# Patient Record
Sex: MALE | Race: WHITE | NOT HISPANIC OR LATINO | Employment: OTHER | URBAN - METROPOLITAN AREA
[De-identification: names, ages, dates, MRNs, and addresses within clinical notes are randomized per-mention and may not be internally consistent; named-entity substitution may affect disease eponyms.]

---

## 2018-06-01 ENCOUNTER — TRANSCRIBE ORDERS (OUTPATIENT)
Dept: LAB | Facility: HOSPITAL | Age: 73
End: 2018-06-01

## 2018-06-01 ENCOUNTER — APPOINTMENT (OUTPATIENT)
Dept: LAB | Facility: HOSPITAL | Age: 73
End: 2018-06-01
Attending: INTERNAL MEDICINE
Payer: MEDICARE

## 2018-06-01 DIAGNOSIS — E78.5 HYPERLIPOPROTEINEMIA: ICD-10-CM

## 2018-06-01 DIAGNOSIS — J45.909 ALLERGIC ASTHMA WITH STATED CAUSE: ICD-10-CM

## 2018-06-01 DIAGNOSIS — E78.5 HYPERLIPOPROTEINEMIA: Primary | ICD-10-CM

## 2018-06-01 DIAGNOSIS — D53.9 SIMPLE CHRONIC ANEMIA: ICD-10-CM

## 2018-06-01 LAB
ALBUMIN SERPL-MCNC: 3.6 G/DL (ref 3.4–5)
ALP SERPL-CCNC: 45 IU/L (ref 35–126)
ALT SERPL-CCNC: 28 IU/L (ref 16–63)
ANION GAP SERPL CALC-SCNC: 6 MEQ/L (ref 3–15)
AST SERPL-CCNC: 41 IU/L (ref 15–41)
BASOPHILS # BLD: 0.01 K/UL (ref 0.01–0.1)
BASOPHILS NFR BLD: 0.2 %
BILIRUB SERPL-MCNC: 0.9 MG/DL (ref 0.3–1.2)
BUN SERPL-MCNC: 20 MG/DL (ref 8–20)
CALCIUM SERPL-MCNC: 8.8 MG/DL (ref 8.9–10.3)
CHLORIDE SERPL-SCNC: 104 MMOL/L (ref 98–109)
CHOLEST SERPL-MCNC: 148 MG/DL
CO2 SERPL-SCNC: 26 MMOL/L (ref 22–32)
CREAT SERPL-MCNC: 1 MG/DL (ref 0.8–1.3)
DIFFERENTIAL METHOD BLD: ABNORMAL
EOSINOPHIL # BLD: 0.02 K/UL (ref 0.04–0.54)
EOSINOPHIL NFR BLD: 0.3 %
ERYTHROCYTE [DISTWIDTH] IN BLOOD BY AUTOMATED COUNT: 13.7 % (ref 11.6–14.4)
GFR SERPL CREATININE-BSD FRML MDRD: >60 ML/MIN/1.73M*2
GLUCOSE SERPL-MCNC: 88 MG/DL (ref 70–99)
HCT VFR BLDCO AUTO: 35.7 % (ref 40–51)
HDLC SERPL-MCNC: 55 MG/DL
HDLC SERPL: 2.7 {RATIO}
HGB BLD-MCNC: 12.3 G/DL (ref 13.7–17.5)
IMM GRANULOCYTES # BLD AUTO: 0.02 K/UL (ref 0–0.08)
IMM GRANULOCYTES NFR BLD AUTO: 0.3 %
LDLC SERPL CALC-MCNC: 85 MG/DL
LYMPHOCYTES # BLD: 1.32 K/UL (ref 1.2–3.5)
LYMPHOCYTES NFR BLD: 20.3 %
MCH RBC QN AUTO: 32.3 PG (ref 28–33.2)
MCHC RBC AUTO-ENTMCNC: 34.5 G/DL (ref 32.2–36.5)
MCV RBC AUTO: 93.7 FL (ref 83–98)
MONOCYTES # BLD: 0.42 K/UL (ref 0.3–1)
MONOCYTES NFR BLD: 6.5 %
NEUTROPHILS # BLD: 4.72 K/UL (ref 1.7–7)
NEUTS SEG NFR BLD: 72.4 %
NONHDLC SERPL-MCNC: 93 MG/DL
NRBC BLD-RTO: 0 %
PDW BLD AUTO: 10.4 FL (ref 9.4–12.4)
PLATELET # BLD AUTO: 159 K/UL (ref 150–350)
POTASSIUM SERPL-SCNC: 3.8 MMOL/L (ref 3.6–5.1)
PROT SERPL-MCNC: 6.6 G/DL (ref 6–8.2)
PSA SERPL-MCNC: 2.26 NG/ML
RBC # BLD AUTO: 3.81 M/UL (ref 4.5–5.8)
SODIUM SERPL-SCNC: 136 MMOL/L (ref 136–144)
TRIGL SERPL-MCNC: 41 MG/DL (ref 30–149)
WBC # BLD AUTO: 6.51 K/UL (ref 3.8–10.5)

## 2018-06-01 PROCEDURE — 85025 COMPLETE CBC W/AUTO DIFF WBC: CPT

## 2018-06-01 PROCEDURE — 80061 LIPID PANEL: CPT

## 2018-06-01 PROCEDURE — 36415 COLL VENOUS BLD VENIPUNCTURE: CPT

## 2018-06-01 PROCEDURE — 84132 ASSAY OF SERUM POTASSIUM: CPT

## 2018-06-01 PROCEDURE — G0103 PSA SCREENING: HCPCS

## 2018-06-05 ENCOUNTER — LAB REQUISITION (OUTPATIENT)
Dept: LAB | Facility: HOSPITAL | Age: 73
End: 2018-06-05
Attending: INTERNAL MEDICINE
Payer: MEDICARE

## 2018-06-05 DIAGNOSIS — D64.9 ANEMIA: ICD-10-CM

## 2018-06-05 DIAGNOSIS — E07.9 DISORDER OF THYROID: ICD-10-CM

## 2018-06-05 DIAGNOSIS — D47.2 MONOCLONAL GAMMOPATHY: ICD-10-CM

## 2018-06-05 PROCEDURE — 36415 COLL VENOUS BLD VENIPUNCTURE: CPT | Performed by: INTERNAL MEDICINE

## 2018-06-05 PROCEDURE — 84165 PROTEIN E-PHORESIS SERUM: CPT | Performed by: INTERNAL MEDICINE

## 2018-06-06 LAB — EXTRA TUBES HOLD SPECIMEN: NORMAL

## 2018-06-07 LAB
ALBUMIN MFR UR ELPH: 46.3 % (ref 48–62)
ALBUMIN SERPL ELPH-MCNC: 3.33 G/DL (ref 3.2–4.5)
ALBUMIN/GLOB SERPL: 0.9 {RATIO} (ref 1.1–2.1)
ALPHA1 GLOB MFR SERPL ELPH: 2.9 % (ref 2.1–4.8)
ALPHA1 GLOB SERPL ELPH-MCNC: 0.21 G/DL (ref 0.15–0.32)
ALPHA2 GLOB MFR UR ELPH: 13.8 % (ref 9.7–16.2)
ALPHA2 GLOB SERPL ELPH-MCNC: 0.99 G/DL (ref 0.7–1.1)
B-GLOBULIN SERPL ELPH-MCNC: 0.7 G/DL (ref 0.73–1.3)
BETA1 GLOB MFR UR ELPH: 9.8 % (ref 10.8–17.9)
GAMMA GLOB MFR UR ELPH: 27.3 % (ref 9–23)
GAMMA GLOB SERPL ELPH-MCNC: 1.97 G/DL (ref 0.6–1.6)
M PROTEIN MFR SERPL ELPH: 24.5 %
M PROTEIN SERPL ELPH-MCNC: 1.76 G/DL
PROT PATTERN SERPL ELPH-IMP: NORMAL
PROT SERPL-MCNC: 7.2 G/DL (ref 6–8.2)

## 2018-07-17 ENCOUNTER — TRANSCRIBE ORDERS (OUTPATIENT)
Dept: SCHEDULING | Age: 73
End: 2018-07-17

## 2018-07-17 DIAGNOSIS — R31.0 GROSS HEMATURIA: Primary | ICD-10-CM

## 2018-10-30 ENCOUNTER — LAB REQUISITION (OUTPATIENT)
Dept: LAB | Facility: HOSPITAL | Age: 73
End: 2018-10-30
Attending: INTERNAL MEDICINE
Payer: MEDICARE

## 2018-10-30 DIAGNOSIS — E07.9 DISORDER OF THYROID: ICD-10-CM

## 2018-10-30 DIAGNOSIS — D47.2 MONOCLONAL GAMMOPATHY: ICD-10-CM

## 2018-10-30 DIAGNOSIS — D64.9 ANEMIA: ICD-10-CM

## 2018-10-30 LAB
ALBUMIN SERPL-MCNC: 3.6 G/DL (ref 3.4–5)
ALP SERPL-CCNC: 54 IU/L (ref 35–126)
ALT SERPL-CCNC: 26 IU/L (ref 16–63)
ANION GAP SERPL CALC-SCNC: 6 MEQ/L (ref 3–15)
AST SERPL-CCNC: 41 IU/L (ref 15–41)
BASOPHILS # BLD: 0.01 K/UL (ref 0.01–0.1)
BASOPHILS NFR BLD: 0.2 %
BILIRUB SERPL-MCNC: 0.7 MG/DL (ref 0.3–1.2)
BUN SERPL-MCNC: 15 MG/DL (ref 8–20)
CALCIUM SERPL-MCNC: 9.2 MG/DL (ref 8.9–10.3)
CHLORIDE SERPL-SCNC: 104 MEQ/L (ref 98–109)
CO2 SERPL-SCNC: 27 MEQ/L (ref 22–32)
CREAT SERPL-MCNC: 1 MG/DL (ref 0.8–1.3)
DIFFERENTIAL METHOD BLD: NORMAL
EOSINOPHIL # BLD: 0.06 K/UL (ref 0.04–0.54)
EOSINOPHIL NFR BLD: 1.1 %
ERYTHROCYTE [DISTWIDTH] IN BLOOD BY AUTOMATED COUNT: 13.8 % (ref 11.6–14.4)
GFR SERPL CREATININE-BSD FRML MDRD: >60 ML/MIN/1.73M*2
GLUCOSE SERPL-MCNC: 103 MG/DL (ref 70–99)
HCT VFR BLDCO AUTO: 34.9 % (ref 40.1–51)
HGB BLD-MCNC: 12 G/DL (ref 13.7–17.5)
IMM GRANULOCYTES # BLD AUTO: 0.01 K/UL (ref 0–0.08)
IMM GRANULOCYTES NFR BLD AUTO: 0.2 %
LYMPHOCYTES # BLD: 1.42 K/UL (ref 1.2–3.5)
LYMPHOCYTES NFR BLD: 26 %
MCH RBC QN AUTO: 32.9 PG (ref 28–33.2)
MCHC RBC AUTO-ENTMCNC: 34.4 G/DL (ref 32.2–36.5)
MCV RBC AUTO: 95.6 FL (ref 83–98)
MONOCYTES # BLD: 0.32 K/UL (ref 0.3–1)
MONOCYTES NFR BLD: 5.9 %
NEUTROPHILS # BLD: 3.65 K/UL (ref 1.7–7)
NEUTS SEG NFR BLD: 66.6 %
NRBC BLD-RTO: 0 %
PDW BLD AUTO: 9.4 FL (ref 9.4–12.4)
PLATELET # BLD AUTO: 166 K/UL (ref 150–350)
POTASSIUM SERPL-SCNC: 4.3 MEQ/L (ref 3.6–5.1)
PROT SERPL-MCNC: 8 G/DL (ref 6–8.2)
RBC # BLD AUTO: 3.65 M/UL (ref 4.5–5.8)
SODIUM SERPL-SCNC: 137 MEQ/L (ref 136–144)
WBC # BLD AUTO: 5.47 K/UL (ref 3.8–10.5)

## 2018-10-30 PROCEDURE — 84165 PROTEIN E-PHORESIS SERUM: CPT | Performed by: INTERNAL MEDICINE

## 2018-10-30 PROCEDURE — 85025 COMPLETE CBC W/AUTO DIFF WBC: CPT | Performed by: INTERNAL MEDICINE

## 2018-10-30 PROCEDURE — 80053 COMPREHEN METABOLIC PANEL: CPT | Performed by: INTERNAL MEDICINE

## 2018-10-30 PROCEDURE — 36415 COLL VENOUS BLD VENIPUNCTURE: CPT | Performed by: INTERNAL MEDICINE

## 2018-10-31 LAB
ALBUMIN MFR UR ELPH: 43.2 % (ref 48–62)
ALBUMIN SERPL ELPH-MCNC: 3.28 G/DL (ref 3.2–4.5)
ALBUMIN/GLOB SERPL: 0.8 {RATIO} (ref 1.1–2.1)
ALPHA1 GLOB MFR SERPL ELPH: 3 % (ref 2.1–4.8)
ALPHA1 GLOB SERPL ELPH-MCNC: 0.23 G/DL (ref 0.15–0.32)
ALPHA2 GLOB MFR UR ELPH: 12.9 % (ref 9.7–16.2)
ALPHA2 GLOB SERPL ELPH-MCNC: 0.98 G/DL (ref 0.7–1.1)
B-GLOBULIN SERPL ELPH-MCNC: 0.8 G/DL (ref 0.73–1.3)
BETA1 GLOB MFR UR ELPH: 10.5 % (ref 10.8–17.9)
GAMMA GLOB MFR UR ELPH: 30.5 % (ref 9–23)
GAMMA GLOB SERPL ELPH-MCNC: 2.31 G/DL (ref 0.6–1.6)
M PROTEIN MFR SERPL ELPH: 27.5 %
M PROTEIN SERPL ELPH-MCNC: 2.09 G/DL
PROT PATTERN SERPL ELPH-IMP: NORMAL
PROT SERPL-MCNC: 7.6 G/DL (ref 6–8.2)

## 2019-03-19 ENCOUNTER — APPOINTMENT (OUTPATIENT)
Dept: PREADMISSION TESTING | Facility: HOSPITAL | Age: 74
End: 2019-03-19
Attending: UROLOGY
Payer: MEDICARE

## 2019-03-19 ENCOUNTER — APPOINTMENT (OUTPATIENT)
Dept: LAB | Facility: HOSPITAL | Age: 74
End: 2019-03-19
Attending: UROLOGY
Payer: MEDICARE

## 2019-03-19 ENCOUNTER — TRANSCRIBE ORDERS (OUTPATIENT)
Dept: LAB | Facility: HOSPITAL | Age: 74
End: 2019-03-19

## 2019-03-19 VITALS
WEIGHT: 158.6 LBS | SYSTOLIC BLOOD PRESSURE: 133 MMHG | DIASTOLIC BLOOD PRESSURE: 60 MMHG | OXYGEN SATURATION: 97 % | RESPIRATION RATE: 16 BRPM | HEART RATE: 65 BPM | TEMPERATURE: 96.9 F | HEIGHT: 63 IN | BODY MASS INDEX: 28.1 KG/M2

## 2019-03-19 DIAGNOSIS — R33.9 RETENTION OF URINE: ICD-10-CM

## 2019-03-19 DIAGNOSIS — R33.9 RETENTION OF URINE: Primary | ICD-10-CM

## 2019-03-19 DIAGNOSIS — Z01.818 ENCOUNTER FOR PREADMISSION TESTING: Primary | ICD-10-CM

## 2019-03-19 LAB
ANION GAP SERPL CALC-SCNC: 8 MEQ/L (ref 3–15)
APTT PPP: 28 SEC (ref 23–35)
ATRIAL RATE: 61
BUN SERPL-MCNC: 21 MG/DL (ref 8–20)
CALCIUM SERPL-MCNC: 9.1 MG/DL (ref 8.9–10.3)
CHLORIDE SERPL-SCNC: 105 MEQ/L (ref 98–109)
CO2 SERPL-SCNC: 25 MEQ/L (ref 22–32)
CREAT SERPL-MCNC: 0.9 MG/DL
ERYTHROCYTE [DISTWIDTH] IN BLOOD BY AUTOMATED COUNT: 13.8 % (ref 11.6–14.4)
GFR SERPL CREATININE-BSD FRML MDRD: >60 ML/MIN/1.73M*2
GLUCOSE SERPL-MCNC: 93 MG/DL (ref 70–99)
HCT VFR BLDCO AUTO: 33.8 %
HGB BLD-MCNC: 11.4 G/DL
INR PPP: 1 INR
MCH RBC QN AUTO: 32.9 PG (ref 28–33.2)
MCHC RBC AUTO-ENTMCNC: 33.7 G/DL (ref 32.2–36.5)
MCV RBC AUTO: 97.4 FL (ref 83–98)
P AXIS: 0
PDW BLD AUTO: 10.3 FL (ref 9.4–12.4)
PLATELET # BLD AUTO: 212 K/UL
POTASSIUM SERPL-SCNC: 4.2 MEQ/L (ref 3.6–5.1)
PR INTERVAL: 214
PROTHROMBIN TIME: 12.3 SEC (ref 12.2–14.5)
QRS DURATION: 140
QT INTERVAL: 458
QTC CALCULATION(BAZETT): 461
R AXIS: -31
RBC # BLD AUTO: 3.47 M/UL (ref 4.5–5.8)
SODIUM SERPL-SCNC: 138 MEQ/L (ref 136–144)
T WAVE AXIS: 21
VENTRICULAR RATE: 61
WBC # BLD AUTO: 5.47 K/UL

## 2019-03-19 PROCEDURE — 36415 COLL VENOUS BLD VENIPUNCTURE: CPT

## 2019-03-19 PROCEDURE — 93005 ELECTROCARDIOGRAM TRACING: CPT

## 2019-03-19 PROCEDURE — 93010 ELECTROCARDIOGRAM REPORT: CPT | Performed by: INTERNAL MEDICINE

## 2019-03-19 PROCEDURE — 87086 URINE CULTURE/COLONY COUNT: CPT

## 2019-03-19 PROCEDURE — 80048 BASIC METABOLIC PNL TOTAL CA: CPT | Mod: 59

## 2019-03-19 PROCEDURE — 85610 PROTHROMBIN TIME: CPT | Mod: GZ

## 2019-03-19 PROCEDURE — 85730 THROMBOPLASTIN TIME PARTIAL: CPT | Mod: DBM

## 2019-03-19 PROCEDURE — 85027 COMPLETE CBC AUTOMATED: CPT

## 2019-03-19 RX ORDER — TAMSULOSIN HYDROCHLORIDE 0.4 MG/1
0.4 CAPSULE ORAL 2 TIMES DAILY
Refills: 3 | Status: ON HOLD | COMMUNITY
Start: 2019-01-28 | End: 2021-11-19 | Stop reason: SDUPTHER

## 2019-03-19 RX ORDER — SODIUM FLUORIDE 1.1 G/100G
CREAM ORAL
Refills: 3 | COMMUNITY
Start: 2019-02-28 | End: 2021-10-31

## 2019-03-19 RX ORDER — SIMVASTATIN 20 MG/1
20 TABLET, FILM COATED ORAL NIGHTLY
COMMUNITY
Start: 2019-02-13 | End: 2019-10-08 | Stop reason: ALTCHOICE

## 2019-03-19 RX ORDER — CYCLOSPORINE 0.5 MG/ML
1 EMULSION OPHTHALMIC 2 TIMES DAILY
COMMUNITY
End: 2021-10-31

## 2019-03-19 ASSESSMENT — ENCOUNTER SYMPTOMS
DIFFICULTY URINATING: 1
RESPIRATORY NEGATIVE: 1
DYSURIA: 1
CARDIOVASCULAR NEGATIVE: 1

## 2019-03-19 NOTE — H&P
History and Physical  Pre-admission testing         HISTORY OF PRESENT ILLNESS      Eulalio Gregg is an 73 y.o. male with a past medical history of BPH who presents with urinary obstruction. He is scheduled for Cystoscopy Urolift [76868 (CPT®)]    PAST MEDICAL AND SURGICAL HISTORY      Past Medical History:   Diagnosis Date   • Anemia    • Blepharospasm    • BPH (benign prostatic hyperplasia)    • History of vertigo 2016   • Lipid disorder    • Tendency toward bleeding easily (CMS/HCC) (HCC)        Past Surgical History:   Procedure Laterality Date   • CATARACT EXTRACTION W/  INTRAOCULAR LENS IMPLANT     • CHOLECYSTECTOMY  1992   • HERNIA REPAIR         PROBLEM LIST     Patient Active Problem List   Diagnosis   • BPH (benign prostatic hyperplasia)       MEDICATIONS        Current Outpatient Prescriptions:   •  cycloSPORINE (RESTASIS) 0.05 % ophthalmic emulsion, Administer 1 drop into both eyes 2 (two) times a day., Disp: , Rfl:   •  multivit-min/FA/lycopen/lutein (CENTRUM SILVER MEN ORAL), Take by mouth daily., Disp: , Rfl:   •  TURMERIC ORAL, Take by mouth daily., Disp: , Rfl:   •  DENTA 5000 PLUS 1.1 % cream, 2 (two) times a day. as directed, Disp: , Rfl: 3  •  simvastatin (ZOCOR) 20 mg tablet, Take 20 mg by mouth nightly.  , Disp: , Rfl:   •  tamsulosin (FLOMAX) 0.4 mg capsule, 1 tablet 2 times daily, Disp: , Rfl: 3    ALLERGIES      Allergies   Allergen Reactions   • Penicillins Rash     amoxicllin only -rash . Patient can tolerate PCN       FAMILY HISTORY      No family history on file.    SOCIAL HISTORY      Social History     Social History   • Marital status: Single     Spouse name: N/A   • Number of children: N/A   • Years of education: N/A     Occupational History   • Not on file.     Social History Main Topics   • Smoking status: Former Smoker     Packs/day: 1.00     Years: 10.00     Types: Cigarettes     Quit date: 1972   • Smokeless tobacco: Never Used   • Alcohol use Yes      Comment: 3-4  "drinks/week   • Drug use: No   • Sexual activity: Not on file     Other Topics Concern   • Not on file     Social History Narrative   • No narrative on file       REVIEW OF SYSTEMS      Review of Systems   Respiratory: Negative.    Cardiovascular: Negative.    Genitourinary: Positive for difficulty urinating, dysuria and urgency.       PHYSICAL EXAMINATION      /60 (BP Location: Right upper arm, Patient Position: Sitting)   Pulse 65   Temp (!) 36.1 °C (96.9 °F)   Resp 16   Ht 1.6 m (5' 3\")   Wt 71.9 kg (158 lb 9.6 oz)   SpO2 97%   BMI 28.09 kg/m²   Body mass index is 28.09 kg/m².    Physical Exam   Constitutional: He is oriented to person, place, and time. He appears well-developed and well-nourished.   HENT:   Head: Normocephalic and atraumatic.   Right Ear: External ear normal.   Left Ear: External ear normal.   Mouth/Throat: Oropharynx is clear and moist.   Eyes: Conjunctivae and EOM are normal. Pupils are equal, round, and reactive to light.   Neck: Normal range of motion. Neck supple.   Cardiovascular: Normal rate, regular rhythm, normal heart sounds and intact distal pulses.    Pulses:       Dorsalis pedis pulses are 1+ on the right side, and 1+ on the left side.        Posterior tibial pulses are 1+ on the right side, and 1+ on the left side.   Pulmonary/Chest: Effort normal and breath sounds normal.   Abdominal: Soft. Bowel sounds are normal.   Genitourinary:   Genitourinary Comments: deferred   Musculoskeletal: Normal range of motion.   Neurological: He is alert and oriented to person, place, and time.   Skin: Skin is warm and dry.   Psychiatric: He has a normal mood and affect. His behavior is normal. Judgment and thought content normal.   Nursing note and vitals reviewed.         CARSON Orozco  3/19/2019     "

## 2019-03-19 NOTE — PRE-PROCEDURE INSTRUCTIONS
1. We will call you between 3 pm and 7 pm on March 27, 2019 to determine that arrival time for your procedure. If you do not hear by 4:30 PM. Please call [phone] for arrival time.    2. Please report to [address] vel, walk into [place] and report to the admission desk on first floor on the day of your procedure.   3. Please follow the following fasting guidelines:   Nothing to eat or drink after midnight unless otherwise instructed by  your physician. No gum mints candy. Brush teeth/Rinse Mouth   4.    5. Other Instructions: Stop supplements today-   6. If you develop a cold, cough, fever, rash, or other symptom prior to the data of the procedure, please report it to your physician immediately.   7. If you need to cancel the procedure for any reason, please contact your physician or call the unit listed above.   8. Make arrangements to have someone drive you home from the procedure. If you have not arranged for transportation home, your surgery may be cancelled.    9. You may not take public transportation unless accompanied by a responsible person.   10. You may not drive a car or operate complex or potentially dangerous machinery for 24 hours following anesthesia and/or sedation.   11. If it is medically necessary for you to have a longer stay, you will be informed as soon as the decision is made.   12. Do not wear or bring anything of value to the hospital including jewelry of any kind. Do not wear make-up or contact lenses. DO bring your glasses and hearing aid.   13. No lotion, creams, powders, or oils on skin the morning of procedure    14. Dress in comfortable clothes.   15.  If instructed, please bring a copy of your Advanced Directive (Living Will/Durable Power of ) on the day of your procedure.      Pre operative instructions given as per protocol.  Form explained by: CARSON Orozco     I have read and understand the above information. I have had sufficient opportunity to  ask questions I might have and they have been answered to my satisfaction. I agree to comply with the Patient Responsibilities listed above and have received a copy of this form.

## 2019-03-20 LAB — BACTERIA UR CULT: NORMAL

## 2019-03-28 ENCOUNTER — ANESTHESIA EVENT (OUTPATIENT)
Dept: OPERATING ROOM | Facility: HOSPITAL | Age: 74
Setting detail: HOSPITAL OUTPATIENT SURGERY
End: 2019-03-28
Payer: MEDICARE

## 2019-03-28 ENCOUNTER — HOSPITAL ENCOUNTER (OUTPATIENT)
Facility: HOSPITAL | Age: 74
Setting detail: HOSPITAL OUTPATIENT SURGERY
Discharge: HOME | End: 2019-03-28
Attending: UROLOGY | Admitting: UROLOGY
Payer: MEDICARE

## 2019-03-28 VITALS
HEIGHT: 63 IN | SYSTOLIC BLOOD PRESSURE: 144 MMHG | OXYGEN SATURATION: 100 % | TEMPERATURE: 97.8 F | WEIGHT: 156 LBS | HEART RATE: 57 BPM | DIASTOLIC BLOOD PRESSURE: 58 MMHG | RESPIRATION RATE: 20 BRPM | BODY MASS INDEX: 27.64 KG/M2

## 2019-03-28 PROCEDURE — C1889 IMPLANT/INSERT DEVICE, NOC: HCPCS | Performed by: UROLOGY

## 2019-03-28 PROCEDURE — 36000012 HC OR LEVEL 2 EA ADDL MIN: Performed by: UROLOGY

## 2019-03-28 PROCEDURE — 25800000 HC PHARMACY IV SOLUTIONS: Performed by: NURSE ANESTHETIST, CERTIFIED REGISTERED

## 2019-03-28 PROCEDURE — 71000012 HC PACU PHASE 2 EA ADDL MIN: Performed by: UROLOGY

## 2019-03-28 PROCEDURE — 25000000 HC PHARMACY GENERAL: Performed by: NURSE ANESTHETIST, CERTIFIED REGISTERED

## 2019-03-28 PROCEDURE — 37000002 HC ANESTHESIA MAC: Performed by: UROLOGY

## 2019-03-28 PROCEDURE — 36000002 HC OR LEVEL 2 INITIAL 30MIN: Performed by: UROLOGY

## 2019-03-28 PROCEDURE — 71000001 HC PACU PHASE 1 INITIAL 30MIN: Performed by: UROLOGY

## 2019-03-28 PROCEDURE — 63600000 HC DRUGS/DETAIL CODE: Performed by: NURSE ANESTHETIST, CERTIFIED REGISTERED

## 2019-03-28 PROCEDURE — 71000011 HC PACU PHASE 1 EA ADDL MIN: Performed by: UROLOGY

## 2019-03-28 PROCEDURE — 71000002 HC PACU PHASE 2 INITIAL 30MIN: Performed by: UROLOGY

## 2019-03-28 PROCEDURE — 0T7D8DZ DILATION OF URETHRA WITH INTRALUMINAL DEVICE, VIA NATURAL OR ARTIFICIAL OPENING ENDOSCOPIC: ICD-10-PCS | Performed by: UROLOGY

## 2019-03-28 DEVICE — UROLIFT SYSTEM IMPLANTS: Type: IMPLANTABLE DEVICE | Status: FUNCTIONAL

## 2019-03-28 RX ORDER — IBUPROFEN 200 MG
16-32 TABLET ORAL AS NEEDED
Status: CANCELLED | OUTPATIENT
Start: 2019-03-28 | End: 2019-06-26

## 2019-03-28 RX ORDER — PROPOFOL 200MG/20ML
SYRINGE (ML) INTRAVENOUS AS NEEDED
Status: DISCONTINUED | OUTPATIENT
Start: 2019-03-28 | End: 2019-03-28 | Stop reason: SURG

## 2019-03-28 RX ORDER — ONDANSETRON HYDROCHLORIDE 2 MG/ML
INJECTION, SOLUTION INTRAVENOUS AS NEEDED
Status: DISCONTINUED | OUTPATIENT
Start: 2019-03-28 | End: 2019-03-28 | Stop reason: SURG

## 2019-03-28 RX ORDER — SODIUM CHLORIDE 9 MG/ML
INJECTION, SOLUTION INTRAVENOUS CONTINUOUS
Status: CANCELLED | OUTPATIENT
Start: 2019-03-28 | End: 2019-03-29

## 2019-03-28 RX ORDER — FENTANYL CITRATE 50 UG/ML
50 INJECTION, SOLUTION INTRAMUSCULAR; INTRAVENOUS
Status: DISCONTINUED | OUTPATIENT
Start: 2019-03-28 | End: 2019-03-28 | Stop reason: HOSPADM

## 2019-03-28 RX ORDER — MIDAZOLAM HYDROCHLORIDE 2 MG/2ML
INJECTION, SOLUTION INTRAMUSCULAR; INTRAVENOUS AS NEEDED
Status: DISCONTINUED | OUTPATIENT
Start: 2019-03-28 | End: 2019-03-28 | Stop reason: SURG

## 2019-03-28 RX ORDER — FLUMAZENIL 0.1 MG/ML
INJECTION INTRAVENOUS AS NEEDED
Status: DISCONTINUED | OUTPATIENT
Start: 2019-03-28 | End: 2019-03-28 | Stop reason: SURG

## 2019-03-28 RX ORDER — HYDROMORPHONE HYDROCHLORIDE 2 MG/ML
0.5 INJECTION, SOLUTION INTRAMUSCULAR; INTRAVENOUS; SUBCUTANEOUS
Status: DISCONTINUED | OUTPATIENT
Start: 2019-03-28 | End: 2019-03-28 | Stop reason: HOSPADM

## 2019-03-28 RX ORDER — DEXTROSE 50 % IN WATER (D50W) INTRAVENOUS SYRINGE
25 AS NEEDED
Status: CANCELLED | OUTPATIENT
Start: 2019-03-28 | End: 2019-06-26

## 2019-03-28 RX ORDER — LIDOCAINE HCL/PF 100 MG/5ML
SYRINGE (ML) INTRAVENOUS AS NEEDED
Status: DISCONTINUED | OUTPATIENT
Start: 2019-03-28 | End: 2019-03-28 | Stop reason: SURG

## 2019-03-28 RX ORDER — ACETAMINOPHEN 325 MG/1
650 TABLET ORAL EVERY 4 HOURS PRN
Status: CANCELLED | OUTPATIENT
Start: 2019-03-28

## 2019-03-28 RX ORDER — NALOXONE HYDROCHLORIDE 0.4 MG/ML
INJECTION, SOLUTION INTRAMUSCULAR; INTRAVENOUS; SUBCUTANEOUS AS NEEDED
Status: DISCONTINUED | OUTPATIENT
Start: 2019-03-28 | End: 2019-03-28 | Stop reason: SURG

## 2019-03-28 RX ORDER — SODIUM CHLORIDE 9 MG/ML
INJECTION, SOLUTION INTRAVENOUS CONTINUOUS PRN
Status: DISCONTINUED | OUTPATIENT
Start: 2019-03-28 | End: 2019-03-28 | Stop reason: SURG

## 2019-03-28 RX ORDER — ONDANSETRON HYDROCHLORIDE 2 MG/ML
4 INJECTION, SOLUTION INTRAVENOUS
Status: DISCONTINUED | OUTPATIENT
Start: 2019-03-28 | End: 2019-03-28 | Stop reason: HOSPADM

## 2019-03-28 RX ORDER — DEXTROSE 40 %
15-30 GEL (GRAM) ORAL AS NEEDED
Status: CANCELLED | OUTPATIENT
Start: 2019-03-28 | End: 2019-06-26

## 2019-03-28 RX ORDER — FENTANYL CITRATE 50 UG/ML
INJECTION, SOLUTION INTRAMUSCULAR; INTRAVENOUS AS NEEDED
Status: DISCONTINUED | OUTPATIENT
Start: 2019-03-28 | End: 2019-03-28 | Stop reason: SURG

## 2019-03-28 RX ADMIN — FLUMAZENIL 0.2 MG: 0.1 INJECTION, SOLUTION INTRAVENOUS at 11:33

## 2019-03-28 RX ADMIN — LIDOCAINE HYDROCHLORIDE 100 MG: 20 INJECTION, SOLUTION INTRAVENOUS at 10:55

## 2019-03-28 RX ADMIN — ONDANSETRON 4 MG: 2 INJECTION INTRAMUSCULAR; INTRAVENOUS at 10:55

## 2019-03-28 RX ADMIN — NALOXONE HYDROCHLORIDE 40 MCG: 0.4 INJECTION, SOLUTION INTRAMUSCULAR; INTRAVENOUS; SUBCUTANEOUS at 11:35

## 2019-03-28 RX ADMIN — MIDAZOLAM HYDROCHLORIDE 2 MG: 1 INJECTION, SOLUTION INTRAMUSCULAR; INTRAVENOUS at 10:55

## 2019-03-28 RX ADMIN — PROPOFOL 20 MG: 10 INJECTION, EMULSION INTRAVENOUS at 10:55

## 2019-03-28 RX ADMIN — SODIUM CHLORIDE: 9 INJECTION, SOLUTION INTRAVENOUS at 11:02

## 2019-03-28 RX ADMIN — PROPOFOL 50 MG: 10 INJECTION, EMULSION INTRAVENOUS at 11:02

## 2019-03-28 RX ADMIN — FENTANYL CITRATE 50 MCG: 50 INJECTION, SOLUTION INTRAMUSCULAR; INTRAVENOUS at 10:55

## 2019-03-28 RX ADMIN — NALOXONE HYDROCHLORIDE 40 MCG: 0.4 INJECTION, SOLUTION INTRAMUSCULAR; INTRAVENOUS; SUBCUTANEOUS at 11:33

## 2019-03-28 ASSESSMENT — PAIN - FUNCTIONAL ASSESSMENT: PAIN_FUNCTIONAL_ASSESSMENT: NO/DENIES PAIN

## 2019-03-28 NOTE — ANESTHESIA PROCEDURE NOTES
Airway  Urgency: elective    Start Time: 3/28/2019 11:03 AM    General Information and Staff    Patient location during procedure: OR  Anesthesiologist: RHONA HAMM  Resident/CRNA: JUSTYNA AVILA  Performed: resident/CRNA     Indications and Patient Condition  Indications for airway management: anesthesia  Sedation level: deep  Preoxygenated: yes  Mask difficulty assessment: 2 - vent by mask + OA or adjuvant +/- NMBA    Final Airway Details  Final airway type: supraglottic airway (LMA)      Successful airway: iGel  Size 5    Number of attempts at approach: 1  Number of other approaches attempted: 1  Atraumatic airway insertion

## 2019-03-28 NOTE — OR SURGEON
Pre-Procedure patient identification:  I am the primary operating surgeon/proceduralist and I have identified the patient on 03/28/19 at 8:29 AM Villa Mathew MD  Phone Number: [phone]

## 2019-03-28 NOTE — PERIOPERATIVE NURSING NOTE
RN spoke with Dr Mathew by phone, pt unable to void, pt has 400ml urine in bladder as per bladder scanner. Dr Mathew verbal ordered that pt go home with bladder catheter to leg bag. 16 fr catheter inserted by rn under sterile technique, pt had 400 ml bloody urine out. Leg bag connected to catheter, teaching done with pt and spouse as to how to care for the catheter and leg bag. Return demonstration completed. Pt discharged to home. Pt will call dr mathew office tomorrow 3/29 to have catheter removed.

## 2019-03-28 NOTE — DISCHARGE INSTRUCTIONS
Resume all medications  cipro sent to pharmacy  Call if temp>100.8; bleeding; pain or retention  Ambulate daily

## 2019-03-28 NOTE — ANESTHESIA PREPROCEDURE EVALUATION
Anesthesia ROS/MED HX      Cardiovascular   dyslipidemia  Comments: BEARD  Renal Disease   BPH      Past Surgical History:   Procedure Laterality Date   • CATARACT EXTRACTION W/  INTRAOCULAR LENS IMPLANT     • CHOLECYSTECTOMY  1992   • HERNIA REPAIR       Patient Active Problem List   Diagnosis   • BPH (benign prostatic hyperplasia)       No current facility-administered medications for this encounter.        Prior to Admission medications    Medication Sig Start Date End Date Taking? Authorizing Provider   cycloSPORINE (RESTASIS) 0.05 % ophthalmic emulsion Administer 1 drop into both eyes 2 (two) times a day.    Jessi Smith MD   DENTA 5000 PLUS 1.1 % cream 2 (two) times a day. as directed 2/28/19   Jessi Smith MD   multivit-min/FA/lycopen/lutein (CENTRUM SILVER MEN ORAL) Take by mouth daily.    Jessi Smith MD   simvastatin (ZOCOR) 20 mg tablet Take 20 mg by mouth nightly.   2/13/19   Jessi Smith MD   tamsulosin (FLOMAX) 0.4 mg capsule 1 tablet 2 times daily 1/28/19   Jessi Smith MD   TURMERIC ORAL Take by mouth daily.    Jessi Smith MD       CBC Results       03/19/19 10/30/18 06/01/18                    1409 1309 0820         WBC 5.47 5.47 6.51         RBC 3.47 (L) 3.65 (L) 3.81 (L)         HGB 11.4 (L) 12.0 (L) 12.3 (L)         HCT 33.8 (L) 34.9 (L) 35.7 (L)         MCV 97.4 95.6 93.7         MCH 32.9 32.9 32.3         MCHC 33.7 34.4 34.5          166 159                       BMP Results       03/19/19 10/30/18 06/01/18                    1409 1309 0820          137 136         K 4.2 4.3 3.8         Cl 105 104 104         CO2 25 27 26         Glucose 93 103 (H) 88         BUN 21 (H) 15 20         Creatinine 0.9 1.0 1.0         Calcium 9.1 9.2 8.8 (L)         Anion Gap 8 6 6         EGFR &gt;60.0 &gt;60.0 &gt;60.0                       Physical Exam    Airway   Mallampati: II   TM distance: >3 FB   Neck ROM: full  Cardiovascular - normal    Rhythm: regular   Rate: normal  Pulmonary - normal   clear to auscultation  Other Findings   BEARD  Dental    Teeth Problems: chipped and missing          Anesthesia Plan    Plan: MAC    Technique: MAC     Lines and Monitors: PIV     Airway: natural airway / supplemental oxygen   ASA 2  Anesthetic plan and risks discussed with: patient  Induction:    intravenous   Postop Plan:   Pain Management: IV analgesics

## 2019-03-28 NOTE — ANESTHESIA POSTPROCEDURE EVALUATION
Patient: Euallio Gregg    Procedure Summary     Date:  03/28/19 Room / Location:  [facility] OR 9 / [facility] OR    Anesthesia Start:  1050 Anesthesia Stop:  1143    Procedure:  Cystoscopy Urolift (N/A ) Diagnosis:       Retention of urine      (R33.9)    Surgeon:  Villa Mathew MD Responsible Provider:  Gabriel Tyson MD    Anesthesia Type:  MAC ASA Status:  2          Anesthesia Type: MAC  PACU Vitals  3/28/2019 1137 - 3/28/2019 1237      3/28/2019 1141 3/28/2019 1145 3/28/2019 1200 3/28/2019 1215    BP: 137/64 137/64 134/60 137/60    Temp: 36.6 °C (97.8 °F) - - -    Pulse: (!)  51 (!)  54 (!)  44 (!)  46    Resp: 18 16 (!)  11 14    SpO2: - 100 % 100 % 100 %              3/28/2019 1230             BP: (!)  149/61       Temp: -       Pulse: (!)  46       Resp: 12       SpO2: 100 %               Anesthesia Post Evaluation    Pain management: satisfactory to patient  Mode of pain management: IV medication  Patient location during evaluation: PACU  Patient participation: complete - patient participated  Level of consciousness: awake and alert  Cardiovascular status: acceptable and hemodynamically stable  Airway Patency: adequate  Respiratory status: acceptable  Hydration status: stable  Anesthetic complications: no

## 2019-04-02 ENCOUNTER — LAB REQUISITION (OUTPATIENT)
Dept: LAB | Facility: HOSPITAL | Age: 74
End: 2019-04-02
Attending: UROLOGY
Payer: MEDICARE

## 2019-04-02 DIAGNOSIS — R39.12 POOR URINARY STREAM: ICD-10-CM

## 2019-04-02 DIAGNOSIS — R33.9 RETENTION OF URINE: ICD-10-CM

## 2019-04-02 DIAGNOSIS — R30.0 DYSURIA: ICD-10-CM

## 2019-04-02 PROCEDURE — 87086 URINE CULTURE/COLONY COUNT: CPT | Performed by: UROLOGY

## 2019-04-04 LAB — BACTERIA UR CULT: NORMAL

## 2019-04-04 NOTE — OP NOTE
REPORT TYPE:  Operative Note    DATE OF OPERATION:  03/28/2019      PREOPERATIVE DIAGNOSIS:  Bladder outlet obstruction.    POSTOPERATIVE DIAGNOSIS:  Bladder outlet obstruction.    PROCEDURE PERFORMED:  Cystoscopy, UroLift, 6 implants.    SURGEON:  Villa Mathew MD.    ANESTHESIA:  General.    ESTIMATED BLOOD LOSS:  None.    FLUIDS:  Approximately 500 mL of crystalloids.    SPECIMENS:  None.    DRAINS:  None.    COMPLICATIONS:  None.    INDICATIONS:  This is a 73-year-old gentleman with history of bladder outlet obstruction refractory to full maximal medical therapy.    The patient requested and was consented for the above procedure.    DESCRIPTION OF PROCEDURE:  He was administered IV antibiotics, brought to the operating room, identified, administered anesthesia, and was placed in relaxed dorsal lithotomy position, was prepped and draped in normal sterile fashion.  Timeout was called   and he was identified once again.    Cystourethroscopy with the camera was performed with the Storz cystoscope.  The urethral mucosa was normal to inspection.  The bladder outlet was occluded with lateral lobe hypertrophy.  The bladder reveals no evidence of stone, tumor, or foreign body   and each orifice was identified, effluxing clear urine bilaterally.    The cystoscope was then brought out through the bladder neck and 1 cm distal to this.  The first 2 UroLift implants were placed at the 11 o'clock and 1 o'clock positions and with the patient in exaggerated lithotomy which elevated the bladder neck.    The cystoscope was then brought out to the apex and 1 cm proximal to this.  The fourth and fifth implants were placed at the 10 o'clock and 2 o'clock positions.  Lateral lobes were then seen bulging into the middle of the prostate and then the fifth and   sixth implants were placed at the 9 o'clock and 3 o'clock positions.  There was a good anterior channel with a slight bulging from the left lobe and, therefore, a seventh  implant was placed between the mid and apical implant on the left side at the 10   o'clock position and, at this point, there was a wide anterior channel.    There was no evidence of bleeding and the bladder outlet was open with a good anterior channel.  The bladder was entered and, once again, there was clear efflux of urine from each orifice and there was no evidence of implant or cab within the bladder   neck or bladder.  Therefore, the bladder was drained.  The cystoscope was removed and the patient was awakened and transferred to recovery in good stable condition.      BRADY REYNOLDS MD        CC:     DD: 04/04/2019 10:08  DT: 04/04/2019 11:01  Voice ID: [id number]/Report ID: [id number]  [email or web address]

## 2019-04-09 NOTE — H&P (VIEW-ONLY)
History and Physical  Pre-admission testing         HISTORY OF PRESENT ILLNESS      Eulalio Gregg is an 73 y.o. male with a past medical history of BPH who presents with urinary obstruction. He is scheduled for Cystoscopy Urolift [82898 (CPT®)]    PAST MEDICAL AND SURGICAL HISTORY      Past Medical History:   Diagnosis Date   • Anemia    • Blepharospasm    • BPH (benign prostatic hyperplasia)    • History of vertigo 2016   • Lipid disorder    • Tendency toward bleeding easily (CMS/HCC) (HCC)        Past Surgical History:   Procedure Laterality Date   • CATARACT EXTRACTION W/  INTRAOCULAR LENS IMPLANT     • CHOLECYSTECTOMY  1992   • HERNIA REPAIR         PROBLEM LIST     Patient Active Problem List   Diagnosis   • BPH (benign prostatic hyperplasia)       MEDICATIONS        Current Outpatient Prescriptions:   •  cycloSPORINE (RESTASIS) 0.05 % ophthalmic emulsion, Administer 1 drop into both eyes 2 (two) times a day., Disp: , Rfl:   •  multivit-min/FA/lycopen/lutein (CENTRUM SILVER MEN ORAL), Take by mouth daily., Disp: , Rfl:   •  TURMERIC ORAL, Take by mouth daily., Disp: , Rfl:   •  DENTA 5000 PLUS 1.1 % cream, 2 (two) times a day. as directed, Disp: , Rfl: 3  •  simvastatin (ZOCOR) 20 mg tablet, Take 20 mg by mouth nightly.  , Disp: , Rfl:   •  tamsulosin (FLOMAX) 0.4 mg capsule, 1 tablet 2 times daily, Disp: , Rfl: 3    ALLERGIES      Allergies   Allergen Reactions   • Penicillins Rash     amoxicllin only -rash . Patient can tolerate PCN       FAMILY HISTORY      No family history on file.    SOCIAL HISTORY      Social History     Social History   • Marital status: Single     Spouse name: N/A   • Number of children: N/A   • Years of education: N/A     Occupational History   • Not on file.     Social History Main Topics   • Smoking status: Former Smoker     Packs/day: 1.00     Years: 10.00     Types: Cigarettes     Quit date: 1972   • Smokeless tobacco: Never Used   • Alcohol use Yes      Comment: 3-4  "drinks/week   • Drug use: No   • Sexual activity: Not on file     Other Topics Concern   • Not on file     Social History Narrative   • No narrative on file       REVIEW OF SYSTEMS      Review of Systems   Respiratory: Negative.    Cardiovascular: Negative.    Genitourinary: Positive for difficulty urinating, dysuria and urgency.       PHYSICAL EXAMINATION      /60 (BP Location: Right upper arm, Patient Position: Sitting)   Pulse 65   Temp (!) 36.1 °C (96.9 °F)   Resp 16   Ht 1.6 m (5' 3\")   Wt 71.9 kg (158 lb 9.6 oz)   SpO2 97%   BMI 28.09 kg/m²   Body mass index is 28.09 kg/m².    Physical Exam   Constitutional: He is oriented to person, place, and time. He appears well-developed and well-nourished.   HENT:   Head: Normocephalic and atraumatic.   Right Ear: External ear normal.   Left Ear: External ear normal.   Mouth/Throat: Oropharynx is clear and moist.   Eyes: Conjunctivae and EOM are normal. Pupils are equal, round, and reactive to light.   Neck: Normal range of motion. Neck supple.   Cardiovascular: Normal rate, regular rhythm, normal heart sounds and intact distal pulses.    Pulses:       Dorsalis pedis pulses are 1+ on the right side, and 1+ on the left side.        Posterior tibial pulses are 1+ on the right side, and 1+ on the left side.   Pulmonary/Chest: Effort normal and breath sounds normal.   Abdominal: Soft. Bowel sounds are normal.   Genitourinary:   Genitourinary Comments: deferred   Musculoskeletal: Normal range of motion.   Neurological: He is alert and oriented to person, place, and time.   Skin: Skin is warm and dry.   Psychiatric: He has a normal mood and affect. His behavior is normal. Judgment and thought content normal.   Nursing note and vitals reviewed.         CARSON Orozco  3/19/2019     "

## 2019-06-03 ENCOUNTER — TRANSCRIBE ORDERS (OUTPATIENT)
Dept: LAB | Facility: HOSPITAL | Age: 74
End: 2019-06-03

## 2019-06-03 ENCOUNTER — APPOINTMENT (OUTPATIENT)
Dept: LAB | Facility: HOSPITAL | Age: 74
End: 2019-06-03
Attending: INTERNAL MEDICINE
Payer: MEDICARE

## 2019-06-03 DIAGNOSIS — E78.5 HYPERLIPIDEMIA: Primary | ICD-10-CM

## 2019-06-03 DIAGNOSIS — E78.5 HYPERLIPIDEMIA: ICD-10-CM

## 2019-06-03 LAB
ALBUMIN SERPL-MCNC: 3.6 G/DL (ref 3.4–5)
ALP SERPL-CCNC: 57 IU/L (ref 35–126)
ALT SERPL-CCNC: 25 IU/L (ref 16–63)
ANION GAP SERPL CALC-SCNC: 7 MEQ/L (ref 3–15)
AST SERPL-CCNC: 37 IU/L (ref 15–41)
BILIRUB SERPL-MCNC: 0.6 MG/DL (ref 0.3–1.2)
BUN SERPL-MCNC: 19 MG/DL (ref 8–20)
CALCIUM SERPL-MCNC: 8.7 MG/DL (ref 8.9–10.3)
CHLORIDE SERPL-SCNC: 102 MEQ/L (ref 98–109)
CHOLEST SERPL-MCNC: 142 MG/DL
CO2 SERPL-SCNC: 27 MEQ/L (ref 22–32)
CREAT SERPL-MCNC: 1.1 MG/DL
ERYTHROCYTE [DISTWIDTH] IN BLOOD BY AUTOMATED COUNT: 13.3 % (ref 11.6–14.4)
GFR SERPL CREATININE-BSD FRML MDRD: >60 ML/MIN/1.73M*2
GLUCOSE SERPL-MCNC: 83 MG/DL (ref 70–99)
HCT VFR BLDCO AUTO: 36.1 %
HDLC SERPL-MCNC: 48 MG/DL
HDLC SERPL: 3 {RATIO}
HGB BLD-MCNC: 12.4 G/DL
LDLC SERPL CALC-MCNC: 88 MG/DL
MCH RBC QN AUTO: 33.3 PG (ref 28–33.2)
MCHC RBC AUTO-ENTMCNC: 34.3 G/DL (ref 32.2–36.5)
MCV RBC AUTO: 97 FL (ref 83–98)
NONHDLC SERPL-MCNC: 94 MG/DL
PDW BLD AUTO: 10.2 FL (ref 9.4–12.4)
PLATELET # BLD AUTO: 174 K/UL
POTASSIUM SERPL-SCNC: 3.9 MEQ/L (ref 3.6–5.1)
PROT SERPL-MCNC: 7 G/DL (ref 6–8.2)
RBC # BLD AUTO: 3.72 M/UL (ref 4.5–5.8)
SODIUM SERPL-SCNC: 136 MEQ/L (ref 136–144)
TRIGL SERPL-MCNC: 28 MG/DL (ref 30–149)
WBC # BLD AUTO: 3.82 K/UL

## 2019-06-03 PROCEDURE — 80061 LIPID PANEL: CPT

## 2019-06-03 PROCEDURE — 85027 COMPLETE CBC AUTOMATED: CPT

## 2019-06-03 PROCEDURE — 84520 ASSAY OF UREA NITROGEN: CPT

## 2019-06-03 PROCEDURE — 36415 COLL VENOUS BLD VENIPUNCTURE: CPT

## 2019-06-05 ENCOUNTER — LAB REQUISITION (OUTPATIENT)
Dept: LAB | Facility: HOSPITAL | Age: 74
End: 2019-06-05
Attending: INTERNAL MEDICINE
Payer: MEDICARE

## 2019-06-05 DIAGNOSIS — D47.2 MONOCLONAL GAMMOPATHY: ICD-10-CM

## 2019-06-05 DIAGNOSIS — D64.9 ANEMIA: ICD-10-CM

## 2019-06-05 DIAGNOSIS — E07.9 DISORDER OF THYROID: ICD-10-CM

## 2019-06-05 PROCEDURE — 84165 PROTEIN E-PHORESIS SERUM: CPT | Performed by: INTERNAL MEDICINE

## 2019-06-05 PROCEDURE — 83883 ASSAY NEPHELOMETRY NOT SPEC: CPT | Performed by: INTERNAL MEDICINE

## 2019-06-06 LAB
ALBUMIN MFR UR ELPH: 47.1 % (ref 48–62)
ALBUMIN SERPL ELPH-MCNC: 3.44 G/DL (ref 3.2–4.5)
ALBUMIN/GLOB SERPL: 0.9 {RATIO} (ref 1.1–2.1)
ALPHA1 GLOB MFR SERPL ELPH: 2.7 % (ref 2.1–4.8)
ALPHA1 GLOB SERPL ELPH-MCNC: 0.19 G/DL (ref 0.15–0.32)
ALPHA2 GLOB MFR UR ELPH: 12 % (ref 9.7–16.2)
ALPHA2 GLOB SERPL ELPH-MCNC: 0.88 G/DL (ref 0.7–1.1)
B-GLOBULIN SERPL ELPH-MCNC: 0.71 G/DL (ref 0.73–1.3)
BETA1 GLOB MFR UR ELPH: 9.8 % (ref 10.8–17.9)
GAMMA GLOB MFR UR ELPH: 28.4 % (ref 9–23)
GAMMA GLOB SERPL ELPH-MCNC: 2.08 G/DL (ref 0.6–1.6)
M PROTEIN MFR SERPL ELPH: 23.8 %
M PROTEIN SERPL ELPH-MCNC: 1.73 G/DL
PROT PATTERN SERPL ELPH-IMP: NORMAL
PROT SERPL-MCNC: 7.3 G/DL (ref 6–8.2)

## 2019-06-07 LAB
KAPPA LC SERPL-MCNC: 8.7 MG/L (ref 3.3–19.4)
KAPPA LC/LAMBDA SER: 0.05 {RATIO} (ref 0.26–1.65)
LAMBDA LC SERPL-MCNC: 190 MG/L (ref 5.7–26.3)

## 2019-06-21 ENCOUNTER — TELEPHONE (OUTPATIENT)
Dept: CARDIOLOGY | Facility: HOSPITAL | Age: 74
End: 2019-06-21

## 2019-06-25 ENCOUNTER — HOSPITAL ENCOUNTER (OUTPATIENT)
Dept: CARDIOLOGY | Facility: HOSPITAL | Age: 74
Discharge: HOME | End: 2019-06-25
Attending: INTERNAL MEDICINE
Payer: MEDICARE

## 2019-06-25 VITALS
HEART RATE: 133 BPM | WEIGHT: 156 LBS | BODY MASS INDEX: 27.64 KG/M2 | DIASTOLIC BLOOD PRESSURE: 62 MMHG | SYSTOLIC BLOOD PRESSURE: 136 MMHG | OXYGEN SATURATION: 97 % | HEIGHT: 63 IN

## 2019-06-25 DIAGNOSIS — R07.89 OTHER CHEST PAIN: ICD-10-CM

## 2019-06-25 LAB
BSA FOR ECHO PROCEDURE: 1.77 M2
EDV (BP): 96 CM3
EF (A4C): 57 %
EF A2C: 61 %
EJECTION FRACTION: 60 %
ESV (BP): 38 CM3
LA ESV (BP): 73 CM3
LA ESV INDEX (A2C): 31.07 CM3/M2
LA ESV INDEX (BP): 41.24 CM3/M2
LAAS-AP2: 20.6 CM2
LAAS-AP4: 27.2 CM2
LALD A4C: 6.24 CM
LALD A4C: 6.95 CM
LAV-S: 55 CM3
LEFT VENTRICLE DIASTOLIC VOLUME INDEX: 53.11 CM3/M2
LEFT VENTRICLE DIASTOLIC VOLUME: 94 CM3
LEFT VENTRICLE SYSTOLIC VOLUME INDEX: 22.6 CM3/M2
LEFT VENTRICLE SYSTOLIC VOLUME: 40 CM3
LV DIASTOLIC VOLUME: 94 CM3
LV ESV (APICAL 2 CHAMBER): 37 CM3
LVAD-AP2: 29.5 CM2
LVAD-AP4: 29.9 CM2
LVAS-AP2: 16.9 CM2
LVAS-AP4: 18 CM2
LVEDVI(A2C): 53.11 CM3/M2
LVEDVI(BP): 54.24 CM3/M2
LVESVI(A2C): 20.9 CM3/M2
LVESVI(BP): 21.47 CM3/M2
LVLD-AP2: 7.57 CM
LVLD-AP4: 7.97 CM
LVLS-AP2: 6.74 CM
LVLS-AP4: 6.8 CM
STRESS ANGINA INDEX: 0
STRESS BASELINE BP: NORMAL MMHG
STRESS BASELINE HR: 71 BPM
STRESS ECHO POST RECOVERY HR: 78 BPM
STRESS PERCENT HR: 92 %
STRESS POST ESTIMATED WORKLOAD: 10.1 METS
STRESS POST EXERCISE DUR MIN: 9 MIN
STRESS POST PEAK BP: NORMAL MMHG
STRESS POST PEAK HR: 133 BPM
STRESS TARGET HR: 123 BPM

## 2019-06-25 PROCEDURE — 25500000 HC DRUGS/INCIDENT RAD: Mod: GZ | Performed by: INTERNAL MEDICINE

## 2019-06-25 PROCEDURE — 25000000 HC PHARMACY GENERAL: Performed by: INTERNAL MEDICINE

## 2019-06-25 PROCEDURE — C8928 TTE W OR W/O FOL W/CON,STRES: HCPCS

## 2019-06-25 PROCEDURE — 93351 STRESS TTE COMPLETE: CPT | Mod: 26 | Performed by: INTERNAL MEDICINE

## 2019-06-25 RX ADMIN — PERFLUTREN 5 ML: 6.52 INJECTION, SUSPENSION INTRAVENOUS at 11:47

## 2019-07-31 ENCOUNTER — OFFICE VISIT (OUTPATIENT)
Dept: CARDIOLOGY | Facility: CLINIC | Age: 74
End: 2019-07-31
Payer: MEDICARE

## 2019-07-31 VITALS
HEART RATE: 66 BPM | SYSTOLIC BLOOD PRESSURE: 130 MMHG | HEIGHT: 63 IN | BODY MASS INDEX: 27.82 KG/M2 | DIASTOLIC BLOOD PRESSURE: 70 MMHG | WEIGHT: 157 LBS | RESPIRATION RATE: 14 BRPM

## 2019-07-31 DIAGNOSIS — E78.49 OTHER HYPERLIPIDEMIA: ICD-10-CM

## 2019-07-31 DIAGNOSIS — R07.89 OTHER CHEST PAIN: ICD-10-CM

## 2019-07-31 DIAGNOSIS — I45.10 RBBB: ICD-10-CM

## 2019-07-31 DIAGNOSIS — R07.9 CHEST PAIN, UNSPECIFIED TYPE: Primary | ICD-10-CM

## 2019-07-31 PROCEDURE — 99205 OFFICE O/P NEW HI 60 MIN: CPT | Performed by: INTERNAL MEDICINE

## 2019-07-31 RX ORDER — ACETAMINOPHEN 500 MG
2000 TABLET ORAL DAILY
COMMUNITY

## 2019-07-31 RX ORDER — ACETAMINOPHEN 160 MG/5ML
SUSPENSION, ORAL (FINAL DOSE FORM) ORAL DAILY
COMMUNITY

## 2019-07-31 RX ORDER — SPIRONOLACTONE 25 MG
20 TABLET ORAL
COMMUNITY
End: 2019-10-08 | Stop reason: ALTCHOICE

## 2019-07-31 RX ORDER — DILTIAZEM HYDROCHLORIDE 60 MG/1
TABLET, FILM COATED ORAL
Refills: 3 | COMMUNITY
Start: 2019-06-19 | End: 2020-10-07

## 2019-07-31 RX ORDER — ACETAMINOPHEN 500 MG
5 TABLET ORAL NIGHTLY
COMMUNITY
End: 2021-10-28

## 2019-07-31 NOTE — LETTER
2019     Otto Dennison MD  [facility] [address]  MOBS, [facility] [address]  [facility] [address]    Patient: Eulalio Gregg  YOB: 1945  Date of Visit: 2019      Dear Dr. Dennison:    Thank you for referring Eulalio Gregg to me for evaluation. Below are my notes for this consultation.    If you have questions, please do not hesitate to call me. I look forward to following your patient along with you.         Sincerely,        Donal Ellis,         CC: MD Rhonda Teran Christopher J, DO  2019 11:53 PM  Sign at close encounter       Donal Ellis D.O., [facility]    Clinical Cardiology and Heart Failure     [facility] HEART [facility]  [facility]  [facility] Level  [address] [place] [address]     TEL  [phone]  [facility]           Re:  Eulalio Gregg  : 1945    Dear Otto,    Thank you kindly for asking me to see Eulalio for a cardiology consultation regarding his exertional chest pain.    Eulalio is a pleasant 73-year-old white male with a history of hyperlipidemia and asthma along with chronic hypotension.  He used to run marathons.  In the last year to when he would run he would get discomfort in the left side of his chest such that he has stopped running.  It was nonradiating, graded 3-4/10 and he would noted even when he ran on a treadmill within the past few months.  You send him for a stress echocardiogram in late  which was negative for ischemia.  He saw Dr. Cardoza he says his lungs are fine and to look elsewhere.  Interestingly, however, he does a spinning class for 1 hour 4-5 days a week and has absolutely no symptoms of chest discomfort.  There is no associated dyspnea or diaphoresis.  He denies orthopnea, paroxysmal nocturnal dyspnea, abdominal bloating, peripheral edema, palpitations, presyncope, syncope, stroke, TIA or claudication.    PAST MEDICAL  "HISTORY:  All aspects of this documentation have been updated and/or entered into our EHR.  Chest pain, hyperlipidemia, asthma, hernia repair, cholecystectomy, Uro-Lift    MEDICATIONS:   · Cholecalciferol  · Coenzyme Q 10  · Cyclosporine eyedrops  · Denta cream  · Lutein  · Melatonin  · Multivitamin  · Simvastatin 20 mg nightly  · Symbicort  · Tamsulosin  · Turmeric    ALLERGIES: Amoxicillin caused skin rash.  Sensitive to anesthesia.    SOCIAL HISTORY: He is .  He has a partner, Emma Powers.  He has a grown son and daughter.  He retired in .  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    FAMILY HISTORY: Mother  of gastric cancer.  Dad  of colon cancer.  One brother  after being hit by a truck.  One sister is alive but she has congestive heart failure.    REVIEW OF SYSTEMS: Aside from what is mentioned above, a 14 point review of systems was performed and was negative.    PHYSICAL EXAMINATION:  Vital Signs: /70   Pulse 66   Resp 14   Ht 1.6 m (5' 3\")   Wt 71.2 kg (157 lb)   BMI 27.81 kg/m²  .   General: Pleasant and in no acute distress.  HEENT: No corneal arcus or xanthelasmas.  Sclerae are anicteric.  Nares patent.  Mucous membranes moist.  Neck: Supple.  JVP is 4 cm/H2O.  Carotids are equal with no audible bruits.  No lymphadenopathy or thyromegaly.  Heart: Regular.  Normal S1 and S2.  No S4. No S3. No murmur.  Chest: Symmetrical.  Lungs: Clear bilaterally without rales, wheezes nor rhonchi.  Abdomen: Soft, nontender.  No masses or bruits.  No organomegaly.  Normal bowel sounds.  Extremities: No cyanosis, clubbing or edema.  Distal pulses are easily palpable.  Skin: Deeply tanned.  Warm and dry and well perfused.  Neurologic: Alert and oriented ×3.  Cranial nerves II through XII are intact.  Psychiatric: normal mood, affect & judgment.    DIAGNOSTIC DATA:    EKG: I reviewed the tracing from 3/19/2019 which shows normal sinus rhythm, left " axis deviation.  Incomplete right bundle branch block.    Labs:   Lab Results   Component Value Date     06/03/2019    K 3.9 06/03/2019    BUN 19 06/03/2019    CREATININE 1.1 06/03/2019    EGFR >60.0 06/03/2019    WBC 3.82 06/03/2019    HGB 12.4 (L) 06/03/2019     06/03/2019    ALT 25 06/03/2019    AST 37 06/03/2019    GLUCOSE 83 06/03/2019    TSH 2.23 12/12/2017    INR 1.0 03/19/2019       Lipid Profile:   Lab Results   Component Value Date    CHOL 142 06/03/2019    CHOL 148 06/01/2018    CHOL 148 06/02/2017     Lab Results   Component Value Date    TRIG 28 (L) 06/03/2019    TRIG 41 06/01/2018    TRIG 46 06/02/2017     Lab Results   Component Value Date    HDL 48 06/03/2019    HDL 55 06/01/2018    HDL 52 06/02/2017     Lab Results   Component Value Date    LDLCALC 88 06/03/2019    LDLCALC 85 06/01/2018    LDLCALC 87 06/02/2017     Stress echocardiogram 6/25/2019:  1. Conclusion: Stress echo does not meet criteria for ischemia.  2. Baseline Echo: Normal left ventricular size and function. No wall motion abnormalities. Ejection fraction 65%. Normal left atrial size.  Mild mitral annular calcification. Trace mitral regurgitation. Tricuspid aortic valve. Mild aortic regurgitation. Normal right atrium. Normal  right ventricular size and function. Trace tricuspid regurgitation. The jet is insufficient to estimate right ventricular systolic pressure.  3. Post-Stress Echo: All walls become hyperdynamic. Ejection fraction improves.  4. Stress ECG does not meet criteria for ischemia.  5. Good exercise tolerance. Holman treadmill score is 8, associated with low risk for near-future cardiac events.    IMPRESSION/RECOMMENDATIONS:  1. Exertional chest pain -certainly Eulalio has a history consistent with exertional angina pectoris.  His left-sided, graded 3-4/10 and resolves with rest.  Interestingly, however, he does a spinning class for 1 hour breaking a solid sweat and has absolutely no symptoms.  I presented 5  options including ignoring his symptoms and looking elsewhere, empiric antianginal therapy, alternate stress testing with nuclear perfusion imaging, coronary CT angiogram, or diagnostic cardiac catheterization.  He wishes to proceed with an invasive evaluation to define his coronary anatomy to the best of our ability.  Options, risks & benefits were explained to the patient and all questions answered.  He will take an aspirin the day of the procedure, otherwise he is unrestricted.  2. Hyperlipidemia -on simvastatin therapy.  I reviewed his lipid profile from June 2019.  3. Asthma -on Symbicort.  He follows with Suresh Cardzoa.  4. MAC -noted on his echo.  5. Aortic insufficiency -graded mild on his stress echocardiogram.  Probably age-related changes to the aortic valve.  6. Incomplete right bundle branch block (I RBBB) -I reviewed this issue with him.  It is probably of no clinical consequence.    Counseling and coordination of care exceeded >50% of this 60-minute patient encounter.    Menjivar, thank you for allowing me to share in the care of your patient.  I asked Eulalio to return 1 to 2 weeks post catheterization.  If you have any further questions, please do not hesitate to contact me.    Sincerely,    Donal Ellis D.O., [facility]

## 2019-07-31 NOTE — LETTER
2019     Otto Dennison MD  [facility] [address]  [address], [facility] [address]  [facility] [address]    Patient: Eulalio Gregg  YOB: 1945  Date of Visit: 2019      Dear Dr. Dennison:    Thank you for referring Eulalio Gregg to me for evaluation. Below are my notes for this consultation.    If you have questions, please do not hesitate to call me. I look forward to following your patient along with you.         Sincerely,        Donal Ellis,         CC: MD Rhonda Teran Christopher J, DO  2019 11:52 PM  Sign at close encounter       Donal Ellis D.O., [facility]    Clinical Cardiology and Heart Failure     [facility] HEART [facility]  [facility]  [facility] [address]  [address] [place] [address]     TEL  [phone]  [facility]           Re:  Eulalio Gregg  : 1945    Dear Otto,    Thank you kindly for asking me to see Eulalio for a cardiology consultation regarding his exertional chest pain.    Eulalio is a pleasant 73-year-old white male with a history of hyperlipidemia and asthma along with chronic hypotension.  He used to run marathons.  In the last year to when he would run he would get discomfort in the left side of his chest such that he has stopped running.  It was nonradiating, graded 3-4/10 and he would noted even when he ran on a treadmill within the past few months.  You send him for a stress echocardiogram in late  which was negative for ischemia.  He saw Dr. Cardoza he says his lungs are fine and to look elsewhere.  Interestingly, however, he does a spinning class for 1 hour 4-5 days a week and has absolutely no symptoms of chest discomfort.  There is no associated dyspnea or diaphoresis.  He denies orthopnea, paroxysmal nocturnal dyspnea, abdominal bloating, peripheral edema, palpitations, presyncope, syncope, stroke, TIA or claudication.    PAST MEDICAL  Left message for Abimael to return my phone call.   "HISTORY:  All aspects of this documentation have been updated and/or entered into our EHR.  Chest pain, hyperlipidemia, asthma, hernia repair, cholecystectomy, Uro-Lift    MEDICATIONS:   · Cholecalciferol  · Coenzyme Q 10  · Cyclosporine eyedrops  · Denta cream  · Lutein  · Melatonin  · Multivitamin  · Simvastatin 20 mg nightly  · Symbicort  · Tamsulosin  · Turmeric    ALLERGIES: Amoxicillin caused skin rash.  Sensitive to anesthesia.    SOCIAL HISTORY: He is .  He has a partner, Emma Powers.  He has a grown son and daughter.  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    FAMILY HISTORY: Mother  of gastric cancer.  Dad  of colon cancer.  One brother  after being hit by a truck.  One sister is alive but she has congestive heart failure.    REVIEW OF SYSTEMS: Aside from what is mentioned above, a 14 point review of systems was performed and was negative.    PHYSICAL EXAMINATION:  Vital Signs: /70   Pulse 66   Resp 14   Ht 1.6 m (5' 3\")   Wt 71.2 kg (157 lb)   BMI 27.81 kg/m²  .   General: Pleasant and in no acute distress.  HEENT: No corneal arcus or xanthelasmas.  Sclerae are anicteric.  Nares patent.  Mucous membranes moist.  Neck: Supple.  JVP is *** cm/H2O.  Carotids are equal with no audible bruits.  No lymphadenopathy or thyromegaly.  Heart: Regular.  Normal S1 and S2.  No S4. No S3. No murmur.  Chest: Symmetrical.  Lungs: Clear bilaterally without rales, wheezes nor rhonchi.  Abdomen: Soft, nontender.  No masses or bruits.  No organomegaly.  Normal bowel sounds.  Extremities: No cyanosis, clubbing or edema.  Distal pulses are easily palpable.  Skin: Deeply tanned.  Warm and dry and well perfused.  Neurologic: Alert and oriented ×3.  Cranial nerves II through XII are intact.  Psychiatric: normal mood, affect & judgment.    DIAGNOSTIC DATA:    EKG: I reviewed the tracing from 3/19/2019 which shows normal sinus rhythm, " left axis deviation.  Incomplete right bundle branch block.    Labs:   Lab Results   Component Value Date     06/03/2019    K 3.9 06/03/2019    BUN 19 06/03/2019    CREATININE 1.1 06/03/2019    EGFR >60.0 06/03/2019    WBC 3.82 06/03/2019    HGB 12.4 (L) 06/03/2019     06/03/2019    ALT 25 06/03/2019    AST 37 06/03/2019    GLUCOSE 83 06/03/2019    TSH 2.23 12/12/2017    INR 1.0 03/19/2019       Lipid Profile:   Lab Results   Component Value Date    CHOL 142 06/03/2019    CHOL 148 06/01/2018    CHOL 148 06/02/2017     Lab Results   Component Value Date    TRIG 28 (L) 06/03/2019    TRIG 41 06/01/2018    TRIG 46 06/02/2017     Lab Results   Component Value Date    HDL 48 06/03/2019    HDL 55 06/01/2018    HDL 52 06/02/2017     Lab Results   Component Value Date    LDLCALC 88 06/03/2019    LDLCALC 85 06/01/2018    LDLCALC 87 06/02/2017     Stress echocardiogram 6/25/2019:  1. Conclusion: Stress echo does not meet criteria for ischemia.  2. Baseline Echo: Normal left ventricular size and function. No wall motion abnormalities. Ejection fraction 65%. Normal left atrial size.  Mild mitral annular calcification. Trace mitral regurgitation. Tricuspid aortic valve. Mild aortic regurgitation. Normal right atrium. Normal  right ventricular size and function. Trace tricuspid regurgitation. The jet is insufficient to estimate right ventricular systolic pressure.  3. Post-Stress Echo: All walls become hyperdynamic. Ejection fraction improves.  4. Stress ECG does not meet criteria for ischemia.  5. Good exercise tolerance. Holman treadmill score is 8, associated with low risk for near-future cardiac events.    IMPRESSION/RECOMMENDATIONS:  1. Exertional chest pain -certainly Eulalio has a history consistent with exertional angina pectoris.  His left-sided, graded 3-4/10 and resolves with rest.  Interestingly, however, he does a spinning class for 1 hour breaking a solid sweat and has absolutely no symptoms.  I presented 5  options including ignoring his symptoms and looking elsewhere, empiric antianginal therapy, alternate stress testing with nuclear perfusion imaging, coronary CT angiogram, or diagnostic cardiac catheterization.  He wishes to proceed with an invasive evaluation to define his coronary anatomy to the best of our ability.  Options, risks & benefits were explained to the patient and all questions answered.  He will take an aspirin the day of the procedure, otherwise he is unrestricted.  2. Hyperlipidemia -on simvastatin therapy.  I reviewed his lipid profile from June 2019.  3. Asthma -on Symbicort.  He follows with Suresh Cardoza.  4. MAC -noted on his echo.  5. Aortic insufficiency -graded mild on his stress echocardiogram.  Probably age-related changes to the aortic valve.  6. Incomplete right bundle branch block (I RBBB) -I reviewed this issue with him.  It is probably of no clinical consequence.    Counseling and coordination of care exceeded >50% of this 60-minute patient encounter.    Menjivar, thank you for allowing me to share in the care of your patient.  I asked Elualio to return 1 to 2 weeks post catheterization.  If you have any further questions, please do not hesitate to contact me.    Sincerely,    Donal Ellis D.O., [facility]

## 2019-07-31 NOTE — PATIENT INSTRUCTIONS
1. We reviewed the issue about the discomfort in the chest you have when you run; we also reviewed the issue with the right bundle branch block which is of no clinical consequence right now.  2. I have recommended a cardiac catheterization as the gold standard to detect a blockage in 1 of the coronary arteries that was not detected by the stress echo test you had  3. Please schedule left heart catheterization with Akin Bates  4. Nonfasting blood work is needed within 30 days of the procedure date unless blood work done 2 weeks ago for Dr. Mathew suffices  5. please take an aspirin 81 mg the day you come in for the cardiac catheterization  6. Return 1 to 2 weeks post catheterization O'Damien/Rhonda

## 2019-07-31 NOTE — PROGRESS NOTES
Donal Ellis D.O., [facility]    Clinical Cardiology and Heart Failure     [facility]     [facility]  The Heart [address]  [address] [place] [address]     TEL  [phone]  [facility]           Re:  Eulalio Gregg  : 1945    Dear Otto,    Thank you kindly for asking me to see Eulalio for a cardiology consultation regarding his exertional chest pain.    Eulalio is a pleasant 73-year-old white male with a history of hyperlipidemia and asthma along with chronic hypotension.  He used to run marathons.  In the last year to when he would run he would get discomfort in the left side of his chest such that he has stopped running.  It was nonradiating, graded 3-4/10 and he would noted even when he ran on a treadmill within the past few months.  You send him for a stress echocardiogram in late  which was negative for ischemia.  He saw Dr. Cardoza he says his lungs are fine and to look elsewhere.  Interestingly, however, he does a spinning class for 1 hour 4-5 days a week and has absolutely no symptoms of chest discomfort.  There is no associated dyspnea or diaphoresis.  He denies orthopnea, paroxysmal nocturnal dyspnea, abdominal bloating, peripheral edema, palpitations, presyncope, syncope, stroke, TIA or claudication.    PAST MEDICAL HISTORY:  All aspects of this documentation have been updated and/or entered into our EHR.  Chest pain, hyperlipidemia, asthma, hernia repair, cholecystectomy, Uro-Lift    MEDICATIONS:   · Cholecalciferol  · Coenzyme Q 10  · Cyclosporine eyedrops  · Denta cream  · Lutein  · Melatonin  · Multivitamin  · Simvastatin 20 mg nightly  · Symbicort  · Tamsulosin  · Turmeric    ALLERGIES: Amoxicillin caused skin rash.  Sensitive to anesthesia.    SOCIAL HISTORY: He is .  He has a partner, Emma Powers.  He has a grown son and daughter.  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    FAMILY HISTORY: Mother  of gastric cancer.  Dad  of colon cancer.  One brother  after being hit by a truck.  One sister is alive but she has congestive heart failure.    REVIEW OF SYSTEMS: Aside from what is mentioned above, a 14 point review of systems was performed and was negative.    PHYSICAL EXAMINATION:  Vital Signs: /70   Pulse 66   Resp 14   Ht 1.6 m (5' 3\")   Wt 71.2 kg (157 lb)   BMI 27.81 kg/m² .   General: Pleasant and in no acute distress.  HEENT: No corneal arcus or xanthelasmas.  Sclerae are anicteric.  Nares patent.  Mucous membranes moist.  Neck: Supple.  JVP is 4 cm/H2O.  Carotids are equal with no audible bruits.  No lymphadenopathy or thyromegaly.  Heart: Regular.  Normal S1 and S2.  No S4. No S3. No murmur.  Chest: Symmetrical.  Lungs: Clear bilaterally without rales, wheezes nor rhonchi.  Abdomen: Soft, nontender.  No masses or bruits.  No organomegaly.  Normal bowel sounds.  Extremities: No cyanosis, clubbing or edema.  Distal pulses are easily palpable.  Skin: Deeply tanned.  Warm and dry and well perfused.  Neurologic: Alert and oriented ×3.  Cranial nerves II through XII are intact.  Psychiatric: normal mood, affect & judgment.    DIAGNOSTIC DATA:    EKG: I reviewed the tracing from 3/19/2019 which shows normal sinus rhythm, left axis deviation.  Incomplete right bundle branch block.    Labs:   Lab Results   Component Value Date     2019    K 3.9 2019    BUN 19 2019    CREATININE 1.1 2019    EGFR >60.0 2019    WBC 3.82 2019    HGB 12.4 (L) 2019     2019    ALT 25 2019    AST 37 2019    GLUCOSE 83 2019    TSH 2.23 2017    INR 1.0 2019       Lipid Profile:   Lab Results   Component Value Date    CHOL 142 2019    CHOL 148 2018    CHOL 148 2017     Lab Results   Component Value Date    TRIG 28 (L) 2019    " TRIG 41 06/01/2018    TRIG 46 06/02/2017     Lab Results   Component Value Date    HDL 48 06/03/2019    HDL 55 06/01/2018    HDL 52 06/02/2017     Lab Results   Component Value Date    LDLCALC 88 06/03/2019    LDLCALC 85 06/01/2018    LDLCALC 87 06/02/2017     Stress echocardiogram 6/25/2019:  1. Conclusion: Stress echo does not meet criteria for ischemia.  2. Baseline Echo: Normal left ventricular size and function. No wall motion abnormalities. Ejection fraction 65%. Normal left atrial size.  Mild mitral annular calcification. Trace mitral regurgitation. Tricuspid aortic valve. Mild aortic regurgitation. Normal right atrium. Normal  right ventricular size and function. Trace tricuspid regurgitation. The jet is insufficient to estimate right ventricular systolic pressure.  3. Post-Stress Echo: All walls become hyperdynamic. Ejection fraction improves.  4. Stress ECG does not meet criteria for ischemia.  5. Good exercise tolerance. Holman treadmill score is 8, associated with low risk for near-future cardiac events.    IMPRESSION/RECOMMENDATIONS:  1. Exertional chest pain -certainly Eulalio has a history consistent with exertional angina pectoris.  His left-sided, graded 3-4/10 and resolves with rest.  Interestingly, however, he does a spinning class for 1 hour breaking a solid sweat and has absolutely no symptoms.  I presented 5 options including ignoring his symptoms and looking elsewhere, empiric antianginal therapy, alternate stress testing with nuclear perfusion imaging, coronary CT angiogram, or diagnostic cardiac catheterization.  He wishes to proceed with an invasive evaluation to define his coronary anatomy to the best of our ability.  Options, risks & benefits were explained to the patient and all questions answered.  He will take an aspirin the day of the procedure, otherwise he is unrestricted.  2. Hyperlipidemia -on simvastatin therapy.  I reviewed his lipid profile from June 2019.  3. Asthma -on  Symbicort.  He follows with Suresh Cardoza.  4. MAC -noted on his echo.  5. Aortic insufficiency -graded mild on his stress echocardiogram.  Probably age-related changes to the aortic valve.  6. Incomplete right bundle branch block (I RBBB) -I reviewed this issue with him.  It is probably of no clinical consequence.    Counseling and coordination of care exceeded >50% of this 60-minute patient encounter.    Menjivar, thank you for allowing me to share in the care of your patient.  I asked Eulalio to return 1 to 2 weeks post catheterization.  If you have any further questions, please do not hesitate to contact me.    Sincerely,    Donal Ellis D.O., [facility]

## 2019-08-01 PROBLEM — R07.9 CHEST PAIN: Status: ACTIVE | Noted: 2019-08-01

## 2019-08-23 LAB
BASOPHILS # BLD AUTO: 0 X10E3/UL (ref 0–0.2)
BASOPHILS NFR BLD AUTO: 0 %
BUN SERPL-MCNC: 26 MG/DL (ref 8–27)
BUN/CREAT SERPL: 27 (ref 10–24)
CALCIUM SERPL-MCNC: 9 MG/DL (ref 8.6–10.2)
CHLORIDE SERPL-SCNC: 102 MMOL/L (ref 96–106)
CO2 SERPL-SCNC: 24 MMOL/L (ref 20–29)
CREAT SERPL-MCNC: 0.96 MG/DL (ref 0.76–1.27)
EOSINOPHIL # BLD AUTO: 0 X10E3/UL (ref 0–0.4)
EOSINOPHIL NFR BLD AUTO: 1 %
ERYTHROCYTE [DISTWIDTH] IN BLOOD BY AUTOMATED COUNT: 14.5 % (ref 12.3–15.4)
GLUCOSE SERPL-MCNC: 84 MG/DL (ref 65–99)
HCT VFR BLD AUTO: 36.5 % (ref 37.5–51)
HGB BLD-MCNC: 11.8 G/DL (ref 13–17.7)
IMM GRANULOCYTES # BLD AUTO: 0 X10E3/UL (ref 0–0.1)
IMM GRANULOCYTES NFR BLD AUTO: 0 %
LAB CORP EGFR IF AFRICN AM: 88 ML/MIN/1.73
LAB CORP EGFR IF NONAFRICN AM: 76 ML/MIN/1.73
LYMPHOCYTES # BLD AUTO: 1.1 X10E3/UL (ref 0.7–3.1)
LYMPHOCYTES NFR BLD AUTO: 23 %
MCH RBC QN AUTO: 32.6 PG (ref 26.6–33)
MCHC RBC AUTO-ENTMCNC: 32.3 G/DL (ref 31.5–35.7)
MCV RBC AUTO: 101 FL (ref 79–97)
MONOCYTES # BLD AUTO: 0.4 X10E3/UL (ref 0.1–0.9)
MONOCYTES NFR BLD AUTO: 9 %
NEUTROPHILS # BLD AUTO: 3.3 X10E3/UL (ref 1.4–7)
NEUTROPHILS NFR BLD AUTO: 67 %
PLATELET # BLD AUTO: 208 X10E3/UL (ref 150–450)
POTASSIUM SERPL-SCNC: 4.8 MMOL/L (ref 3.5–5.2)
RBC # BLD AUTO: 3.62 X10E6/UL (ref 4.14–5.8)
SODIUM SERPL-SCNC: 139 MMOL/L (ref 134–144)
WBC # BLD AUTO: 4.9 X10E3/UL (ref 3.4–10.8)

## 2019-09-04 ENCOUNTER — LAB REQUISITION (OUTPATIENT)
Dept: LAB | Facility: HOSPITAL | Age: 74
End: 2019-09-04
Attending: INTERNAL MEDICINE
Payer: MEDICARE

## 2019-09-04 DIAGNOSIS — E07.9 DISORDER OF THYROID: ICD-10-CM

## 2019-09-04 DIAGNOSIS — D47.2 MONOCLONAL GAMMOPATHY: ICD-10-CM

## 2019-09-04 DIAGNOSIS — D64.9 ANEMIA: ICD-10-CM

## 2019-09-04 LAB
ALBUMIN SERPL-MCNC: 3.5 G/DL (ref 3.4–5)
ALP SERPL-CCNC: 51 IU/L (ref 35–126)
ALT SERPL-CCNC: 30 IU/L (ref 16–63)
ANION GAP SERPL CALC-SCNC: 5 MEQ/L (ref 3–15)
AST SERPL-CCNC: 39 IU/L (ref 15–41)
BASOPHILS # BLD: 0.01 K/UL (ref 0.01–0.1)
BASOPHILS NFR BLD: 0.2 %
BILIRUB SERPL-MCNC: 0.8 MG/DL (ref 0.3–1.2)
BUN SERPL-MCNC: 17 MG/DL (ref 8–20)
CALCIUM SERPL-MCNC: 8.8 MG/DL (ref 8.9–10.3)
CHLORIDE SERPL-SCNC: 103 MEQ/L (ref 98–109)
CO2 SERPL-SCNC: 27 MEQ/L (ref 22–32)
CREAT SERPL-MCNC: 1 MG/DL
DIFFERENTIAL METHOD BLD: ABNORMAL
EOSINOPHIL # BLD: 0.02 K/UL (ref 0.04–0.54)
EOSINOPHIL NFR BLD: 0.4 %
ERYTHROCYTE [DISTWIDTH] IN BLOOD BY AUTOMATED COUNT: 13.3 % (ref 11.6–14.4)
GFR SERPL CREATININE-BSD FRML MDRD: >60 ML/MIN/1.73M*2
GLUCOSE SERPL-MCNC: 93 MG/DL (ref 70–99)
HCT VFR BLDCO AUTO: 34.7 %
HGB BLD-MCNC: 12.1 G/DL
IMM GRANULOCYTES # BLD AUTO: 0.02 K/UL (ref 0–0.08)
IMM GRANULOCYTES NFR BLD AUTO: 0.4 %
LYMPHOCYTES # BLD: 0.98 K/UL (ref 1.2–3.5)
LYMPHOCYTES NFR BLD: 21.7 %
MCH RBC QN AUTO: 33.4 PG (ref 28–33.2)
MCHC RBC AUTO-ENTMCNC: 34.9 G/DL (ref 32.2–36.5)
MCV RBC AUTO: 95.9 FL (ref 83–98)
MONOCYTES # BLD: 0.37 K/UL (ref 0.3–1)
MONOCYTES NFR BLD: 8.2 %
NEUTROPHILS # BLD: 3.11 K/UL (ref 1.7–7)
NEUTS SEG NFR BLD: 69.1 %
NRBC BLD-RTO: 0 %
PDW BLD AUTO: 9.3 FL (ref 9.4–12.4)
PLATELET # BLD AUTO: 176 K/UL
POTASSIUM SERPL-SCNC: 4 MEQ/L (ref 3.6–5.1)
PROT SERPL-MCNC: 7.2 G/DL (ref 6–8.2)
RBC # BLD AUTO: 3.62 M/UL (ref 4.5–5.8)
SODIUM SERPL-SCNC: 135 MEQ/L (ref 136–144)
WBC # BLD AUTO: 4.51 K/UL

## 2019-09-04 PROCEDURE — 80053 COMPREHEN METABOLIC PANEL: CPT | Performed by: INTERNAL MEDICINE

## 2019-09-04 PROCEDURE — 83883 ASSAY NEPHELOMETRY NOT SPEC: CPT | Performed by: INTERNAL MEDICINE

## 2019-09-04 PROCEDURE — 36415 COLL VENOUS BLD VENIPUNCTURE: CPT | Performed by: INTERNAL MEDICINE

## 2019-09-04 PROCEDURE — 85025 COMPLETE CBC W/AUTO DIFF WBC: CPT | Performed by: INTERNAL MEDICINE

## 2019-09-04 PROCEDURE — 84165 PROTEIN E-PHORESIS SERUM: CPT | Performed by: INTERNAL MEDICINE

## 2019-09-05 LAB
ALBUMIN MFR UR ELPH: 47.4 % (ref 48–62)
ALBUMIN SERPL ELPH-MCNC: 3.46 G/DL (ref 3.2–4.5)
ALBUMIN/GLOB SERPL: 0.9 {RATIO} (ref 1.1–2.1)
ALPHA1 GLOB MFR SERPL ELPH: 2.9 % (ref 2.1–4.8)
ALPHA1 GLOB SERPL ELPH-MCNC: 0.21 G/DL (ref 0.15–0.32)
ALPHA2 GLOB MFR UR ELPH: 11.2 % (ref 9.7–16.2)
ALPHA2 GLOB SERPL ELPH-MCNC: 0.82 G/DL (ref 0.7–1.1)
B-GLOBULIN SERPL ELPH-MCNC: 0.73 G/DL (ref 0.73–1.3)
BETA1 GLOB MFR UR ELPH: 10 % (ref 10.8–17.9)
GAMMA GLOB MFR UR ELPH: 28.5 % (ref 9–23)
GAMMA GLOB SERPL ELPH-MCNC: 2.08 G/DL (ref 0.6–1.6)
M PROTEIN MFR SERPL ELPH: 22 %
M PROTEIN SERPL ELPH-MCNC: 1.6 G/DL
PROT PATTERN SERPL ELPH-IMP: NORMAL
PROT SERPL-MCNC: 7.3 G/DL (ref 6–8.2)

## 2019-09-06 LAB
KAPPA LC SERPL-MCNC: 4 MG/L (ref 3.3–19.4)
KAPPA LC/LAMBDA SER: 0.03 {RATIO} (ref 0.26–1.65)
LAMBDA LC SERPL-MCNC: 119.8 MG/L (ref 5.7–26.3)

## 2019-09-18 ENCOUNTER — HOSPITAL ENCOUNTER (OUTPATIENT)
Facility: HOSPITAL | Age: 74
Setting detail: HOSPITAL OUTPATIENT SURGERY
Discharge: HOME | End: 2019-09-18
Attending: INTERNAL MEDICINE | Admitting: INTERNAL MEDICINE
Payer: MEDICARE

## 2019-09-18 VITALS
BODY MASS INDEX: 28 KG/M2 | HEART RATE: 54 BPM | DIASTOLIC BLOOD PRESSURE: 56 MMHG | TEMPERATURE: 96.8 F | WEIGHT: 158 LBS | HEIGHT: 63 IN | SYSTOLIC BLOOD PRESSURE: 134 MMHG | OXYGEN SATURATION: 100 % | RESPIRATION RATE: 14 BRPM

## 2019-09-18 DIAGNOSIS — R07.9 CHEST PAIN, UNSPECIFIED TYPE: ICD-10-CM

## 2019-09-18 PROBLEM — E78.5 HYPERLIPIDEMIA: Status: ACTIVE | Noted: 2019-09-18

## 2019-09-18 PROCEDURE — 63700000 HC SELF-ADMINISTRABLE DRUG: Performed by: INTERNAL MEDICINE

## 2019-09-18 PROCEDURE — 200200 CARDIAC CATHETERIZATION: Performed by: INTERNAL MEDICINE

## 2019-09-18 PROCEDURE — 63600105 HC IODINE BASED CONTRAST: Performed by: INTERNAL MEDICINE

## 2019-09-18 PROCEDURE — 4A023N7 MEASUREMENT OF CARDIAC SAMPLING AND PRESSURE, LEFT HEART, PERCUTANEOUS APPROACH: ICD-10-PCS | Performed by: INTERNAL MEDICINE

## 2019-09-18 PROCEDURE — C1887 CATHETER, GUIDING: HCPCS | Performed by: INTERNAL MEDICINE

## 2019-09-18 PROCEDURE — 4A0335C MEASUREMENT OF ARTERIAL FLOW, CORONARY, PERCUTANEOUS APPROACH: ICD-10-PCS | Performed by: INTERNAL MEDICINE

## 2019-09-18 PROCEDURE — B2111ZZ FLUOROSCOPY OF MULTIPLE CORONARY ARTERIES USING LOW OSMOLAR CONTRAST: ICD-10-PCS | Performed by: INTERNAL MEDICINE

## 2019-09-18 PROCEDURE — 93458 L HRT ARTERY/VENTRICLE ANGIO: CPT | Performed by: INTERNAL MEDICINE

## 2019-09-18 PROCEDURE — C1769 GUIDE WIRE: HCPCS | Performed by: INTERNAL MEDICINE

## 2019-09-18 PROCEDURE — 63600000 HC DRUGS/DETAIL CODE: Performed by: INTERNAL MEDICINE

## 2019-09-18 PROCEDURE — 71000011 HC PACU PHASE 1 EA ADDL MIN: Performed by: INTERNAL MEDICINE

## 2019-09-18 PROCEDURE — 99152 MOD SED SAME PHYS/QHP 5/>YRS: CPT | Performed by: INTERNAL MEDICINE

## 2019-09-18 PROCEDURE — 200200 PR NO CHARGE: Performed by: INTERNAL MEDICINE

## 2019-09-18 PROCEDURE — 27200000 HC STERILE SUPPLY: Performed by: INTERNAL MEDICINE

## 2019-09-18 PROCEDURE — 99153 MOD SED SAME PHYS/QHP EA: CPT | Performed by: INTERNAL MEDICINE

## 2019-09-18 PROCEDURE — C1894 INTRO/SHEATH, NON-LASER: HCPCS | Performed by: INTERNAL MEDICINE

## 2019-09-18 PROCEDURE — 93571 IV DOP VEL&/PRESS C FLO 1ST: CPT | Mod: 52 | Performed by: INTERNAL MEDICINE

## 2019-09-18 PROCEDURE — 4A033BC MEASUREMENT OF ARTERIAL PRESSURE, CORONARY, PERCUTANEOUS APPROACH: ICD-10-PCS | Performed by: INTERNAL MEDICINE

## 2019-09-18 PROCEDURE — 93458 L HRT ARTERY/VENTRICLE ANGIO: CPT | Mod: 26 | Performed by: INTERNAL MEDICINE

## 2019-09-18 PROCEDURE — 71000001 HC PACU PHASE 1 INITIAL 30MIN: Performed by: INTERNAL MEDICINE

## 2019-09-18 PROCEDURE — 25000000 HC PHARMACY GENERAL: Performed by: INTERNAL MEDICINE

## 2019-09-18 RX ORDER — HEPARIN SODIUM 1000 [USP'U]/ML
INJECTION, SOLUTION INTRAVENOUS; SUBCUTANEOUS AS NEEDED
Status: DISCONTINUED | OUTPATIENT
Start: 2019-09-18 | End: 2019-09-18 | Stop reason: HOSPADM

## 2019-09-18 RX ORDER — FENTANYL CITRATE 50 UG/ML
INJECTION, SOLUTION INTRAMUSCULAR; INTRAVENOUS AS NEEDED
Status: DISCONTINUED | OUTPATIENT
Start: 2019-09-18 | End: 2019-09-18 | Stop reason: HOSPADM

## 2019-09-18 RX ORDER — NICARDIPINE HCL-0.9% SOD CHLOR 1 MG/10 ML
SYRINGE (ML) INTRAVENOUS AS NEEDED
Status: DISCONTINUED | OUTPATIENT
Start: 2019-09-18 | End: 2019-09-18 | Stop reason: HOSPADM

## 2019-09-18 RX ORDER — ASPIRIN 325 MG
325 TABLET ORAL ONCE
Status: COMPLETED | OUTPATIENT
Start: 2019-09-18 | End: 2019-09-18

## 2019-09-18 RX ORDER — LIDOCAINE HYDROCHLORIDE 10 MG/ML
INJECTION, SOLUTION INFILTRATION; PERINEURAL AS NEEDED
Status: DISCONTINUED | OUTPATIENT
Start: 2019-09-18 | End: 2019-09-18 | Stop reason: HOSPADM

## 2019-09-18 RX ORDER — IODIXANOL 320 MG/ML
INJECTION, SOLUTION INTRAVASCULAR AS NEEDED
Status: DISCONTINUED | OUTPATIENT
Start: 2019-09-18 | End: 2019-09-18 | Stop reason: HOSPADM

## 2019-09-18 RX ORDER — MIDAZOLAM HYDROCHLORIDE 2 MG/2ML
INJECTION, SOLUTION INTRAMUSCULAR; INTRAVENOUS AS NEEDED
Status: DISCONTINUED | OUTPATIENT
Start: 2019-09-18 | End: 2019-09-18 | Stop reason: HOSPADM

## 2019-09-18 RX ADMIN — ASPIRIN 325 MG: 325 TABLET, FILM COATED ORAL at 07:36

## 2019-09-18 NOTE — POST-PROCEDURE NOTE
Patient ambulated and voided in the bathroom.  Steady gait.   Right radial site looks good.  Med lock removed.  Patient dressed and taken to lobby for discharge.

## 2019-09-18 NOTE — DISCHARGE INSTRUCTIONS
· Post cardiac catheterization instructions:  · NO driving or operating heavy machinery for 24 hours.  This is because of sedation you received for your procedure.  · On day of discharge, limit activities.  · No heavy lifting greater than 10 lbs for the next 2 days after procedure.    · Remove dressing next day. Keep site clean and dry.  · May shower next day of procedure. Do not submerge catherization site in pool or tub baths for 5 days  · Call if drainage, swelling, bleeding, fever or drainage from catheterization site          Medications:  Please take medications as directed. Any questions or concerns, please contact your physician's  office.    Follow up: Dr. Ellis  [phone]  Follow up as scheduled

## 2019-09-18 NOTE — PRE-PROCEDURE NOTE
Cardiac Cath Lab Pre-procedure Note    - Patient was seen and examined at bedside.  - The patient's chart and all data was reviewed.  - The procedure, treatment alternatives, risks and benefits were explained with specific risks discussed.  - Patient was consented for cardiac cath procedure and possible PCI.  - Patient's case was found appropriate for dual antiplatelet therapy.    Patient's clinical presentation to the cardiac cath lab: stable ischemic symptoms.       Patient appears to be managing well.         Total anti-anginal medications: none.         Pre-sedation assessment  ASA 2   Mallampati class: III - soft palate, base of uvula visible.

## 2019-09-18 NOTE — ASSESSMENT & PLAN NOTE
Exertional chest pain, improves with rest  Stress  ECHO 6/25/19  Neg for ischemia  For cath today  Plan pending cath results  Follows with Dr Ellis

## 2019-09-18 NOTE — H&P
Cardiology History and Physical     Admitting Diagnosis   Chest pain, unspecified type [R07.9]   HPI    Eulalio Gregg is a 74 y.o. male with PMH of dyslipidemia, incomplete rbbb who presents to [facility] for cardiac catheterization. Pt notes exertional cp when running. + Relief with rest. He noted it when he was late to see a show and had to run to get there quickly.  He is able to do spin class without pain.  Stress in June 2019 neg for ischemia. However with continued symptoms and discussion with his cardiologist Dr Ellis, for cardiac catheterization today.       Medical History   Medical History:   Past Medical History:   Diagnosis Date   • Anemia    • Blepharospasm     receives botox injections every 2-3 months   • BPH (benign prostatic hyperplasia)    • History of vertigo 2016   • Lipid disorder    • Tendency toward bleeding easily (CMS/HCC) (HCC)        Surgical History:   Past Surgical History:   Procedure Laterality Date   • CATARACT EXTRACTION W/  INTRAOCULAR LENS IMPLANT     • CHOLECYSTECTOMY  1992   • HERNIA REPAIR         Allergies: Penicillins    No current facility-administered medications for this encounter.        Social History:   Social History     Social History   • Marital status: Single     Spouse name: N/A   • Number of children: N/A   • Years of education: N/A     Social History Main Topics   • Smoking status: Former Smoker     Packs/day: 1.00     Years: 10.00     Types: Cigarettes     Quit date: 1972   • Smokeless tobacco: Never Used   • Alcohol use Yes      Comment: 3-4 drinks/week   • Drug use: No   • Sexual activity: Defer     Other Topics Concern   • None     Social History Narrative   • None       Family History:   Family History   Problem Relation Age of Onset   • Cancer Biological Mother    • Colon cancer Biological Father          Review of Systems   Constitutional: negative  Respiratory: negative  Cardiovascular: positive for chest pressure/discomfort  Gastrointestinal:  "negative  Genitourinary:negative  Hematologic/lymphatic: negative  Neurological: negative  Endocrine: negative   Objective    Vital Signs for the last 24 hours   Temp:  [36 °C (96.8 °F)] 36 °C (96.8 °F)  Heart Rate:  [46] 46  Resp:  [15-17] 15  BP: (135-151)/(67-76) 135/67       Physical Exam   /67   Pulse (!) 46   Temp (!) 36 °C (96.8 °F) (Tympanic)   Resp 15   Ht 1.6 m (5' 3\")   Wt 71.7 kg (158 lb)   SpO2 98%   BMI 27.99 kg/m²   General appearance: alert, appears stated age and cooperative  Head: normocephalic, without obvious abnormality, atraumatic  Lungs: clear to auscultation bilaterally  Heart: regular rate and rhythm, S1, S2 normal, no murmur, click, rub or gallop  Extremities: extremities normal, warm and well-perfused; no cyanosis, clubbing, or edema  Pulses: 2+ and symmetric bilateral DP  Neurologic: Grossly normal      Labs   Lab Results   Component Value Date    WBC 4.51 09/04/2019    HGB 12.1 (L) 09/04/2019    HCT 34.7 (L) 09/04/2019     09/04/2019    CHOL 142 06/03/2019    TRIG 28 (L) 06/03/2019    HDL 48 06/03/2019    ALT 30 09/04/2019    AST 39 09/04/2019     (L) 09/04/2019    K 4.0 09/04/2019     09/04/2019    CREATININE 1.0 09/04/2019    BUN 17 09/04/2019    CO2 27 09/04/2019    TSH 2.23 12/12/2017    PSA 2.26 06/01/2018    INR 1.0 03/19/2019     Troponin I Results     No lab values to display.            Imaging        Cardiac Imaging   ECHOCARDIOGRAM STRESS TEST 06/25/2019    Narrative 1. Conclusion: Stress echo does not meet criteria for ischemia.  2. Baseline Echo: Normal left ventricular size and function. No wall   motion abnormalities. Ejection fraction 65%. Normal left atrial size.  Mild mitral annular calcification. Trace mitral regurgitation. Tricuspid   aortic valve. Mild aortic regurgitation. Normal right atrium. Normal  right ventricular size and function. Trace tricuspid regurgitation. The   jet is insufficient to estimate right ventricular systolic " pressure.  3. Post-Stress Echo: All walls become hyperdynamic. Ejection fraction   improves.  4. Stress ECG does not meet criteria for ischemia.  5. Good exercise tolerance. Holman treadmill score is 8, associated with low   risk for near-future cardiac events.            ECG     SR   Telemetry   SR      Assessment/Plan      Chest pain  Exertional chest pain, improves with rest  Stress  ECHO 6/25/19  Neg for ischemia  For cath today  Plan pending cath results  Follows with Dr Ellis       RBBB  Known Incomplete bundle branch block    Hyperlipidemia  Lab Results   Component Value Date    CHOL 142 06/03/2019    CHOL 148 06/01/2018    CHOL 148 06/02/2017     Lab Results   Component Value Date    HDL 48 06/03/2019    HDL 55 06/01/2018    HDL 52 06/02/2017     Lab Results   Component Value Date    LDLCALC 88 06/03/2019    LDLCALC 85 06/01/2018    LDLCALC 87 06/02/2017     Lab Results   Component Value Date    TRIG 28 (L) 06/03/2019    TRIG 41 06/01/2018    TRIG 46 06/02/2017       C.w statin                    CARSON Kaur  9/18/2019  8:10 AM

## 2019-09-18 NOTE — ASSESSMENT & PLAN NOTE
Lab Results   Component Value Date    CHOL 142 06/03/2019    CHOL 148 06/01/2018    CHOL 148 06/02/2017     Lab Results   Component Value Date    HDL 48 06/03/2019    HDL 55 06/01/2018    HDL 52 06/02/2017     Lab Results   Component Value Date    LDLCALC 88 06/03/2019    LDLCALC 85 06/01/2018    LDLCALC 87 06/02/2017     Lab Results   Component Value Date    TRIG 28 (L) 06/03/2019    TRIG 41 06/01/2018    TRIG 46 06/02/2017       C.w statin

## 2019-10-02 ENCOUNTER — TRANSCRIBE ORDERS (OUTPATIENT)
Dept: SCHEDULING | Age: 74
End: 2019-10-02

## 2019-10-02 DIAGNOSIS — S62.022A DISPLACED FRACTURE OF MIDDLE THIRD OF NAVICULAR (SCAPHOID) BONE OF LEFT WRIST, INITIAL ENCOUNTER FOR CLOSED FRACTURE: Primary | ICD-10-CM

## 2019-10-03 ENCOUNTER — HOSPITAL ENCOUNTER (OUTPATIENT)
Dept: RADIOLOGY | Facility: HOSPITAL | Age: 74
Discharge: HOME | End: 2019-10-03
Attending: ORTHOPAEDIC SURGERY
Payer: MEDICARE

## 2019-10-03 DIAGNOSIS — S62.022A DISPLACED FRACTURE OF MIDDLE THIRD OF NAVICULAR (SCAPHOID) BONE OF LEFT WRIST, INITIAL ENCOUNTER FOR CLOSED FRACTURE: ICD-10-CM

## 2019-10-03 PROCEDURE — 73221 MRI JOINT UPR EXTREM W/O DYE: CPT | Mod: LT

## 2019-10-08 ENCOUNTER — OFFICE VISIT (OUTPATIENT)
Dept: CARDIOLOGY | Facility: CLINIC | Age: 74
End: 2019-10-08
Payer: MEDICARE

## 2019-10-08 VITALS
RESPIRATION RATE: 12 BRPM | DIASTOLIC BLOOD PRESSURE: 56 MMHG | WEIGHT: 161 LBS | HEART RATE: 60 BPM | BODY MASS INDEX: 28.53 KG/M2 | SYSTOLIC BLOOD PRESSURE: 112 MMHG | HEIGHT: 63 IN

## 2019-10-08 DIAGNOSIS — I45.10 RBBB: ICD-10-CM

## 2019-10-08 DIAGNOSIS — E78.5 HYPERLIPIDEMIA, UNSPECIFIED HYPERLIPIDEMIA TYPE: ICD-10-CM

## 2019-10-08 DIAGNOSIS — I25.10 CORONARY ARTERY DISEASE INVOLVING NATIVE CORONARY ARTERY OF NATIVE HEART WITHOUT ANGINA PECTORIS: Primary | ICD-10-CM

## 2019-10-08 DIAGNOSIS — R07.9 CHEST PAIN, UNSPECIFIED TYPE: ICD-10-CM

## 2019-10-08 PROCEDURE — 99214 OFFICE O/P EST MOD 30 MIN: CPT | Performed by: INTERNAL MEDICINE

## 2019-10-08 PROCEDURE — 93000 ELECTROCARDIOGRAM COMPLETE: CPT | Performed by: INTERNAL MEDICINE

## 2019-10-08 RX ORDER — ASPIRIN 81 MG/1
81 TABLET ORAL DAILY
Qty: 90 TABLET | Refills: 1
Start: 2019-10-08 | End: 2021-10-28

## 2019-10-08 RX ORDER — ROSUVASTATIN CALCIUM 10 MG/1
10 TABLET, COATED ORAL DAILY
Qty: 90 TABLET | Refills: 3 | Status: SHIPPED | OUTPATIENT
Start: 2019-10-08 | End: 2020-06-08 | Stop reason: SDUPTHER

## 2019-10-08 NOTE — LETTER
2019     Otto Dennison MD  [address] [place] [address] Ave  [place], [address] [address]    Patient: Eulalio Gregg  YOB: 1945  Date of Visit: 10/8/2019      Dear Dr. Dennison:    Thank you for referring Eulalio Gregg to me for evaluation. Below are my notes for this consultation.    If you have questions, please do not hesitate to call me. I look forward to following your patient along with you.         Sincerely,        Donal Ellis, DO        CC: MD Latoya Teran MD Droogan, Christopher J, DO  10/8/2019  3:54 PM  Sign at close encounter       Donal Ellis D.O., [facility]    Clinical Cardiology and Heart Failure     [facility] HEART [facility]  The Heart [facility] Level  [address] [place] [address]     TEL  [phone]  [facility] system       10/8/2019    Re:  Eulalio Gregg  : 1945    Dear Eulalio Menjivar return to the office for follow-up after cardiac catheterization which showed moderate nonobstructive single-vessel CAD with 50% LAD and first diagonal stenosis.  He is a pleasant 73-year-old white male with a history of hyperlipidemia and asthma along with chronic hypotension.      At his last visit 2 months ago, he was seen in cardiology consultation regarding exertional chest discomfort. He used to run marathons. In the last year to when he would run he would get discomfort in the left side of his chest such that he has stopped running.  It was nonradiating, graded 3-4/10 and he would noted even when he ran on a treadmill within the past few months.  You sent him for a stress echocardiogram 2019 which was negative for ischemia.  He saw Dr. Cardoza he says his lungs are fine and to look elsewhere.  Interestingly, however, he does a spinning class for 1 hour 4-5 days a week and has absolutely no symptoms of chest discomfort.  There is no associated dyspnea or  "diaphoresis.  Thankfully left heart catheterization without obstructive CAD.  He denies orthopnea, paroxysmal nocturnal dyspnea, abdominal bloating, peripheral edema, palpitations, presyncope, syncope, stroke, TIA or claudication.    PAST MEDICAL HISTORY:  All aspects of this documentation have been updated and/or entered into our EHR.  Chest pain, hyperlipidemia, asthma, hernia repair, cholecystectomy, Uro-Lift    MEDICATIONS:   · Aspirin 81 mg daily-started for moderate nonobstructive CAD  · Cholecalciferol  · Coenzyme Q 10  · Cyclosporine eyedrops  · Denta cream  · Lutein  · Melatonin  · Multivitamin  · Simvastatin 20 mg nightly-switched to more aggressive rosuvastatin 10 mg daily targeting LDL less than 70  · Symbicort  · Tamsulosin  · Turmeric    ALLERGIES: Amoxicillin caused skin rash.  Sensitive to anesthesia.    SOCIAL HISTORY: He is .  He has a partner, Emma Powers.  He has a grown son and daughter.  He retired in .  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  He smoked 1 pack a day for 10 years and quit in .    FAMILY HISTORY: Mother  of gastric cancer.  Dad  of colon cancer.  One brother  after being hit by a truck.  One sister is alive but she has congestive heart failure.    REVIEW OF SYSTEMS: Aside from what is mentioned above, a 14 point review of systems was performed and was negative.    PHYSICAL EXAMINATION:  Vital Signs: BP (!) 112/56   Pulse 60   Resp 12   Ht 1.6 m (5' 3\")   Wt 73 kg (161 lb)   BMI 28.52 kg/m²  .   General: Pleasant and in no acute distress.  HEENT: No corneal arcus or xanthelasmas.  Sclerae are anicteric.  Nares patent.  Mucous membranes moist.  Neck: Supple.  JVP is 4 cm/H2O.  Carotids are equal with no audible bruits.  No lymphadenopathy or thyromegaly.  Heart: Regular.  Normal S1 and S2.  No S4. No S3. No murmur.  Chest: Symmetrical.  Lungs: Clear bilaterally without rales, wheezes nor rhonchi.  Abdomen: Soft, nontender.  No masses or bruits.  No " organomegaly.  Normal bowel sounds.  Extremities: No cyanosis, clubbing or edema.  Distal pulses are easily palpable.  Skin: Deeply tanned.  Warm and dry and well perfused.  Right radial cath site without ecchymosis or evidence for aneurysm.  Neurologic: Alert and oriented ×3.  Cranial nerves II through XII are intact.  Psychiatric: normal mood, affect & judgment.    DIAGNOSTIC DATA:    EKG: normal sinus rhythm, left axis deviation.  Incomplete right bundle branch block.    Labs:   Lab Results   Component Value Date     (L) 09/04/2019    K 4.0 09/04/2019    BUN 17 09/04/2019    CREATININE 1.0 09/04/2019    EGFR >60.0 09/04/2019    WBC 4.51 09/04/2019    HGB 12.1 (L) 09/04/2019     09/04/2019    ALT 30 09/04/2019    AST 39 09/04/2019    GLUCOSE 93 09/04/2019    TSH 2.23 12/12/2017    INR 1.0 03/19/2019       Lipid Profile:   Lab Results   Component Value Date    CHOL 142 06/03/2019    CHOL 148 06/01/2018    CHOL 148 06/02/2017     Lab Results   Component Value Date    TRIG 28 (L) 06/03/2019    TRIG 41 06/01/2018    TRIG 46 06/02/2017     Lab Results   Component Value Date    HDL 48 06/03/2019    HDL 55 06/01/2018    HDL 52 06/02/2017     Lab Results   Component Value Date    LDLCALC 88 06/03/2019    LDLCALC 85 06/01/2018    LDLCALC 87 06/02/2017     Left heart catheterization: 9/18/2019  Mid LAD 50% stenosis at the level of a diagonal branch which also has 50% ostial stenosis. Both LAD and diagonal branches were iFR negative. Normal LVEDP.    Stress echocardiogram 6/25/2019:  1. Conclusion: Stress echo does not meet criteria for ischemia.  2. Baseline Echo: Normal left ventricular size and function. No wall motion abnormalities. Ejection fraction 65%. Normal left atrial size.  Mild mitral annular calcification. Trace mitral regurgitation. Tricuspid aortic valve. Mild aortic regurgitation. Normal right atrium. Normal  right ventricular size and function. Trace tricuspid regurgitation. The jet is  insufficient to estimate right ventricular systolic pressure.  3. Post-Stress Echo: All walls become hyperdynamic. Ejection fraction improves.  4. Stress ECG does not meet criteria for ischemia.  5. Good exercise tolerance. Holman treadmill score is 8, associated with low risk for near-future cardiac events.    IMPRESSION/RECOMMENDATIONS:  1. Moderate nonobstructive single vessel 50% mid LAD and 50% ostial D1 stenosis by catheterization 9/18/2019- Exertional chest discomfort when running to a concert and previously when running full speed. He spins for 1 hour without complaints. Thankfully only nonobstructive CAD.  I reviewed the potential for microvascular/small vessel disease as a cause of his exertional chest discomfort in the absence of severe epicardial large vessel stenoses.  Again I favor aggressive medical therapy with aspirin and statin. I asked him to start aspirin 81mg daily and switch simvastatin to more aggressive rosuvastatin to drive his LDL well below 70.  2. Exertional chest pain -His left-sided, graded 3-4/10 and resolves with rest.  Interestingly, however, he does a spinning class for 1 hour breaking a solid sweat and has absolutely no symptoms.  As above.  3. Hyperlipidemia -target LDL<70 given CAD. LDL 88 on simvastatin 20mg which I will switch to more aggressive rosuvastatin therapy.  Fasting lipids prior to next annual visit.   4. Asthma -on Symbicort.  He follows with Suresh Cardoza.  5. MAC -noted on his echo.  6. Aortic insufficiency -graded mild on his stress echocardiogram.  Probably age-related changes to the aortic valve.  7. Incomplete right bundle branch block (I RBBB) -I reviewed this issue with him.  It is probably of no clinical consequence.  8. MGUS- follows with Dr. Romero.     Counseling and coordination of care exceeded >50% of this 25-minute patient encounter.    Menjivar, thank you for allowing me to share in the care of your patient.  I asked Eulalio to return 1 year.  If you have  any further questions, please do not hesitate to contact me.    Sincerely,    DUSTY Walker D.O., [facility]    I, Devon Galeana, am scribing for, and in the presence of, Donal Ellis DO.    IDonal D.O., personally performed the services described in this documentation as scribed by Devon Galeana PA-C in my presence, and it is both accurate and complete.

## 2019-10-08 NOTE — PATIENT INSTRUCTIONS
1. Seen today for heart cath follow-up. Medium (moderate) narrowing of 1 your heart (coronary) blood vessels which was 50% narrowed.  2. We will treat with medications.  3. Start aspirin 81 mg daily.  4. Switch simvastatin to rosuvastatin 10 mg daily.  5. Fasting blood work 1 week before your next visit.  CMP and lipids.  6. Office follow-up in 1 year.

## 2019-10-08 NOTE — PROGRESS NOTES
Donal Ellis D.O., [facility]    Clinical Cardiology and Heart Failure     [facility] HEART GROUP     [facility]  The Heart [facility] Level  [address] [id number]     TEL  [phone]  [facility]/ml       10/8/2019    Re:  Eulalio Rosadoblatt  : 1945    Dear Eulalio Menjivar return to the office for follow-up after cardiac catheterization which showed moderate nonobstructive single-vessel CAD with 50% LAD and first diagonal stenosis.  He is a pleasant 73-year-old white male with a history of hyperlipidemia and asthma along with chronic hypotension.      At his last visit 2 months ago, he was seen in cardiology consultation regarding exertional chest discomfort. He used to run marathons. In the last year to when he would run he would get discomfort in the left side of his chest such that he has stopped running.  It was nonradiating, graded 3-4/10 and he would noted even when he ran on a treadmill within the past few months.  You sent him for a stress echocardiogram 2019 which was negative for ischemia.  He saw Dr. Cradoza he says his lungs are fine and to look elsewhere.  Interestingly, however, he does a spinning class for 1 hour 4-5 days a week and has absolutely no symptoms of chest discomfort.  There is no associated dyspnea or diaphoresis.  Thankfully left heart catheterization without obstructive CAD.  He denies orthopnea, paroxysmal nocturnal dyspnea, abdominal bloating, peripheral edema, palpitations, presyncope, syncope, stroke, TIA or claudication.    PAST MEDICAL HISTORY:  All aspects of this documentation have been updated and/or entered into our EHR.  Chest pain, hyperlipidemia, asthma, hernia repair, cholecystectomy, Uro-Lift    MEDICATIONS:   · Aspirin 81 mg daily-started for moderate nonobstructive CAD  · Cholecalciferol  · Coenzyme Q 10  · Cyclosporine eyedrops  · Denta  "cream  · Lutein  · Melatonin  · Multivitamin  · Simvastatin 20 mg nightly-switched to more aggressive rosuvastatin 10 mg daily targeting LDL less than 70  · Symbicort  · Tamsulosin  · Turmeric    ALLERGIES: Amoxicillin caused skin rash.  Sensitive to anesthesia.    SOCIAL HISTORY: He is .  He has a partner, Emma Powers.  He has a grown son and daughter.  He retired in .  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    FAMILY HISTORY: Mother  of gastric cancer.  Dad  of colon cancer.  One brother  after being hit by a truck.  One sister is alive but she has congestive heart failure.    REVIEW OF SYSTEMS: Aside from what is mentioned above, a 14 point review of systems was performed and was negative.    PHYSICAL EXAMINATION:  Vital Signs: BP (!) 112/56   Pulse 60   Resp 12   Ht 1.6 m (5' 3\")   Wt 73 kg (161 lb)   BMI 28.52 kg/m² .   General: Pleasant and in no acute distress.  HEENT: No corneal arcus or xanthelasmas.  Sclerae are anicteric.  Nares patent.  Mucous membranes moist.  Neck: Supple.  JVP is 4 cm/H2O.  Carotids are equal with no audible bruits.  No lymphadenopathy or thyromegaly.  Heart: Regular.  Normal S1 and S2.  No S4. No S3. No murmur.  Chest: Symmetrical.  Lungs: Clear bilaterally without rales, wheezes nor rhonchi.  Abdomen: Soft, nontender.  No masses or bruits.  No organomegaly.  Normal bowel sounds.  Extremities: No cyanosis, clubbing or edema.  Distal pulses are easily palpable.  Skin: Deeply tanned.  Warm and dry and well perfused.  Right radial cath site without ecchymosis or evidence for aneurysm.  Neurologic: Alert and oriented ×3.  Cranial nerves II through XII are intact.  Psychiatric: normal mood, affect & judgment.    DIAGNOSTIC DATA:    EKG: normal sinus rhythm, left axis deviation.  Incomplete right bundle branch block.    Labs:   Lab Results   Component Value Date     (L) 2019    K 4.0 2019    BUN 17 " 09/04/2019    CREATININE 1.0 09/04/2019    EGFR >60.0 09/04/2019    WBC 4.51 09/04/2019    HGB 12.1 (L) 09/04/2019     09/04/2019    ALT 30 09/04/2019    AST 39 09/04/2019    GLUCOSE 93 09/04/2019    TSH 2.23 12/12/2017    INR 1.0 03/19/2019       Lipid Profile:   Lab Results   Component Value Date    CHOL 142 06/03/2019    CHOL 148 06/01/2018    CHOL 148 06/02/2017     Lab Results   Component Value Date    TRIG 28 (L) 06/03/2019    TRIG 41 06/01/2018    TRIG 46 06/02/2017     Lab Results   Component Value Date    HDL 48 06/03/2019    HDL 55 06/01/2018    HDL 52 06/02/2017     Lab Results   Component Value Date    LDLCALC 88 06/03/2019    LDLCALC 85 06/01/2018    LDLCALC 87 06/02/2017     Left heart catheterization: 9/18/2019  Mid LAD 50% stenosis at the level of a diagonal branch which also has 50% ostial stenosis. Both LAD and diagonal branches were iFR negative. Normal LVEDP.    Stress echocardiogram 6/25/2019:  1. Conclusion: Stress echo does not meet criteria for ischemia.  2. Baseline Echo: Normal left ventricular size and function. No wall motion abnormalities. Ejection fraction 65%. Normal left atrial size.  Mild mitral annular calcification. Trace mitral regurgitation. Tricuspid aortic valve. Mild aortic regurgitation. Normal right atrium. Normal  right ventricular size and function. Trace tricuspid regurgitation. The jet is insufficient to estimate right ventricular systolic pressure.  3. Post-Stress Echo: All walls become hyperdynamic. Ejection fraction improves.  4. Stress ECG does not meet criteria for ischemia.  5. Good exercise tolerance. Holman treadmill score is 8, associated with low risk for near-future cardiac events.    IMPRESSION/RECOMMENDATIONS:  1. Moderate nonobstructive single vessel 50% mid LAD and 50% ostial D1 stenosis by catheterization 9/18/2019- Exertional chest discomfort when running to a concert and previously when running full speed. He spins for 1 hour without complaints.  Thankfully only nonobstructive CAD.  I reviewed the potential for microvascular/small vessel disease as a cause of his exertional chest discomfort in the absence of severe epicardial large vessel stenoses.  Again I favor aggressive medical therapy with aspirin and statin. I asked him to start aspirin 81mg daily and switch simvastatin to more aggressive rosuvastatin to drive his LDL well below 70.  2. Exertional chest pain -His left-sided, graded 3-4/10 and resolves with rest.  Interestingly, however, he does a spinning class for 1 hour breaking a solid sweat and has absolutely no symptoms.  As above.  3. Hyperlipidemia -target LDL<70 given CAD. LDL 88 on simvastatin 20mg which I will switch to more aggressive rosuvastatin therapy.  Fasting lipids prior to next annual visit.   4. Asthma -on Symbicort.  He follows with Suresh Cardoza.  5. MAC -noted on his echo.  6. Aortic insufficiency -graded mild on his stress echocardiogram.  Probably age-related changes to the aortic valve.  7. Incomplete right bundle branch block (I RBBB) -I reviewed this issue with him.  It is probably of no clinical consequence.  8. MGUS- follows with Dr. Romero.     Counseling and coordination of care exceeded >50% of this 25-minute patient encounter.    Menjivar, thank you for allowing me to share in the care of your patient.  I asked Eulalio to return 1 year.  If you have any further questions, please do not hesitate to contact me.    Sincerely,    DUSTY Walker D.O., [facility]    Devon SOLITARIO, am scribing for, and in the presence of, Donal Ellis DO.    Donal SOLITARIO D.O., personally performed the services described in this documentation as scribed by Devon Galeana PA-C in my presence, and it is both accurate and complete.

## 2019-10-08 NOTE — LETTER
2019     Donal Ellis, DO  [address] [facility]  [facility]  [place] [address]    Patient: Eulalio Gregg  YOB: 1945  Date of Visit: 10/8/2019      Dear Dr. Ellis:    Thank you for referring Eulalio Gregg to me for evaluation. Below are my notes for this consultation.    If you have questions, please do not hesitate to call me. I look forward to following your patient along with you.         Sincerely,        DUSTY Walker        CC: No Recipients  Devon Beltrán PA C  10/8/2019  3:39 PM  Sign at close encounter       Donal Ellis D.O., [facility]    Clinical Cardiology and Heart Failure     [facility] HEART [facility]  [facility]  [address] [place] [address]     TEL  [phone]  [facility]       10/8/2019    Re:  Eulalio Gregg  : 1945    Dear Eulalio Menjivar return to the office for follow-up after cardiac catheterization which showed moderate nonobstructive single-vessel CAD with 50% LAD and first diagonal stenosis.  He is a pleasant 73-year-old white male with a history of hyperlipidemia and asthma along with chronic hypotension.      At his last visit 2 months ago, he was seen in cardiology consultation regarding exertional chest discomfort. He used to run marathons. In the last year to when he would run he would get discomfort in the left side of his chest such that he has stopped running.  It was nonradiating, graded 3-4/10 and he would noted even when he ran on a treadmill within the past few months.  You sent him for a stress echocardiogram 2019 which was negative for ischemia.  He saw Dr. Cardoza he says his lungs are fine and to look elsewhere.  Interestingly, however, he does a spinning class for 1 hour 4-5 days a week and has absolutely no symptoms of chest discomfort.  There is no associated dyspnea or diaphoresis.   "Thankfully left heart catheterization without obstructive CAD.  He denies orthopnea, paroxysmal nocturnal dyspnea, abdominal bloating, peripheral edema, palpitations, presyncope, syncope, stroke, TIA or claudication.    PAST MEDICAL HISTORY:  All aspects of this documentation have been updated and/or entered into our EHR.  Chest pain, hyperlipidemia, asthma, hernia repair, cholecystectomy, Uro-Lift    MEDICATIONS:   · Aspirin 81 mg daily-started for moderate nonobstructive CAD  · Cholecalciferol  · Coenzyme Q 10  · Cyclosporine eyedrops  · Denta cream  · Lutein  · Melatonin  · Multivitamin  · Simvastatin 20 mg nightly-switched to more aggressive rosuvastatin 10 mg daily targeting LDL less than 70  · Symbicort  · Tamsulosin  · Turmeric    ALLERGIES: Amoxicillin caused skin rash.  Sensitive to anesthesia.    SOCIAL HISTORY: He is .  He has a partner, Emma Powers.  He has a grown son and daughter.  He retired in .  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    FAMILY HISTORY: Mother  of gastric cancer.  Dad  of colon cancer.  One brother  after being hit by a truck.  One sister is alive but she has congestive heart failure.    REVIEW OF SYSTEMS: Aside from what is mentioned above, a 14 point review of systems was performed and was negative.    PHYSICAL EXAMINATION:  Vital Signs: BP (!) 112/56   Pulse 60   Resp 12   Ht 1.6 m (5' 3\")   Wt 73 kg (161 lb)   BMI 28.52 kg/m²  .   General: Pleasant and in no acute distress.  HEENT: No corneal arcus or xanthelasmas.  Sclerae are anicteric.  Nares patent.  Mucous membranes moist.  Neck: Supple.  JVP is 4 cm/H2O.  Carotids are equal with no audible bruits.  No lymphadenopathy or thyromegaly.  Heart: Regular.  Normal S1 and S2.  No S4. No S3. No murmur.  Chest: Symmetrical.  Lungs: Clear bilaterally without rales, wheezes nor rhonchi.  Abdomen: Soft, nontender.  No masses or bruits.  No organomegaly.  Normal " bowel sounds.  Extremities: No cyanosis, clubbing or edema.  Distal pulses are easily palpable.  Skin: Deeply tanned.  Warm and dry and well perfused.  Right radial cath site without ecchymosis or evidence for aneurysm.  Neurologic: Alert and oriented ×3.  Cranial nerves II through XII are intact.  Psychiatric: normal mood, affect & judgment.    DIAGNOSTIC DATA:    EKG: normal sinus rhythm, left axis deviation.  Incomplete right bundle branch block.    Labs:   Lab Results   Component Value Date     (L) 09/04/2019    K 4.0 09/04/2019    BUN 17 09/04/2019    CREATININE 1.0 09/04/2019    EGFR >60.0 09/04/2019    WBC 4.51 09/04/2019    HGB 12.1 (L) 09/04/2019     09/04/2019    ALT 30 09/04/2019    AST 39 09/04/2019    GLUCOSE 93 09/04/2019    TSH 2.23 12/12/2017    INR 1.0 03/19/2019       Lipid Profile:   Lab Results   Component Value Date    CHOL 142 06/03/2019    CHOL 148 06/01/2018    CHOL 148 06/02/2017     Lab Results   Component Value Date    TRIG 28 (L) 06/03/2019    TRIG 41 06/01/2018    TRIG 46 06/02/2017     Lab Results   Component Value Date    HDL 48 06/03/2019    HDL 55 06/01/2018    HDL 52 06/02/2017     Lab Results   Component Value Date    LDLCALC 88 06/03/2019    LDLCALC 85 06/01/2018    LDLCALC 87 06/02/2017     Left heart catheterization: 9/18/2019  Mid LAD 50% stenosis at the level of a diagonal branch which also has 50% ostial stenosis. Both LAD and diagonal branches were iFR negative. Normal LVEDP.    Stress echocardiogram 6/25/2019:  1. Conclusion: Stress echo does not meet criteria for ischemia.  2. Baseline Echo: Normal left ventricular size and function. No wall motion abnormalities. Ejection fraction 65%. Normal left atrial size.  Mild mitral annular calcification. Trace mitral regurgitation. Tricuspid aortic valve. Mild aortic regurgitation. Normal right atrium. Normal  right ventricular size and function. Trace tricuspid regurgitation. The jet is insufficient to estimate right  ventricular systolic pressure.  3. Post-Stress Echo: All walls become hyperdynamic. Ejection fraction improves.  4. Stress ECG does not meet criteria for ischemia.  5. Good exercise tolerance. Holman treadmill score is 8, associated with low risk for near-future cardiac events.    IMPRESSION/RECOMMENDATIONS:  1. Moderate nonobstructive single vessel 50% mid LAD and 50% ostial D1 stenosis by catheterization 9/18/2019- Exertional chest discomfort when running to a concert and previously when running full speed. He spins for 1 hour without complaints. Thankfully only nonobstructive CAD. Potential for small vessel disease. Medical therapy with aspirin and statin. I asked him to start aspirin 81mg daily and switch simvastatin to more aggressive rosuvastatin.  2. Exertional chest pain -His left-sided, graded 3-4/10 and resolves with rest.  Interestingly, however, he does a spinning class for 1 hour breaking a solid sweat and has absolutely no symptoms.  As above.  3. Hyperlipidemia -target LDL<70 given CAD. LDL 88 on simvastatin 20mg which I will switch to more aggressive rosuvastatin therapy.  Fasting lipids prior to next annual visit.   4. Asthma -on Symbicort.  He follows with Suresh Cardoza.  5. MAC -noted on his echo.  6. Aortic insufficiency -graded mild on his stress echocardiogram.  Probably age-related changes to the aortic valve.  7. Incomplete right bundle branch block (I RBBB) -I reviewed this issue with him.  It is probably of no clinical consequence.  8. MGUS- follows with Dr. Romero.     Menjivar, thank you for allowing me to share in the care of your patient.  I asked Eulalio to return 1 year.  If you have any further questions, please do not hesitate to contact me.    Sincerely,    DUSTY Walker    I, Devon Galeana, am scribing for, and in the presence of, Donal Ellis DO.

## 2020-02-19 ENCOUNTER — LAB REQUISITION (OUTPATIENT)
Dept: LAB | Facility: HOSPITAL | Age: 75
End: 2020-02-19
Attending: INTERNAL MEDICINE
Payer: MEDICARE

## 2020-02-19 DIAGNOSIS — D64.9 ANEMIA, UNSPECIFIED: ICD-10-CM

## 2020-02-19 DIAGNOSIS — D47.2 MONOCLONAL GAMMOPATHY: ICD-10-CM

## 2020-02-19 DIAGNOSIS — E07.9 DISORDER OF THYROID, UNSPECIFIED: ICD-10-CM

## 2020-02-19 LAB
ALBUMIN SERPL-MCNC: 3.4 G/DL (ref 3.4–5)
ALP SERPL-CCNC: 48 IU/L (ref 35–126)
ALT SERPL-CCNC: 24 IU/L (ref 16–63)
ANION GAP SERPL CALC-SCNC: 7 MEQ/L (ref 3–15)
AST SERPL-CCNC: 30 IU/L (ref 15–41)
BASOPHILS # BLD: 0 K/UL (ref 0.01–0.1)
BASOPHILS NFR BLD: 0 %
BILIRUB SERPL-MCNC: 0.6 MG/DL (ref 0.3–1.2)
BUN SERPL-MCNC: 24 MG/DL (ref 8–20)
CALCIUM SERPL-MCNC: 9.3 MG/DL (ref 8.9–10.3)
CHLORIDE SERPL-SCNC: 101 MEQ/L (ref 98–109)
CO2 SERPL-SCNC: 27 MEQ/L (ref 22–32)
CREAT SERPL-MCNC: 1 MG/DL (ref 0.8–1.3)
DIFFERENTIAL METHOD BLD: ABNORMAL
EOSINOPHIL # BLD: 0.05 K/UL (ref 0.04–0.54)
EOSINOPHIL NFR BLD: 0.9 %
ERYTHROCYTE [DISTWIDTH] IN BLOOD BY AUTOMATED COUNT: 14 % (ref 11.6–14.4)
GFR SERPL CREATININE-BSD FRML MDRD: >60 ML/MIN/1.73M*2
GLUCOSE SERPL-MCNC: 113 MG/DL (ref 70–99)
HCT VFR BLDCO AUTO: 35.4 % (ref 40.1–51)
HGB BLD-MCNC: 12.1 G/DL (ref 13.7–17.5)
IMM GRANULOCYTES # BLD AUTO: 0.02 K/UL (ref 0–0.08)
IMM GRANULOCYTES NFR BLD AUTO: 0.4 %
LYMPHOCYTES # BLD: 1.12 K/UL (ref 1.2–3.5)
LYMPHOCYTES NFR BLD: 19.7 %
MCH RBC QN AUTO: 33.5 PG (ref 28–33.2)
MCHC RBC AUTO-ENTMCNC: 34.2 G/DL (ref 32.2–36.5)
MCV RBC AUTO: 98.1 FL (ref 83–98)
MONOCYTES # BLD: 0.63 K/UL (ref 0.3–1)
MONOCYTES NFR BLD: 11.1 %
NEUTROPHILS # BLD: 3.86 K/UL (ref 1.7–7)
NEUTS SEG NFR BLD: 67.9 %
NRBC BLD-RTO: 0 %
PDW BLD AUTO: 9 FL (ref 9.4–12.4)
PLATELET # BLD AUTO: 250 K/UL (ref 150–350)
POTASSIUM SERPL-SCNC: 4.2 MEQ/L (ref 3.6–5.1)
PROT SERPL-MCNC: 7.5 G/DL (ref 6–8.2)
RBC # BLD AUTO: 3.61 M/UL (ref 4.5–5.8)
SODIUM SERPL-SCNC: 135 MEQ/L (ref 136–144)
WBC # BLD AUTO: 5.68 K/UL (ref 3.8–10.5)

## 2020-02-19 PROCEDURE — 80053 COMPREHEN METABOLIC PANEL: CPT | Performed by: INTERNAL MEDICINE

## 2020-02-19 PROCEDURE — 84165 PROTEIN E-PHORESIS SERUM: CPT | Performed by: INTERNAL MEDICINE

## 2020-02-19 PROCEDURE — 83883 ASSAY NEPHELOMETRY NOT SPEC: CPT | Performed by: INTERNAL MEDICINE

## 2020-02-19 PROCEDURE — 85025 COMPLETE CBC W/AUTO DIFF WBC: CPT | Performed by: INTERNAL MEDICINE

## 2020-02-19 PROCEDURE — 36415 COLL VENOUS BLD VENIPUNCTURE: CPT | Performed by: INTERNAL MEDICINE

## 2020-02-20 LAB
ALBUMIN MFR UR ELPH: 42 % (ref 48–62)
ALBUMIN SERPL ELPH-MCNC: 3.31 G/DL (ref 3.2–4.5)
ALBUMIN/GLOB SERPL: 0.7 {RATIO} (ref 1.1–2.1)
ALPHA1 GLOB MFR SERPL ELPH: 3.5 % (ref 2.1–4.8)
ALPHA1 GLOB SERPL ELPH-MCNC: 0.28 G/DL (ref 0.15–0.32)
ALPHA2 GLOB MFR UR ELPH: 14.2 % (ref 9.7–16.2)
ALPHA2 GLOB SERPL ELPH-MCNC: 1.12 G/DL (ref 0.7–1.1)
B-GLOBULIN SERPL ELPH-MCNC: 0.76 G/DL (ref 0.73–1.3)
BETA1 GLOB MFR UR ELPH: 9.6 % (ref 10.8–17.9)
GAMMA GLOB MFR UR ELPH: 30.7 % (ref 9–23)
GAMMA GLOB SERPL ELPH-MCNC: 2.42 G/DL (ref 0.6–1.6)
KAPPA LC SERPL-MCNC: 9.35 MG/L (ref 8.96–34.28)
KAPPA LC/LAMBDA SER: 0.06 {RATIO} (ref 0.64–1.83)
LAMBDA LC SERPL-MCNC: 163.1 MG/L (ref 5.7–26.3)
M PROTEIN MFR SERPL ELPH: 28.2 %
M PROTEIN SERPL ELPH-MCNC: 2.23 G/DL
PROT PATTERN SERPL ELPH-IMP: NORMAL
PROT SERPL-MCNC: 7.9 G/DL (ref 6–8.2)

## 2020-03-09 ENCOUNTER — LAB REQUISITION (OUTPATIENT)
Dept: LAB | Facility: HOSPITAL | Age: 75
End: 2020-03-09
Attending: INTERNAL MEDICINE
Payer: MEDICARE

## 2020-03-09 DIAGNOSIS — D47.2 MONOCLONAL GAMMOPATHY: ICD-10-CM

## 2020-03-09 PROCEDURE — 88341 IMHCHEM/IMCYTCHM EA ADD ANTB: CPT | Mod: 59 | Performed by: INTERNAL MEDICINE

## 2020-03-25 LAB
CASE RPRT: NORMAL
CLINICAL INFO: NORMAL
IMMUNE STAIN STUDY: NORMAL
PATH REPORT.ADDENDUM SPEC: NORMAL
PATH REPORT.ADDENDUM SPEC: NORMAL
PATH REPORT.FINAL DX SPEC: NORMAL
PATH REPORT.FINAL DX SPEC: NORMAL
PATH REPORT.GROSS SPEC: NORMAL
PATH REPORT.MICROSCOPIC SPEC OTHER STN: NORMAL

## 2020-03-30 ENCOUNTER — TRANSCRIBE ORDERS (OUTPATIENT)
Dept: SCHEDULING | Age: 75
End: 2020-03-30

## 2020-03-30 DIAGNOSIS — D47.2 MONOCLONAL GAMMOPATHY: ICD-10-CM

## 2020-03-30 DIAGNOSIS — C90.00: Primary | ICD-10-CM

## 2020-05-05 ENCOUNTER — APPOINTMENT (OUTPATIENT)
Dept: LAB | Facility: HOSPITAL | Age: 75
End: 2020-05-05
Attending: UROLOGY
Payer: MEDICARE

## 2020-05-05 ENCOUNTER — TRANSCRIBE ORDERS (OUTPATIENT)
Dept: LAB | Facility: HOSPITAL | Age: 75
End: 2020-05-05

## 2020-05-05 DIAGNOSIS — N40.1 BENIGN PROSTATIC HYPERPLASIA WITH LOWER URINARY TRACT SYMPTOMS: Primary | ICD-10-CM

## 2020-05-05 DIAGNOSIS — N40.1 BENIGN PROSTATIC HYPERPLASIA WITH LOWER URINARY TRACT SYMPTOMS: ICD-10-CM

## 2020-05-05 LAB — PSA SERPL-MCNC: 1.48 NG/ML

## 2020-05-05 PROCEDURE — 36415 COLL VENOUS BLD VENIPUNCTURE: CPT

## 2020-05-05 PROCEDURE — 84153 ASSAY OF PSA TOTAL: CPT

## 2020-06-01 ENCOUNTER — HOSPITAL ENCOUNTER (OUTPATIENT)
Dept: RADIOLOGY | Facility: HOSPITAL | Age: 75
Discharge: HOME | End: 2020-06-01
Attending: INTERNAL MEDICINE
Payer: MEDICARE

## 2020-06-01 DIAGNOSIS — C90.00: ICD-10-CM

## 2020-06-01 DIAGNOSIS — D47.2 MONOCLONAL GAMMOPATHY: ICD-10-CM

## 2020-06-01 PROCEDURE — 77080 DXA BONE DENSITY AXIAL: CPT | Mod: GA

## 2020-06-08 ENCOUNTER — LAB REQUISITION (OUTPATIENT)
Dept: LAB | Facility: HOSPITAL | Age: 75
End: 2020-06-08
Attending: INTERNAL MEDICINE
Payer: MEDICARE

## 2020-06-08 DIAGNOSIS — E07.9 DISORDER OF THYROID, UNSPECIFIED: ICD-10-CM

## 2020-06-08 DIAGNOSIS — D47.2 MONOCLONAL GAMMOPATHY: ICD-10-CM

## 2020-06-08 DIAGNOSIS — D64.9 ANEMIA, UNSPECIFIED: ICD-10-CM

## 2020-06-08 LAB
ALBUMIN SERPL-MCNC: 3.7 G/DL (ref 3.4–5)
ALP SERPL-CCNC: 43 IU/L (ref 35–126)
ALT SERPL-CCNC: 27 IU/L (ref 16–63)
ANION GAP SERPL CALC-SCNC: 6 MEQ/L (ref 3–15)
AST SERPL-CCNC: 38 IU/L (ref 15–41)
BASOPHILS # BLD: 0.01 K/UL (ref 0.01–0.1)
BASOPHILS NFR BLD: 0.2 %
BILIRUB SERPL-MCNC: 0.9 MG/DL (ref 0.3–1.2)
BUN SERPL-MCNC: 23 MG/DL (ref 8–20)
CALCIUM SERPL-MCNC: 9.4 MG/DL (ref 8.9–10.3)
CHLORIDE SERPL-SCNC: 104 MEQ/L (ref 98–109)
CO2 SERPL-SCNC: 28 MEQ/L (ref 22–32)
CREAT SERPL-MCNC: 1 MG/DL (ref 0.8–1.3)
DIFFERENTIAL METHOD BLD: ABNORMAL
EOSINOPHIL # BLD: 0.05 K/UL (ref 0.04–0.54)
EOSINOPHIL NFR BLD: 0.9 %
ERYTHROCYTE [DISTWIDTH] IN BLOOD BY AUTOMATED COUNT: 13.4 % (ref 11.6–14.4)
GFR SERPL CREATININE-BSD FRML MDRD: >60 ML/MIN/1.73M*2
GLUCOSE SERPL-MCNC: 85 MG/DL (ref 70–99)
HCT VFR BLDCO AUTO: 36.2 % (ref 40.1–51)
HGB BLD-MCNC: 12.2 G/DL (ref 13.7–17.5)
IMM GRANULOCYTES # BLD AUTO: 0.01 K/UL (ref 0–0.08)
IMM GRANULOCYTES NFR BLD AUTO: 0.2 %
LYMPHOCYTES # BLD: 1.25 K/UL (ref 1.2–3.5)
LYMPHOCYTES NFR BLD: 22.5 %
MCH RBC QN AUTO: 33.4 PG (ref 28–33.2)
MCHC RBC AUTO-ENTMCNC: 33.7 G/DL (ref 32.2–36.5)
MCV RBC AUTO: 99.2 FL (ref 83–98)
MONOCYTES # BLD: 0.41 K/UL (ref 0.3–1)
MONOCYTES NFR BLD: 7.4 %
NEUTROPHILS # BLD: 3.83 K/UL (ref 1.7–7)
NEUTROPHILS # BLD: 3.83 K/UL (ref 1.7–7)
NEUTS SEG NFR BLD: 68.8 %
NRBC BLD-RTO: 0 %
PDW BLD AUTO: 9.3 FL (ref 9.4–12.4)
PLATELET # BLD AUTO: 168 K/UL (ref 150–350)
POTASSIUM SERPL-SCNC: 5 MEQ/L (ref 3.6–5.1)
PROT SERPL-MCNC: 7.6 G/DL (ref 6–8.2)
RBC # BLD AUTO: 3.65 M/UL (ref 4.5–5.8)
SODIUM SERPL-SCNC: 138 MEQ/L (ref 136–144)
WBC # BLD AUTO: 5.56 K/UL (ref 3.8–10.5)

## 2020-06-08 PROCEDURE — 80053 COMPREHEN METABOLIC PANEL: CPT | Performed by: INTERNAL MEDICINE

## 2020-06-08 PROCEDURE — 84165 PROTEIN E-PHORESIS SERUM: CPT | Performed by: INTERNAL MEDICINE

## 2020-06-08 PROCEDURE — 85025 COMPLETE CBC W/AUTO DIFF WBC: CPT | Performed by: INTERNAL MEDICINE

## 2020-06-08 PROCEDURE — 36415 COLL VENOUS BLD VENIPUNCTURE: CPT | Performed by: INTERNAL MEDICINE

## 2020-06-08 PROCEDURE — 83883 ASSAY NEPHELOMETRY NOT SPEC: CPT | Performed by: INTERNAL MEDICINE

## 2020-06-09 LAB
ALBUMIN MFR UR ELPH: 46.4 % (ref 48–62)
ALBUMIN SERPL ELPH-MCNC: 3.53 G/DL (ref 3.2–4.5)
ALBUMIN/GLOB SERPL: 0.9 {RATIO} (ref 1.1–2.1)
ALPHA1 GLOB MFR SERPL ELPH: 3.1 % (ref 2.1–4.8)
ALPHA1 GLOB SERPL ELPH-MCNC: 0.23 G/DL (ref 0.15–0.32)
ALPHA2 GLOB MFR UR ELPH: 12.4 % (ref 9.7–16.2)
ALPHA2 GLOB SERPL ELPH-MCNC: 0.94 G/DL (ref 0.7–1.1)
B-GLOBULIN SERPL ELPH-MCNC: 0.69 G/DL (ref 0.73–1.3)
BETA1 GLOB MFR UR ELPH: 9.1 % (ref 10.8–17.9)
GAMMA GLOB MFR UR ELPH: 29 % (ref 9–23)
GAMMA GLOB SERPL ELPH-MCNC: 2.2 G/DL (ref 0.6–1.6)
KAPPA LC SERPL-MCNC: 7.92 MG/L (ref 8.96–34.28)
KAPPA LC/LAMBDA SER: 0.03 {RATIO} (ref 0.64–1.83)
LAMBDA LC SERPL-MCNC: 228.9 MG/L (ref 5.7–26.3)
M PROTEIN MFR SERPL ELPH: 24.5 %
M PROTEIN SERPL ELPH-MCNC: 1.86 G/DL
PROT PATTERN SERPL ELPH-IMP: NORMAL
PROT SERPL-MCNC: 7.6 G/DL (ref 6–8.2)

## 2020-08-19 ENCOUNTER — TRANSCRIBE ORDERS (OUTPATIENT)
Dept: LAB | Facility: HOSPITAL | Age: 75
End: 2020-08-19

## 2020-08-19 ENCOUNTER — APPOINTMENT (OUTPATIENT)
Dept: LAB | Facility: HOSPITAL | Age: 75
End: 2020-08-19
Attending: UROLOGY
Payer: MEDICARE

## 2020-08-19 DIAGNOSIS — N40.1 BENIGN PROSTATIC HYPERPLASIA WITH LOWER URINARY TRACT SYMPTOMS: ICD-10-CM

## 2020-08-19 DIAGNOSIS — N40.1 BENIGN PROSTATIC HYPERPLASIA WITH LOWER URINARY TRACT SYMPTOMS: Primary | ICD-10-CM

## 2020-08-19 LAB — PSA SERPL-MCNC: 2.07 NG/ML

## 2020-08-19 PROCEDURE — 84153 ASSAY OF PSA TOTAL: CPT

## 2020-08-19 PROCEDURE — 36415 COLL VENOUS BLD VENIPUNCTURE: CPT

## 2020-08-25 ENCOUNTER — LAB REQUISITION (OUTPATIENT)
Dept: LAB | Facility: HOSPITAL | Age: 75
End: 2020-08-25
Attending: INTERNAL MEDICINE
Payer: MEDICARE

## 2020-08-25 DIAGNOSIS — D64.9 ANEMIA, UNSPECIFIED: ICD-10-CM

## 2020-08-25 DIAGNOSIS — E07.9 DISORDER OF THYROID, UNSPECIFIED: ICD-10-CM

## 2020-08-25 DIAGNOSIS — D47.2 MONOCLONAL GAMMOPATHY: ICD-10-CM

## 2020-08-25 DIAGNOSIS — C90.00 MULTIPLE MYELOMA NOT HAVING ACHIEVED REMISSION (CMS/HCC): ICD-10-CM

## 2020-08-25 LAB
ALBUMIN SERPL-MCNC: 3.6 G/DL (ref 3.4–5)
ALP SERPL-CCNC: 47 IU/L (ref 35–126)
ALT SERPL-CCNC: 28 IU/L (ref 16–63)
ANION GAP SERPL CALC-SCNC: 8 MEQ/L (ref 3–15)
AST SERPL-CCNC: 35 IU/L (ref 15–41)
BASOPHILS # BLD: 0.01 K/UL (ref 0.01–0.1)
BASOPHILS NFR BLD: 0.2 %
BILIRUB SERPL-MCNC: 0.8 MG/DL (ref 0.3–1.2)
BUN SERPL-MCNC: 18 MG/DL (ref 8–20)
CALCIUM SERPL-MCNC: 9.3 MG/DL (ref 8.9–10.3)
CHLORIDE SERPL-SCNC: 105 MEQ/L (ref 98–109)
CO2 SERPL-SCNC: 25 MEQ/L (ref 22–32)
CREAT SERPL-MCNC: 0.9 MG/DL (ref 0.8–1.3)
DIFFERENTIAL METHOD BLD: ABNORMAL
EOSINOPHIL # BLD: 0.07 K/UL (ref 0.04–0.54)
EOSINOPHIL NFR BLD: 1.4 %
ERYTHROCYTE [DISTWIDTH] IN BLOOD BY AUTOMATED COUNT: 14.1 % (ref 11.6–14.4)
GFR SERPL CREATININE-BSD FRML MDRD: >60 ML/MIN/1.73M*2
GLUCOSE SERPL-MCNC: 101 MG/DL (ref 70–99)
HCT VFR BLDCO AUTO: 33.4 % (ref 40.1–51)
HGB BLD-MCNC: 11.6 G/DL (ref 13.7–17.5)
IMM GRANULOCYTES # BLD AUTO: 0.01 K/UL (ref 0–0.08)
IMM GRANULOCYTES NFR BLD AUTO: 0.2 %
LYMPHOCYTES # BLD: 1.26 K/UL (ref 1.2–3.5)
LYMPHOCYTES NFR BLD: 25 %
MCH RBC QN AUTO: 33.9 PG (ref 28–33.2)
MCHC RBC AUTO-ENTMCNC: 34.7 G/DL (ref 32.2–36.5)
MCV RBC AUTO: 97.7 FL (ref 83–98)
MONOCYTES # BLD: 0.37 K/UL (ref 0.3–1)
MONOCYTES NFR BLD: 7.3 %
NEUTROPHILS # BLD: 3.33 K/UL (ref 1.7–7)
NEUTROPHILS # BLD: 3.33 K/UL (ref 1.7–7)
NEUTS SEG NFR BLD: 65.9 %
NRBC BLD-RTO: 0 %
PDW BLD AUTO: 8.9 FL (ref 9.4–12.4)
PLATELET # BLD AUTO: 187 K/UL (ref 150–350)
POTASSIUM SERPL-SCNC: 4.8 MEQ/L (ref 3.6–5.1)
PROT SERPL-MCNC: 7.3 G/DL (ref 6–8.2)
RBC # BLD AUTO: 3.42 M/UL (ref 4.5–5.8)
SODIUM SERPL-SCNC: 138 MEQ/L (ref 136–144)
WBC # BLD AUTO: 5.05 K/UL (ref 3.8–10.5)

## 2020-08-25 PROCEDURE — 85025 COMPLETE CBC W/AUTO DIFF WBC: CPT | Performed by: INTERNAL MEDICINE

## 2020-08-25 PROCEDURE — 80053 COMPREHEN METABOLIC PANEL: CPT | Performed by: INTERNAL MEDICINE

## 2020-08-25 PROCEDURE — 82784 ASSAY IGA/IGD/IGG/IGM EACH: CPT | Mod: 59 | Performed by: INTERNAL MEDICINE

## 2020-08-25 PROCEDURE — 83883 ASSAY NEPHELOMETRY NOT SPEC: CPT | Performed by: INTERNAL MEDICINE

## 2020-08-25 PROCEDURE — 84165 PROTEIN E-PHORESIS SERUM: CPT | Performed by: INTERNAL MEDICINE

## 2020-08-25 PROCEDURE — 36415 COLL VENOUS BLD VENIPUNCTURE: CPT | Performed by: INTERNAL MEDICINE

## 2020-08-26 LAB
ALBUMIN MFR UR ELPH: 46.4 % (ref 48–62)
ALBUMIN SERPL ELPH-MCNC: 3.39 G/DL (ref 3.2–4.5)
ALBUMIN/GLOB SERPL: 0.9 {RATIO} (ref 1.1–2.1)
ALPHA1 GLOB MFR SERPL ELPH: 2.7 % (ref 2.1–4.8)
ALPHA1 GLOB SERPL ELPH-MCNC: 0.2 G/DL (ref 0.15–0.32)
ALPHA2 GLOB MFR UR ELPH: 11.2 % (ref 9.7–16.2)
ALPHA2 GLOB SERPL ELPH-MCNC: 0.82 G/DL (ref 0.7–1.1)
B-GLOBULIN SERPL ELPH-MCNC: 0.6 G/DL (ref 0.73–1.3)
BETA1 GLOB MFR UR ELPH: 8.3 % (ref 10.8–17.9)
GAMMA GLOB MFR UR ELPH: 31.4 % (ref 9–23)
GAMMA GLOB SERPL ELPH-MCNC: 2.29 G/DL (ref 0.6–1.6)
IGA SERPL-MCNC: 24 MG/DL (ref 84.5–499)
IGG SERPL-MCNC: 3081 MG/DL (ref 537–1535)
IGM SERPL-MCNC: 17 MG/DL (ref 35–242)
KAPPA LC SERPL-MCNC: 7.74 MG/L (ref 8.96–34.28)
KAPPA LC/LAMBDA SER: 0.03 {RATIO} (ref 0.64–1.83)
LAMBDA LC SERPL-MCNC: 255.1 MG/L (ref 5.7–26.3)
M PROTEIN MFR SERPL ELPH: 26.8 %
M PROTEIN SERPL ELPH-MCNC: 1.96 G/DL
PROT PATTERN SERPL ELPH-IMP: NORMAL
PROT SERPL-MCNC: 7.3 G/DL (ref 6–8.2)

## 2020-10-06 ENCOUNTER — TELEPHONE (OUTPATIENT)
Dept: SCHEDULING | Facility: CLINIC | Age: 75
End: 2020-10-06

## 2020-10-07 ENCOUNTER — OFFICE VISIT (OUTPATIENT)
Dept: CARDIOLOGY | Facility: CLINIC | Age: 75
End: 2020-10-07
Payer: MEDICARE

## 2020-10-07 VITALS
RESPIRATION RATE: 15 BRPM | HEART RATE: 60 BPM | WEIGHT: 158 LBS | DIASTOLIC BLOOD PRESSURE: 60 MMHG | SYSTOLIC BLOOD PRESSURE: 108 MMHG | HEIGHT: 63 IN | BODY MASS INDEX: 28 KG/M2

## 2020-10-07 DIAGNOSIS — I45.10 RBBB: ICD-10-CM

## 2020-10-07 DIAGNOSIS — E78.49 OTHER HYPERLIPIDEMIA: ICD-10-CM

## 2020-10-07 DIAGNOSIS — I25.10 CORONARY ARTERY DISEASE INVOLVING NATIVE CORONARY ARTERY OF NATIVE HEART WITHOUT ANGINA PECTORIS: Primary | ICD-10-CM

## 2020-10-07 PROCEDURE — 93000 ELECTROCARDIOGRAM COMPLETE: CPT | Performed by: INTERNAL MEDICINE

## 2020-10-07 PROCEDURE — 99214 OFFICE O/P EST MOD 30 MIN: CPT | Performed by: INTERNAL MEDICINE

## 2020-10-07 RX ORDER — FLUTICASONE PROPIONATE AND SALMETEROL 250; 50 UG/1; UG/1
1 POWDER RESPIRATORY (INHALATION) 2 TIMES DAILY
COMMUNITY

## 2020-10-07 NOTE — PATIENT INSTRUCTIONS
1. Stable check up  2. You forgot to get the fasting cholesterol blood test before this visit so please get it on 10/29/20 when you bring your partner Julia for her appointment  3. Return in 1 year

## 2020-10-07 NOTE — PROGRESS NOTES
Donal Ellis D.O., [facility]    Clinical Cardiology and Heart Failure     [facility]ART [facility]     [facility]  The [facility] [address] [facility]  [address] [place] [address]     TEL  [phone]  [facility]       10/7/2020    Re:  Eulalio Gregg  : 1945    Dear Otto    Eulalio returned to the office for his annual cardiovascular follow-up given his history of moderate nonobstructive single-vessel CAD with a 50% LAD and 50% first diagonal stenoses, bifascicular block with LAFB/RBBB, hyperlipidemia and asthma along with chronic hypotension.      Since his last visit 1 year ago he has maintained cardiovascular stability.  During the COVID pandemic he has not been doing his typical spinning classes, but he gets outside and takes walks anywhere from 3 to 5 miles about 5 to 7 days a week.  He has been doing less lately because his partner, Julia had some medical issues and he is tending to her.  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 Romero.  His breathlessness has been held in check with the use of an Advair which took the place of Symbicort because of insurance issues.  He denies orthopnea, paroxysmal nocturnal dyspnea, abdominal bloating, peripheral edema, palpitations, presyncope, syncope, stroke, TIA or claudication.    PAST MEDICAL HISTORY:  All aspects of this documentation have been updated and/or entered into our EHR.  Chest pain, hyperlipidemia, asthma, hernia repair, cholecystectomy, Uro-Lift    MEDICATIONS:  Patient's Medications   New Prescriptions    No medications on file   Previous Medications    ASPIRIN (ASPIR-81) 81 MG ENTERIC COATED TABLET    Take 1 tablet (81 mg total) by mouth daily.    CHOLECALCIFEROL, VITAMIN D3, (VITAMIN D3) 2,000 UNIT CAPSULE    Take 2,000 Units by mouth daily.    COENZYME Q10 (COQ10) 200 MG CAPSULE    Take by mouth.    CYCLOSPORINE  "(RESTASIS) 0.05 % OPHTHALMIC EMULSION    Administer 1 drop into both eyes 2 (two) times a day.    DENTA 5000 PLUS 1.1 % CREAM    2 (two) times a day. as directed    FERROUS SULFATE (IRON ORAL)    Take by mouth.    FLUTICASONE PROPION-SALMETEROL (ADVAIR DISKUS) 250-50 MCG/DOSE DISKUS INHALER    Inhale 1 puff 2 (two) times a day.   Rinse mouth with water after use to reduce aftertaste and incidence of candidiasis.   Do not swallow.  For patients not on a ventilator, a spacer is recommended to be used with this medication/inhaler.    MELATONIN 5 MG TABLET    Take 5 mg by mouth nightly.    MULTIVIT-MIN/FOLIC/VIT K/LYCOP (MEN'S MULTIVITAMIN ORAL)    Take by mouth.    ROSUVASTATIN (CRESTOR) 10 MG TABLET    Take 1 tablet (10 mg total) by mouth daily.    TAMSULOSIN (FLOMAX) 0.4 MG CAPSULE    1 tablet 2 times daily    TURMERIC ORAL    Take by mouth daily.   Modified Medications    No medications on file   Discontinued Medications    SYMBICORT 80-4.5 MCG/ACTUATION INHALER    TAKE 2 PUFFS BY MOUTH EVERY DAY     ALLERGIES: Amoxicillin caused skin rash.  Sensitive to anesthesia.    SOCIAL HISTORY: He is .  He has a partner, Julia Powers.  He has a grown son and daughter.  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    FAMILY HISTORY: Mother  of gastric cancer.  Dad  of colon cancer.  One brother  after being hit by a truck.  One sister is alive but she has congestive heart failure.    REVIEW OF SYSTEMS: Aside from what is mentioned above, a 14 point review of systems was performed and was negative.    PHYSICAL EXAMINATION:   Visit Vitals  /60   Pulse 60   Resp 15   Ht 1.6 m (5' 3\")   Wt 71.7 kg (158 lb)   BMI 27.99 kg/m²   Body surface area is 1.79 meters squared.    General: Pleasant and in no acute distress.  HEENT: No corneal arcus or xanthelasmas.  Sclerae are anicteric.  Nares patent.  Mucous membranes moist.  Neck: Supple.  JVP is 4 " cm/H2O.  Carotids are equal with no audible bruits.  No lymphadenopathy or thyromegaly.  Heart: Regular.  Normal S1 and S2.  No S4. No S3. No murmur.  Chest: Symmetrical.  Lungs: Clear bilaterally without rales, wheezes nor rhonchi.  Abdomen: Soft, nontender.  No masses or bruits.  No organomegaly.  Normal bowel sounds.  Extremities: No cyanosis, clubbing or edema.  Distal pulses are easily palpable.  Skin: Deeply tanned.  Warm and dry and well perfused.   Neurologic: Alert and oriented ×3.  Cranial nerves II through XII are intact.  Psychiatric: normal mood, affect & judgment.    DIAGNOSTIC DATA:    EKG: Ectopic atrial versus low sinus rhythm, left axis deviation with an LAFB/RBBB    Labs:   Lab Results   Component Value Date     08/25/2020    K 4.8 08/25/2020    BUN 18 08/25/2020    CREATININE 0.9 08/25/2020    EGFR >60.0 08/25/2020    WBC 5.05 08/25/2020    HGB 11.6 (L) 08/25/2020     08/25/2020    ALT 28 08/25/2020    AST 35 08/25/2020    GLUCOSE 101 (H) 08/25/2020    TSH 2.23 12/12/2017    INR 1.0 03/19/2019       Lipid Profile:   Lab Results   Component Value Date    CHOL 142 06/03/2019    CHOL 148 06/01/2018    CHOL 148 06/02/2017     Lab Results   Component Value Date    TRIG 28 (L) 06/03/2019    TRIG 41 06/01/2018    TRIG 46 06/02/2017     Lab Results   Component Value Date    HDL 48 06/03/2019    HDL 55 06/01/2018    HDL 52 06/02/2017     Lab Results   Component Value Date    LDLCALC 88 06/03/2019    LDLCALC 85 06/01/2018    LDLCALC 87 06/02/2017     Left heart catheterization: 9/18/2019  Mid LAD 50% stenosis at the level of a diagonal branch which also has 50% ostial stenosis. Both LAD and diagonal branches were iFR negative. Normal LVEDP.    Stress echocardiogram 6/25/2019:  1. Conclusion: Stress echo does not meet criteria for ischemia.  2. Baseline Echo: Normal left ventricular size and function. No wall motion abnormalities. Ejection fraction 65%. Normal left atrial size.  Mild mitral  annular calcification. Trace mitral regurgitation. Tricuspid aortic valve. Mild aortic regurgitation. Normal right atrium. Normal  right ventricular size and function. Trace tricuspid regurgitation. The jet is insufficient to estimate right ventricular systolic pressure.  3. Post-Stress Echo: All walls become hyperdynamic. Ejection fraction improves.  4. Stress ECG does not meet criteria for ischemia.  5. Good exercise tolerance. Holman treadmill score is 8, associated with low risk for near-future cardiac events.    IMPRESSION/RECOMMENDATIONS:  1. Moderate nonobstructive single vessel 50% mid LAD and 50% ostial D1 stenosis by catheterization 9/18/2019-he has no symptoms of angina pectoralis.  I have asked him to continue with his aspirin and rosuvastatin therapy along with lifestyle modification.  2. Exertional chest pain -in 2019 he was noting left-sided, graded 3-4/10 which resolved with rest.  Interestingly, however, he was doing a spinning class for 1 hour breaking a solid sweat and has absolutely no symptoms.  This has not recurred.  3. Hyperlipidemia -I reviewed his latest lipid profile outlined above.  I have asked him to continue with his rosuvastatin therapy.  He was to have labs performed before this visit but it was not obtained.  I reordered a fasting lipid profile.  4. Asthma -on Advair.  He follows with Suresh Cardoza.  5. MAC -noted on his echo which is outlined above.  6. Aortic insufficiency -this was graded mild on his stress echocardiogram.  Probably age-related changes to the aortic valve.  7. Bifascicular block with LAFB/RBBB-I reviewed this issue with him.  It is probably of no clinical consequence and will be followed with an annual EKG and he will report any symptoms of syncope to me.  8. Smoldering multiple myeloma-he follows with Dr. Romero and his MGUS has progressed.     Counseling and coordination of care exceeded >50% of this 25-minute patient encounter.    Menjivar, thank you for allowing  me to share in the care of your patient.  I asked Eulalio to return 1 year.  If you have any further questions, please do not hesitate to contact me.    Sincerely,    Donal Ellis D.O., FACC

## 2020-10-07 NOTE — LETTER
2020     Otto Dennison MD  [address] [place] [address] Ave  MOBS, [facility] [address]  [facility] [address]    Patient: Eulalio Gregg  YOB: 1945  Date of Visit: 10/7/2020      Dear Dr. Dennison:    Thank you for referring Eulalio Gregg to me for evaluation. Below are my notes for this consultation.    If you have questions, please do not hesitate to call me. I look forward to following your patient along with you.         Sincerely,        Donal Ellis,         CC: MD Latoya Teran MD Droogan, Christopher J, DO  10/7/2020 11:14 PM  Sign when Signing Visit       Donal Ellis D.O., [facility]    Clinical Cardiology and Heart Failure     [facility] HEART [facility]  The Heart [facility] Level  [address] [place] [address]     TEL  [phone]  [facility]       10/7/2020    Re:  Eulalio Gregg  : 1945    Dear Eulalio Menjivar returned to the office for his annual cardiovascular follow-up given his history of moderate nonobstructive single-vessel CAD with a 50% LAD and 50% first diagonal stenoses, bifascicular block with LAFB/RBBB, hyperlipidemia and asthma along with chronic hypotension.      Since his last visit 1 year ago he has maintained cardiovascular stability.  During the COVID pandemic he has not been doing his typical spinning classes, but he gets outside and takes walks anywhere from 3 to 5 miles about 5 to 7 days a week.  He has been doing less lately because his partner, Julia had some medical issues and he is tending to her.  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 Romero.  His breathlessness has been held in check with the use of an Advair which took the place of Symbicort because of insurance issues.  He denies orthopnea, paroxysmal nocturnal dyspnea, abdominal bloating, peripheral edema,  palpitations, presyncope, syncope, stroke, TIA or claudication.    PAST MEDICAL HISTORY:  All aspects of this documentation have been updated and/or entered into our EHR.  Chest pain, hyperlipidemia, asthma, hernia repair, cholecystectomy, Uro-Lift    MEDICATIONS:  Patient's Medications   New Prescriptions    No medications on file   Previous Medications    ASPIRIN (ASPIR-81) 81 MG ENTERIC COATED TABLET    Take 1 tablet (81 mg total) by mouth daily.    CHOLECALCIFEROL, VITAMIN D3, (VITAMIN D3) 2,000 UNIT CAPSULE    Take 2,000 Units by mouth daily.    COENZYME Q10 (COQ10) 200 MG CAPSULE    Take by mouth.    CYCLOSPORINE (RESTASIS) 0.05 % OPHTHALMIC EMULSION    Administer 1 drop into both eyes 2 (two) times a day.    DENTA 5000 PLUS 1.1 % CREAM    2 (two) times a day. as directed    FERROUS SULFATE (IRON ORAL)    Take by mouth.    FLUTICASONE PROPION-SALMETEROL (ADVAIR DISKUS) 250-50 MCG/DOSE DISKUS INHALER    Inhale 1 puff 2 (two) times a day.   Rinse mouth with water after use to reduce aftertaste and incidence of candidiasis.   Do not swallow.  For patients not on a ventilator, a spacer is recommended to be used with this medication/inhaler.    MELATONIN 5 MG TABLET    Take 5 mg by mouth nightly.    MULTIVIT-MIN/FOLIC/VIT K/LYCOP (MEN'S MULTIVITAMIN ORAL)    Take by mouth.    ROSUVASTATIN (CRESTOR) 10 MG TABLET    Take 1 tablet (10 mg total) by mouth daily.    TAMSULOSIN (FLOMAX) 0.4 MG CAPSULE    1 tablet 2 times daily    TURMERIC ORAL    Take by mouth daily.   Modified Medications    No medications on file   Discontinued Medications    SYMBICORT 80-4.5 MCG/ACTUATION INHALER    TAKE 2 PUFFS BY MOUTH EVERY DAY     ALLERGIES: Amoxicillin caused skin rash.  Sensitive to anesthesia.    SOCIAL HISTORY: He is .  He has a partner, Julia Powers.  He has a grown son and daughter.  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    FAMILY HISTORY: Mother  of  "gastric cancer.  Dad  of colon cancer.  One brother  after being hit by a truck.  One sister is alive but she has congestive heart failure.    REVIEW OF SYSTEMS: Aside from what is mentioned above, a 14 point review of systems was performed and was negative.    PHYSICAL EXAMINATION:   Visit Vitals  /60   Pulse 60   Resp 15   Ht 1.6 m (5' 3\")   Wt 71.7 kg (158 lb)   BMI 27.99 kg/m²   Body surface area is 1.79 meters squared.    General: Pleasant and in no acute distress.  HEENT: No corneal arcus or xanthelasmas.  Sclerae are anicteric.  Nares patent.  Mucous membranes moist.  Neck: Supple.  JVP is 4 cm/H2O.  Carotids are equal with no audible bruits.  No lymphadenopathy or thyromegaly.  Heart: Regular.  Normal S1 and S2.  No S4. No S3. No murmur.  Chest: Symmetrical.  Lungs: Clear bilaterally without rales, wheezes nor rhonchi.  Abdomen: Soft, nontender.  No masses or bruits.  No organomegaly.  Normal bowel sounds.  Extremities: No cyanosis, clubbing or edema.  Distal pulses are easily palpable.  Skin: Deeply tanned.  Warm and dry and well perfused.   Neurologic: Alert and oriented ×3.  Cranial nerves II through XII are intact.  Psychiatric: normal mood, affect & judgment.    DIAGNOSTIC DATA:    EKG: Ectopic atrial versus low sinus rhythm, left axis deviation with an LAFB/RBBB    Labs:   Lab Results   Component Value Date     2020    K 4.8 2020    BUN 18 2020    CREATININE 0.9 2020    EGFR >60.0 2020    WBC 5.05 2020    HGB 11.6 (L) 2020     2020    ALT 28 2020    AST 35 2020    GLUCOSE 101 (H) 2020    TSH 2.23 2017    INR 1.0 2019       Lipid Profile:   Lab Results   Component Value Date    CHOL 142 2019    CHOL 148 2018    CHOL 148 2017     Lab Results   Component Value Date    TRIG 28 (L) 2019    TRIG 41 2018    TRIG 46 2017     Lab Results   Component Value Date    HDL 48 " 06/03/2019    HDL 55 06/01/2018    HDL 52 06/02/2017     Lab Results   Component Value Date    LDLCALC 88 06/03/2019    LDLCALC 85 06/01/2018    LDLCALC 87 06/02/2017     Left heart catheterization: 9/18/2019  Mid LAD 50% stenosis at the level of a diagonal branch which also has 50% ostial stenosis. Both LAD and diagonal branches were iFR negative. Normal LVEDP.    Stress echocardiogram 6/25/2019:  1. Conclusion: Stress echo does not meet criteria for ischemia.  2. Baseline Echo: Normal left ventricular size and function. No wall motion abnormalities. Ejection fraction 65%. Normal left atrial size.  Mild mitral annular calcification. Trace mitral regurgitation. Tricuspid aortic valve. Mild aortic regurgitation. Normal right atrium. Normal  right ventricular size and function. Trace tricuspid regurgitation. The jet is insufficient to estimate right ventricular systolic pressure.  3. Post-Stress Echo: All walls become hyperdynamic. Ejection fraction improves.  4. Stress ECG does not meet criteria for ischemia.  5. Good exercise tolerance. Holman treadmill score is 8, associated with low risk for near-future cardiac events.    IMPRESSION/RECOMMENDATIONS:  1. Moderate nonobstructive single vessel 50% mid LAD and 50% ostial D1 stenosis by catheterization 9/18/2019-he has no symptoms of angina pectoralis.  I have asked him to continue with his aspirin and rosuvastatin therapy along with lifestyle modification.  2. Exertional chest pain -in 2019 he was noting left-sided, graded 3-4/10 which resolved with rest.  Interestingly, however, he was doing a spinning class for 1 hour breaking a solid sweat and has absolutely no symptoms.  This has not recurred.  3. Hyperlipidemia -I reviewed his latest lipid profile outlined above.  I have asked him to continue with his rosuvastatin therapy.  He was to have labs performed before this visit but it was not obtained.  I reordered a fasting lipid profile.  4. Asthma -on Advair.  He  follows with Suresh Cardoza.  5. MAC -noted on his echo which is outlined above.  6. Aortic insufficiency -this was graded mild on his stress echocardiogram.  Probably age-related changes to the aortic valve.  7. Bifascicular block with LAFB/RBBB-I reviewed this issue with him.  It is probably of no clinical consequence and will be followed with an annual EKG and he will report any symptoms of syncope to me.  8. Smoldering multiple myeloma-he follows with Dr. Romero and his MGUS has progressed.     Counseling and coordination of care exceeded >50% of this 25-minute patient encounter.    Menjivar, thank you for allowing me to share in the care of your patient.  I asked Eulalio to return 1 year.  If you have any further questions, please do not hesitate to contact me.    Sincerely,    Donal Ellis D.O., FACC

## 2020-10-22 ENCOUNTER — APPOINTMENT (OUTPATIENT)
Dept: LAB | Facility: HOSPITAL | Age: 75
End: 2020-10-22
Attending: INTERNAL MEDICINE
Payer: MEDICARE

## 2020-10-22 DIAGNOSIS — I25.10 CORONARY ARTERY DISEASE INVOLVING NATIVE CORONARY ARTERY OF NATIVE HEART WITHOUT ANGINA PECTORIS: ICD-10-CM

## 2020-10-22 DIAGNOSIS — I45.10 RBBB: ICD-10-CM

## 2020-10-22 DIAGNOSIS — E78.49 OTHER HYPERLIPIDEMIA: ICD-10-CM

## 2020-10-22 LAB
CHOLEST SERPL-MCNC: 137 MG/DL
HDLC SERPL-MCNC: 53 MG/DL
HDLC SERPL: 2.6 {RATIO}
LDLC SERPL CALC-MCNC: 78 MG/DL
NONHDLC SERPL-MCNC: 84 MG/DL
TRIGL SERPL-MCNC: 31 MG/DL (ref 30–149)

## 2020-10-22 PROCEDURE — 80061 LIPID PANEL: CPT

## 2020-10-22 PROCEDURE — 36415 COLL VENOUS BLD VENIPUNCTURE: CPT

## 2020-10-23 ENCOUNTER — TELEPHONE (OUTPATIENT)
Dept: CARDIOLOGY | Facility: CLINIC | Age: 75
End: 2020-10-23

## 2020-10-23 NOTE — TELEPHONE ENCOUNTER
----- Message from Donal Ellis DO sent at 10/22/2020  8:46 PM EDT -----  Devon, please call the patient regarding his lab/test result.  Eulalio was then last week.  On rosuvastatin.  50% lesions in the LAD and diagonal.  Okay to keep at same dose

## 2020-10-23 NOTE — RESULT ENCOUNTER NOTE
Devon, please call the patient regarding his lab/test result.  Eulalio was then last week.  On rosuvastatin.  50% lesions in the LAD and diagonal.  Okay to keep at same dose

## 2020-11-20 ENCOUNTER — LAB REQUISITION (OUTPATIENT)
Dept: LAB | Facility: HOSPITAL | Age: 75
End: 2020-11-20
Attending: INTERNAL MEDICINE
Payer: MEDICARE

## 2020-11-20 DIAGNOSIS — C90.00 MULTIPLE MYELOMA NOT HAVING ACHIEVED REMISSION (CMS/HCC): ICD-10-CM

## 2020-11-20 DIAGNOSIS — D47.2 MONOCLONAL GAMMOPATHY: ICD-10-CM

## 2020-11-20 DIAGNOSIS — E07.9 DISORDER OF THYROID, UNSPECIFIED: ICD-10-CM

## 2020-11-20 DIAGNOSIS — D64.9 ANEMIA, UNSPECIFIED: ICD-10-CM

## 2020-11-20 LAB
ALBUMIN SERPL-MCNC: 3.7 G/DL (ref 3.4–5)
ALP SERPL-CCNC: 45 IU/L (ref 35–126)
ALT SERPL-CCNC: 29 IU/L (ref 16–63)
ANION GAP SERPL CALC-SCNC: 10 MEQ/L (ref 3–15)
AST SERPL-CCNC: 42 IU/L (ref 15–41)
BASOPHILS # BLD: 0.01 K/UL (ref 0.01–0.1)
BASOPHILS NFR BLD: 0.2 %
BILIRUB SERPL-MCNC: 0.9 MG/DL (ref 0.3–1.2)
BUN SERPL-MCNC: 19 MG/DL (ref 8–20)
CALCIUM SERPL-MCNC: 9.4 MG/DL (ref 8.9–10.3)
CHLORIDE SERPL-SCNC: 106 MEQ/L (ref 98–109)
CO2 SERPL-SCNC: 23 MEQ/L (ref 22–32)
CREAT SERPL-MCNC: 1 MG/DL (ref 0.8–1.3)
DIFFERENTIAL METHOD BLD: ABNORMAL
EOSINOPHIL # BLD: 0.03 K/UL (ref 0.04–0.54)
EOSINOPHIL NFR BLD: 0.6 %
ERYTHROCYTE [DISTWIDTH] IN BLOOD BY AUTOMATED COUNT: 13.5 % (ref 11.6–14.4)
GFR SERPL CREATININE-BSD FRML MDRD: >60 ML/MIN/1.73M*2
GLUCOSE SERPL-MCNC: 86 MG/DL (ref 70–99)
HCT VFR BLDCO AUTO: 35 % (ref 40.1–51)
HGB BLD-MCNC: 12.3 G/DL (ref 13.7–17.5)
IMM GRANULOCYTES # BLD AUTO: 0.02 K/UL (ref 0–0.08)
IMM GRANULOCYTES NFR BLD AUTO: 0.4 %
LYMPHOCYTES # BLD: 1.44 K/UL (ref 1.2–3.5)
LYMPHOCYTES NFR BLD: 26.6 %
MCH RBC QN AUTO: 34.5 PG (ref 28–33.2)
MCHC RBC AUTO-ENTMCNC: 35.1 G/DL (ref 32.2–36.5)
MCV RBC AUTO: 98 FL (ref 83–98)
MONOCYTES # BLD: 0.4 K/UL (ref 0.3–1)
MONOCYTES NFR BLD: 7.4 %
NEUTROPHILS # BLD: 3.51 K/UL (ref 1.7–7)
NEUTROPHILS # BLD: 3.51 K/UL (ref 1.7–7)
NEUTS SEG NFR BLD: 64.8 %
NRBC BLD-RTO: 0 %
PDW BLD AUTO: 8.8 FL (ref 9.4–12.4)
PLATELET # BLD AUTO: 174 K/UL (ref 150–350)
POTASSIUM SERPL-SCNC: 4.7 MEQ/L (ref 3.6–5.1)
PROT SERPL-MCNC: 7.4 G/DL (ref 6–8.2)
RBC # BLD AUTO: 3.57 M/UL (ref 4.5–5.8)
SODIUM SERPL-SCNC: 139 MEQ/L (ref 136–144)
WBC # BLD AUTO: 5.41 K/UL (ref 3.8–10.5)

## 2020-11-20 PROCEDURE — 83883 ASSAY NEPHELOMETRY NOT SPEC: CPT | Performed by: INTERNAL MEDICINE

## 2020-11-20 PROCEDURE — 36415 COLL VENOUS BLD VENIPUNCTURE: CPT | Performed by: INTERNAL MEDICINE

## 2020-11-20 PROCEDURE — 82232 ASSAY OF BETA-2 PROTEIN: CPT | Performed by: INTERNAL MEDICINE

## 2020-11-20 PROCEDURE — 84165 PROTEIN E-PHORESIS SERUM: CPT | Performed by: INTERNAL MEDICINE

## 2020-11-20 PROCEDURE — 80053 COMPREHEN METABOLIC PANEL: CPT | Performed by: INTERNAL MEDICINE

## 2020-11-20 PROCEDURE — 85025 COMPLETE CBC W/AUTO DIFF WBC: CPT | Performed by: INTERNAL MEDICINE

## 2020-11-22 LAB
KAPPA LC SERPL-MCNC: 7.84 MG/L (ref 8.96–34.28)
KAPPA LC/LAMBDA SER: 0.02 {RATIO} (ref 0.64–1.83)
LAMBDA LC SERPL-MCNC: 337.7 MG/L (ref 5.7–26.3)

## 2020-11-23 LAB
ALBUMIN MFR UR ELPH: 45.2 % (ref 48–62)
ALBUMIN SERPL ELPH-MCNC: 3.34 G/DL (ref 3.2–4.5)
ALBUMIN/GLOB SERPL: 0.8 {RATIO} (ref 1.1–2.1)
ALPHA1 GLOB MFR SERPL ELPH: 2.8 % (ref 2.1–4.8)
ALPHA1 GLOB SERPL ELPH-MCNC: 0.21 G/DL (ref 0.15–0.32)
ALPHA2 GLOB MFR UR ELPH: 11.2 % (ref 9.7–16.2)
ALPHA2 GLOB SERPL ELPH-MCNC: 0.83 G/DL (ref 0.7–1.1)
B-GLOBULIN SERPL ELPH-MCNC: 0.61 G/DL (ref 0.73–1.3)
B2 MICROGLOB SERPL-MCNC: 2.56 MG/L (ref 0.98–2.52)
BETA1 GLOB MFR UR ELPH: 8.3 % (ref 10.8–17.9)
GAMMA GLOB MFR UR ELPH: 32.6 % (ref 9–23)
GAMMA GLOB SERPL ELPH-MCNC: 2.41 G/DL (ref 0.6–1.6)
M PROTEIN MFR SERPL ELPH: 27.6 %
M PROTEIN SERPL ELPH-MCNC: 2.04 G/DL
PROT PATTERN SERPL ELPH-IMP: NORMAL
PROT SERPL-MCNC: 7.4 G/DL (ref 6–8.2)

## 2021-01-15 ENCOUNTER — TELEPHONE (OUTPATIENT)
Dept: CARDIOLOGY | Facility: CLINIC | Age: 76
End: 2021-01-15

## 2021-01-15 NOTE — TELEPHONE ENCOUNTER
Called and left  notifying that [place] is still in phase 1A, vaccinating healthcare providers and we do not have any further information about when the public will be vaccinated. Directed him to the [place] [employer] website or the CDC for further information.

## 2021-01-15 NOTE — TELEPHONE ENCOUNTER
----- Message from CARSON Narayan sent at 1/15/2021  9:03 AM EST -----  Regarding: FW: Referral Request  Contact: [phone]  Please see below.  This is a Dr. Ellis patient.    ----- Message -----  From: Peter Marie LPN  Sent: 1/15/2021   8:13 AM EST  To: , #  Subject: FW: Referral Request                               ----- Message -----  From: Eulalio Gregg  Sent: 1/14/2021   8:54 PM EST  To: , #  Subject: Referral Request                                 When will I be eligible to receive my vaccinations for covid 19? I am 75 years old.  Eulalio Gregg

## 2021-01-28 ENCOUNTER — IMMUNIZATIONS (OUTPATIENT)
Dept: FAMILY MEDICINE CLINIC | Facility: HOSPITAL | Age: 76
End: 2021-01-28

## 2021-01-28 DIAGNOSIS — Z23 ENCOUNTER FOR IMMUNIZATION: Primary | ICD-10-CM

## 2021-01-28 PROCEDURE — 0011A SARS-COV-2 / COVID-19 MRNA VACCINE (MODERNA) 100 MCG: CPT | Performed by: INTERNAL MEDICINE

## 2021-01-28 PROCEDURE — 91301 SARS-COV-2 / COVID-19 MRNA VACCINE (MODERNA) 100 MCG: CPT | Performed by: INTERNAL MEDICINE

## 2021-02-23 ENCOUNTER — LAB REQUISITION (OUTPATIENT)
Dept: LAB | Facility: HOSPITAL | Age: 76
End: 2021-02-23
Attending: INTERNAL MEDICINE
Payer: MEDICARE

## 2021-02-23 DIAGNOSIS — D47.2 MONOCLONAL GAMMOPATHY: ICD-10-CM

## 2021-02-23 DIAGNOSIS — C90.00 MULTIPLE MYELOMA NOT HAVING ACHIEVED REMISSION (CMS/HCC): ICD-10-CM

## 2021-02-23 LAB
ALBUMIN SERPL-MCNC: 3.5 G/DL (ref 3.4–5)
ALP SERPL-CCNC: 46 IU/L (ref 35–126)
ALT SERPL-CCNC: 26 IU/L (ref 16–63)
ANION GAP SERPL CALC-SCNC: 7 MEQ/L (ref 3–15)
AST SERPL-CCNC: 38 IU/L (ref 15–41)
BASOPHILS # BLD: 0.01 K/UL (ref 0.01–0.1)
BASOPHILS NFR BLD: 0.2 %
BILIRUB SERPL-MCNC: 0.7 MG/DL (ref 0.3–1.2)
BUN SERPL-MCNC: 15 MG/DL (ref 8–20)
CALCIUM SERPL-MCNC: 9.5 MG/DL (ref 8.9–10.3)
CHLORIDE SERPL-SCNC: 104 MEQ/L (ref 98–109)
CO2 SERPL-SCNC: 27 MEQ/L (ref 22–32)
CREAT SERPL-MCNC: 1 MG/DL (ref 0.8–1.3)
DIFFERENTIAL METHOD BLD: ABNORMAL
EOSINOPHIL # BLD: 0.07 K/UL (ref 0.04–0.54)
EOSINOPHIL NFR BLD: 1.3 %
ERYTHROCYTE [DISTWIDTH] IN BLOOD BY AUTOMATED COUNT: 13.8 % (ref 11.6–14.4)
GFR SERPL CREATININE-BSD FRML MDRD: >60 ML/MIN/1.73M*2
GLUCOSE SERPL-MCNC: 72 MG/DL (ref 70–99)
HCT VFR BLDCO AUTO: 33.6 % (ref 40.1–51)
HGB BLD-MCNC: 11.4 G/DL (ref 13.7–17.5)
IMM GRANULOCYTES # BLD AUTO: 0.01 K/UL (ref 0–0.08)
IMM GRANULOCYTES NFR BLD AUTO: 0.2 %
LYMPHOCYTES # BLD: 1.6 K/UL (ref 1.2–3.5)
LYMPHOCYTES NFR BLD: 29 %
MCH RBC QN AUTO: 34.2 PG (ref 28–33.2)
MCHC RBC AUTO-ENTMCNC: 33.9 G/DL (ref 32.2–36.5)
MCV RBC AUTO: 100.9 FL (ref 83–98)
MONOCYTES # BLD: 0.4 K/UL (ref 0.3–1)
MONOCYTES NFR BLD: 7.2 %
NEUTROPHILS # BLD: 3.43 K/UL (ref 1.7–7)
NEUTROPHILS # BLD: 3.43 K/UL (ref 1.7–7)
NEUTS SEG NFR BLD: 62.1 %
NRBC BLD-RTO: 0 %
PDW BLD AUTO: 8.7 FL (ref 9.4–12.4)
PLATELET # BLD AUTO: 173 K/UL (ref 150–350)
POTASSIUM SERPL-SCNC: 4.5 MEQ/L (ref 3.6–5.1)
PROT SERPL-MCNC: 7.8 G/DL (ref 6–8.2)
RBC # BLD AUTO: 3.33 M/UL (ref 4.5–5.8)
SODIUM SERPL-SCNC: 138 MEQ/L (ref 136–144)
WBC # BLD AUTO: 5.52 K/UL (ref 3.8–10.5)

## 2021-02-23 PROCEDURE — 82784 ASSAY IGA/IGD/IGG/IGM EACH: CPT | Mod: 59 | Performed by: INTERNAL MEDICINE

## 2021-02-23 PROCEDURE — 85025 COMPLETE CBC W/AUTO DIFF WBC: CPT | Performed by: INTERNAL MEDICINE

## 2021-02-23 PROCEDURE — 83883 ASSAY NEPHELOMETRY NOT SPEC: CPT | Performed by: INTERNAL MEDICINE

## 2021-02-23 PROCEDURE — 84165 PROTEIN E-PHORESIS SERUM: CPT | Performed by: INTERNAL MEDICINE

## 2021-02-23 PROCEDURE — 82232 ASSAY OF BETA-2 PROTEIN: CPT | Performed by: INTERNAL MEDICINE

## 2021-02-23 PROCEDURE — 80053 COMPREHEN METABOLIC PANEL: CPT | Performed by: INTERNAL MEDICINE

## 2021-02-23 PROCEDURE — 36415 COLL VENOUS BLD VENIPUNCTURE: CPT | Performed by: INTERNAL MEDICINE

## 2021-02-24 LAB
ALBUMIN MFR UR ELPH: 49.3 %
ALBUMIN SERPL ELPH-MCNC: 3.85 G/DL (ref 3.2–4.9)
ALBUMIN/GLOB SERPL: 1 {RATIO} (ref 1.1–2.1)
ALPHA1 GLOB MFR SERPL ELPH: 2.2 %
ALPHA1 GLOB SERPL ELPH-MCNC: 0.17 G/DL (ref 0.08–0.23)
ALPHA2 GLOB MFR UR ELPH: 7.8 %
ALPHA2 GLOB SERPL ELPH-MCNC: 0.61 G/DL (ref 0.45–0.92)
B-GLOBULIN SERPL ELPH-MCNC: 0.46 G/DL (ref 0.5–1.03)
B2 MICROGLOB SERPL-MCNC: 2.52 MG/L (ref 0.98–2.52)
BETA1 GLOB MFR UR ELPH: 5.9 %
GAMMA GLOB MFR UR ELPH: 34.8 %
GAMMA GLOB SERPL ELPH-MCNC: 2.71 G/DL (ref 0.6–1.6)
IGA SERPL-MCNC: 12 MG/DL (ref 84.5–499)
IGG SERPL-MCNC: 3336 MG/DL (ref 537–1535)
IGM SERPL-MCNC: 14 MG/DL (ref 35–242)
KAPPA LC SERPL-MCNC: 6.83 MG/L (ref 8.96–34.28)
KAPPA LC/LAMBDA SER: 0.02 {RATIO} (ref 0.64–1.83)
LAMBDA LC SERPL-MCNC: 382.9 MG/L (ref 5.7–26.3)
M PROTEIN MFR SERPL ELPH: 32.5 %
M PROTEIN SERPL ELPH-MCNC: 2.54 G/DL
PROT PATTERN SERPL ELPH-IMP: NORMAL
PROT SERPL-MCNC: 7.8 G/DL (ref 6–8.2)

## 2021-02-25 ENCOUNTER — IMMUNIZATIONS (OUTPATIENT)
Dept: FAMILY MEDICINE CLINIC | Facility: HOSPITAL | Age: 76
End: 2021-02-25

## 2021-02-25 DIAGNOSIS — Z23 ENCOUNTER FOR IMMUNIZATION: Primary | ICD-10-CM

## 2021-02-25 PROCEDURE — 0012A SARS-COV-2 / COVID-19 MRNA VACCINE (MODERNA) 100 MCG: CPT | Performed by: EMERGENCY MEDICINE

## 2021-02-25 PROCEDURE — 91301 SARS-COV-2 / COVID-19 MRNA VACCINE (MODERNA) 100 MCG: CPT | Performed by: EMERGENCY MEDICINE

## 2021-03-17 ENCOUNTER — APPOINTMENT (OUTPATIENT)
Dept: LAB | Facility: HOSPITAL | Age: 76
End: 2021-03-17
Attending: UROLOGY
Payer: MEDICARE

## 2021-03-17 ENCOUNTER — TRANSCRIBE ORDERS (OUTPATIENT)
Dept: SCHEDULING | Age: 76
End: 2021-03-17

## 2021-03-17 DIAGNOSIS — N40.1 BENIGN PROSTATIC HYPERPLASIA WITH LOWER URINARY TRACT SYMPTOMS: Primary | ICD-10-CM

## 2021-03-17 DIAGNOSIS — N40.1 BENIGN PROSTATIC HYPERPLASIA WITH LOWER URINARY TRACT SYMPTOMS: ICD-10-CM

## 2021-03-17 LAB — PSA SERPL-MCNC: 1.55 NG/ML

## 2021-03-17 PROCEDURE — 84153 ASSAY OF PSA TOTAL: CPT

## 2021-03-17 PROCEDURE — 36415 COLL VENOUS BLD VENIPUNCTURE: CPT

## 2021-04-07 ENCOUNTER — TELEPHONE (OUTPATIENT)
Dept: SCHEDULING | Facility: CLINIC | Age: 76
End: 2021-04-07

## 2021-04-07 RX ORDER — ROSUVASTATIN CALCIUM 10 MG/1
10 TABLET, COATED ORAL DAILY
Qty: 90 TABLET | Refills: 3 | Status: SHIPPED | OUTPATIENT
Start: 2021-04-07 | End: 2021-10-28 | Stop reason: SDUPTHER

## 2021-04-07 NOTE — TELEPHONE ENCOUNTER
Medicine Refill Request    Last Office Visit: Visit date not found  Last Telemedicine Visit: Visit date not found    Next Office Visit: Visit date not found  Next Telemedicine Visit: Visit date not found     rosuvastatin (CRESTOR) 10 mg tablet      Current Outpatient Medications:   •  aspirin (ASPIR-81) 81 mg enteric coated tablet, Take 1 tablet (81 mg total) by mouth daily., Disp: 90 tablet, Rfl: 1  •  cholecalciferol, vitamin D3, (VITAMIN D3) 2,000 unit capsule, Take 2,000 Units by mouth daily., Disp: , Rfl:   •  coenzyme Q10 (COQ10) 200 mg capsule, Take by mouth., Disp: , Rfl:   •  cycloSPORINE (RESTASIS) 0.05 % ophthalmic emulsion, Administer 1 drop into both eyes 2 (two) times a day., Disp: , Rfl:   •  DENTA 5000 PLUS 1.1 % cream, 2 (two) times a day. as directed, Disp: , Rfl: 3  •  ferrous sulfate (IRON ORAL), Take by mouth., Disp: , Rfl:   •  fluticasone propion-salmeteroL (ADVAIR DISKUS) 250-50 mcg/dose diskus inhaler, Inhale 1 puff 2 (two) times a day.  Rinse mouth with water after use to reduce aftertaste and incidence of candidiasis.  Do not swallow. For patients not on a ventilator, a spacer is recommended to be used with this medication/inhaler., Disp: , Rfl:   •  melatonin 5 mg tablet, Take 5 mg by mouth nightly., Disp: , Rfl:   •  multivit-min/folic/vit K/lycop (MEN'S MULTIVITAMIN ORAL), Take by mouth., Disp: , Rfl:   •  rosuvastatin (CRESTOR) 10 mg tablet, Take 1 tablet (10 mg total) by mouth daily., Disp: 90 tablet, Rfl: 3  •  tamsulosin (FLOMAX) 0.4 mg capsule, 1 tablet 2 times daily, Disp: , Rfl: 3  •  TURMERIC ORAL, Take by mouth daily., Disp: , Rfl:       BP Readings from Last 3 Encounters:   10/07/20 108/60   10/08/19 (!) 112/56   09/18/19 (!) 134/56       Recent Lab results:  Lab Results   Component Value Date    CHOL 137 10/22/2020   ,   Lab Results   Component Value Date    HDL 53 10/22/2020   ,   Lab Results   Component Value Date    LDLCALC 78 10/22/2020   ,   Lab Results   Component Value  Date    TRIG 31 10/22/2020        Lab Results   Component Value Date    GLUCOSE 72 02/23/2021   , No results found for: HGBA1C      Lab Results   Component Value Date    CREATININE 1.0 02/23/2021       Lab Results   Component Value Date    TSH 2.23 12/12/2017

## 2021-05-03 ENCOUNTER — LAB REQUISITION (OUTPATIENT)
Dept: LAB | Facility: HOSPITAL | Age: 76
End: 2021-05-03
Attending: INTERNAL MEDICINE
Payer: MEDICARE

## 2021-05-03 DIAGNOSIS — D47.2 MONOCLONAL GAMMOPATHY: ICD-10-CM

## 2021-05-03 DIAGNOSIS — D64.9 ANEMIA, UNSPECIFIED: ICD-10-CM

## 2021-05-03 DIAGNOSIS — E07.9 DISORDER OF THYROID, UNSPECIFIED: ICD-10-CM

## 2021-05-03 DIAGNOSIS — C90.00 MULTIPLE MYELOMA NOT HAVING ACHIEVED REMISSION (CMS/HCC): ICD-10-CM

## 2021-05-03 LAB
ALBUMIN SERPL-MCNC: 3.4 G/DL (ref 3.4–5)
ALP SERPL-CCNC: 41 IU/L (ref 35–126)
ALT SERPL-CCNC: 26 IU/L (ref 16–63)
ANION GAP SERPL CALC-SCNC: 8 MEQ/L (ref 3–15)
AST SERPL-CCNC: 36 IU/L (ref 15–41)
B2 MICROGLOB SERPL-MCNC: 2.7 MG/L (ref 0.98–2.52)
BASOPHILS # BLD: 0.01 K/UL (ref 0.01–0.1)
BASOPHILS NFR BLD: 0.2 %
BILIRUB SERPL-MCNC: 1.2 MG/DL (ref 0.3–1.2)
BUN SERPL-MCNC: 18 MG/DL (ref 8–20)
CALCIUM SERPL-MCNC: 8.9 MG/DL (ref 8.9–10.3)
CHLORIDE SERPL-SCNC: 104 MEQ/L (ref 98–109)
CO2 SERPL-SCNC: 25 MEQ/L (ref 22–32)
CREAT SERPL-MCNC: 1.1 MG/DL (ref 0.8–1.3)
DIFFERENTIAL METHOD BLD: ABNORMAL
EOSINOPHIL # BLD: 0.04 K/UL (ref 0.04–0.54)
EOSINOPHIL NFR BLD: 0.8 %
ERYTHROCYTE [DISTWIDTH] IN BLOOD BY AUTOMATED COUNT: 14.2 % (ref 11.6–14.4)
GFR SERPL CREATININE-BSD FRML MDRD: >60 ML/MIN/1.73M*2
GLUCOSE SERPL-MCNC: 83 MG/DL (ref 70–99)
HCT VFR BLDCO AUTO: 31 % (ref 40.1–51)
HGB BLD-MCNC: 10.6 G/DL (ref 13.7–17.5)
IGA SERPL-MCNC: 10 MG/DL (ref 84.5–499)
IGG SERPL-MCNC: 3443 MG/DL (ref 537–1535)
IGM SERPL-MCNC: 12 MG/DL (ref 35–242)
IMM GRANULOCYTES # BLD AUTO: 0 K/UL (ref 0–0.08)
IMM GRANULOCYTES NFR BLD AUTO: 0 %
KAPPA LC SERPL-MCNC: 6.38 MG/L (ref 8.96–34.28)
KAPPA LC/LAMBDA SER: 0.01 {RATIO} (ref 0.64–1.83)
LAMBDA LC SERPL-MCNC: 475.5 MG/L (ref 5.7–26.3)
LYMPHOCYTES # BLD: 1.5 K/UL (ref 1.2–3.5)
LYMPHOCYTES NFR BLD: 31.3 %
MCH RBC QN AUTO: 34.6 PG (ref 28–33.2)
MCHC RBC AUTO-ENTMCNC: 34.2 G/DL (ref 32.2–36.5)
MCV RBC AUTO: 101.3 FL (ref 83–98)
MONOCYTES # BLD: 0.38 K/UL (ref 0.3–1)
MONOCYTES NFR BLD: 7.9 %
NEUTROPHILS # BLD: 2.86 K/UL (ref 1.7–7)
NEUTROPHILS # BLD: 2.86 K/UL (ref 1.7–7)
NEUTS SEG NFR BLD: 59.8 %
NRBC BLD-RTO: 0 %
PDW BLD AUTO: 8.6 FL (ref 9.4–12.4)
PLATELET # BLD AUTO: 161 K/UL (ref 150–350)
POTASSIUM SERPL-SCNC: 4.1 MEQ/L (ref 3.6–5.1)
PROT SERPL-MCNC: 7.8 G/DL (ref 6–8.2)
RBC # BLD AUTO: 3.06 M/UL (ref 4.5–5.8)
SODIUM SERPL-SCNC: 137 MEQ/L (ref 136–144)
WBC # BLD AUTO: 4.79 K/UL (ref 3.8–10.5)

## 2021-05-03 PROCEDURE — 36415 COLL VENOUS BLD VENIPUNCTURE: CPT | Performed by: INTERNAL MEDICINE

## 2021-05-03 PROCEDURE — 80053 COMPREHEN METABOLIC PANEL: CPT | Performed by: INTERNAL MEDICINE

## 2021-05-03 PROCEDURE — 83883 ASSAY NEPHELOMETRY NOT SPEC: CPT | Performed by: INTERNAL MEDICINE

## 2021-05-03 PROCEDURE — 82232 ASSAY OF BETA-2 PROTEIN: CPT | Performed by: INTERNAL MEDICINE

## 2021-05-03 PROCEDURE — 82784 ASSAY IGA/IGD/IGG/IGM EACH: CPT | Mod: 59 | Performed by: INTERNAL MEDICINE

## 2021-05-03 PROCEDURE — 85025 COMPLETE CBC W/AUTO DIFF WBC: CPT | Performed by: INTERNAL MEDICINE

## 2021-05-03 PROCEDURE — 84165 PROTEIN E-PHORESIS SERUM: CPT | Performed by: INTERNAL MEDICINE

## 2021-05-04 LAB
ALBUMIN MFR UR ELPH: 49.8 %
ALBUMIN SERPL ELPH-MCNC: 3.88 G/DL (ref 3.2–4.9)
ALBUMIN/GLOB SERPL: 1 {RATIO} (ref 1.1–2.1)
ALPHA1 GLOB MFR SERPL ELPH: 1.8 %
ALPHA1 GLOB SERPL ELPH-MCNC: 0.14 G/DL (ref 0.08–0.23)
ALPHA2 GLOB MFR UR ELPH: 7.4 %
ALPHA2 GLOB SERPL ELPH-MCNC: 0.58 G/DL (ref 0.45–0.92)
B-GLOBULIN SERPL ELPH-MCNC: 0.43 G/DL (ref 0.5–1.03)
BETA1 GLOB MFR UR ELPH: 5.5 %
GAMMA GLOB MFR UR ELPH: 35.5 %
GAMMA GLOB SERPL ELPH-MCNC: 2.77 G/DL (ref 0.6–1.6)
M PROTEIN MFR SERPL ELPH: 32.7 %
M PROTEIN SERPL ELPH-MCNC: 2.55 G/DL
PROT PATTERN SERPL ELPH-IMP: NORMAL
PROT SERPL-MCNC: 7.8 G/DL (ref 6–8.2)

## 2021-05-27 ENCOUNTER — LAB REQUISITION (OUTPATIENT)
Dept: LAB | Facility: HOSPITAL | Age: 76
End: 2021-05-27
Attending: INTERNAL MEDICINE
Payer: MEDICARE

## 2021-05-27 DIAGNOSIS — D64.9 ANEMIA, UNSPECIFIED: ICD-10-CM

## 2021-05-27 DIAGNOSIS — E07.9 DISORDER OF THYROID, UNSPECIFIED: ICD-10-CM

## 2021-05-27 DIAGNOSIS — D47.2 MONOCLONAL GAMMOPATHY: ICD-10-CM

## 2021-05-27 LAB
ALBUMIN SERPL-MCNC: 3.4 G/DL (ref 3.4–5)
ALP SERPL-CCNC: 56 IU/L (ref 35–126)
ALT SERPL-CCNC: 27 IU/L (ref 16–63)
ANION GAP SERPL CALC-SCNC: 9 MEQ/L (ref 3–15)
AST SERPL-CCNC: 34 IU/L (ref 15–41)
BASOPHILS # BLD: 0.01 K/UL (ref 0.01–0.1)
BASOPHILS NFR BLD: 0.2 %
BILIRUB SERPL-MCNC: 0.7 MG/DL (ref 0.3–1.2)
BUN SERPL-MCNC: 19 MG/DL (ref 8–20)
CALCIUM SERPL-MCNC: 8.8 MG/DL (ref 8.9–10.3)
CHLORIDE SERPL-SCNC: 104 MEQ/L (ref 98–109)
CO2 SERPL-SCNC: 25 MEQ/L (ref 22–32)
CREAT SERPL-MCNC: 1 MG/DL (ref 0.8–1.3)
DIFFERENTIAL METHOD BLD: ABNORMAL
EOSINOPHIL # BLD: 0.06 K/UL (ref 0.04–0.54)
EOSINOPHIL NFR BLD: 1.5 %
ERYTHROCYTE [DISTWIDTH] IN BLOOD BY AUTOMATED COUNT: 14.6 % (ref 11.6–14.4)
GFR SERPL CREATININE-BSD FRML MDRD: >60 ML/MIN/1.73M*2
GLUCOSE SERPL-MCNC: 91 MG/DL (ref 70–99)
HCT VFR BLDCO AUTO: 31.7 % (ref 40.1–51)
HGB BLD-MCNC: 10.8 G/DL (ref 13.7–17.5)
IGA SERPL-MCNC: 10 MG/DL (ref 84.5–499)
IGG SERPL-MCNC: 3839 MG/DL (ref 537–1535)
IGM SERPL-MCNC: 12 MG/DL (ref 35–242)
IMM GRANULOCYTES # BLD AUTO: 0.01 K/UL (ref 0–0.08)
IMM GRANULOCYTES NFR BLD AUTO: 0.2 %
KAPPA LC SERPL-MCNC: 6.79 MG/L (ref 8.96–34.28)
KAPPA LC/LAMBDA SER: 0.01 {RATIO} (ref 0.64–1.83)
LAMBDA LC SERPL-MCNC: 544.1 MG/L (ref 5.7–26.3)
LYMPHOCYTES # BLD: 1.54 K/UL (ref 1.2–3.5)
LYMPHOCYTES NFR BLD: 37.7 %
MCH RBC QN AUTO: 35.2 PG (ref 28–33.2)
MCHC RBC AUTO-ENTMCNC: 34.1 G/DL (ref 32.2–36.5)
MCV RBC AUTO: 103.3 FL (ref 83–98)
MONOCYTES # BLD: 0.28 K/UL (ref 0.3–1)
MONOCYTES NFR BLD: 6.8 %
NEUTROPHILS # BLD: 2.19 K/UL (ref 1.7–7)
NEUTROPHILS # BLD: 2.19 K/UL (ref 1.7–7)
NEUTS SEG NFR BLD: 53.6 %
NRBC BLD-RTO: 0 %
PDW BLD AUTO: 8.5 FL (ref 9.4–12.4)
PLATELET # BLD AUTO: 147 K/UL (ref 150–350)
POTASSIUM SERPL-SCNC: 4.2 MEQ/L (ref 3.6–5.1)
PROT SERPL-MCNC: 7.7 G/DL (ref 6–8.2)
RBC # BLD AUTO: 3.07 M/UL (ref 4.5–5.8)
SODIUM SERPL-SCNC: 138 MEQ/L (ref 136–144)
WBC # BLD AUTO: 4.09 K/UL (ref 3.8–10.5)

## 2021-05-27 PROCEDURE — 85025 COMPLETE CBC W/AUTO DIFF WBC: CPT | Performed by: INTERNAL MEDICINE

## 2021-05-27 PROCEDURE — 82784 ASSAY IGA/IGD/IGG/IGM EACH: CPT | Mod: 59 | Performed by: INTERNAL MEDICINE

## 2021-05-27 PROCEDURE — 83883 ASSAY NEPHELOMETRY NOT SPEC: CPT | Performed by: INTERNAL MEDICINE

## 2021-05-27 PROCEDURE — 84165 PROTEIN E-PHORESIS SERUM: CPT | Performed by: INTERNAL MEDICINE

## 2021-05-27 PROCEDURE — 36415 COLL VENOUS BLD VENIPUNCTURE: CPT | Performed by: INTERNAL MEDICINE

## 2021-05-27 PROCEDURE — 82232 ASSAY OF BETA-2 PROTEIN: CPT | Performed by: INTERNAL MEDICINE

## 2021-05-27 PROCEDURE — 80053 COMPREHEN METABOLIC PANEL: CPT | Performed by: INTERNAL MEDICINE

## 2021-05-28 ENCOUNTER — APPOINTMENT (OUTPATIENT)
Dept: LAB | Facility: HOSPITAL | Age: 76
End: 2021-05-28
Attending: INTERNAL MEDICINE
Payer: MEDICARE

## 2021-05-28 ENCOUNTER — TRANSCRIBE ORDERS (OUTPATIENT)
Dept: LAB | Facility: HOSPITAL | Age: 76
End: 2021-05-28

## 2021-05-28 DIAGNOSIS — E78.5 HYPERLIPIDEMIA, UNSPECIFIED: ICD-10-CM

## 2021-05-28 DIAGNOSIS — J45.998 OTHER ASTHMA: Primary | ICD-10-CM

## 2021-05-28 DIAGNOSIS — J45.998 OTHER ASTHMA: ICD-10-CM

## 2021-05-28 LAB
ALBUMIN MFR UR ELPH: 48.9 %
ALBUMIN SERPL ELPH-MCNC: 3.77 G/DL (ref 3.2–4.9)
ALBUMIN SERPL-MCNC: 3.2 G/DL (ref 3.4–5)
ALBUMIN/GLOB SERPL: 1 {RATIO} (ref 1.1–2.1)
ALP SERPL-CCNC: 42 IU/L (ref 35–126)
ALPHA1 GLOB MFR SERPL ELPH: 2 %
ALPHA1 GLOB SERPL ELPH-MCNC: 0.15 G/DL (ref 0.08–0.23)
ALPHA2 GLOB MFR UR ELPH: 7.2 %
ALPHA2 GLOB SERPL ELPH-MCNC: 0.55 G/DL (ref 0.45–0.92)
ALT SERPL-CCNC: 28 IU/L (ref 16–63)
ANION GAP SERPL CALC-SCNC: 6 MEQ/L (ref 3–15)
AST SERPL-CCNC: 35 IU/L (ref 15–41)
B-GLOBULIN SERPL ELPH-MCNC: 0.41 G/DL (ref 0.5–1.03)
B2 MICROGLOB SERPL-MCNC: 2.67 MG/L (ref 0.98–2.52)
BETA1 GLOB MFR UR ELPH: 5.3 %
BILIRUB SERPL-MCNC: 0.6 MG/DL (ref 0.3–1.2)
BUN SERPL-MCNC: 17 MG/DL (ref 8–20)
CALCIUM SERPL-MCNC: 8.9 MG/DL (ref 8.9–10.3)
CHLORIDE SERPL-SCNC: 105 MEQ/L (ref 98–109)
CHOLEST SERPL-MCNC: 126 MG/DL
CO2 SERPL-SCNC: 27 MEQ/L (ref 22–32)
CREAT SERPL-MCNC: 1 MG/DL (ref 0.8–1.3)
GAMMA GLOB MFR UR ELPH: 36.6 %
GAMMA GLOB SERPL ELPH-MCNC: 2.82 G/DL (ref 0.6–1.6)
GFR SERPL CREATININE-BSD FRML MDRD: >60 ML/MIN/1.73M*2
GLUCOSE SERPL-MCNC: 88 MG/DL (ref 70–99)
HDLC SERPL-MCNC: 43 MG/DL
HDLC SERPL: 2.9 {RATIO}
LDLC SERPL CALC-MCNC: 71 MG/DL
M PROTEIN MFR SERPL ELPH: 34.4 %
M PROTEIN SERPL ELPH-MCNC: 2.65 G/DL
NONHDLC SERPL-MCNC: 83 MG/DL
POTASSIUM SERPL-SCNC: 4 MEQ/L (ref 3.6–5.1)
PROT PATTERN SERPL ELPH-IMP: NORMAL
PROT SERPL-MCNC: 7.7 G/DL (ref 6–8.2)
PROT SERPL-MCNC: 7.8 G/DL (ref 6–8.2)
SODIUM SERPL-SCNC: 138 MEQ/L (ref 136–144)
TRIGL SERPL-MCNC: 62 MG/DL (ref 30–149)

## 2021-05-28 PROCEDURE — 36415 COLL VENOUS BLD VENIPUNCTURE: CPT

## 2021-05-28 PROCEDURE — 80053 COMPREHEN METABOLIC PANEL: CPT

## 2021-05-28 PROCEDURE — 80061 LIPID PANEL: CPT

## 2021-06-08 ENCOUNTER — LAB REQUISITION (OUTPATIENT)
Dept: LAB | Facility: HOSPITAL | Age: 76
End: 2021-06-08
Attending: INTERNAL MEDICINE
Payer: MEDICARE

## 2021-06-08 DIAGNOSIS — C90.00 MULTIPLE MYELOMA NOT HAVING ACHIEVED REMISSION (CMS/HCC): ICD-10-CM

## 2021-06-08 DIAGNOSIS — E07.9 DISORDER OF THYROID, UNSPECIFIED: ICD-10-CM

## 2021-06-08 DIAGNOSIS — D47.2 MONOCLONAL GAMMOPATHY: ICD-10-CM

## 2021-06-08 LAB
ALBUMIN SERPL-MCNC: 3.5 G/DL (ref 3.4–5)
ALP SERPL-CCNC: 46 IU/L (ref 35–126)
ALT SERPL-CCNC: 30 IU/L (ref 16–63)
ANION GAP SERPL CALC-SCNC: 7 MEQ/L (ref 3–15)
ANISOCYTOSIS BLD QL SMEAR: ABNORMAL
AST SERPL-CCNC: 38 IU/L (ref 15–41)
BASOPHILS # BLD: 0 K/UL (ref 0.01–0.1)
BASOPHILS NFR BLD: 0 %
BILIRUB SERPL-MCNC: 0.6 MG/DL (ref 0.3–1.2)
BUN SERPL-MCNC: 17 MG/DL (ref 8–20)
CALCIUM SERPL-MCNC: 9.1 MG/DL (ref 8.9–10.3)
CHLORIDE SERPL-SCNC: 103 MEQ/L (ref 98–109)
CO2 SERPL-SCNC: 25 MEQ/L (ref 22–32)
CREAT SERPL-MCNC: 1.1 MG/DL (ref 0.8–1.3)
DIFFERENTIAL METHOD BLD: ABNORMAL
EOSINOPHIL # BLD: 0 K/UL (ref 0.04–0.54)
EOSINOPHIL NFR BLD: 0 %
ERYTHROCYTE [DISTWIDTH] IN BLOOD BY AUTOMATED COUNT: 14.5 % (ref 11.6–14.4)
GFR SERPL CREATININE-BSD FRML MDRD: >60 ML/MIN/1.73M*2
GLUCOSE SERPL-MCNC: 145 MG/DL (ref 70–99)
HCT VFR BLDCO AUTO: 31.9 % (ref 40.1–51)
HGB BLD-MCNC: 10.8 G/DL (ref 13.7–17.5)
LYMPHOCYTES # BLD: 0.96 K/UL (ref 1.2–3.5)
LYMPHOCYTES NFR BLD: 18 %
MCH RBC QN AUTO: 34.6 PG (ref 28–33.2)
MCHC RBC AUTO-ENTMCNC: 33.9 G/DL (ref 32.2–36.5)
MCV RBC AUTO: 102.2 FL (ref 83–98)
MONOCYTES # BLD: 0.11 K/UL (ref 0.3–1)
MONOCYTES NFR BLD: 2 %
NEUTROPHILS # BLD: 4.18 K/UL (ref 1.7–7)
NEUTS BAND # BLD: 0.05 K/UL (ref 0–0.53)
NEUTS BAND # BLD: 4.22 K/UL (ref 1.7–7)
NEUTS BAND NFR BLD: 1 %
NEUTS SEG NFR BLD: 79 %
PDW BLD AUTO: 8.5 FL (ref 9.4–12.4)
PLAT MORPH BLD: NORMAL
PLATELET # BLD AUTO: 183 K/UL (ref 150–350)
PLATELET # BLD EST: ABNORMAL 10*3/UL
POIKILOCYTOSIS BLD QL SMEAR: ABNORMAL
POTASSIUM SERPL-SCNC: 4.4 MEQ/L (ref 3.6–5.1)
PROT SERPL-MCNC: 8.3 G/DL (ref 6–8.2)
RBC # BLD AUTO: 3.12 M/UL (ref 4.5–5.8)
SODIUM SERPL-SCNC: 135 MEQ/L (ref 136–144)
WBC # BLD AUTO: 5.34 K/UL (ref 3.8–10.5)

## 2021-06-08 PROCEDURE — 80053 COMPREHEN METABOLIC PANEL: CPT | Performed by: INTERNAL MEDICINE

## 2021-06-08 PROCEDURE — 85025 COMPLETE CBC W/AUTO DIFF WBC: CPT | Performed by: INTERNAL MEDICINE

## 2021-06-08 PROCEDURE — 36415 COLL VENOUS BLD VENIPUNCTURE: CPT | Performed by: INTERNAL MEDICINE

## 2021-06-15 ENCOUNTER — LAB REQUISITION (OUTPATIENT)
Dept: LAB | Facility: HOSPITAL | Age: 76
End: 2021-06-15
Attending: INTERNAL MEDICINE
Payer: MEDICARE

## 2021-06-15 DIAGNOSIS — D47.2 MONOCLONAL GAMMOPATHY: ICD-10-CM

## 2021-06-15 DIAGNOSIS — D64.9 ANEMIA, UNSPECIFIED: ICD-10-CM

## 2021-06-15 DIAGNOSIS — M81.8 OTHER OSTEOPOROSIS WITHOUT CURRENT PATHOLOGICAL FRACTURE: ICD-10-CM

## 2021-06-15 DIAGNOSIS — E07.9 DISORDER OF THYROID, UNSPECIFIED: ICD-10-CM

## 2021-06-15 DIAGNOSIS — C90.00 MULTIPLE MYELOMA NOT HAVING ACHIEVED REMISSION (CMS/HCC): ICD-10-CM

## 2021-06-15 LAB
BASOPHILS # BLD: 0 K/UL (ref 0.01–0.1)
BASOPHILS NFR BLD: 0 %
DIFFERENTIAL METHOD BLD: ABNORMAL
EOSINOPHIL # BLD: 0.01 K/UL (ref 0.04–0.54)
EOSINOPHIL NFR BLD: 0.2 %
ERYTHROCYTE [DISTWIDTH] IN BLOOD BY AUTOMATED COUNT: 14.6 % (ref 11.6–14.4)
HCT VFR BLDCO AUTO: 30.3 % (ref 40.1–51)
HGB BLD-MCNC: 10.4 G/DL (ref 13.7–17.5)
IMM GRANULOCYTES # BLD AUTO: 0.06 K/UL (ref 0–0.08)
IMM GRANULOCYTES NFR BLD AUTO: 1.2 %
LYMPHOCYTES # BLD: 0.6 K/UL (ref 1.2–3.5)
LYMPHOCYTES NFR BLD: 11.8 %
MCH RBC QN AUTO: 35.6 PG (ref 28–33.2)
MCHC RBC AUTO-ENTMCNC: 34.3 G/DL (ref 32.2–36.5)
MCV RBC AUTO: 103.8 FL (ref 83–98)
MONOCYTES # BLD: 0.06 K/UL (ref 0.3–1)
MONOCYTES NFR BLD: 1.2 %
NEUTROPHILS # BLD: 4.34 K/UL (ref 1.7–7)
NEUTROPHILS # BLD: 4.34 K/UL (ref 1.7–7)
NEUTS SEG NFR BLD: 85.6 %
NRBC BLD-RTO: 0 %
PDW BLD AUTO: 9.2 FL (ref 9.4–12.4)
PLATELET # BLD AUTO: 171 K/UL (ref 150–350)
RBC # BLD AUTO: 2.92 M/UL (ref 4.5–5.8)
WBC # BLD AUTO: 5.07 K/UL (ref 3.8–10.5)

## 2021-06-15 PROCEDURE — 36415 COLL VENOUS BLD VENIPUNCTURE: CPT | Performed by: INTERNAL MEDICINE

## 2021-06-15 PROCEDURE — 85025 COMPLETE CBC W/AUTO DIFF WBC: CPT | Performed by: INTERNAL MEDICINE

## 2021-06-22 ENCOUNTER — LAB REQUISITION (OUTPATIENT)
Dept: LAB | Facility: HOSPITAL | Age: 76
End: 2021-06-22
Attending: INTERNAL MEDICINE
Payer: MEDICARE

## 2021-06-22 DIAGNOSIS — D47.2 MONOCLONAL GAMMOPATHY: ICD-10-CM

## 2021-06-22 DIAGNOSIS — M81.8 OTHER OSTEOPOROSIS WITHOUT CURRENT PATHOLOGICAL FRACTURE: ICD-10-CM

## 2021-06-22 DIAGNOSIS — D64.9 ANEMIA, UNSPECIFIED: ICD-10-CM

## 2021-06-22 DIAGNOSIS — C90.00 MULTIPLE MYELOMA NOT HAVING ACHIEVED REMISSION (CMS/HCC): ICD-10-CM

## 2021-06-22 DIAGNOSIS — E07.9 DISORDER OF THYROID, UNSPECIFIED: ICD-10-CM

## 2021-06-22 LAB
BASOPHILS # BLD: 0 K/UL (ref 0.01–0.1)
BASOPHILS NFR BLD: 0 %
DIFFERENTIAL METHOD BLD: ABNORMAL
EOSINOPHIL # BLD: 0 K/UL (ref 0.04–0.54)
EOSINOPHIL NFR BLD: 0 %
ERYTHROCYTE [DISTWIDTH] IN BLOOD BY AUTOMATED COUNT: 14.9 % (ref 11.6–14.4)
HCT VFR BLDCO AUTO: 30.6 % (ref 40.1–51)
HGB BLD-MCNC: 10.3 G/DL (ref 13.7–17.5)
IMM GRANULOCYTES # BLD AUTO: 0.05 K/UL (ref 0–0.08)
IMM GRANULOCYTES NFR BLD AUTO: 0.9 %
LYMPHOCYTES # BLD: 0.34 K/UL (ref 1.2–3.5)
LYMPHOCYTES NFR BLD: 6 %
MCH RBC QN AUTO: 35.3 PG (ref 28–33.2)
MCHC RBC AUTO-ENTMCNC: 33.7 G/DL (ref 32.2–36.5)
MCV RBC AUTO: 104.8 FL (ref 83–98)
MONOCYTES # BLD: 0.09 K/UL (ref 0.3–1)
MONOCYTES NFR BLD: 1.6 %
NEUTROPHILS # BLD: 5.16 K/UL (ref 1.7–7)
NEUTROPHILS # BLD: 5.16 K/UL (ref 1.7–7)
NEUTS SEG NFR BLD: 91.5 %
NRBC BLD-RTO: 0 %
PDW BLD AUTO: 10.2 FL (ref 9.4–12.4)
PLATELET # BLD AUTO: 108 K/UL (ref 150–350)
RBC # BLD AUTO: 2.92 M/UL (ref 4.5–5.8)
WBC # BLD AUTO: 5.64 K/UL (ref 3.8–10.5)

## 2021-06-22 PROCEDURE — 36415 COLL VENOUS BLD VENIPUNCTURE: CPT | Performed by: INTERNAL MEDICINE

## 2021-06-22 PROCEDURE — 85025 COMPLETE CBC W/AUTO DIFF WBC: CPT | Performed by: INTERNAL MEDICINE

## 2021-07-06 ENCOUNTER — LAB REQUISITION (OUTPATIENT)
Dept: LAB | Facility: HOSPITAL | Age: 76
End: 2021-07-06
Attending: INTERNAL MEDICINE
Payer: MEDICARE

## 2021-07-06 DIAGNOSIS — D47.2 MONOCLONAL GAMMOPATHY: ICD-10-CM

## 2021-07-06 DIAGNOSIS — C90.00 MULTIPLE MYELOMA NOT HAVING ACHIEVED REMISSION (CMS/HCC): ICD-10-CM

## 2021-07-06 DIAGNOSIS — M81.8 OTHER OSTEOPOROSIS WITHOUT CURRENT PATHOLOGICAL FRACTURE: ICD-10-CM

## 2021-07-06 DIAGNOSIS — D64.9 ANEMIA, UNSPECIFIED: ICD-10-CM

## 2021-07-06 DIAGNOSIS — E07.9 DISORDER OF THYROID, UNSPECIFIED: ICD-10-CM

## 2021-07-06 LAB
ANISOCYTOSIS BLD QL SMEAR: ABNORMAL
BASOPHILS # BLD: 0 K/UL (ref 0.01–0.1)
BASOPHILS NFR BLD: 0 %
BURR CELLS BLD QL SMEAR: ABNORMAL
DACRYOCYTES BLD QL SMEAR: ABNORMAL
DIFFERENTIAL METHOD BLD: ABNORMAL
EOSINOPHIL # BLD: 0 K/UL (ref 0.04–0.54)
EOSINOPHIL NFR BLD: 0 %
ERYTHROCYTE [DISTWIDTH] IN BLOOD BY AUTOMATED COUNT: 14.6 % (ref 11.6–14.4)
HCT VFR BLDCO AUTO: 29.8 % (ref 40.1–51)
HGB BLD-MCNC: 9.7 G/DL (ref 13.7–17.5)
HYPOCHROMIA BLD QL SMEAR: ABNORMAL
LYMPHOCYTES # BLD: 0.57 K/UL (ref 1.2–3.5)
LYMPHOCYTES NFR BLD: 10 %
MACROCYTES BLD QL SMEAR: ABNORMAL
MCH RBC QN AUTO: 33.8 PG (ref 28–33.2)
MCHC RBC AUTO-ENTMCNC: 32.6 G/DL (ref 32.2–36.5)
MCV RBC AUTO: 103.8 FL (ref 83–98)
MONOCYTES # BLD: 0.11 K/UL (ref 0.3–1)
MONOCYTES NFR BLD: 2 %
NEUTROPHILS # BLD: 4.92 K/UL (ref 1.7–7)
NEUTS BAND # BLD: 0.46 K/UL (ref 0–0.53)
NEUTS BAND # BLD: 4.44 K/UL (ref 1.7–7)
NEUTS BAND NFR BLD: 8 %
NEUTS SEG NFR BLD: 78 %
NEUTS VAC BLD QL SMEAR: ABNORMAL
OVALOCYTES BLD QL SMEAR: ABNORMAL
PDW BLD AUTO: 9.5 FL (ref 9.4–12.4)
PLAT MORPH BLD: NORMAL
PLATELET # BLD AUTO: 301 K/UL (ref 150–350)
PLATELET # BLD EST: ABNORMAL 10*3/UL
POLYCHROMASIA BLD QL SMEAR: ABNORMAL
RBC # BLD AUTO: 2.87 M/UL (ref 4.5–5.8)
SCHISTOCYTES BLD QL SMEAR: ABNORMAL
TOXIC GRANULES BLD QL SMEAR: ABNORMAL
VARIANT LYMPHS # BLD MANUAL: 0.11 K/UL
VARIANT LYMPHS NFR BLD: 2 %
WBC # BLD AUTO: 5.69 K/UL (ref 3.8–10.5)

## 2021-07-06 PROCEDURE — 36415 COLL VENOUS BLD VENIPUNCTURE: CPT | Performed by: INTERNAL MEDICINE

## 2021-07-06 PROCEDURE — 85025 COMPLETE CBC W/AUTO DIFF WBC: CPT | Performed by: INTERNAL MEDICINE

## 2021-07-12 ENCOUNTER — LAB REQUISITION (OUTPATIENT)
Dept: LAB | Facility: HOSPITAL | Age: 76
End: 2021-07-12
Attending: INTERNAL MEDICINE
Payer: MEDICARE

## 2021-07-12 DIAGNOSIS — D47.2 MONOCLONAL GAMMOPATHY: ICD-10-CM

## 2021-07-12 DIAGNOSIS — M81.8 OTHER OSTEOPOROSIS WITHOUT CURRENT PATHOLOGICAL FRACTURE: ICD-10-CM

## 2021-07-12 DIAGNOSIS — C90.00 MULTIPLE MYELOMA NOT HAVING ACHIEVED REMISSION (CMS/HCC): ICD-10-CM

## 2021-07-12 DIAGNOSIS — D64.9 ANEMIA, UNSPECIFIED: ICD-10-CM

## 2021-07-12 DIAGNOSIS — E07.9 DISORDER OF THYROID, UNSPECIFIED: ICD-10-CM

## 2021-07-12 LAB
ALBUMIN SERPL-MCNC: 3.1 G/DL (ref 3.4–5)
ALP SERPL-CCNC: 57 IU/L (ref 35–126)
ALT SERPL-CCNC: 24 IU/L (ref 16–63)
ANION GAP SERPL CALC-SCNC: 8 MEQ/L (ref 3–15)
AST SERPL-CCNC: 25 IU/L (ref 15–41)
BASOPHILS # BLD: 0.03 K/UL (ref 0.01–0.1)
BASOPHILS NFR BLD: 0.4 %
BILIRUB SERPL-MCNC: 0.7 MG/DL (ref 0.3–1.2)
BUN SERPL-MCNC: 23 MG/DL (ref 8–20)
CALCIUM SERPL-MCNC: 8.5 MG/DL (ref 8.9–10.3)
CHLORIDE SERPL-SCNC: 104 MEQ/L (ref 98–109)
CO2 SERPL-SCNC: 26 MEQ/L (ref 22–32)
CREAT SERPL-MCNC: 0.9 MG/DL (ref 0.8–1.3)
DIFFERENTIAL METHOD BLD: ABNORMAL
EOSINOPHIL # BLD: 0 K/UL (ref 0.04–0.54)
EOSINOPHIL NFR BLD: 0 %
ERYTHROCYTE [DISTWIDTH] IN BLOOD BY AUTOMATED COUNT: 14.6 % (ref 11.6–14.4)
GFR SERPL CREATININE-BSD FRML MDRD: >60 ML/MIN/1.73M*2
GLUCOSE SERPL-MCNC: 115 MG/DL (ref 70–99)
HCT VFR BLDCO AUTO: 31 % (ref 40.1–51)
HGB BLD-MCNC: 10.4 G/DL (ref 13.7–17.5)
IMM GRANULOCYTES # BLD AUTO: 0.11 K/UL (ref 0–0.08)
IMM GRANULOCYTES NFR BLD AUTO: 1.4 %
LYMPHOCYTES # BLD: 0.59 K/UL (ref 1.2–3.5)
LYMPHOCYTES NFR BLD: 7.4 %
MCH RBC QN AUTO: 33.8 PG (ref 28–33.2)
MCHC RBC AUTO-ENTMCNC: 33.5 G/DL (ref 32.2–36.5)
MCV RBC AUTO: 100.6 FL (ref 83–98)
MONOCYTES # BLD: 0.09 K/UL (ref 0.3–1)
MONOCYTES NFR BLD: 1.1 %
NEUTROPHILS # BLD: 7.14 K/UL (ref 1.7–7)
NEUTROPHILS # BLD: 7.14 K/UL (ref 1.7–7)
NEUTS SEG NFR BLD: 89.7 %
NRBC BLD-RTO: 0 %
PDW BLD AUTO: 11.4 FL (ref 9.4–12.4)
PLATELET # BLD AUTO: 140 K/UL (ref 150–350)
POTASSIUM SERPL-SCNC: 4.3 MEQ/L (ref 3.6–5.1)
PROT SERPL-MCNC: 6 G/DL (ref 6–8.2)
RBC # BLD AUTO: 3.08 M/UL (ref 4.5–5.8)
SODIUM SERPL-SCNC: 138 MEQ/L (ref 136–144)
WBC # BLD AUTO: 7.96 K/UL (ref 3.8–10.5)

## 2021-07-12 PROCEDURE — 83883 ASSAY NEPHELOMETRY NOT SPEC: CPT | Performed by: INTERNAL MEDICINE

## 2021-07-12 PROCEDURE — 82040 ASSAY OF SERUM ALBUMIN: CPT | Performed by: INTERNAL MEDICINE

## 2021-07-12 PROCEDURE — 85025 COMPLETE CBC W/AUTO DIFF WBC: CPT | Performed by: INTERNAL MEDICINE

## 2021-07-12 PROCEDURE — 36415 COLL VENOUS BLD VENIPUNCTURE: CPT | Performed by: INTERNAL MEDICINE

## 2021-07-12 PROCEDURE — 84165 PROTEIN E-PHORESIS SERUM: CPT | Performed by: INTERNAL MEDICINE

## 2021-07-12 PROCEDURE — 82232 ASSAY OF BETA-2 PROTEIN: CPT | Performed by: INTERNAL MEDICINE

## 2021-07-13 LAB
ALBUMIN MFR UR ELPH: 59.2 %
ALBUMIN SERPL ELPH-MCNC: 3.55 G/DL (ref 3.2–4.9)
ALBUMIN/GLOB SERPL: 1.5 {RATIO} (ref 1.1–2.1)
ALPHA1 GLOB MFR SERPL ELPH: 3.2 %
ALPHA1 GLOB SERPL ELPH-MCNC: 0.19 G/DL (ref 0.08–0.23)
ALPHA2 GLOB MFR UR ELPH: 11.5 %
ALPHA2 GLOB SERPL ELPH-MCNC: 0.69 G/DL (ref 0.45–0.92)
B-GLOBULIN SERPL ELPH-MCNC: 0.6 G/DL (ref 0.5–1.03)
B2 MICROGLOB SERPL-MCNC: 2.16 MG/L (ref 0.98–2.52)
BETA1 GLOB MFR UR ELPH: 10 %
GAMMA GLOB MFR UR ELPH: 16.1 %
GAMMA GLOB SERPL ELPH-MCNC: 0.97 G/DL (ref 0.6–1.6)
KAPPA LC SERPL-MCNC: 15.62 MG/L (ref 8.96–34.28)
KAPPA LC/LAMBDA SER: 0.74 {RATIO} (ref 0.64–1.83)
LAMBDA LC SERPL-MCNC: 21 MG/L (ref 5.7–26.3)
M PROTEIN MFR SERPL ELPH: 13.6 %
M PROTEIN SERPL ELPH-MCNC: 0.82 G/DL
PROT PATTERN SERPL ELPH-IMP: NORMAL
PROT SERPL-MCNC: 6 G/DL (ref 6–8.2)

## 2021-07-20 ENCOUNTER — LAB REQUISITION (OUTPATIENT)
Dept: LAB | Facility: HOSPITAL | Age: 76
End: 2021-07-20
Attending: INTERNAL MEDICINE
Payer: MEDICARE

## 2021-07-20 DIAGNOSIS — C90.00 MULTIPLE MYELOMA NOT HAVING ACHIEVED REMISSION (CMS/HCC): ICD-10-CM

## 2021-07-20 DIAGNOSIS — D47.2 MONOCLONAL GAMMOPATHY: ICD-10-CM

## 2021-07-20 DIAGNOSIS — D64.9 ANEMIA, UNSPECIFIED: ICD-10-CM

## 2021-07-20 DIAGNOSIS — E07.9 DISORDER OF THYROID, UNSPECIFIED: ICD-10-CM

## 2021-07-20 LAB
BASOPHILS # BLD: 0.02 K/UL (ref 0.01–0.1)
BASOPHILS NFR BLD: 0.2 %
DIFFERENTIAL METHOD BLD: ABNORMAL
EOSINOPHIL # BLD: 0.01 K/UL (ref 0.04–0.54)
EOSINOPHIL NFR BLD: 0.1 %
ERYTHROCYTE [DISTWIDTH] IN BLOOD BY AUTOMATED COUNT: 14.6 % (ref 11.6–14.4)
HCT VFR BLDCO AUTO: 30.9 % (ref 40.1–51)
HGB BLD-MCNC: 10.4 G/DL (ref 13.7–17.5)
IMM GRANULOCYTES # BLD AUTO: 0.11 K/UL (ref 0–0.08)
IMM GRANULOCYTES NFR BLD AUTO: 1.4 %
LYMPHOCYTES # BLD: 0.44 K/UL (ref 1.2–3.5)
LYMPHOCYTES NFR BLD: 5.4 %
MCH RBC QN AUTO: 33.4 PG (ref 28–33.2)
MCHC RBC AUTO-ENTMCNC: 33.7 G/DL (ref 32.2–36.5)
MCV RBC AUTO: 99.4 FL (ref 83–98)
MONOCYTES # BLD: 0.14 K/UL (ref 0.3–1)
MONOCYTES NFR BLD: 1.7 %
NEUTROPHILS # BLD: 7.4 K/UL (ref 1.7–7)
NEUTROPHILS # BLD: 7.4 K/UL (ref 1.7–7)
NEUTS SEG NFR BLD: 91.2 %
NRBC BLD-RTO: 0 %
PDW BLD AUTO: 12.2 FL (ref 9.4–12.4)
PLATELET # BLD AUTO: 135 K/UL (ref 150–350)
RBC # BLD AUTO: 3.11 M/UL (ref 4.5–5.8)
WBC # BLD AUTO: 8.12 K/UL (ref 3.8–10.5)

## 2021-07-20 PROCEDURE — 85025 COMPLETE CBC W/AUTO DIFF WBC: CPT | Performed by: INTERNAL MEDICINE

## 2021-07-20 PROCEDURE — 36415 COLL VENOUS BLD VENIPUNCTURE: CPT | Performed by: INTERNAL MEDICINE

## 2021-08-03 ENCOUNTER — LAB REQUISITION (OUTPATIENT)
Dept: LAB | Facility: HOSPITAL | Age: 76
End: 2021-08-03
Attending: INTERNAL MEDICINE
Payer: MEDICARE

## 2021-08-03 DIAGNOSIS — D64.9 ANEMIA, UNSPECIFIED: ICD-10-CM

## 2021-08-03 DIAGNOSIS — M81.8 OTHER OSTEOPOROSIS WITHOUT CURRENT PATHOLOGICAL FRACTURE: ICD-10-CM

## 2021-08-03 DIAGNOSIS — E07.9 DISORDER OF THYROID, UNSPECIFIED: ICD-10-CM

## 2021-08-03 DIAGNOSIS — D47.2 MONOCLONAL GAMMOPATHY: ICD-10-CM

## 2021-08-03 DIAGNOSIS — C90.00 MULTIPLE MYELOMA NOT HAVING ACHIEVED REMISSION (CMS/HCC): ICD-10-CM

## 2021-08-03 LAB
ALBUMIN SERPL-MCNC: 3.3 G/DL (ref 3.4–5)
ALP SERPL-CCNC: 51 IU/L (ref 35–126)
ALT SERPL-CCNC: 26 IU/L (ref 16–63)
ANION GAP SERPL CALC-SCNC: 9 MEQ/L (ref 3–15)
ANISOCYTOSIS BLD QL SMEAR: ABNORMAL
AST SERPL-CCNC: 32 IU/L (ref 15–41)
BASOPHILS # BLD: 0 K/UL (ref 0.01–0.1)
BASOPHILS NFR BLD: 0 %
BILIRUB SERPL-MCNC: 0.8 MG/DL (ref 0.3–1.2)
BUN SERPL-MCNC: 14 MG/DL (ref 8–20)
CALCIUM SERPL-MCNC: 8.5 MG/DL (ref 8.9–10.3)
CHLORIDE SERPL-SCNC: 104 MEQ/L (ref 98–109)
CO2 SERPL-SCNC: 24 MEQ/L (ref 22–32)
CREAT SERPL-MCNC: 1 MG/DL (ref 0.8–1.3)
DIFFERENTIAL METHOD BLD: ABNORMAL
EOSINOPHIL # BLD: 0 K/UL (ref 0.04–0.54)
EOSINOPHIL NFR BLD: 0 %
ERYTHROCYTE [DISTWIDTH] IN BLOOD BY AUTOMATED COUNT: 14.8 % (ref 11.6–14.4)
GFR SERPL CREATININE-BSD FRML MDRD: >60 ML/MIN/1.73M*2
GLUCOSE SERPL-MCNC: 104 MG/DL (ref 70–99)
HCT VFR BLDCO AUTO: 31.3 % (ref 40.1–51)
HGB BLD-MCNC: 10.3 G/DL (ref 13.7–17.5)
LYMPHOCYTES # BLD: 0.2 K/UL (ref 1.2–3.5)
LYMPHOCYTES NFR BLD: 3 %
MACROCYTES BLD QL SMEAR: ABNORMAL
MCH RBC QN AUTO: 33 PG (ref 28–33.2)
MCHC RBC AUTO-ENTMCNC: 32.9 G/DL (ref 32.2–36.5)
MCV RBC AUTO: 100.3 FL (ref 83–98)
MONOCYTES # BLD: 0.07 K/UL (ref 0.3–1)
MONOCYTES NFR BLD: 1 %
MYELOCYTES # BLD MANUAL: 0.07 K/UL
MYELOCYTES NFR BLD MANUAL: 1 %
NEUTROPHILS # BLD: 6.28 K/UL (ref 1.7–7)
NEUTS BAND # BLD: 6.37 K/UL (ref 1.7–7)
NEUTS SEG NFR BLD: 95 %
PDW BLD AUTO: 10.3 FL (ref 9.4–12.4)
PLAT MORPH BLD: NORMAL
PLATELET # BLD AUTO: 197 K/UL (ref 150–350)
PLATELET # BLD EST: ABNORMAL 10*3/UL
POTASSIUM SERPL-SCNC: 4.3 MEQ/L (ref 3.6–5.1)
PROT SERPL-MCNC: 5.3 G/DL (ref 6–8.2)
RBC # BLD AUTO: 3.12 M/UL (ref 4.5–5.8)
SCHISTOCYTES BLD QL SMEAR: ABNORMAL
SODIUM SERPL-SCNC: 137 MEQ/L (ref 136–144)
WBC # BLD AUTO: 6.71 K/UL (ref 3.8–10.5)

## 2021-08-03 PROCEDURE — 82232 ASSAY OF BETA-2 PROTEIN: CPT | Performed by: INTERNAL MEDICINE

## 2021-08-03 PROCEDURE — 85025 COMPLETE CBC W/AUTO DIFF WBC: CPT | Performed by: INTERNAL MEDICINE

## 2021-08-03 PROCEDURE — 80053 COMPREHEN METABOLIC PANEL: CPT | Performed by: INTERNAL MEDICINE

## 2021-08-03 PROCEDURE — 36415 COLL VENOUS BLD VENIPUNCTURE: CPT | Performed by: INTERNAL MEDICINE

## 2021-08-03 PROCEDURE — 84165 PROTEIN E-PHORESIS SERUM: CPT | Performed by: INTERNAL MEDICINE

## 2021-08-03 PROCEDURE — 83883 ASSAY NEPHELOMETRY NOT SPEC: CPT | Performed by: INTERNAL MEDICINE

## 2021-08-04 LAB
ALBUMIN MFR UR ELPH: 64.6 %
ALBUMIN SERPL ELPH-MCNC: 3.42 G/DL (ref 3.2–4.9)
ALBUMIN/GLOB SERPL: 1.8 {RATIO} (ref 1.1–2.1)
ALPHA1 GLOB MFR SERPL ELPH: 3.2 %
ALPHA1 GLOB SERPL ELPH-MCNC: 0.17 G/DL (ref 0.08–0.23)
ALPHA2 GLOB MFR UR ELPH: 12.2 %
ALPHA2 GLOB SERPL ELPH-MCNC: 0.65 G/DL (ref 0.45–0.92)
B-GLOBULIN SERPL ELPH-MCNC: 0.54 G/DL (ref 0.5–1.03)
B2 MICROGLOB SERPL-MCNC: 2.22 MG/L (ref 0.98–2.52)
BETA1 GLOB MFR UR ELPH: 10.2 %
GAMMA GLOB MFR UR ELPH: 9.8 %
GAMMA GLOB SERPL ELPH-MCNC: 0.52 G/DL (ref 0.6–1.6)
KAPPA LC SERPL-MCNC: 17.75 MG/L (ref 8.96–34.28)
KAPPA LC/LAMBDA SER: 1.21 {RATIO} (ref 0.64–1.83)
LAMBDA LC SERPL-MCNC: 14.7 MG/L (ref 5.7–26.3)
M PROTEIN MFR SERPL ELPH: 5.5 %
M PROTEIN SERPL ELPH-MCNC: 0.29 G/DL
PROT PATTERN SERPL ELPH-IMP: NORMAL
PROT SERPL-MCNC: 5.3 G/DL (ref 6–8.2)

## 2021-08-10 ENCOUNTER — LAB REQUISITION (OUTPATIENT)
Dept: LAB | Facility: HOSPITAL | Age: 76
End: 2021-08-10
Attending: INTERNAL MEDICINE
Payer: MEDICARE

## 2021-08-10 DIAGNOSIS — E07.9 DISORDER OF THYROID, UNSPECIFIED: ICD-10-CM

## 2021-08-10 DIAGNOSIS — M81.8 OTHER OSTEOPOROSIS WITHOUT CURRENT PATHOLOGICAL FRACTURE: ICD-10-CM

## 2021-08-10 DIAGNOSIS — D47.2 MONOCLONAL GAMMOPATHY: ICD-10-CM

## 2021-08-10 DIAGNOSIS — D64.9 ANEMIA, UNSPECIFIED: ICD-10-CM

## 2021-08-10 DIAGNOSIS — C90.00 MULTIPLE MYELOMA NOT HAVING ACHIEVED REMISSION (CMS/HCC): ICD-10-CM

## 2021-08-10 LAB
BASOPHILS # BLD: 0.01 K/UL (ref 0.01–0.1)
BASOPHILS NFR BLD: 0.2 %
DIFFERENTIAL METHOD BLD: ABNORMAL
EOSINOPHIL # BLD: 0 K/UL (ref 0.04–0.54)
EOSINOPHIL NFR BLD: 0 %
ERYTHROCYTE [DISTWIDTH] IN BLOOD BY AUTOMATED COUNT: 14.9 % (ref 11.6–14.4)
GIANT PLATELETS BLD QL SMEAR: ABNORMAL
HCT VFR BLDCO AUTO: 32.3 % (ref 40.1–51)
HGB BLD-MCNC: 10.8 G/DL (ref 13.7–17.5)
IMM GRANULOCYTES # BLD AUTO: 0.03 K/UL (ref 0–0.08)
IMM GRANULOCYTES NFR BLD AUTO: 0.7 %
LYMPHOCYTES # BLD: 0.28 K/UL (ref 1.2–3.5)
LYMPHOCYTES NFR BLD: 6.9 %
MCH RBC QN AUTO: 32.3 PG (ref 28–33.2)
MCHC RBC AUTO-ENTMCNC: 33.4 G/DL (ref 32.2–36.5)
MCV RBC AUTO: 96.7 FL (ref 83–98)
MONOCYTES # BLD: 0.07 K/UL (ref 0.3–1)
MONOCYTES NFR BLD: 1.7 %
NEUTROPHILS # BLD: 3.68 K/UL (ref 1.7–7)
NEUTROPHILS # BLD: 3.68 K/UL (ref 1.7–7)
NEUTS SEG NFR BLD: 90.5 %
NRBC BLD-RTO: 0 %
PDW BLD AUTO: 11.2 FL (ref 9.4–12.4)
PLATELET # BLD AUTO: 77 K/UL (ref 150–350)
PLATELET # BLD EST: ABNORMAL 10*3/UL
RBC # BLD AUTO: 3.34 M/UL (ref 4.5–5.8)
RBC MORPH BLD: NORMAL
WBC # BLD AUTO: 4.07 K/UL (ref 3.8–10.5)

## 2021-08-10 PROCEDURE — 86769 SARS-COV-2 COVID-19 ANTIBODY: CPT | Mod: 59 | Performed by: INTERNAL MEDICINE

## 2021-08-10 PROCEDURE — 85025 COMPLETE CBC W/AUTO DIFF WBC: CPT | Performed by: INTERNAL MEDICINE

## 2021-08-10 PROCEDURE — 30000001 MISCELLANEOUS LAB TEST (NOT TO BE USED FOR COVID19): Mod: 59 | Performed by: INTERNAL MEDICINE

## 2021-08-13 LAB — LABORATORY REPORT: NORMAL

## 2021-08-17 ENCOUNTER — LAB REQUISITION (OUTPATIENT)
Dept: LAB | Facility: HOSPITAL | Age: 76
End: 2021-08-17
Attending: INTERNAL MEDICINE
Payer: MEDICARE

## 2021-08-17 DIAGNOSIS — D47.2 MONOCLONAL GAMMOPATHY: ICD-10-CM

## 2021-08-17 DIAGNOSIS — E07.9 DISORDER OF THYROID, UNSPECIFIED: ICD-10-CM

## 2021-08-17 DIAGNOSIS — D64.9 ANEMIA, UNSPECIFIED: ICD-10-CM

## 2021-08-17 DIAGNOSIS — M81.8 OTHER OSTEOPOROSIS WITHOUT CURRENT PATHOLOGICAL FRACTURE: ICD-10-CM

## 2021-08-17 DIAGNOSIS — C90.00 MULTIPLE MYELOMA NOT HAVING ACHIEVED REMISSION (CMS/HCC): ICD-10-CM

## 2021-08-17 LAB
ALBUMIN SERPL-MCNC: 3.4 G/DL (ref 3.4–5)
ALP SERPL-CCNC: 56 IU/L (ref 35–126)
ALT SERPL-CCNC: 25 IU/L (ref 16–63)
ANION GAP SERPL CALC-SCNC: 9 MEQ/L (ref 3–15)
AST SERPL-CCNC: 24 IU/L (ref 15–41)
BASOPHILS # BLD: 0 K/UL (ref 0.01–0.1)
BASOPHILS NFR BLD: 0 %
BILIRUB SERPL-MCNC: 1.1 MG/DL (ref 0.3–1.2)
BUN SERPL-MCNC: 18 MG/DL (ref 8–20)
CALCIUM SERPL-MCNC: 8.4 MG/DL (ref 8.9–10.3)
CHLORIDE SERPL-SCNC: 101 MEQ/L (ref 98–109)
CO2 SERPL-SCNC: 27 MEQ/L (ref 22–32)
CREAT SERPL-MCNC: 0.9 MG/DL (ref 0.8–1.3)
DACRYOCYTES BLD QL SMEAR: ABNORMAL
DIFFERENTIAL METHOD BLD: ABNORMAL
EOSINOPHIL # BLD: 0 K/UL (ref 0.04–0.54)
EOSINOPHIL NFR BLD: 0 %
ERYTHROCYTE [DISTWIDTH] IN BLOOD BY AUTOMATED COUNT: 15.4 % (ref 11.6–14.4)
GFR SERPL CREATININE-BSD FRML MDRD: >60 ML/MIN/1.73M*2
GIANT PLATELETS BLD QL SMEAR: ABNORMAL
GLUCOSE SERPL-MCNC: 120 MG/DL (ref 70–99)
HCT VFR BLDCO AUTO: 32.6 % (ref 40.1–51)
HGB BLD-MCNC: 10.8 G/DL (ref 13.7–17.5)
LYMPHOCYTES # BLD: 0.29 K/UL (ref 1.2–3.5)
LYMPHOCYTES NFR BLD: 13 %
MCH RBC QN AUTO: 32 PG (ref 28–33.2)
MCHC RBC AUTO-ENTMCNC: 33.1 G/DL (ref 32.2–36.5)
MCV RBC AUTO: 96.7 FL (ref 83–98)
MONOCYTES # BLD: 0.09 K/UL (ref 0.3–1)
MONOCYTES NFR BLD: 4 %
NEUTROPHILS # BLD: 1.71 K/UL (ref 1.7–7)
NEUTS BAND # BLD: 0.07 K/UL (ref 0–0.53)
NEUTS BAND # BLD: 1.76 K/UL (ref 1.7–7)
NEUTS BAND NFR BLD: 3 %
NEUTS SEG NFR BLD: 80 %
PDW BLD AUTO: 11.2 FL (ref 9.4–12.4)
PLATELET # BLD AUTO: 131 K/UL (ref 150–350)
PLATELET # BLD EST: ABNORMAL 10*3/UL
POTASSIUM SERPL-SCNC: 4.1 MEQ/L (ref 3.6–5.1)
PROT SERPL-MCNC: 5.4 G/DL (ref 6–8.2)
RBC # BLD AUTO: 3.37 M/UL (ref 4.5–5.8)
SODIUM SERPL-SCNC: 137 MEQ/L (ref 136–144)
WBC # BLD AUTO: 2.2 K/UL (ref 3.8–10.5)

## 2021-08-17 PROCEDURE — 36415 COLL VENOUS BLD VENIPUNCTURE: CPT | Performed by: INTERNAL MEDICINE

## 2021-08-17 PROCEDURE — 84165 PROTEIN E-PHORESIS SERUM: CPT | Performed by: INTERNAL MEDICINE

## 2021-08-17 PROCEDURE — 85025 COMPLETE CBC W/AUTO DIFF WBC: CPT | Performed by: INTERNAL MEDICINE

## 2021-08-17 PROCEDURE — 83883 ASSAY NEPHELOMETRY NOT SPEC: CPT | Performed by: INTERNAL MEDICINE

## 2021-08-17 PROCEDURE — 82232 ASSAY OF BETA-2 PROTEIN: CPT | Performed by: INTERNAL MEDICINE

## 2021-08-17 PROCEDURE — 80053 COMPREHEN METABOLIC PANEL: CPT | Performed by: INTERNAL MEDICINE

## 2021-08-18 LAB
ALBUMIN MFR UR ELPH: 66.1 %
ALBUMIN SERPL ELPH-MCNC: 3.57 G/DL (ref 3.2–4.9)
ALBUMIN/GLOB SERPL: 2 {RATIO} (ref 1.1–2.1)
ALPHA1 GLOB MFR SERPL ELPH: 3.3 %
ALPHA1 GLOB SERPL ELPH-MCNC: 0.18 G/DL (ref 0.08–0.23)
ALPHA2 GLOB MFR UR ELPH: 12.3 %
ALPHA2 GLOB SERPL ELPH-MCNC: 0.66 G/DL (ref 0.45–0.92)
B-GLOBULIN SERPL ELPH-MCNC: 0.56 G/DL (ref 0.5–1.03)
B2 MICROGLOB SERPL-MCNC: 2.22 MG/L (ref 0.98–2.52)
BETA1 GLOB MFR UR ELPH: 10.3 %
GAMMA GLOB MFR UR ELPH: 8 %
GAMMA GLOB SERPL ELPH-MCNC: 0.43 G/DL (ref 0.6–1.6)
KAPPA LC SERPL-MCNC: 14.49 MG/L (ref 8.96–34.28)
KAPPA LC/LAMBDA SER: 0.91 {RATIO} (ref 0.64–1.83)
LAMBDA LC SERPL-MCNC: 15.9 MG/L (ref 5.7–26.3)
M PROTEIN MFR SERPL ELPH: 5.4 %
M PROTEIN SERPL ELPH-MCNC: 0.29 G/DL
PROT PATTERN SERPL ELPH-IMP: NORMAL
PROT SERPL-MCNC: 5.4 G/DL (ref 6–8.2)

## 2021-09-26 ENCOUNTER — DOCUMENTATION (OUTPATIENT)
Dept: HEMATOLOGY/ONCOLOGY | Facility: HOSPITAL | Age: 76
End: 2021-09-26

## 2021-09-26 NOTE — PROGRESS NOTES
Brief Hematology/Oncology Progress Note:    Received call from Julia Powers (girlfriend of patient). Patient follows with Dr. Romero for MM. Per Julia, patient was admitted to the hospital in August 2021 with Covid.  He recovered well and was sent home.  He then developed a UTI/dehydration and was admitted to a hospital in [place].  She states that the patient was treated at the hospital and is pending discharge to home.  She states she was told by Medicare that he does not qualify for rehab.  The patient is calling to see if Dr. Romero can assist with patient to be discharged to a rehab facility close to [facility].  I informed the patient that I would pass the message on to Dr. Romero and her office will try to assist tomorrow as the office is closed today.     Scott Coronado  Hematology/Oncology Fellow

## 2021-10-12 ENCOUNTER — LAB REQUISITION (OUTPATIENT)
Dept: LAB | Facility: HOSPITAL | Age: 76
End: 2021-10-12
Attending: INTERNAL MEDICINE
Payer: MEDICARE

## 2021-10-12 DIAGNOSIS — D64.9 ANEMIA, UNSPECIFIED: ICD-10-CM

## 2021-10-12 DIAGNOSIS — M81.8 OTHER OSTEOPOROSIS WITHOUT CURRENT PATHOLOGICAL FRACTURE: ICD-10-CM

## 2021-10-12 DIAGNOSIS — C90.00 MULTIPLE MYELOMA NOT HAVING ACHIEVED REMISSION (CMS/HCC): ICD-10-CM

## 2021-10-12 DIAGNOSIS — D47.2 MONOCLONAL GAMMOPATHY: ICD-10-CM

## 2021-10-12 DIAGNOSIS — E07.9 DISORDER OF THYROID, UNSPECIFIED: ICD-10-CM

## 2021-10-12 LAB
ALBUMIN SERPL-MCNC: 3.1 G/DL (ref 3.4–5)
ALP SERPL-CCNC: 62 IU/L (ref 35–126)
ALT SERPL-CCNC: 23 IU/L (ref 16–63)
ANION GAP SERPL CALC-SCNC: 12 MEQ/L (ref 3–15)
AST SERPL-CCNC: 22 IU/L (ref 15–41)
BASOPHILS # BLD: 0.02 K/UL (ref 0.01–0.1)
BASOPHILS NFR BLD: 0.3 %
BILIRUB SERPL-MCNC: 0.7 MG/DL (ref 0.3–1.2)
BUN SERPL-MCNC: 10 MG/DL (ref 8–20)
CALCIUM SERPL-MCNC: 8.7 MG/DL (ref 8.9–10.3)
CHLORIDE SERPL-SCNC: 103 MEQ/L (ref 98–109)
CO2 SERPL-SCNC: 23 MEQ/L (ref 22–32)
CREAT SERPL-MCNC: 0.9 MG/DL (ref 0.8–1.3)
DIFFERENTIAL METHOD BLD: ABNORMAL
EOSINOPHIL # BLD: 0.04 K/UL (ref 0.04–0.54)
EOSINOPHIL NFR BLD: 0.7 %
ERYTHROCYTE [DISTWIDTH] IN BLOOD BY AUTOMATED COUNT: 18 % (ref 11.6–14.4)
GFR SERPL CREATININE-BSD FRML MDRD: >60 ML/MIN/1.73M*2
GLUCOSE SERPL-MCNC: 143 MG/DL (ref 70–99)
HCT VFR BLDCO AUTO: 33 % (ref 40.1–51)
HGB BLD-MCNC: 10.7 G/DL (ref 13.7–17.5)
IMM GRANULOCYTES # BLD AUTO: 0.02 K/UL (ref 0–0.08)
IMM GRANULOCYTES NFR BLD AUTO: 0.3 %
LYMPHOCYTES # BLD: 1.12 K/UL (ref 1.2–3.5)
LYMPHOCYTES NFR BLD: 18.9 %
MCH RBC QN AUTO: 29.9 PG (ref 28–33.2)
MCHC RBC AUTO-ENTMCNC: 32.4 G/DL (ref 32.2–36.5)
MCV RBC AUTO: 92.2 FL (ref 83–98)
MONOCYTES # BLD: 0.39 K/UL (ref 0.3–1)
MONOCYTES NFR BLD: 6.6 %
NEUTROPHILS # BLD: 4.33 K/UL (ref 1.7–7)
NEUTROPHILS # BLD: 4.33 K/UL (ref 1.7–7)
NEUTS SEG NFR BLD: 73.2 %
NRBC BLD-RTO: 0 %
PDW BLD AUTO: 9.2 FL (ref 9.4–12.4)
PLATELET # BLD AUTO: 272 K/UL (ref 150–350)
POTASSIUM SERPL-SCNC: 4.2 MEQ/L (ref 3.6–5.1)
PROT SERPL-MCNC: 5.3 G/DL (ref 6–8.2)
RBC # BLD AUTO: 3.58 M/UL (ref 4.5–5.8)
SODIUM SERPL-SCNC: 138 MEQ/L (ref 136–144)
WBC # BLD AUTO: 5.92 K/UL (ref 3.8–10.5)

## 2021-10-12 PROCEDURE — 83883 ASSAY NEPHELOMETRY NOT SPEC: CPT | Performed by: INTERNAL MEDICINE

## 2021-10-12 PROCEDURE — 82232 ASSAY OF BETA-2 PROTEIN: CPT | Performed by: INTERNAL MEDICINE

## 2021-10-12 PROCEDURE — 84165 PROTEIN E-PHORESIS SERUM: CPT | Performed by: INTERNAL MEDICINE

## 2021-10-12 PROCEDURE — 85025 COMPLETE CBC W/AUTO DIFF WBC: CPT | Performed by: INTERNAL MEDICINE

## 2021-10-12 PROCEDURE — 80053 COMPREHEN METABOLIC PANEL: CPT | Performed by: INTERNAL MEDICINE

## 2021-10-13 LAB
ALBUMIN MFR UR ELPH: 59.7 %
ALBUMIN SERPL ELPH-MCNC: 3.34 G/DL (ref 3.2–4.9)
ALBUMIN/GLOB SERPL: 1.5 {RATIO} (ref 1.1–2.1)
ALPHA1 GLOB MFR SERPL ELPH: 3.9 %
ALPHA1 GLOB SERPL ELPH-MCNC: 0.22 G/DL (ref 0.08–0.23)
ALPHA2 GLOB MFR UR ELPH: 13.7 %
ALPHA2 GLOB SERPL ELPH-MCNC: 0.77 G/DL (ref 0.45–0.92)
B-GLOBULIN SERPL ELPH-MCNC: 0.62 G/DL (ref 0.5–1.03)
B2 MICROGLOB SERPL-MCNC: 2.03 MG/L (ref 0.98–2.52)
BETA1 GLOB MFR UR ELPH: 11 %
GAMMA GLOB MFR UR ELPH: 11.7 %
GAMMA GLOB SERPL ELPH-MCNC: 0.66 G/DL (ref 0.6–1.6)
KAPPA LC SERPL-MCNC: 14.64 MG/L (ref 8.96–34.28)
KAPPA LC/LAMBDA SER: 1 {RATIO} (ref 0.64–1.83)
LAMBDA LC SERPL-MCNC: 14.7 MG/L (ref 5.7–26.3)
PROT PATTERN SERPL ELPH-IMP: NORMAL
PROT SERPL-MCNC: 5.6 G/DL (ref 6–8.2)

## 2021-10-19 ENCOUNTER — LAB REQUISITION (OUTPATIENT)
Dept: LAB | Facility: HOSPITAL | Age: 76
End: 2021-10-19
Attending: INTERNAL MEDICINE
Payer: MEDICARE

## 2021-10-19 DIAGNOSIS — E07.9 DISORDER OF THYROID, UNSPECIFIED: ICD-10-CM

## 2021-10-19 DIAGNOSIS — D47.2 MONOCLONAL GAMMOPATHY: ICD-10-CM

## 2021-10-19 DIAGNOSIS — D64.9 ANEMIA, UNSPECIFIED: ICD-10-CM

## 2021-10-19 DIAGNOSIS — M81.8 OTHER OSTEOPOROSIS WITHOUT CURRENT PATHOLOGICAL FRACTURE: ICD-10-CM

## 2021-10-19 DIAGNOSIS — C90.00 MULTIPLE MYELOMA NOT HAVING ACHIEVED REMISSION (CMS/HCC): ICD-10-CM

## 2021-10-19 LAB
BASOPHILS # BLD: 0.01 K/UL (ref 0.01–0.1)
BASOPHILS NFR BLD: 0.1 %
DIFFERENTIAL METHOD BLD: ABNORMAL
EOSINOPHIL # BLD: 0.01 K/UL (ref 0.04–0.54)
EOSINOPHIL NFR BLD: 0.1 %
ERYTHROCYTE [DISTWIDTH] IN BLOOD BY AUTOMATED COUNT: 18.4 % (ref 11.6–14.4)
HCT VFR BLDCO AUTO: 32.9 % (ref 40.1–51)
HGB BLD-MCNC: 11 G/DL (ref 13.7–17.5)
IMM GRANULOCYTES # BLD AUTO: 0.06 K/UL (ref 0–0.08)
IMM GRANULOCYTES NFR BLD AUTO: 0.8 %
LYMPHOCYTES # BLD: 0.47 K/UL (ref 1.2–3.5)
LYMPHOCYTES NFR BLD: 6.4 %
MCH RBC QN AUTO: 29.8 PG (ref 28–33.2)
MCHC RBC AUTO-ENTMCNC: 33.4 G/DL (ref 32.2–36.5)
MCV RBC AUTO: 89.2 FL (ref 83–98)
MONOCYTES # BLD: 0.66 K/UL (ref 0.3–1)
MONOCYTES NFR BLD: 9 %
NEUTROPHILS # BLD: 6.16 K/UL (ref 1.7–7)
NEUTROPHILS # BLD: 6.16 K/UL (ref 1.7–7)
NEUTS SEG NFR BLD: 83.6 %
NRBC BLD-RTO: 0 %
PDW BLD AUTO: 9.6 FL (ref 9.4–12.4)
PLATELET # BLD AUTO: 172 K/UL (ref 150–350)
RBC # BLD AUTO: 3.69 M/UL (ref 4.5–5.8)
WBC # BLD AUTO: 7.37 K/UL (ref 3.8–10.5)

## 2021-10-19 PROCEDURE — 85025 COMPLETE CBC W/AUTO DIFF WBC: CPT | Performed by: INTERNAL MEDICINE

## 2021-10-19 PROCEDURE — 36415 COLL VENOUS BLD VENIPUNCTURE: CPT | Performed by: INTERNAL MEDICINE

## 2021-10-20 ENCOUNTER — TRANSCRIBE ORDERS (OUTPATIENT)
Dept: LAB | Facility: HOSPITAL | Age: 76
End: 2021-10-20
Payer: MEDICARE

## 2021-10-20 ENCOUNTER — APPOINTMENT (OUTPATIENT)
Dept: LAB | Facility: HOSPITAL | Age: 76
End: 2021-10-20
Attending: INTERNAL MEDICINE
Payer: MEDICARE

## 2021-10-20 DIAGNOSIS — I42.8 OTHER CARDIOMYOPATHIES: ICD-10-CM

## 2021-10-20 DIAGNOSIS — N39.0 URINARY TRACT INFECTION, SITE NOT SPECIFIED: Primary | ICD-10-CM

## 2021-10-20 DIAGNOSIS — N39.0 URINARY TRACT INFECTION, SITE NOT SPECIFIED: ICD-10-CM

## 2021-10-20 LAB
ANION GAP SERPL CALC-SCNC: 14 MEQ/L (ref 3–15)
ANISOCYTOSIS BLD QL SMEAR: ABNORMAL
BASOPHILS # BLD: 0 K/UL (ref 0.01–0.1)
BASOPHILS NFR BLD: 0 %
BUN SERPL-MCNC: 30 MG/DL (ref 8–20)
CALCIUM SERPL-MCNC: 8.7 MG/DL (ref 8.9–10.3)
CHLORIDE SERPL-SCNC: 97 MEQ/L (ref 98–109)
CO2 SERPL-SCNC: 22 MEQ/L (ref 22–32)
CREAT SERPL-MCNC: 1.3 MG/DL (ref 0.8–1.3)
DIFFERENTIAL METHOD BLD: ABNORMAL
EOSINOPHIL # BLD: 0 K/UL (ref 0.04–0.54)
EOSINOPHIL NFR BLD: 0 %
ERYTHROCYTE [DISTWIDTH] IN BLOOD BY AUTOMATED COUNT: 18.6 % (ref 11.6–14.4)
GFR SERPL CREATININE-BSD FRML MDRD: 53.4 ML/MIN/1.73M*2
GLUCOSE SERPL-MCNC: 136 MG/DL (ref 70–99)
HCT VFR BLDCO AUTO: 32.5 % (ref 40.1–51)
HGB BLD-MCNC: 10.6 G/DL (ref 13.7–17.5)
LYMPHOCYTES # BLD: 1.44 K/UL (ref 1.2–3.5)
LYMPHOCYTES NFR BLD: 20 %
MCH RBC QN AUTO: 29.9 PG (ref 28–33.2)
MCHC RBC AUTO-ENTMCNC: 32.6 G/DL (ref 32.2–36.5)
MCV RBC AUTO: 91.8 FL (ref 83–98)
METAMYELOCYTES # BLD MANUAL: 0.29 K/UL
METAMYELOCYTES NFR BLD MANUAL: 4 %
MONOCYTES # BLD: 1.44 K/UL (ref 0.3–1)
MONOCYTES NFR BLD: 20 %
NEUTS BAND # BLD: 0.58 K/UL (ref 0–0.53)
NEUTS BAND # BLD: 3.47 K/UL (ref 1.7–7)
NEUTS BAND NFR BLD: 8 %
NEUTS SEG NFR BLD: 48 %
OVALOCYTES BLD QL SMEAR: ABNORMAL
PDW BLD AUTO: 11.3 FL (ref 9.4–12.4)
PLATELET # BLD AUTO: 172 K/UL (ref 150–350)
PLATELET # BLD EST: ABNORMAL 10*3/UL
POTASSIUM SERPL-SCNC: 4.2 MEQ/L (ref 3.6–5.1)
RBC # BLD AUTO: 3.54 M/UL (ref 4.5–5.8)
SODIUM SERPL-SCNC: 133 MEQ/L (ref 136–144)
TOXIC GRANULES BLD QL SMEAR: ABNORMAL
WBC # BLD AUTO: 7.22 K/UL (ref 3.8–10.5)

## 2021-10-20 PROCEDURE — 36415 COLL VENOUS BLD VENIPUNCTURE: CPT

## 2021-10-20 PROCEDURE — 85025 COMPLETE CBC W/AUTO DIFF WBC: CPT

## 2021-10-20 PROCEDURE — 80048 BASIC METABOLIC PNL TOTAL CA: CPT

## 2021-10-21 ENCOUNTER — APPOINTMENT (OUTPATIENT)
Dept: LAB | Facility: HOSPITAL | Age: 76
End: 2021-10-21
Attending: INTERNAL MEDICINE
Payer: MEDICARE

## 2021-10-21 LAB
BACTERIA URNS QL MICRO: 3 /HPF
BILIRUB UR QL STRIP.AUTO: NEGATIVE MG/DL
CLARITY UR REFRACT.AUTO: ABNORMAL
COLOR UR AUTO: YELLOW
GLUCOSE UR STRIP.AUTO-MCNC: NEGATIVE MG/DL
HGB UR QL STRIP.AUTO: 2
HYALINE CASTS #/AREA URNS LPF: ABNORMAL /LPF
KETONES UR STRIP.AUTO-MCNC: NEGATIVE MG/DL
LEUKOCYTE ESTERASE UR QL STRIP.AUTO: 3
NITRITE UR QL STRIP.AUTO: NEGATIVE
PH UR STRIP.AUTO: 6.5 [PH]
PROT UR QL STRIP.AUTO: 2
RBC #/AREA URNS HPF: ABNORMAL /HPF
SP GR UR REFRACT.AUTO: 1.01
SQUAMOUS URNS QL MICRO: 2 /HPF
UROBILINOGEN UR STRIP-ACNC: 0.2 EU/DL
WBC #/AREA URNS HPF: ABNORMAL /HPF

## 2021-10-21 PROCEDURE — 81001 URINALYSIS AUTO W/SCOPE: CPT

## 2021-10-21 PROCEDURE — 87077 CULTURE AEROBIC IDENTIFY: CPT

## 2021-10-23 LAB
BACTERIA UR CULT: ABNORMAL
BACTERIA UR CULT: ABNORMAL

## 2021-10-25 ENCOUNTER — TELEPHONE (OUTPATIENT)
Dept: SCHEDULING | Facility: CLINIC | Age: 76
End: 2021-10-25

## 2021-10-25 ENCOUNTER — TELEPHONE (OUTPATIENT)
Dept: CARDIOLOGY | Facility: CLINIC | Age: 76
End: 2021-10-25

## 2021-10-25 NOTE — TELEPHONE ENCOUNTER
Called pt to reschedule a cxld appt & his partner Julia states the pt is very weak. She states that the pt recently had a visit w another  & was prescribed medication that is making him extremely weak. Pts partner Julia would like to speak w someone regarding this.

## 2021-10-25 NOTE — TELEPHONE ENCOUNTER
Pt calling to make an appt but has previous cx appt. Please advice    Pt can be reach at [phone]

## 2021-10-25 NOTE — TELEPHONE ENCOUNTER
I called and spoke with Julia and Eulalio.  She reports that Eulalio was admitted in August at [facility] with Covid-19 infection.  He was discharged then readmitted with UTI and dehydration.  He then went to rehab in [place] and has been discharged home.  He was started on metoprolol succinate 25 mg once in AM and doxazosin 1 mg in PM.  She is unclear as to why these medications were prescribed.  She reports that rosuvastatin was also stopped.  Eulalio saw his PCP last week and BP was in the 100s.  She reports since then he has felt more weak and fatigued.  His oral intake has been poor.  He was seen by home care PT today and his BP was 88/52 and HR 76.  I requested that they use home BP cuff to check and record BP and HR at least once daily.  I encouraged him to increase his fluid intake to 64 ounces per day.  I recommended that they stop metoprolol and Cardura for now.  If he is lethargic, I recommended that he go to ER for evaluation.  I will check in with them later this week for an update if they are unable to be seen before then.  I will submit a records request to [facility].  He is scheduled for a routine visit in December but will look for a sooner appointment.  She verbalized understanding and appreciated the phone call.     CD - Eulalio was hospitalized in August 2021 at [facility] with Covid-19 infection.  He was discharged then readmitted in September 2021 with UTI and dehydration.  He has been weak and fatigued.  He is hypotensive to 80s.  I stopped metoprolol and cardura for now.  Encouraged him to push fluids.  Will get hospital records and schedule HFU appt.     Duarte - Please offer a visit a HFU visit on 10/28 at 11am otherwise I can see him alone the week of November 8th but CD is away that week.

## 2021-10-26 ENCOUNTER — LAB REQUISITION (OUTPATIENT)
Dept: LAB | Facility: HOSPITAL | Age: 76
End: 2021-10-26
Attending: INTERNAL MEDICINE
Payer: MEDICARE

## 2021-10-26 DIAGNOSIS — M81.8 OTHER OSTEOPOROSIS WITHOUT CURRENT PATHOLOGICAL FRACTURE: ICD-10-CM

## 2021-10-26 DIAGNOSIS — E07.9 DISORDER OF THYROID, UNSPECIFIED: ICD-10-CM

## 2021-10-26 DIAGNOSIS — D64.9 ANEMIA, UNSPECIFIED: ICD-10-CM

## 2021-10-26 DIAGNOSIS — C90.00 MULTIPLE MYELOMA NOT HAVING ACHIEVED REMISSION (CMS/HCC): ICD-10-CM

## 2021-10-26 DIAGNOSIS — D47.2 MONOCLONAL GAMMOPATHY: ICD-10-CM

## 2021-10-26 LAB
ANISOCYTOSIS BLD QL SMEAR: ABNORMAL
BASOPHILS # BLD: 0 K/UL (ref 0.01–0.1)
BASOPHILS NFR BLD: 0 %
DIFFERENTIAL METHOD BLD: ABNORMAL
DOHLE BOD BLD QL SMEAR: ABNORMAL
EOSINOPHIL # BLD: 0 K/UL (ref 0.04–0.54)
EOSINOPHIL NFR BLD: 0 %
ERYTHROCYTE [DISTWIDTH] IN BLOOD BY AUTOMATED COUNT: 18.7 % (ref 11.6–14.4)
GIANT PLATELETS BLD QL SMEAR: ABNORMAL
HCT VFR BLDCO AUTO: 29.7 % (ref 40.1–51)
HGB BLD-MCNC: 10.5 G/DL (ref 13.7–17.5)
HYPOCHROMIA BLD QL SMEAR: ABNORMAL
LYMPHOCYTES # BLD: 0.13 K/UL (ref 1.2–3.5)
LYMPHOCYTES NFR BLD: 2 %
MCH RBC QN AUTO: 29.6 PG (ref 28–33.2)
MCHC RBC AUTO-ENTMCNC: 35.4 G/DL (ref 32.2–36.5)
MCV RBC AUTO: 83.7 FL (ref 83–98)
MONOCYTES # BLD: 0.47 K/UL (ref 0.3–1)
MONOCYTES NFR BLD: 7 %
NEUTROPHILS # BLD: 5.12 K/UL (ref 1.7–7)
NEUTS BAND # BLD: 0.27 K/UL (ref 0–0.53)
NEUTS BAND # BLD: 5.8 K/UL (ref 1.7–7)
NEUTS BAND NFR BLD: 4 %
NEUTS SEG NFR BLD: 87 %
NEUTS VAC BLD QL SMEAR: ABNORMAL
PDW BLD AUTO: ABNORMAL FL
PLATELET # BLD AUTO: 30 K/UL (ref 150–350)
PLATELET # BLD EST: ABNORMAL 10*3/UL
RBC # BLD AUTO: 3.55 M/UL (ref 4.5–5.8)
SCHISTOCYTES BLD QL SMEAR: ABNORMAL
TOXIC GRANULES BLD QL SMEAR: ABNORMAL
WBC # BLD AUTO: 6.67 K/UL (ref 3.8–10.5)

## 2021-10-26 PROCEDURE — 85025 COMPLETE CBC W/AUTO DIFF WBC: CPT | Performed by: INTERNAL MEDICINE

## 2021-10-26 PROCEDURE — 36415 COLL VENOUS BLD VENIPUNCTURE: CPT | Performed by: INTERNAL MEDICINE

## 2021-10-27 NOTE — PROGRESS NOTES
Donal Ellis D.O., [facility]    Clinical Cardiology and Heart Failure     [facility]     [facility]  [facility] [address]  [address] [place] [address]     TEL  [phone]  [facility]/ml       10/28/2021    Re:  Euallio Gregg  : 1945    Dear Otto Dennison MD:    Eulalio returned to the office for hospital follow-up given new diagnosis of LV dysfunction in 2021 while hospitalized with acute hypoxemic respiratory failure due to Covid-19 pneumonia, sepsis and UTI.  As you recall, he has a history of moderate nonobstructive single-vessel CAD with a 50% LAD and 50% first diagonal stenoses, bifascicular block with LAFB/RBBB, hyperlipidemia and asthma along with chronic hypotension.      Since his last visit 1 year ago, he was hospitalized at [facility] in 2021 with acute hypoxemic respiratory failure due to Covid-19 pneumonia, sepsis and UTI.  He had associated demand ischemia with an echocardiogram that showed mild left ventricular systolic dysfunction with an EF of 40-45% with mild global hypokinesis.  He was subsequently readmitted in 2021 due to weakness, UTI, and urinary retention.  He was seen by you last week and was started on Bactrim today for unresolved UTI.  His partner, Julia, called the office earlier this week to report Eulalio has significant generalized weakness and home SBP reading in 80s.  I recommended that Eulalio increase his fluid intake as well as stop metoprolol succinate 25 mg daily and doxazosin 1 mg for now.  Julia told me today in the office that Eulalio has also been taking furosemide 20 mg once daily.  He has peripheral edema that his reports has been improved with furosemide. Julia reports Eulalio's oral intake has been poor.  He will drink an Ensure or two and averages about 30 to 40 ounces of fluid per day.  He notes dizziness with standing and walking.  Prior to  his illness in August, he was taking walks anywhere from 3 to 5 miles 5 to 7 days a w.[place].  He now uses a ane or walker to ambulate.  He denies chest pain, dyspnea on exertion, orthopnea, paroxysmal nocturnal dyspnea, abdominal bloating, palpitations, syncope, stroke, TIA or claudication.    PAST MEDICAL HISTORY:  All aspects of this documentation have been updated and/or entered into our EHR.  Chest pain, hyperlipidemia, asthma, hernia repair, cholecystectomy, Uro-Lift    MEDICATIONS:    Outpatient Encounter Medications as of 10/28/2021:   •  cholecalciferol, vitamin D3, (VITAMIN D3) 2,000 unit capsule, Take 2,000 Units by mouth daily.  •  coenzyme Q10 (COQ10) 200 mg capsule, Take by mouth daily.    •  cycloSPORINE (RESTASIS) 0.05 % ophthalmic emulsion, Administer 1 drop into both eyes 2 (two) times a day.  •  DENTA 5000 PLUS 1.1 % cream, 2 (two) times a day. as directed  •  ferrous sulfate (IRON ORAL), Take by mouth daily.    •  fluticasone propion-salmeteroL (ADVAIR DISKUS) 250-50 mcg/dose diskus inhaler, Inhale 1 puff 2 (two) times a day.  Rinse mouth with water after use to reduce aftertaste and incidence of candidiasis.  Do not swallow. For patients not on a ventilator, a spacer is recommended to be used with this medication/inhaler.  •  furosemide 20 mg tablet, Take 1 tablet (20 mg total) by mouth daily as needed (weight gain of 3 lbs in 1 day or 5 lbs in 1 week).  •  multivit-min/folic/vit K/lycop (MEN'S MULTIVITAMIN ORAL), Take by mouth daily.    •  REVLIMID 25 mg capsule, Take  25 mg daily     •  rosuvastatin 10 mg tablet, Take 1 tablet (10 mg total) by mouth daily.  •  sulfamethoxazole-trimethoprim 800-160 mg per tablet,   •  tamsulosin (FLOMAX) 0.4 mg capsule, 1 tablet 2 times daily  •  [DISCONTINUED] atorvastatin 40 mg tablet, Take 40 mg by mouth every evening.  •  [DISCONTINUED] furosemide 20 mg tablet, Take 20 mg by mouth once daily.  •  [DISCONTINUED] KAPSPARGO SPRINKLE 25 mg capsule,sprinkle,ER  "24hr, TAKE 1 CAPSULE BY MOUTH DAILY (HOLD FOR SBP LESS THAN 120 AND HR LESS THAN 60)  •  [DISCONTINUED] rosuvastatin (CRESTOR) 10 mg tablet, Take 1 tablet (10 mg total) by mouth daily.  •  aspirin (ASPIR-81) 81 mg enteric coated tablet, Take 1 tablet (81 mg total) by mouth daily.  •  compress.stocking,knee,reg,med misc, 1 each daily. Apply in AM and remove in PM.  •  [DISCONTINUED] aspirin (ASPIR-81) 81 mg enteric coated tablet, Take 1 tablet (81 mg total) by mouth daily. (Patient not taking: Reported on 10/28/2021 )  •  [DISCONTINUED] melatonin 5 mg tablet, Take 5 mg by mouth nightly.  •  [DISCONTINUED] TURMERIC ORAL, Take by mouth daily.    ALLERGIES: Amoxicillin caused skin rash.  Sensitive to anesthesia.    SOCIAL HISTORY: He is .  He has a partner, Julia Powers.  He has a grown son and daughter.  He retired in .  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  He smoked 1 pack a day for 10 years and quit in .    FAMILY HISTORY: Mother  of gastric cancer.  Dad  of colon cancer.  One brother  after being hit by a truck.  One sister is alive but she has congestive heart failure.    REVIEW OF SYSTEMS: Aside from what is mentioned above, a 14 point review of systems was performed and was negative.    PHYSICAL EXAMINATION:   Visit Vitals  BP (!) 76/50 (Patient Position: Standing)   Pulse (!) 50   Resp 16   Ht 1.6 m (5' 3\")   Wt 67.1 kg (148 lb)   BMI 26.22 kg/m²   Body surface area is 1.73 meters squared.   His weight is down 10 lbs since his last office visit.    General: Pleasant, weak, frail.  HEENT: No corneal arcus or xanthelasmas.  Sclerae are anicteric.  Nares patent.  Mucous membranes are slightly dry.  Neck: Supple.  JVP is 2 cm/H2O.  Carotids are equal with no audible bruits.  No lymphadenopathy or thyromegaly.  Heart: Regular.  Normal S1 and S2.  No S4. No S3. No murmur.  Chest: Symmetrical.  Lungs: Clear bilaterally without rales, wheezes nor rhonchi.  Abdomen: Soft, nontender.  No masses or " bruits.  No organomegaly.  Normal bowel sounds.  Extremities: No cyanosis or clubbing. 1+ pitting pretibial edema bilaterally.  Distal pulses are easily palpable.  Skin: Deeply tanned.  Warm and dry and well perfused.   Neurologic: Alert and oriented ×3.  Cranial nerves II through XII are intact.  Psychiatric: normal mood, affect & judgment.    DIAGNOSTIC DATA:    EKG: Sinus bradycardia, left axis deviation with an LAFB/RBBB    Labs:   Lab Results   Component Value Date     (L) 10/20/2021    K 4.2 10/20/2021    BUN 30 (H) 10/20/2021    CREATININE 1.3 10/20/2021    EGFR 53.4 (L) 10/20/2021    WBC 6.67 10/26/2021    HGB 10.5 (L) 10/26/2021    PLT 30 (LL) 10/26/2021    ALT 23 10/12/2021    AST 22 10/12/2021    GLUCOSE 136 (H) 10/20/2021    TSH 2.23 12/12/2017    INR 1.0 03/19/2019       Lipid Profile:   Lab Results   Component Value Date    CHOL 126 05/28/2021    CHOL 137 10/22/2020    CHOL 142 06/03/2019     Lab Results   Component Value Date    TRIG 62 05/28/2021    TRIG 31 10/22/2020    TRIG 28 (L) 06/03/2019     Lab Results   Component Value Date    HDL 43 (L) 05/28/2021    HDL 53 10/22/2020    HDL 48 06/03/2019     Lab Results   Component Value Date    LDLCALC 71 05/28/2021    LDLCALC 78 10/22/2020    LDLCALC 88 06/03/2019     Echocardiogram 08/23/2021: Performed at [facility]  Normal left ventricular cavity size. There is mild concentric left ventricular hypertrophy. There is mild global hypokinesis involving all segments of the left ventricle.  Mild left ventricular systolic dysfunction.  The ejection fraction estimate is 40-45%. Abnormal left ventricular diastolic function.  The left atrial volume is normal.   The right ventricle size is borderline enlarged. The right ventricular systolic function is normal.    Normal opening and closure of the aortic valve. There is trace aortic regurgitation.  Grossly normal mitral valve.  Grossly normal tricuspid valve.  Trace tricuspid regurgitation.  Mean  PA pressure is normal.  Normal IVC. Normal respiratory collapse.  No significant pericardial effusion.    Left heart catheterization 9/18/2019:  Mid LAD 50% stenosis at the level of a diagonal branch which also has 50% ostial stenosis. Both LAD and diagonal branches were iFR negative. Normal LVEDP.    Stress echocardiogram 6/25/2019:  1. Conclusion: Stress echo does not meet criteria for ischemia.  2. Baseline Echo: Normal left ventricular size and function. No wall motion abnormalities. Ejection fraction 65%. Normal left atrial size.  Mild mitral annular calcification. Trace mitral regurgitation. Tricuspid aortic valve. Mild aortic regurgitation. Normal right atrium. Normal  right ventricular size and function. Trace tricuspid regurgitation. The jet is insufficient to estimate right ventricular systolic pressure.  3. Post-Stress Echo: All walls become hyperdynamic. Ejection fraction improves.  4. Stress ECG does not meet criteria for ischemia.  5. Good exercise tolerance. Holman treadmill score is 8, associated with low risk for near-future cardiac events.    IMPRESSION/RECOMMENDATIONS:  1. Left ventricular systolic dysfunction - His echocardiogram in August 2021 during his acute illness showed mild left ventricular systolic dysfunction with an . EF of 40-45%.  He was started on metoprolol succinate 25 mg during his hospital stay however he is significantly hypotensive and I recommended that he remain off of it for now.  I plan to re-evaluate his home blood pressure readings in a week and attempt to reintroduce low dose metoprolol succinate.  I will also hold off on introducing an ACE-inhibitor or ARB in setting of hypotension and acute renal insuffiency.  He is volume depleted on exam in the office today. I asked him to stop his daily loop diuretic and use only if needed for weight gain.  I discussed sodium restriction.  I recommended a repeat echocardiogram in 3 months to reassess his LV systolic function.    2. Hypotension - He is volume depleted on exam in the office today.  His recent blood work is consistent with renal insufficiency.  I think he is over-medicated.  I recommended that he remain off of metoprolol, doxazosin and daily loop diuretic.  I ask him to drink at least 48-64 ounces of fluid per day. I asked his partner, Julia, to continue daily home blood pressure checks.   3. Lower extremity edema - As stated above, he is volume depleted on exam in the office today.  I suspect his albumin is very low and that his edema is more due to decreased osmotic pressure. I recommended that he increase his protein intake with diet and nutritional supplements.  I also wrote a prescription for compression stockings.  He should only use his furosemide as needed for weight gain of 3 lbs in 1 day or 5 lbs in 1 week.  4. Acute renal insuffiencey - He is volume depleted due to poor oral intake and ongoing diuretic use.  I recommended that he increase his fluid intake and only use his diuretic as needed.    5. Moderate nonobstructive single vessel 50% mid LAD and 50% ostial D1 stenosis by catheterization 9/18/2019- he has no symptoms of angina pectoralis.  I have asked him to resume his aspirin which he had self discontinued and resume rosuvastatin therapy along with lifestyle modification.  Mild troponin elevation during acute illness due to demand ischemia.  No ischemic work up indicated at this time.  6. Exertional chest pain -in 2019 he was noting left-sided, graded 3-4/10 chest pain which resolved with rest.  Interestingly, however, he was doing a spinning class for 1 hour breaking a solid sweat and has absolutely no symptoms.  This has not recurred.  7. Hyperlipidemia -I reviewed his latest lipid profile outlined above.  I have asked him to stop atorvastatin and resume his rosuvastatin therapy.    8. Asthma -He is on Advair.  He follows with Suresh Cardoza.  9. MAC - This was noted on his stress echo which is outlined  "above.  10. Aortic insufficiency -this was graded mild on his stress echocardiogram and trace on his most recent echocardiogram in August 2021.  This is probably age-related changes to the aortic valve.  11. Bifascicular block with LAFB/RBBB-I previously reviewed this issue with him.  It is probably of no clinical consequence and will be followed with an annual EKG and he will report any symptoms of syncope to me.  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  He is on Revlimid.  13. Covid-19 pneumonia - He was hospitalized in August 2021. He had associated hypoxemic respiratory failure but fortunately did not require intubation.  He completed a course of Remdesivir.    14. BPH with urinary retention - He continue on Flomax. He was also on doxazosin but I instructed him to remain off of it until his blood pressure and lightheadedness improved.  15. Klebsiella UTI - He was hospitalized in August 2021 and again in September 2021. He was treated with a course of fosfomycin then Bactrim.  He was restarted on Bactrim today by you.      Counseling and coordination of care consisted of more than 50% of this 56-minute medical evaluation; numerous diagnostic and management options were considered and testing results were reviewed and discussed, face-to-face, with the patient as best as able, during this encounter; the patient has multiple medical problems and is on numerous medications; the patient has a high risk of morbidity, and all these resulted in the assigned \"complex medical decision-making\" characterization encoding of this patient encounter.    Menjivar, thank you for allowing me to share in the care of your patient.  I asked Eulalio to return in 3 months.  If you have any further questions, please do not hesitate to contact me.    Sincerely,    CARSON Nation / Donal Ellis D.O., [facility]    I, Donal Ellis D.O., personally saw and examined the " patient and helped to formulate the plan outlined below. I reviewed available interim notes, studies, labs and communications where appropriate.  Edits were made where appropriate.

## 2021-10-28 ENCOUNTER — OFFICE VISIT (OUTPATIENT)
Dept: CARDIOLOGY | Facility: CLINIC | Age: 76
End: 2021-10-28
Payer: MEDICARE

## 2021-10-28 ENCOUNTER — TELEPHONE (OUTPATIENT)
Dept: SCHEDULING | Facility: CLINIC | Age: 76
End: 2021-10-28

## 2021-10-28 VITALS
HEART RATE: 50 BPM | HEIGHT: 63 IN | WEIGHT: 148 LBS | RESPIRATION RATE: 16 BRPM | SYSTOLIC BLOOD PRESSURE: 76 MMHG | BODY MASS INDEX: 26.22 KG/M2 | DIASTOLIC BLOOD PRESSURE: 50 MMHG

## 2021-10-28 DIAGNOSIS — I95.9 HYPOTENSION, UNSPECIFIED HYPOTENSION TYPE: ICD-10-CM

## 2021-10-28 DIAGNOSIS — E78.5 HYPERLIPIDEMIA, UNSPECIFIED HYPERLIPIDEMIA TYPE: ICD-10-CM

## 2021-10-28 DIAGNOSIS — I25.10 CORONARY ARTERY DISEASE INVOLVING NATIVE CORONARY ARTERY OF NATIVE HEART WITHOUT ANGINA PECTORIS: Primary | ICD-10-CM

## 2021-10-28 DIAGNOSIS — I51.9 LEFT VENTRICULAR DYSFUNCTION: ICD-10-CM

## 2021-10-28 DIAGNOSIS — R60.0 LOWER EXTREMITY EDEMA: ICD-10-CM

## 2021-10-28 PROCEDURE — 93000 ELECTROCARDIOGRAM COMPLETE: CPT | Performed by: INTERNAL MEDICINE

## 2021-10-28 PROCEDURE — 99215 OFFICE O/P EST HI 40 MIN: CPT | Performed by: INTERNAL MEDICINE

## 2021-10-28 RX ORDER — ASPIRIN 81 MG/1
81 TABLET ORAL DAILY
Qty: 90 TABLET | Refills: 3 | COMMUNITY
Start: 2021-10-28

## 2021-10-28 RX ORDER — FUROSEMIDE 20 MG/1
20 TABLET ORAL DAILY PRN
COMMUNITY
Start: 2021-10-28

## 2021-10-28 RX ORDER — ATORVASTATIN CALCIUM 40 MG/1
40 TABLET, FILM COATED ORAL EVERY EVENING
COMMUNITY
Start: 2021-10-07 | End: 2021-10-28

## 2021-10-28 RX ORDER — SULFAMETHOXAZOLE AND TRIMETHOPRIM 800; 160 MG/1; MG/1
1 TABLET ORAL 2 TIMES DAILY
COMMUNITY
Start: 2021-10-27 | End: 2021-11-19 | Stop reason: HOSPADM

## 2021-10-28 RX ORDER — FUROSEMIDE 20 MG/1
20 TABLET ORAL
COMMUNITY
Start: 2021-10-07 | End: 2021-10-28 | Stop reason: SDUPTHER

## 2021-10-28 RX ORDER — ROSUVASTATIN CALCIUM 10 MG/1
10 TABLET, COATED ORAL DAILY
Qty: 90 TABLET | Refills: 3 | Status: SHIPPED | OUTPATIENT
Start: 2021-10-28

## 2021-10-28 RX ORDER — LENALIDOMIDE 25 MG/1
25 CAPSULE ORAL DAILY
COMMUNITY
Start: 2021-08-24 | End: 2021-11-19 | Stop reason: HOSPADM

## 2021-10-28 RX ORDER — METOPROLOL SUCCINATE 25 MG/1
CAPSULE, EXTENDED RELEASE ORAL
COMMUNITY
Start: 2021-10-07 | End: 2021-10-28

## 2021-10-28 NOTE — TELEPHONE ENCOUNTER
I called and spoke with Silvia.  I informed her that I stopped metoprolol (Kapsargo) and doxazosin as well as made furosemide as needed for weight gain.  I also resume aspirin, stopped atorvastatin and resume rosuvastatin.  I let her know he was hypotensive in the office and appeared dry. I plan to check in with them early next week to see if we can slowly reintroduce low dose metoprolol or doxazosin.  She verbalized understanding and appreciated the phone call.    CD - Updated home care RN regarding mediation changes from today's visit.

## 2021-10-28 NOTE — PATIENT INSTRUCTIONS
1. You returned to the office after your recent hospital stay for Covid-19 pneumonia and a urinary tract infection.  2. You are feeling very weak and tired. Your blood pressure is running low.  3. Stay off of metoprolol and doxazosin.  4. Stop furosemide (Lasix) every day as you are dehydrated.  5. Check your weight every morning.  Record your weights.  6. If you have weight gain of 3 lbs in 1 day or 5 lbs in 1 week, you may use a furosemide (Lasix) 20 mg tablet if needed.  7. Drink at least 48 - 64 ounces of fluid per day.  8. Limit sodium to less than 2000 mg per day.  9. Check your blood pressure once daily in the morning.  10. Use compression stockings every day. Apply in morning and remove before bed.  You can purchase these from [facility] in [facility].  11. Increase the protein in your diet to help build strength and help with fluid in the legs.  12. Restart aspirin 81 mg once daily  13. Stop atorvastatin.  14. Resume rosuvastatin once daily.  I sent a refill to [facility] pharmacy.  15. Schedule echocardiogram for 1 week before your next appointment.  16. Return in 3 months Alejandra

## 2021-10-28 NOTE — TELEPHONE ENCOUNTER
Silvia/Chaitanya called asking if his metoprolol and his doxazosin were dc'd today? Are you resuming lasix 20 mg?   Patient was taking Kapsargo Sprinkles, she advised him to stop this.  Call Silvia at [phone] to advise

## 2021-10-28 NOTE — LETTER
2021     Otto Dennison MD  [address] [place] [address] Ave  MOBS, [address] [address]    Patient: Eulalio Gregg  YOB: 1945  Date of Visit: 10/28/2021      Dear Dr. Dennison:    Thank you for referring Eulalio Gregg to me for evaluation. Below are my notes for this consultation.    If you have questions, please do not hesitate to call me. I look forward to following your patient along with you.         Sincerely,        Donal Ellis,         CC: MD Latoya Teran MD Droogan, Christopher J, DO  10/30/2021  6:35 PM  Signed       Donal Ellis D.O., [facility]    Clinical Cardiology and Heart Failure     [facility] HEART [facility]  [facility]  [facility] Level  [address] [place] [address]     TEL  [phone]  [facility]       10/28/2021    Re:  Eulalio Gregg  : 1945    Dear Otto Dennison MD:    Eulalio returned to the office for hospital follow-up given new diagnosis of LV dysfunction in 2021 while hospitalized with acute hypoxemic respiratory failure due to Covid-19 pneumonia, sepsis and UTI.  As you recall, he has a history of moderate nonobstructive single-vessel CAD with a 50% LAD and 50% first diagonal stenoses, bifascicular block with LAFB/RBBB, hyperlipidemia and asthma along with chronic hypotension.      Since his last visit 1 year ago, he was hospitalized at [facility] in 2021 with acute hypoxemic respiratory failure due to Covid-19 pneumonia, sepsis and UTI.  He had associated demand ischemia with an echocardiogram that showed mild left ventricular systolic dysfunction with an EF of 40-45% with mild global hypokinesis.  He was subsequently readmitted in 2021 due to weakness, UTI, and urinary retention.  He was seen by you last week and was started on Bactrim today for unresolved UTI.  His partner, Julia, called the  office earlier this week to report Eulalio has significant generalized weakness and home SBP reading in 80s.  I recommended that Eulalio increase his fluid intake as well as stop metoprolol succinate 25 mg daily and doxazosin 1 mg for now.  Julia told me today in the office that Eulalio has also been taking furosemide 20 mg once daily.  He has peripheral edema that his reports has been improved with furosemide. Julia reports Eulalio's oral intake has been poor.  He will drink an Ensure or two and averages about 30 to 40 ounces of fluid per day.  He notes dizziness with standing and walking.  Prior to his illness in August, he was taking walks anywhere from 3 to 5 miles 5 to 7 days a w.[place].  He now uses a ane or walker to ambulate.  He denies chest pain, dyspnea on exertion, orthopnea, paroxysmal nocturnal dyspnea, abdominal bloating, palpitations, syncope, stroke, TIA or claudication.    PAST MEDICAL HISTORY:  All aspects of this documentation have been updated and/or entered into our EHR.  Chest pain, hyperlipidemia, asthma, hernia repair, cholecystectomy, Uro-Lift    MEDICATIONS:    Outpatient Encounter Medications as of 10/28/2021:   •  cholecalciferol, vitamin D3, (VITAMIN D3) 2,000 unit capsule, Take 2,000 Units by mouth daily.  •  coenzyme Q10 (COQ10) 200 mg capsule, Take by mouth daily.    •  cycloSPORINE (RESTASIS) 0.05 % ophthalmic emulsion, Administer 1 drop into both eyes 2 (two) times a day.  •  DENTA 5000 PLUS 1.1 % cream, 2 (two) times a day. as directed  •  ferrous sulfate (IRON ORAL), Take by mouth daily.    •  fluticasone propion-salmeteroL (ADVAIR DISKUS) 250-50 mcg/dose diskus inhaler, Inhale 1 puff 2 (two) times a day.  Rinse mouth with water after use to reduce aftertaste and incidence of candidiasis.  Do not swallow. For patients not on a ventilator, a spacer is recommended to be used with this medication/inhaler.  •  furosemide 20 mg tablet, Take 1 tablet (20 mg total) by mouth daily as needed  (weight gain of 3 lbs in 1 day or 5 lbs in 1 week).  •  multivit-min/folic/vit K/lycop (MEN'S MULTIVITAMIN ORAL), Take by mouth daily.    •  REVLIMID 25 mg capsule, Take  25 mg daily     •  rosuvastatin 10 mg tablet, Take 1 tablet (10 mg total) by mouth daily.  •  sulfamethoxazole-trimethoprim 800-160 mg per tablet,   •  tamsulosin (FLOMAX) 0.4 mg capsule, 1 tablet 2 times daily  •  [DISCONTINUED] atorvastatin 40 mg tablet, Take 40 mg by mouth every evening.  •  [DISCONTINUED] furosemide 20 mg tablet, Take 20 mg by mouth once daily.  •  [DISCONTINUED] KAPSPARGO SPRINKLE 25 mg capsule,sprinkle,ER 24hr, TAKE 1 CAPSULE BY MOUTH DAILY (HOLD FOR SBP LESS THAN 120 AND HR LESS THAN 60)  •  [DISCONTINUED] rosuvastatin (CRESTOR) 10 mg tablet, Take 1 tablet (10 mg total) by mouth daily.  •  aspirin (ASPIR-81) 81 mg enteric coated tablet, Take 1 tablet (81 mg total) by mouth daily.  •  compress.stocking,knee,reg,med misc, 1 each daily. Apply in AM and remove in PM.  •  [DISCONTINUED] aspirin (ASPIR-81) 81 mg enteric coated tablet, Take 1 tablet (81 mg total) by mouth daily. (Patient not taking: Reported on 10/28/2021 )  •  [DISCONTINUED] melatonin 5 mg tablet, Take 5 mg by mouth nightly.  •  [DISCONTINUED] TURMERIC ORAL, Take by mouth daily.    ALLERGIES: Amoxicillin caused skin rash.  Sensitive to anesthesia.    SOCIAL HISTORY: He is .  He has a partner, Julia Powers.  He has a grown son and daughter.  He retired in .  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  He smoked 1 pack a day for 10 years and quit in .    FAMILY HISTORY: Mother  of gastric cancer.  Dad  of colon cancer.  One brother  after being hit by a truck.  One sister is alive but she has congestive heart failure.    REVIEW OF SYSTEMS: Aside from what is mentioned above, a 14 point review of systems was performed and was negative.    PHYSICAL EXAMINATION:   Visit Vitals  BP (!) 76/50 (Patient Position: Standing)   Pulse (!) 50   Resp 16   Ht 1.6 m  "(5' 3\")   Wt 67.1 kg (148 lb)   BMI 26.22 kg/m²   Body surface area is 1.73 meters squared.   His weight is down 10 lbs since his last office visit.    General: Pleasant, weak, frail.  HEENT: No corneal arcus or xanthelasmas.  Sclerae are anicteric.  Nares patent.  Mucous membranes are slightly dry.  Neck: Supple.  JVP is 2 cm/H2O.  Carotids are equal with no audible bruits.  No lymphadenopathy or thyromegaly.  Heart: Regular.  Normal S1 and S2.  No S4. No S3. No murmur.  Chest: Symmetrical.  Lungs: Clear bilaterally without rales, wheezes nor rhonchi.  Abdomen: Soft, nontender.  No masses or bruits.  No organomegaly.  Normal bowel sounds.  Extremities: No cyanosis or clubbing. 1+ pitting pretibial edema bilaterally.  Distal pulses are easily palpable.  Skin: Deeply tanned.  Warm and dry and well perfused.   Neurologic: Alert and oriented ×3.  Cranial nerves II through XII are intact.  Psychiatric: normal mood, affect & judgment.    DIAGNOSTIC DATA:    EKG: Sinus bradycardia, left axis deviation with an LAFB/RBBB    Labs:   Lab Results   Component Value Date     (L) 10/20/2021    K 4.2 10/20/2021    BUN 30 (H) 10/20/2021    CREATININE 1.3 10/20/2021    EGFR 53.4 (L) 10/20/2021    WBC 6.67 10/26/2021    HGB 10.5 (L) 10/26/2021    PLT 30 (LL) 10/26/2021    ALT 23 10/12/2021    AST 22 10/12/2021    GLUCOSE 136 (H) 10/20/2021    TSH 2.23 12/12/2017    INR 1.0 03/19/2019       Lipid Profile:   Lab Results   Component Value Date    CHOL 126 05/28/2021    CHOL 137 10/22/2020    CHOL 142 06/03/2019     Lab Results   Component Value Date    TRIG 62 05/28/2021    TRIG 31 10/22/2020    TRIG 28 (L) 06/03/2019     Lab Results   Component Value Date    HDL 43 (L) 05/28/2021    HDL 53 10/22/2020    HDL 48 06/03/2019     Lab Results   Component Value Date    LDLCALC 71 05/28/2021    LDLCALC 78 10/22/2020    LDLCALC 88 06/03/2019     Echocardiogram 08/23/2021: Performed at [facility]  Normal left ventricular cavity " size. There is mild concentric left ventricular hypertrophy. There is mild global hypokinesis involving all segments of the left ventricle.  Mild left ventricular systolic dysfunction.  The ejection fraction estimate is 40-45%. Abnormal left ventricular diastolic function.  The left atrial volume is normal.   The right ventricle size is borderline enlarged. The right ventricular systolic function is normal.    Normal opening and closure of the aortic valve. There is trace aortic regurgitation.  Grossly normal mitral valve.  Grossly normal tricuspid valve.  Trace tricuspid regurgitation.  Mean PA pressure is normal.  Normal IVC. Normal respiratory collapse.  No significant pericardial effusion.    Left heart catheterization 9/18/2019:  Mid LAD 50% stenosis at the level of a diagonal branch which also has 50% ostial stenosis. Both LAD and diagonal branches were iFR negative. Normal LVEDP.    Stress echocardiogram 6/25/2019:  1. Conclusion: Stress echo does not meet criteria for ischemia.  2. Baseline Echo: Normal left ventricular size and function. No wall motion abnormalities. Ejection fraction 65%. Normal left atrial size.  Mild mitral annular calcification. Trace mitral regurgitation. Tricuspid aortic valve. Mild aortic regurgitation. Normal right atrium. Normal  right ventricular size and function. Trace tricuspid regurgitation. The jet is insufficient to estimate right ventricular systolic pressure.  3. Post-Stress Echo: All walls become hyperdynamic. Ejection fraction improves.  4. Stress ECG does not meet criteria for ischemia.  5. Good exercise tolerance. Holman treadmill score is 8, associated with low risk for near-future cardiac events.    IMPRESSION/RECOMMENDATIONS:  1. Left ventricular systolic dysfunction - His echocardiogram in August 2021 during his acute illness showed mild left ventricular systolic dysfunction with an . EF of 40-45%.  He was started on metoprolol succinate 25 mg during his hospital  stay however he is significantly hypotensive and I recommended that he remain off of it for now.  I plan to re-evaluate his home blood pressure readings in a week and attempt to reintroduce low dose metoprolol succinate.  I will also hold off on introducing an ACE-inhibitor or ARB in setting of hypotension and acute renal insuffiency.  He is volume depleted on exam in the office today. I asked him to stop his daily loop diuretic and use only if needed for weight gain.  I discussed sodium restriction.  I recommended a repeat echocardiogram in 3 months to reassess his LV systolic function.   2. Hypotension - He is volume depleted on exam in the office today.  His recent blood work is consistent with renal insufficiency.  I think he is over-medicated.  I recommended that he remain off of metoprolol, doxazosin and daily loop diuretic.  I ask him to drink at least 48-64 ounces of fluid per day. I asked his partner, Julia, to continue daily home blood pressure checks.   3. Lower extremity edema - As stated above, he is volume depleted on exam in the office today.  I suspect his albumin is very low and that his edema is more due to decreased osmotic pressure. I recommended that he increase his protein intake with diet and nutritional supplements.  I also wrote a prescription for compression stockings.  He should only use his furosemide as needed for weight gain of 3 lbs in 1 day or 5 lbs in 1 week.  4. Acute renal insuffiencey - He is volume depleted due to poor oral intake and ongoing diuretic use.  I recommended that he increase his fluid intake and only use his diuretic as needed.    5. Moderate nonobstructive single vessel 50% mid LAD and 50% ostial D1 stenosis by catheterization 9/18/2019- he has no symptoms of angina pectoralis.  I have asked him to resume his aspirin which he had self discontinued and resume rosuvastatin therapy along with lifestyle modification.  Mild troponin elevation during acute illness due to  demand ischemia.  No ischemic work up indicated at this time.  6. Exertional chest pain -in 2019 he was noting left-sided, graded 3-4/10 chest pain which resolved with rest.  Interestingly, however, he was doing a spinning class for 1 hour breaking a solid sweat and has absolutely no symptoms.  This has not recurred.  7. Hyperlipidemia -I reviewed his latest lipid profile outlined above.  I have asked him to stop atorvastatin and resume his rosuvastatin therapy.    8. Asthma -He is on Advair.  He follows with Suresh Cardoza.  9. MAC - This was noted on his stress echo which is outlined above.  10. Aortic insufficiency -this was graded mild on his stress echocardiogram and trace on his most recent echocardiogram in August 2021.  This is probably age-related changes to the aortic valve.  11. Bifascicular block with LAFB/RBBB-I previously reviewed this issue with him.  It is probably of no clinical consequence and will be followed with an annual EKG and he will report any symptoms of syncope to me.  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  He is on Revlimid.  13. Covid-19 pneumonia - He was hospitalized in August 2021. He had associated hypoxemic respiratory failure but fortunately did not require intubation.  He completed a course of Remdesivir.    14. BPH with urinary retention - He continue on Flomax. He was also on doxazosin but I instructed him to remain off of it until his blood pressure and lightheadedness improved.  15. Klebsiella UTI - He was hospitalized in August 2021 and again in September 2021. He was treated with a course of fosfomycin then Bactrim.  He was restarted on Bactrim today by you.      Counseling and coordination of care consisted of more than 50% of this 56-minute medical evaluation; numerous diagnostic and management options were considered and testing results were reviewed and discussed, face-to-face, with the patient as best as able, during  "this encounter; the patient has multiple medical problems and is on numerous medications; the patient has a high risk of morbidity, and all these resulted in the assigned \"complex medical decision-making\" characterization encoding of this patient encounter.    Menjivar, thank you for allowing me to share in the care of your patient.  I asked Eulalio to return in 3 months.  If you have any further questions, please do not hesitate to contact me.    Sincerely,    CARSON Nation / Donal Ellis D.O., [facility]    I, Donal Ellis D.O., personally saw and examined the patient and helped to formulate the plan outlined below. I reviewed available interim notes, studies, labs and communications where appropriate.  Edits were made where appropriate.                                       "

## 2021-10-29 ENCOUNTER — LAB REQUISITION (OUTPATIENT)
Dept: LAB | Facility: HOSPITAL | Age: 76
DRG: 871 | End: 2021-10-29
Attending: INTERNAL MEDICINE
Payer: MEDICARE

## 2021-10-29 DIAGNOSIS — D47.2 MONOCLONAL GAMMOPATHY: ICD-10-CM

## 2021-10-29 DIAGNOSIS — C90.00 MULTIPLE MYELOMA NOT HAVING ACHIEVED REMISSION (CMS/HCC): ICD-10-CM

## 2021-10-29 LAB
ANISOCYTOSIS BLD QL SMEAR: ABNORMAL
BASOPHILS # BLD: 0 K/UL (ref 0.01–0.1)
BASOPHILS NFR BLD: 0 %
BURR CELLS BLD QL SMEAR: ABNORMAL
DIFFERENTIAL METHOD BLD: ABNORMAL
EOSINOPHIL # BLD: 0 K/UL (ref 0.04–0.54)
EOSINOPHIL NFR BLD: 0 %
ERYTHROCYTE [DISTWIDTH] IN BLOOD BY AUTOMATED COUNT: 19.2 % (ref 11.6–14.4)
GIANT PLATELETS BLD QL SMEAR: ABNORMAL
HCT VFR BLDCO AUTO: 30.7 % (ref 40.1–51)
HGB BLD-MCNC: 10.7 G/DL (ref 13.7–17.5)
LYMPHOCYTES # BLD: 1.03 K/UL (ref 1.2–3.5)
LYMPHOCYTES NFR BLD: 8 %
MCH RBC QN AUTO: 29.7 PG (ref 28–33.2)
MCHC RBC AUTO-ENTMCNC: 34.9 G/DL (ref 32.2–36.5)
MCV RBC AUTO: 85.3 FL (ref 83–98)
MONOCYTES # BLD: 0.77 K/UL (ref 0.3–1)
MONOCYTES NFR BLD: 6 %
NEUTROPHILS # BLD: 11.08 K/UL (ref 1.7–7)
NEUTS BAND # BLD: 0.26 K/UL (ref 0–0.53)
NEUTS BAND # BLD: 10.8 K/UL (ref 1.7–7)
NEUTS BAND NFR BLD: 2 %
NEUTS SEG NFR BLD: 84 %
NEUTS VAC BLD QL SMEAR: ABNORMAL
PDW BLD AUTO: ABNORMAL FL
PLATELET # BLD AUTO: 40 K/UL (ref 150–350)
PLATELET # BLD EST: ABNORMAL 10*3/UL
POIKILOCYTOSIS BLD QL SMEAR: ABNORMAL
RBC # BLD AUTO: 3.6 M/UL (ref 4.5–5.8)
TOXIC GRANULES BLD QL SMEAR: ABNORMAL
WBC # BLD AUTO: 12.86 K/UL (ref 3.8–10.5)

## 2021-10-29 PROCEDURE — 36415 COLL VENOUS BLD VENIPUNCTURE: CPT | Performed by: INTERNAL MEDICINE

## 2021-10-29 PROCEDURE — 85025 COMPLETE CBC W/AUTO DIFF WBC: CPT | Performed by: INTERNAL MEDICINE

## 2021-10-31 ENCOUNTER — APPOINTMENT (EMERGENCY)
Dept: RADIOLOGY | Facility: HOSPITAL | Age: 76
DRG: 871 | End: 2021-10-31
Attending: EMERGENCY MEDICINE
Payer: MEDICARE

## 2021-10-31 ENCOUNTER — HOSPITAL ENCOUNTER (INPATIENT)
Facility: HOSPITAL | Age: 76
LOS: 19 days | Discharge: NURSING, REHAB OR LONG-TERM CARE | DRG: 871 | End: 2021-11-19
Attending: EMERGENCY MEDICINE | Admitting: INTERNAL MEDICINE
Payer: MEDICARE

## 2021-10-31 DIAGNOSIS — R79.89 ELEVATED SERUM CREATININE: ICD-10-CM

## 2021-10-31 DIAGNOSIS — T83.511A URINARY TRACT INFECTION ASSOCIATED WITH CATHETERIZATION OF URINARY TRACT, UNSPECIFIED INDWELLING URINARY CATHETER TYPE, INITIAL ENCOUNTER (CMS/HCC): ICD-10-CM

## 2021-10-31 DIAGNOSIS — I74.9 EMBOLISM AND THROMBOSIS OF UNSPECIFIED ARTERY (CMS/HCC): ICD-10-CM

## 2021-10-31 DIAGNOSIS — N39.0 URINARY TRACT INFECTION ASSOCIATED WITH CATHETERIZATION OF URINARY TRACT, UNSPECIFIED INDWELLING URINARY CATHETER TYPE, INITIAL ENCOUNTER (CMS/HCC): ICD-10-CM

## 2021-10-31 DIAGNOSIS — R53.1 WEAKNESS: Primary | ICD-10-CM

## 2021-10-31 DIAGNOSIS — I50.20 HFREF (HEART FAILURE WITH REDUCED EJECTION FRACTION) (CMS/HCC): ICD-10-CM

## 2021-10-31 PROBLEM — N17.9 AKI (ACUTE KIDNEY INJURY) (CMS/HCC): Status: ACTIVE | Noted: 2021-10-31

## 2021-10-31 PROBLEM — C90.00 MULTIPLE MYELOMA (CMS/HCC): Status: ACTIVE | Noted: 2021-10-31

## 2021-10-31 PROBLEM — I25.10 CAD (CORONARY ARTERY DISEASE): Status: ACTIVE | Noted: 2021-10-31

## 2021-10-31 PROBLEM — Z86.16 HISTORY OF COVID-19: Status: ACTIVE | Noted: 2021-10-31

## 2021-10-31 PROBLEM — D64.9 ANEMIA: Status: ACTIVE | Noted: 2021-10-31

## 2021-10-31 LAB
ALBUMIN SERPL-MCNC: 2 G/DL (ref 3.4–5)
ALP SERPL-CCNC: 82 IU/L (ref 35–126)
ALT SERPL-CCNC: 34 IU/L (ref 16–63)
ANION GAP SERPL CALC-SCNC: 8 MEQ/L (ref 3–15)
ANISOCYTOSIS BLD QL SMEAR: ABNORMAL
AST SERPL-CCNC: 37 IU/L (ref 15–41)
BACTERIA URNS QL MICRO: 3 /HPF
BASOPHILS # BLD: 0 K/UL (ref 0.01–0.1)
BASOPHILS NFR BLD: 0 %
BILIRUB SERPL-MCNC: 1.1 MG/DL (ref 0.3–1.2)
BILIRUB UR QL STRIP.AUTO: NEGATIVE MG/DL
BUN SERPL-MCNC: 28 MG/DL (ref 8–20)
BURR CELLS BLD QL SMEAR: ABNORMAL
CALCIUM SERPL-MCNC: 7.4 MG/DL (ref 8.9–10.3)
CHLORIDE SERPL-SCNC: 103 MEQ/L (ref 98–109)
CLARITY UR REFRACT.AUTO: ABNORMAL
CO2 SERPL-SCNC: 22 MEQ/L (ref 22–32)
COLOR UR AUTO: YELLOW
CREAT SERPL-MCNC: 1.5 MG/DL (ref 0.8–1.3)
DIFFERENTIAL METHOD BLD: ABNORMAL
EOSINOPHIL # BLD: 0.07 K/UL (ref 0.04–0.54)
EOSINOPHIL NFR BLD: 1 %
ERYTHROCYTE [DISTWIDTH] IN BLOOD BY AUTOMATED COUNT: 19.7 % (ref 11.6–14.4)
GENTAMICIN DOSE DATE: ABNORMAL
GENTAMICIN DOSE TIME: ABNORMAL
GENTAMICIN PEAK SERPL-MCNC: 11.3 UG/ML (ref 4–10)
GFR SERPL CREATININE-BSD FRML MDRD: 45.3 ML/MIN/1.73M*2
GLUCOSE SERPL-MCNC: 122 MG/DL (ref 70–99)
GLUCOSE UR STRIP.AUTO-MCNC: NEGATIVE MG/DL
HCT VFR BLDCO AUTO: 28 % (ref 40.1–51)
HGB BLD-MCNC: 9.2 G/DL (ref 13.7–17.5)
HGB UR QL STRIP.AUTO: 2
HYALINE CASTS #/AREA URNS LPF: ABNORMAL /LPF
KETONES UR STRIP.AUTO-MCNC: NEGATIVE MG/DL
LACTATE SERPL-SCNC: 0.8 MMOL/L (ref 0.4–2)
LACTATE SERPL-SCNC: 2.1 MMOL/L (ref 0.4–2)
LEUKOCYTE ESTERASE UR QL STRIP.AUTO: 3
LYMPHOCYTES # BLD: 0.33 K/UL (ref 1.2–3.5)
LYMPHOCYTES NFR BLD: 5 %
MCH RBC QN AUTO: 29.7 PG (ref 28–33.2)
MCHC RBC AUTO-ENTMCNC: 32.9 G/DL (ref 32.2–36.5)
MCV RBC AUTO: 90.3 FL (ref 83–98)
METAMYELOCYTES # BLD MANUAL: 0.26 K/UL
METAMYELOCYTES NFR BLD MANUAL: 4 %
MONOCYTES # BLD: 0.13 K/UL (ref 0.3–1)
MONOCYTES NFR BLD: 2 %
MYELOCYTES # BLD MANUAL: 0.07 K/UL
MYELOCYTES NFR BLD MANUAL: 1 %
NEUTS BAND # BLD: 0.52 K/UL (ref 0–0.53)
NEUTS BAND # BLD: 5.14 K/UL (ref 1.7–7)
NEUTS BAND NFR BLD: 8 %
NEUTS SEG NFR BLD: 79 %
NITRITE UR QL STRIP.AUTO: POSITIVE
OVALOCYTES BLD QL SMEAR: ABNORMAL
PDW BLD AUTO: 13 FL (ref 9.4–12.4)
PH UR STRIP.AUTO: 6.5 [PH]
PLAT MORPH BLD: NORMAL
PLATELET # BLD AUTO: 120 K/UL (ref 150–350)
PLATELET # BLD EST: ABNORMAL 10*3/UL
POIKILOCYTOSIS BLD QL SMEAR: ABNORMAL
POLYCHROMASIA BLD QL SMEAR: ABNORMAL
POTASSIUM SERPL-SCNC: 3.9 MEQ/L (ref 3.6–5.1)
PROT SERPL-MCNC: 4.5 G/DL (ref 6–8.2)
PROT UR QL STRIP.AUTO: 1
RBC # BLD AUTO: 3.1 M/UL (ref 4.5–5.8)
RBC #/AREA URNS HPF: ABNORMAL /HPF
SARS-COV-2 RNA RESP QL NAA+PROBE: POSITIVE
SODIUM SERPL-SCNC: 133 MEQ/L (ref 136–144)
SP GR UR REFRACT.AUTO: 1.01
SQUAMOUS URNS QL MICRO: ABNORMAL /HPF
TOXIC GRANULES BLD QL SMEAR: ABNORMAL
TROPONIN I SERPL-MCNC: <0.03 NG/ML
UROBILINOGEN UR STRIP-ACNC: 0.2 EU/DL
WBC # BLD AUTO: 6.51 K/UL (ref 3.8–10.5)
WBC #/AREA URNS HPF: ABNORMAL /HPF

## 2021-10-31 PROCEDURE — 99285 EMERGENCY DEPT VISIT HI MDM: CPT | Mod: 25

## 2021-10-31 PROCEDURE — 87040 BLOOD CULTURE FOR BACTERIA: CPT | Performed by: PHYSICIAN ASSISTANT

## 2021-10-31 PROCEDURE — U0003 INFECTIOUS AGENT DETECTION BY NUCLEIC ACID (DNA OR RNA); SEVERE ACUTE RESPIRATORY SYNDROME CORONAVIRUS 2 (SARS-COV-2) (CORONAVIRUS DISEASE [COVID-19]), AMPLIFIED PROBE TECHNIQUE, MAKING USE OF HIGH THROUGHPUT TECHNOLOGIES AS DESCRIBED BY CMS-2020-01-R: HCPCS | Performed by: PHYSICIAN ASSISTANT

## 2021-10-31 PROCEDURE — 87086 URINE CULTURE/COLONY COUNT: CPT | Performed by: PHYSICIAN ASSISTANT

## 2021-10-31 PROCEDURE — 71045 X-RAY EXAM CHEST 1 VIEW: CPT

## 2021-10-31 PROCEDURE — 96374 THER/PROPH/DIAG INJ IV PUSH: CPT

## 2021-10-31 PROCEDURE — 25800000 HC PHARMACY IV SOLUTIONS: Performed by: STUDENT IN AN ORGANIZED HEALTH CARE EDUCATION/TRAINING PROGRAM

## 2021-10-31 PROCEDURE — 81003 URINALYSIS AUTO W/O SCOPE: CPT | Performed by: PHYSICIAN ASSISTANT

## 2021-10-31 PROCEDURE — 93005 ELECTROCARDIOGRAM TRACING: CPT | Performed by: PHYSICIAN ASSISTANT

## 2021-10-31 PROCEDURE — 85025 COMPLETE CBC W/AUTO DIFF WBC: CPT | Performed by: PHYSICIAN ASSISTANT

## 2021-10-31 PROCEDURE — 21400000 HC ROOM AND CARE CCU/INTERMEDIATE

## 2021-10-31 PROCEDURE — 36415 COLL VENOUS BLD VENIPUNCTURE: CPT | Performed by: PHYSICIAN ASSISTANT

## 2021-10-31 PROCEDURE — 84484 ASSAY OF TROPONIN QUANT: CPT | Performed by: PHYSICIAN ASSISTANT

## 2021-10-31 PROCEDURE — 80053 COMPREHEN METABOLIC PANEL: CPT | Performed by: PHYSICIAN ASSISTANT

## 2021-10-31 PROCEDURE — 87186 SC STD MICRODIL/AGAR DIL: CPT | Performed by: PHYSICIAN ASSISTANT

## 2021-10-31 PROCEDURE — 25800000 HC PHARMACY IV SOLUTIONS: Performed by: PHYSICIAN ASSISTANT

## 2021-10-31 PROCEDURE — 87150 DNA/RNA AMPLIFIED PROBE: CPT | Performed by: PHYSICIAN ASSISTANT

## 2021-10-31 PROCEDURE — 83605 ASSAY OF LACTIC ACID: CPT | Performed by: PHYSICIAN ASSISTANT

## 2021-10-31 PROCEDURE — 63600000 HC DRUGS/DETAIL CODE: Performed by: STUDENT IN AN ORGANIZED HEALTH CARE EDUCATION/TRAINING PROGRAM

## 2021-10-31 PROCEDURE — 25000000 HC PHARMACY GENERAL: Performed by: STUDENT IN AN ORGANIZED HEALTH CARE EDUCATION/TRAINING PROGRAM

## 2021-10-31 PROCEDURE — 63700000 HC SELF-ADMINISTRABLE DRUG: Performed by: STUDENT IN AN ORGANIZED HEALTH CARE EDUCATION/TRAINING PROGRAM

## 2021-10-31 PROCEDURE — 80170 ASSAY OF GENTAMICIN: CPT | Performed by: STUDENT IN AN ORGANIZED HEALTH CARE EDUCATION/TRAINING PROGRAM

## 2021-10-31 PROCEDURE — 83605 ASSAY OF LACTIC ACID: CPT | Performed by: STUDENT IN AN ORGANIZED HEALTH CARE EDUCATION/TRAINING PROGRAM

## 2021-10-31 PROCEDURE — 63600000 HC DRUGS/DETAIL CODE: Performed by: PHYSICIAN ASSISTANT

## 2021-10-31 RX ORDER — DEXTROSE 40 %
15-30 GEL (GRAM) ORAL AS NEEDED
Status: DISCONTINUED | OUTPATIENT
Start: 2021-10-31 | End: 2021-11-19 | Stop reason: HOSPADM

## 2021-10-31 RX ORDER — DEXTROSE 50 % IN WATER (D50W) INTRAVENOUS SYRINGE
25 AS NEEDED
Status: DISCONTINUED | OUTPATIENT
Start: 2021-10-31 | End: 2021-11-19 | Stop reason: HOSPADM

## 2021-10-31 RX ORDER — ASPIRIN 81 MG/1
81 TABLET ORAL DAILY
Status: DISCONTINUED | OUTPATIENT
Start: 2021-11-01 | End: 2021-11-19 | Stop reason: HOSPADM

## 2021-10-31 RX ORDER — LENALIDOMIDE 25 MG/1
25 CAPSULE ORAL DAILY
Status: DISCONTINUED | OUTPATIENT
Start: 2021-11-01 | End: 2021-11-19 | Stop reason: HOSPADM

## 2021-10-31 RX ORDER — TAMSULOSIN HYDROCHLORIDE 0.4 MG/1
0.4 CAPSULE ORAL DAILY
Status: DISCONTINUED | OUTPATIENT
Start: 2021-10-31 | End: 2021-11-19 | Stop reason: HOSPADM

## 2021-10-31 RX ORDER — ATROPINE SULFATE 0.1 MG/ML
0.5 INJECTION INTRAVENOUS EVERY 5 MIN PRN
Status: DISCONTINUED | OUTPATIENT
Start: 2021-10-31 | End: 2021-11-19 | Stop reason: HOSPADM

## 2021-10-31 RX ORDER — HEPARIN SODIUM 5000 [USP'U]/ML
5000 INJECTION, SOLUTION INTRAVENOUS; SUBCUTANEOUS EVERY 8 HOURS
Status: DISCONTINUED | OUTPATIENT
Start: 2021-10-31 | End: 2021-11-19 | Stop reason: HOSPADM

## 2021-10-31 RX ORDER — ROSUVASTATIN CALCIUM 10 MG/1
10 TABLET, COATED ORAL DAILY
Status: DISCONTINUED | OUTPATIENT
Start: 2021-10-31 | End: 2021-11-19 | Stop reason: HOSPADM

## 2021-10-31 RX ORDER — FERROUS SULFATE 325(65) MG
325 TABLET ORAL EVERY OTHER DAY
Status: DISCONTINUED | OUTPATIENT
Start: 2021-11-01 | End: 2021-11-19 | Stop reason: HOSPADM

## 2021-10-31 RX ORDER — CHOLECALCIFEROL (VITAMIN D3) 25 MCG
1000 TABLET ORAL 2 TIMES DAILY
Status: DISCONTINUED | OUTPATIENT
Start: 2021-11-01 | End: 2021-11-19 | Stop reason: HOSPADM

## 2021-10-31 RX ORDER — CYCLOSPORINE 0.5 MG/ML
1 EMULSION OPHTHALMIC 2 TIMES DAILY
Status: DISCONTINUED | OUTPATIENT
Start: 2021-10-31 | End: 2021-11-19 | Stop reason: HOSPADM

## 2021-10-31 RX ORDER — NITROGLYCERIN 0.4 MG/1
0.4 TABLET SUBLINGUAL EVERY 5 MIN PRN
Status: DISCONTINUED | OUTPATIENT
Start: 2021-10-31 | End: 2021-11-19 | Stop reason: HOSPADM

## 2021-10-31 RX ORDER — IBUPROFEN 200 MG
16-32 TABLET ORAL AS NEEDED
Status: DISCONTINUED | OUTPATIENT
Start: 2021-10-31 | End: 2021-11-19 | Stop reason: HOSPADM

## 2021-10-31 RX ORDER — FERROUS SULFATE 325(65) MG
65 TABLET ORAL
COMMUNITY

## 2021-10-31 RX ADMIN — GENTAMICIN SULFATE 180 MG: 40 INJECTION, SOLUTION INTRAMUSCULAR; INTRAVENOUS at 19:57

## 2021-10-31 RX ADMIN — MOMETASONE FUROATE AND FORMOTEROL FUMARATE DIHYDRATE 2 PUFF: 200; 5 AEROSOL RESPIRATORY (INHALATION) at 22:27

## 2021-10-31 RX ADMIN — MEROPENEM 1000 MG: 1 INJECTION, POWDER, FOR SOLUTION INTRAVENOUS at 16:15

## 2021-10-31 RX ADMIN — TAMSULOSIN HYDROCHLORIDE 0.4 MG: 0.4 CAPSULE ORAL at 18:45

## 2021-10-31 RX ADMIN — ROSUVASTATIN CALCIUM 10 MG: 10 TABLET, FILM COATED ORAL at 18:45

## 2021-10-31 RX ADMIN — SODIUM CHLORIDE 500 ML: 9 INJECTION, SOLUTION INTRAVENOUS at 16:24

## 2021-10-31 RX ADMIN — CYCLOSPORINE 1 DROP: 0.5 EMULSION OPHTHALMIC at 22:26

## 2021-10-31 ASSESSMENT — ENCOUNTER SYMPTOMS
DIFFICULTY URINATING: 0
MYALGIAS: 0
NAUSEA: 0
ACTIVITY CHANGE: 1
SORE THROAT: 0
RHINORRHEA: 0
SHORTNESS OF BREATH: 0
DIARRHEA: 0
WEAKNESS: 1
FATIGUE: 1
DYSURIA: 0
NUMBNESS: 0
CHILLS: 1
ABDOMINAL PAIN: 0
PALPITATIONS: 0
VOMITING: 0
DIZZINESS: 0
DIAPHORESIS: 0
HEADACHES: 0
COLOR CHANGE: 0
NECK PAIN: 0
COUGH: 0
BLOOD IN STOOL: 0
LIGHT-HEADEDNESS: 0
ARTHRALGIAS: 0
FEVER: 0
CONSTIPATION: 0

## 2021-10-31 NOTE — ASSESSMENT & PLAN NOTE
EF 40-45% on home lasix PRN: Recently saw his Cardiologist Dr. Ellis who stopped his Metoprolol and changed his Lasix from daily to PRN schedule due to hypotension   - appears Euvolemic on exam, lungs clear, no LE edema  - Can order lasix PRN  - Daily standing weight as able. Only bed scale documented.   - strict I/Os   - Cardiology following with thanks: anti-hypertensives remain on hold. He will need a TTE in 3 months of discharge to reassess his LV function (currently scheduled on 1/31/21)

## 2021-10-31 NOTE — PROGRESS NOTES
Spoke with patient and confirmed with medication list from [facility] and/or patient's own pharmacy to complete the medication reconciliation.     Prior to admission medication list:  Current Outpatient Medications:   •  ferrous sulfate 325 mg (65 mg iron) tablet, Take 65 mg by mouth daily with breakfast., Disp: , Rfl:   •  aspirin (ASPIR-81) 81 mg enteric coated tablet, Take 1 tablet (81 mg total) by mouth daily., Disp: 90 tablet, Rfl: 3  •  cholecalciferol, vitamin D3, (VITAMIN D3) 2,000 unit capsule, Take 2,000 Units by mouth daily., Disp: , Rfl:   •  coenzyme Q10 (COQ10) 200 mg capsule, Take by mouth daily.  , Disp: , Rfl:   •  compress.stocking,knee,reg,med misc, 1 each daily. Apply in AM and remove in PM., Disp: 2 each, Rfl: 0  •  fluticasone propion-salmeteroL (ADVAIR DISKUS) 250-50 mcg/dose diskus inhaler, Inhale 1 puff 2 (two) times a day.  Rinse mouth with water after use to reduce aftertaste and incidence of candidiasis.  Do not swallow. For patients not on a ventilator, a spacer is recommended to be used with this medication/inhaler., Disp: , Rfl:   •  furosemide 20 mg tablet, Take 1 tablet (20 mg total) by mouth daily as needed (weight gain of 3 lbs in 1 day or 5 lbs in 1 week)., Disp: , Rfl:   •  multivit-min/folic/vit K/lycop (MEN'S MULTIVITAMIN ORAL), Take by mouth daily.  , Disp: , Rfl:   •  REVLIMID 25 mg capsule, Take  25 mg daily   , Disp: , Rfl:   •  rosuvastatin 10 mg tablet, Take 1 tablet (10 mg total) by mouth daily., Disp: 90 tablet, Rfl: 3  •  sulfamethoxazole-trimethoprim 800-160 mg per tablet, Take 1 tablet by mouth 2 (two) times a day.  , Disp: , Rfl:   •  tamsulosin (FLOMAX) 0.4 mg capsule, Take 0.4 mg by mouth 2 (two) times a day.  , Disp: , Rfl: 3    Comments about home medications:  - Patient is a poor historian. He reports he is no longer on atorvastatin, cyclosporine ophthalmic drops, and metoprolol.     -Metoprolol and doxazosin was discontinued during 10/28/2021 office visit as  per chart review. Patient reports continued doxazosin use at home but discontinued the metoprolol prior to admission.     -Patient is unsure about his last dose of furosemide at home.     Compliance:   -Revlimid was last filled 8/24/2021 for a 28 day supply; all other medications are recently filled.

## 2021-10-31 NOTE — ED ATTESTATION NOTE
The patient was evaluated and managed by the physician assistant under my supervision.  We discussed the history, examination, and the plan of care.  I evaluated the patient, perform my own history and physical examination.  This 68-year-old male with a history of multiple myeloma on chemotherapy presents with generalized weakness.  Of note, he had a urine culture 1 week ago that grew Klebsiella ESBL.  He has been on Bactrim without improvement.  On examination, he is awake and alert, in mild distress.  He is afebrile.  Blood pressure is marginal, although he states that his blood pressure normally runs low.  His abdomen is soft, nontender, nondistended with normoactive bowel sounds.  Evaluation here suggests persistent urinary tract infection, which is likely resistant.  Will hospitalize for IV meropenem and further care.  There is no evidence of severe sepsis at this time.    Impression: Resistant urinary tract infection      Tobias Bardales MD  10/31/21 1748

## 2021-10-31 NOTE — ASSESSMENT & PLAN NOTE
Likely 2/2 multiple recent hospitalizations/SNF stay and acute infection (UTI/bacteremia, +COVID)  - PT/OT appreciated: recommended for SNF on discharge.

## 2021-10-31 NOTE — ASSESSMENT & PLAN NOTE
No current s/s of acute infection; +screen on admission (10/31/21)  UTD on vaccination  Continue home Advair    -Status post monoclonal antibody therapy on 11/1  -No longer on enhanced precautions due to completion of isolation period while inpatient.

## 2021-10-31 NOTE — ASSESSMENT & PLAN NOTE
Cr 1.5 b/l 1  UA c/f UTI  No CVA tenderness. No need for imaging studies at this point     - daily BMP  - trend with gentle IVF and tx of UTI; now resolved back to baseline  - avoid nephrotoxins , NSAIDs

## 2021-10-31 NOTE — ED PROVIDER NOTES
Emergency Medicine Note  HPI   HISTORY OF PRESENT ILLNESS     Patient is 76-year-old male, past medical history of hyperlipidemia, CAD with 50% LAD, bifascicular block, ejection fraction 40 to 45%, respiratory failure secondary to COVID-19, multiple myeloma currently undergoing chemotherapy, presenting to the ER with weakness x1 week.  Patient has progressively become more weak and struggled to walk and talk according to family.  Patient had episode of bloody urine.  He was started on Bactrim 5 days ago and is still taking the antibiotic.  Patient had culture on 10/21/2021, that grew Klebsiella pneumonia ESBL that was susceptible to Bactrim.  Patient received shot of chemotherapy every Friday at [facility].  He denies any fevers, cough, chest pain, shortness of breath, abdominal pain, urinary complaints, changes in bowel habits.            Patient History   PAST HISTORY     Reviewed from Nursing Triage:      Past Medical History:   Diagnosis Date   • Anemia    • Blepharospasm     receives botox injections every 2-3 months   • BPH (benign prostatic hyperplasia)    • COVID-19 2021   • History of vertigo    • Lipid disorder    • Tendency toward bleeding easily (CMS/HCC)        Past Surgical History:   Procedure Laterality Date   • CATARACT EXTRACTION W/  INTRAOCULAR LENS IMPLANT     • CHOLECYSTECTOMY     • HERNIA REPAIR         Family History   Problem Relation Age of Onset   • Cancer Biological Mother    • Colon cancer Biological Father        Social History     Tobacco Use   • Smoking status: Former Smoker     Packs/day: 1.00     Years: 10.00     Pack years: 10.00     Types: Cigarettes     Quit date:      Years since quittin.8   • Smokeless tobacco: Never Used   Substance Use Topics   • Alcohol use: Yes     Comment: 3-4 drinks/week   • Drug use: No         Review of Systems   REVIEW OF SYSTEMS     Review of Systems   Constitutional: Positive for activity change, chills and fatigue. Negative for  diaphoresis and fever.   HENT: Negative for congestion, rhinorrhea and sore throat.    Eyes: Negative for visual disturbance.   Respiratory: Negative for cough and shortness of breath.    Cardiovascular: Positive for leg swelling. Negative for chest pain and palpitations.   Gastrointestinal: Negative for abdominal pain, blood in stool, constipation, diarrhea, nausea and vomiting.   Genitourinary: Negative for difficulty urinating and dysuria.   Musculoskeletal: Negative for arthralgias, myalgias and neck pain.   Skin: Negative for color change.   Neurological: Positive for weakness. Negative for dizziness, syncope, light-headedness, numbness and headaches.         VITALS     ED Vitals    Date/Time Temp Pulse Resp BP SpO2 [facility]   10/31/21 1400 -- 65 16 90/54 100 % PMC   10/31/21 1325 -- 74 20 97/53 98 %    10/31/21 1208 36.1 °C (97 °F) 71 19 103/54 97 % KAB        Pulse Ox %: 98 % (10/31/21 1354)  Pulse Ox Interpretation: Normal (10/31/21 1354)  Heart Rate: 60 (10/31/21 1354)  Rhythm Strip Interpretation: Normal Sinus Rhythm (sinus w/ PAC) (10/31/21 1354)     Physical Exam   PHYSICAL EXAM     Physical Exam  Vitals reviewed.   Constitutional:       General: He is not in acute distress.     Appearance: Normal appearance. He is normal weight. He is not ill-appearing or toxic-appearing.   HENT:      Head: Normocephalic and atraumatic.      Nose: Nose normal.      Mouth/Throat:      Mouth: Mucous membranes are moist.      Pharynx: Oropharynx is clear. No oropharyngeal exudate or posterior oropharyngeal erythema.   Eyes:      Conjunctiva/sclera: Conjunctivae normal.      Pupils: Pupils are equal, round, and reactive to light.   Cardiovascular:      Rate and Rhythm: Normal rate and regular rhythm.      Pulses: Normal pulses.      Heart sounds: Normal heart sounds. No murmur heard.      Pulmonary:      Effort: Pulmonary effort is normal. No respiratory distress.      Breath sounds: Normal breath sounds. No wheezing or rales.    Abdominal:      General: Abdomen is flat. Bowel sounds are normal.      Palpations: Abdomen is soft.      Tenderness: There is no abdominal tenderness. There is no guarding or rebound.   Musculoskeletal:         General: Normal range of motion.      Cervical back: Normal range of motion and neck supple. No rigidity or tenderness.      Right lower leg: Edema present.      Left lower leg: Edema present.   Skin:     General: Skin is warm and dry.   Neurological:      General: No focal deficit present.      Mental Status: He is alert and oriented to person, place, and time.      Motor: No weakness.   Psychiatric:         Mood and Affect: Mood normal.         Behavior: Behavior normal.           PROCEDURES     Procedures     DATA     Results     Procedure Component Value Units Date/Time    UA with reflex culture [[id number]]  (Abnormal) Collected: 10/31/21 1439    Specimen: Urine, Clean Catch Updated: 10/31/21 1524    Narrative:      The following orders were created for panel order UA with reflex culture.  Procedure                               Abnormality         Status                     ---------                               -----------         ------                     UA Reflex to Culture (Ma...[[id number]]  Abnormal            Final result               UA Microscopic[[id number]]               Abnormal            Final result                 Please view results for these tests on the individual orders.    UA Microscopic [[id number]]  (Abnormal) Collected: 10/31/21 1439    Specimen: Urine, Clean Catch Updated: 10/31/21 1524     RBC, Urine 5 TO 9 /HPF      WBC, Urine Too Numerous To Count /HPF      Squamous Epithelial None Seen /hpf      Hyaline Cast 0 TO 2 /lpf      Bacteria, Urine +3 /HPF     UA Reflex to Culture (Macroscopic) [[id number]]  (Abnormal) Collected: 10/31/21 1439    Specimen: Urine, Clean Catch Updated: 10/31/21 1520     Color, Urine Yellow     Clarity, Urine Turbid     Specific Gravity, Urine 1.015      pH, Urine 6.5     Leukocyte Esterase +3     Nitrite, Urine Positive     Protein, Urine +1     Glucose, Urine Negative mg/dL      Ketones, Urine Negative mg/dL      Urobilinogen, Urine 0.2 EU/dL      Bilirubin, Urine Negative mg/dL      Blood, Urine +2     Comment: The sensitivity of the occult blood test is equivalent to approximately 4 intact RBC/HPF.       CBC and differential [[id number]]  (Abnormal) Collected: 10/31/21 1324    Specimen: Blood, Venous Updated: 10/31/21 1405     WBC 6.51 K/uL      RBC 3.10 M/uL      Hemoglobin 9.2 g/dL      Hematocrit 28.0 %      MCV 90.3 fL      MCH 29.7 pg      MCHC 32.9 g/dL      RDW 19.7 %      Platelets 120 K/uL      Comment: ALL RESULTS HAVE BEEN RECHECKED        MPV 13.0 fL      Differential Type Manu     Neutrophils 79 %      Lymphocytes 5 %      Monocytes 2 %      Eosinophils 1 %      Basophils 0 %      Bands 8 %      Metamyelocytes 4 %      Myelocytes 1 %      Neutrophils, Absolute 5.14 K/uL      Lymphocytes, Absolute 0.33 K/uL      Monocytes, Absolute 0.13 K/uL      Eosinophils, Absolute 0.07 K/uL      Basophils, Absolute 0.00 K/uL      Bands, Absolute 0.52 K/uL      Metamyelocytes, Absolute 0.26 K/uL      Myelocytes, Absolute 0.07 K/uL      PLT Morphology Normal     Platelet Estimate Decreased (51,000-149,000)     Toxic Granulation 1+     Anisocytosis 1+     Ovalocytes Occasional     Poikilocytes 1+     Polychromasia Occasional     [place] Cells Occasional    Comprehensive metabolic panel [[id number]]  (Abnormal) Collected: 10/31/21 1324    Specimen: Blood, Venous Updated: 10/31/21 1402     Sodium 133 mEQ/L      Potassium 3.9 mEQ/L      Chloride 103 mEQ/L      CO2 22 mEQ/L      BUN 28 mg/dL      Creatinine 1.5 mg/dL      Glucose 122 mg/dL      Calcium 7.4 mg/dL      AST (SGOT) 37 IU/L      ALT (SGPT) 34 IU/L      Alkaline Phosphatase 82 IU/L      Total Protein 4.5 g/dL      Albumin 2.0 g/dL      Bilirubin, Total 1.1 mg/dL      eGFR 45.3 mL/min/1.73m*2      Anion Gap 8  mEQ/L     Troponin I [[id number]]  (Normal) Collected: 10/31/21 1324    Specimen: Blood, Venous Updated: 10/31/21 1401     Troponin I <0.03 ng/mL     Lactate, w/ reflex repeat if > 2.0 [[id number]]  (Abnormal) Collected: 10/31/21 1340    Specimen: Blood, Venous Updated: 10/31/21 1347     Lactate 2.1 mmol/L           Imaging Results          X-RAY CHEST 1 VIEW (Final result)  Result time 10/31/21 15:01:44    Final result                 Impression:    IMPRESSION: No significant interval change. Probable minimal atelectasis or  scarring bilateral lung bases.             Narrative:    CLINICAL HISTORY: Weakness.    COMPARISON: 11/2/2015    COMMENT: AP view of the chest.    No significant interval change. Linear lung markings bilateral lung bases, not  significantly changed, probable atelectasis or scarring. Heart size within  normal limits. Mediastinal contours appear unremarkable. Osseous structures are  intact.                                ECG 12 lead             Scoring tools                                 ED Course & MDM   MDM / ED COURSE and CLINICAL IMPRESSIONS     MDM  Number of Diagnoses or Management Options  Elevated serum creatinine  Urinary tract infection associated with catheterization of urinary tract, unspecified indwelling urinary catheter type, initial encounter (CMS/[facility])  Weakness  Diagnosis management comments: Patient is 76-year-old male, past medical history of hyperlipidemia, CAD with 50% LAD, bifascicular block, ejection fraction 40 to 45%, respiratory failure secondary to COVID-19, multiple myeloma currently undergoing chemotherapy, presenting to the ER with weakness x1 week.  Patient seen examined, no acute distress.  Patient's blood pressure 90/54.  Patient reports his systolic is usually between .  Will give patient gentle IV fluids given reduced ejection fraction.  We will also order basic labs, troponin, EKG, chest x-ray, lactate, blood cultures, UA.  Strong suspicion for UTI with  failed outpatient p.o. treatment.       Amount and/or Complexity of Data Reviewed  Decide to obtain previous medical records or to obtain history from someone other than the patient: yes        ED Course as of 10/31/21 1618   Sun Oct 31, 2021   1539 UA shows +3 leukocyte Estrace, positive nitrate, +2 blood, too numerous white blood cells, +3 bacteria.  Patient failed p.o. Bactrim, will start meropenem in the ED and admit patient for UTI.  Patient has hemoglobin 9.2, baseline around 10.7.  Patient's creatinine elevated to 1.5, with baseline creatinine around 1.0 [PH]   1611 Discussed with internal medicine, patient to be admitted for UTI [PH]      ED Course User Index  [PH] Onel Koenig, DUSTY BEAUCHAMP         Clinical Impressions as of 10/31/21 1618   Weakness   Urinary tract infection associated with catheterization of urinary tract, unspecified indwelling urinary catheter type, initial encounter (CMS/[facility])   Elevated serum creatinine            Onel Koenig PA C  10/31/21 1618

## 2021-10-31 NOTE — H&P
Internal Medicine  History & Physical        CHIEF COMPLAINT   UTI      HISTORY OF PRESENT ILLNESS      This is a 76 y.o. male with a past medical history of HFrEF 40-45%, CAD, HTN, HLP, MM, BPH,  who presents with generalized weakness and fatigue and outpatient diagnosed UTI.    According to the patient and his wife, he has been having generalized weakness and fatigue over the last 2 weeks.  His wife noticed blood in his urine around 10 days ago.  At that time he followed up with her primary doctor Dr. Dennison.  He was started on Bactrim urinalysis and cultures were sent.  He started taking the Bactrim on 10/27/2021.  According to his wife, he was still having generalized weakness and fatigue, chills, no documented fevers.  She was not checking his temperature but she did not feel that he was febrile.  He was also having decreased appetite and decreased p.o. intake.  He denies any suprapubic pain.  Denies any flank pain.  Reports that his urine has been cloudy.  Denies any urinary frequency or urgency.  Denies any dysuria.  According to him, his last chemotherapy session for his multiple myeloma was last Wednesday.  Patient reports that his blood pressure is usually in the low range, with systolic blood pressure in the nineties.  He was recently seen by his cardiologist who discontinued some of his medications including his metoprolol    In the ED, his blood pressure was 103/54, went down to 90/54.  Heart rate 71, afebrile, satting well on room air.  He received 1 dose of meropenem and 500 cc of fluids.  Urine cultures and blood cultures were sent.  PAST MEDICAL AND SURGICAL HISTORY      PMHx:  Past Medical History:   Diagnosis Date   • Anemia    • Blepharospasm     receives botox injections every 2-3 months   • BPH (benign prostatic hyperplasia)    • COVID-19 08/21/2021   • History of vertigo 2016   • Lipid disorder    • Tendency toward bleeding easily (CMS/HCC)        PSHx:  Past Surgical History:    Procedure Laterality Date   • CATARACT EXTRACTION W/  INTRAOCULAR LENS IMPLANT     • CHOLECYSTECTOMY  1992   • HERNIA REPAIR         PCP:   Otto Dennison MD    MEDICATIONS      Prior to Admission medications    Medication Sig Start Date End Date Taking? Authorizing Provider   aspirin (ASPIR-81) 81 mg enteric coated tablet Take 1 tablet (81 mg total) by mouth daily. 10/28/21   Whitney Steinberg CRNP   cholecalciferol, vitamin D3, (VITAMIN D3) 2,000 unit capsule Take 2,000 Units by mouth daily.    Jessi Smith MD   coenzyme Q10 (COQ10) 200 mg capsule Take by mouth daily.      Jessi Smith MD   compress.stocking,knee,reg,med misc 1 each daily. Apply in AM and remove in PM. 10/28/21   Whitney Steinberg CRNP   cycloSPORINE (RESTASIS) 0.05 % ophthalmic emulsion Administer 1 drop into both eyes 2 (two) times a day.    Jessi Smith MD   DENTA 5000 PLUS 1.1 % cream 2 (two) times a day. as directed 2/28/19   Jessi Smith MD   ferrous sulfate (IRON ORAL) Take by mouth daily.      Jessi Smith MD   fluticasone propion-salmeteroL (ADVAIR DISKUS) 250-50 mcg/dose diskus inhaler Inhale 1 puff 2 (two) times a day.   Rinse mouth with water after use to reduce aftertaste and incidence of candidiasis.   Do not swallow.  For patients not on a ventilator, a spacer is recommended to be used with this medication/inhaler.    Jessi Smith MD   furosemide 20 mg tablet Take 1 tablet (20 mg total) by mouth daily as needed (weight gain of 3 lbs in 1 day or 5 lbs in 1 week). 10/28/21   Whitney Steinberg CRNP   multivit-min/folic/vit K/lycop (MEN'S MULTIVITAMIN ORAL) Take by mouth daily.      Jessi Smith MD   REVLIMID 25 mg capsule Take  25 mg daily    8/24/21   Jessi Smith MD   rosuvastatin 10 mg tablet Take 1 tablet (10 mg total) by mouth daily. 10/28/21   Whitney Steinberg CRNP   sulfamethoxazole-trimethoprim 800-160 mg per tablet  10/27/21    ProviderJessi MD   tamsulosin (FLOMAX) 0.4 mg capsule 1 tablet 2 times daily 19   ProviderJessi MD       Home medications were personally reviewed.    ALLERGIES      Amoxicillin and Penicillins    FAMILY HISTORY      Family History   Problem Relation Age of Onset   • Cancer Biological Mother    • Colon cancer Biological Father        SOCIAL HISTORY      Social History     Socioeconomic History   • Marital status: Significant Other     Spouse name: Not on file   • Number of children: Not on file   • Years of education: Not on file   • Highest education level: Not on file   Occupational History   • Not on file   Tobacco Use   • Smoking status: Former Smoker     Packs/day: 1.00     Years: 10.00     Pack years: 10.00     Types: Cigarettes     Quit date:      Years since quittin.8   • Smokeless tobacco: Never Used   Substance and Sexual Activity   • Alcohol use: Yes     Comment: 3-4 drinks/week   • Drug use: No   • Sexual activity: Defer   Other Topics Concern   • Not on file   Social History Narrative   • Not on file     Social Determinants of Health     Financial Resource Strain:    • Difficulty of Paying Living Expenses: Not on file   Food Insecurity:    • Worried About Running Out of Food in the Last Year: Not on file   • Ran Out of Food in the Last Year: Not on file   Transportation Needs:    • Lack of Transportation (Medical): Not on file   • Lack of Transportation (Non-Medical): Not on file   Physical Activity:    • Days of Exercise per Week: Not on file   • Minutes of Exercise per Session: Not on file   Stress:    • Feeling of Stress : Not on file   Social Connections:    • Frequency of Communication with Friends and Family: Not on file   • Frequency of Social Gatherings with Friends and Family: Not on file   • Attends [employer] Services: Not on file   • Active Member of Clubs or Organizations: Not on file   • Attends Club or Organization Meetings: Not on file   • Marital Status: Not  on file   Intimate Partner Violence:    • Fear of Current or Ex-Partner: Not on file   • Emotionally Abused: Not on file   • Physically Abused: Not on file   • Sexually Abused: Not on file   Housing Stability:    • Unable to Pay for Housing in the Last Year: Not on file   • Number of Places Lived in the Last Year: Not on file   • Unstable Housing in the Last Year: Not on file       REVIEW OF SYSTEMS      Review of Systems  Please refer to HPI  PHYSICAL EXAMINATION      Temp:  [36.1 °C (97 °F)-37.2 °C (98.9 °F)] 37.2 °C (98.9 °F)  Heart Rate:  [64-74] 64  Resp:  [16-20] 19  BP: ()/(53-54) 97/53  Body mass index is 25.69 kg/m².    Physical Exam  Constitutional:       Comments: Chronically ill-appearing thin   HENT:      Head: Normocephalic and atraumatic.      Mouth/Throat:      Mouth: Mucous membranes are moist.   Eyes:      Extraocular Movements: Extraocular movements intact.      Pupils: Pupils are equal, round, and reactive to light.   Cardiovascular:      Rate and Rhythm: Normal rate and regular rhythm.   Pulmonary:      Effort: Pulmonary effort is normal. No respiratory distress.      Breath sounds: No rales.   Abdominal:      General: Abdomen is flat.      Palpations: Abdomen is soft.      Tenderness: There is no abdominal tenderness. There is no right CVA tenderness or left CVA tenderness.   Musculoskeletal:         General: Swelling present. Normal range of motion.      Cervical back: Normal range of motion. No rigidity or tenderness.   Skin:     General: Skin is warm.   Neurological:      General: No focal deficit present.      Mental Status: He is alert and oriented to person, place, and time.         LABS / IMAGING / EKG        Labs:  Results from last 7 days   Lab Units 10/31/21  1324 10/29/21  0834 10/26/21  0936   WBC K/uL 6.51 12.86* 6.67   HEMOGLOBIN g/dL 9.2* 10.7* 10.5*   HEMATOCRIT % 28.0* 30.7* 29.7*   PLATELETS K/uL 120* 40* 30*   DIFF TYPE  Manu Manu Manu   NEUTROS PCT MAN % 79 84 87    LYMPHO PCT MAN % 5 8 2   MONO PCT MAN % 2 6 7   EOSINO PCT MAN % 1 0 0   BASOS PCT MAN % 0 0 0   BANDS PCT MAN % 8 2 4   SEGS ABS MAN K/uL 5.14 10.80* 5.80   LYMPHO ABS MAN K/uL 0.33* 1.03* 0.13*   MONO ABS MAN K/uL 0.13* 0.77 0.47   EOS ABS MAN K/uL 0.07 0.00* 0.00*   BASOS ABS MAN K/uL 0.00* 0.00* 0.00*   MYELOCYTES, ABSOLUTE (MANUAL) K/uL 0.07*  --   --      Results from last 7 days   Lab Units 10/31/21  1324   SODIUM mEQ/L 133*   POTASSIUM mEQ/L 3.9   CHLORIDE mEQ/L 103   CO2 mEQ/L 22   BUN mg/dL 28*   CREATININE mg/dL 1.5*   CALCIUM mg/dL 7.4*   ALBUMIN g/dL 2.0*   BILIRUBIN TOTAL mg/dL 1.1   ALK PHOS IU/L 82   ALT IU/L 34   AST IU/L 37   GLUCOSE mg/dL 122*   Lactate: 2.1  Trop <0.03  U/A: TNTC WBCs, +3 bacteria, Pos nitrite, Pos leuk esterase,     Microbiology Data personally reviewed:  Microbiology Results     Procedure Component Value Units Date/Time    Urine culture [[id number]]  (Abnormal)  (Susceptibility) Collected: 10/21/21 1531    Specimen: Urine, Clean Catch Updated: 10/23/21 1012     Urine Culture **Positive Culture**      >1 x 10^5 CFU/mL Klebsiella pneumoniae ESBL          Imaging personally reviewed(does not include unread studies):  X-RAY CHEST 1 VIEW    Result Date: 10/31/2021  IMPRESSION: No significant interval change. Probable minimal atelectasis or scarring bilateral lung bases.      ECG/Telemetry  Heart rate 60.  QTc 470.      ASSESSMENT AND PLAN           UTI (urinary tract infection)  Assessment & Plan  Uclx prior to admission c/w ESBL klebsiella MDRO including meropenem  Received 5 days bactrim PTP  U/A TNTC WBC, 3+ Bacteria, pos leuk esterase and nitrite  Lactate 2.1, Hypotensive with BP 90/54  No leukocytosis  - give one dose gentamicin for now instead of meropenem  - c/s ID to see in the AM given MDROs  - fu repeat UClx and Bclx  - reassess BP after 500cc bolus fluids- pts BP runs chronically low  - trend lactate to clearance    Hyperlipidemia  Assessment & Plan  Continue home  statin    Multiple myeloma (CMS/[facility])  Assessment & Plan  Currently on chemotherapy outpatient  - c/w outpatient management       History of COVID-19  Assessment & Plan  No current signs or sx's of acute infection  Continue home Advair    HFrEF (heart failure with reduced ejection fraction) (CMS/[facility])  Assessment & Plan  EF 40-45% on home lasix PRN  - appears euvolemic now  Recentrly saw Dr. Ellis who stopped his metoprolo and changes his lasix from daily to PRN due to hypotension   - can give lasix PRN  - daily standing weight and accurate I/Os     Anemia  Assessment & Plan  Presents with Hb 9/1 b/l 10  Has known MM on chemotherapy which likely explains this  - CBC daily  - c/w chemo outpatient  - transfuse Hb <7  - hold home iron supplementation given acute infection UTI    TRA (acute kidney injury) (CMS/[facility])  Assessment & Plan  Cr 1.5 b/l 1  UA c/f UTI  No CVA tenderness. No need for imaging studies at this point   - daily BMP  - trend with gentle IVF and tx of UTI  - avoid nephrotoxins , NSAIDs    CAD (coronary artery disease)  Assessment & Plan  Has known 50% LAD occlusion  CW home statin and  ASA    BPH (benign prostatic hyperplasia)  Assessment & Plan  C/w home Flomax  Follws with urology Dr. Mathew    * Weakness  Assessment & Plan  Likely 2/2 UTI  - PT/OT  - plan as per UTI       VTE Assessment: Padua    VTE Prophylaxis: Current anticoagulants:  heparin (porcine) 5,000 unit/mL injection 5,000 Units, subcutaneous, q8h [facility]      Palliative Care Screen:    Code Status: Full Code  Estimated discharge date: 11/1/2021     ATTENDING DOCUMENTATION  ALSO SEE ATTENDING ATTESTATION SECTION OF NOTE

## 2021-10-31 NOTE — ASSESSMENT & PLAN NOTE
-C/w home Flomax  -Mancia catheter now in place  -Follows with Urology Dr. Mathew: needs follow-up for void trial on discharge.

## 2021-10-31 NOTE — ASSESSMENT & PLAN NOTE
Currently on chemotherapy outpatient following with Dr. Shevade  -Per curbside with Heme-Onc: holding Revlimid in s/o infection, he will f/u with his oncologist after D/C to discuss when to resume

## 2021-10-31 NOTE — ASSESSMENT & PLAN NOTE
Presents with Hgb ~9 (baseline ~10).  Has known MM on chemotherapy which likely explains this. No report of active bleeding  - CBC daily  - Will follow-up with Heme-Onc on discharge to establish resumption of outpatient chemo given recent acute infection  - transfuse Hb <7  - Continue home iron supplementation

## 2021-11-01 ENCOUNTER — APPOINTMENT (INPATIENT)
Dept: RADIOLOGY | Facility: HOSPITAL | Age: 76
DRG: 871 | End: 2021-11-01
Attending: STUDENT IN AN ORGANIZED HEALTH CARE EDUCATION/TRAINING PROGRAM
Payer: MEDICARE

## 2021-11-01 LAB
ANION GAP SERPL CALC-SCNC: 12 MEQ/L (ref 3–15)
ANISOCYTOSIS BLD QL SMEAR: ABNORMAL
ATRIAL RATE: 60
B FRAGILIS DNA BLD QL NAA+PROBE: NOT DETECTED
BASOPHILS # BLD: 0 K/UL (ref 0.01–0.1)
BASOPHILS NFR BLD: 0 %
BLACTX-M ISLT/SPM QL: NOT DETECTED
BLAIMP ISLT/SPM QL: NOT DETECTED
BLAOXA-48-LIKE ISLT/SPM QL: NOT DETECTED
BLAVIM ISLT/SPM QL: NOT DETECTED
BUN SERPL-MCNC: 21 MG/DL (ref 8–20)
BURR CELLS BLD QL SMEAR: ABNORMAL
C ALBICANS DNA BLD QL NAA+PROBE: NOT DETECTED
C AURIS DNA BLD POS QL NAA+PROBE: NOT DETECTED
C GATTII+NEOFOR DNA BLD POS QL NAA+N-PRB: NOT DETECTED
C GLABRATA DNA BLD QL NAA+PROBE: NOT DETECTED
C KRUSEI DNA BLD QL NAA+PROBE: NOT DETECTED
C PARAP DNA BLD QL NAA+PROBE: NOT DETECTED
C TROPICLS DNA BLD QL NAA+PROBE: NOT DETECTED
CALCIUM SERPL-MCNC: 7.2 MG/DL (ref 8.9–10.3)
CHLORIDE SERPL-SCNC: 102 MEQ/L (ref 98–109)
CO2 SERPL-SCNC: 20 MEQ/L (ref 22–32)
COLISTIN RES MCR-1 ISLT/SPM QL: NOT DETECTED
CPR GENES ISLT NAA+PROBE: DETECTED
CREAT SERPL-MCNC: 1.2 MG/DL (ref 0.8–1.3)
DIFFERENTIAL METHOD BLD: ABNORMAL
E CLOAC COMP DNA BLD POS NAA+NON-PROBE: NOT DETECTED
E COLI DNA SPEC QL NAA+PROBE: NOT DETECTED
E FAECALIS DNA SPEC QL NAA+PROBE: NOT DETECTED
E FAECIUM DNA SPEC QL NAA+PROBE: NOT DETECTED
EOSINOPHIL # BLD: 0 K/UL (ref 0.04–0.54)
EOSINOPHIL NFR BLD: 0 %
ERYTHROCYTE [DISTWIDTH] IN BLOOD BY AUTOMATED COUNT: 19.9 % (ref 11.6–14.4)
GENTAMICIN TROUGH SERPL-MCNC: 0.6 UG/ML (ref 0–2)
GFR SERPL CREATININE-BSD FRML MDRD: 58.6 ML/MIN/1.73M*2
GLUCOSE SERPL-MCNC: 102 MG/DL (ref 70–99)
GP B STREP DNA SPEC QL NAA+PROBE: NOT DETECTED
HAEM INFLU DNA SPEC QL NAA+PROBE: NOT DETECTED
HCT VFR BLDCO AUTO: 28.7 % (ref 40.1–51)
HGB BLD-MCNC: 9.6 G/DL (ref 13.7–17.5)
K OXYTOCA DNA BLD QL NAA+PROBE: NOT DETECTED
K PNEUMON DNA SPEC QL NAA+PROBE: DETECTED
K. AEROGENES DNA SPEC QL NAA+PROBE: NOT DETECTED
L MONOCYTOG DNA SPEC QL NAA+PROBE: NOT DETECTED
LYMPHOCYTES # BLD: 0.94 K/UL (ref 1.2–3.5)
LYMPHOCYTES NFR BLD: 12 %
MCH RBC QN AUTO: 30.1 PG (ref 28–33.2)
MCHC RBC AUTO-ENTMCNC: 33.4 G/DL (ref 32.2–36.5)
MCV RBC AUTO: 90 FL (ref 83–98)
MECA ISLT/SPM QL: ABNORMAL
MECA+MECC+MREJ ISLT/SPM QL: ABNORMAL
MONOCYTES # BLD: 1.02 K/UL (ref 0.3–1)
MONOCYTES NFR BLD: 13 %
MYELOCYTES # BLD MANUAL: 0.08 K/UL
MYELOCYTES NFR BLD MANUAL: 1 %
N MEN DNA BLD QL NAA+PROBE: NOT DETECTED
NDM: NOT DETECTED
NEUTS BAND # BLD: 0.24 K/UL (ref 0–0.53)
NEUTS BAND # BLD: 5.57 K/UL (ref 1.7–7)
NEUTS BAND NFR BLD: 3 %
NEUTS SEG NFR BLD: 71 %
OVALOCYTES BLD QL SMEAR: ABNORMAL
P AERUGINOSA DNA SPEC QL NAA+PROBE: NOT DETECTED
P AXIS: 1
PDW BLD AUTO: 13.3 FL (ref 9.4–12.4)
PLAT MORPH BLD: NORMAL
PLATELET # BLD AUTO: 156 K/UL (ref 150–350)
PLATELET # BLD EST: ABNORMAL 10*3/UL
POTASSIUM SERPL-SCNC: 4.3 MEQ/L (ref 3.6–5.1)
PR INTERVAL: 202
PROTEUS SP DNA BLD POS QL NAA+NON-PROBE: NOT DETECTED
QRS DURATION: 148
QT INTERVAL: 470
QTC CALCULATION(BAZETT): 470
R AXIS: -37
RBC # BLD AUTO: 3.19 M/UL (ref 4.5–5.8)
S AUREUS DNA SPEC QL NAA+PROBE: NOT DETECTED
S AUREUS+CONS DNA BLD POS NAA+NON-PROBE: NOT DETECTED
S EPIDERMIDIS DNA SPEC QL NAA+PROBE: NOT DETECTED
S HAEMOLYTICUS DNA BLD QL NAA+PROBE: NOT DETECTED
S MALTOPH DNA SPEC QL NAA+PROBE: NOT DETECTED
S MARCESCENS DNA SPEC QL NAA+PROBE: NOT DETECTED
S PNEUM DNA BLD QL NAA+PROBE: NOT DETECTED
S PYO DNA SPEC NAA+PROBE: NOT DETECTED
SALMONELLA DNA SPEC QL NAA+PROBE: NOT DETECTED
SODIUM SERPL-SCNC: 134 MEQ/L (ref 136–144)
STREPTOCOCCUS DNA BLD QL NAA+PROBE: NOT DETECTED
T WAVE AXIS: 23
TEST PERFORMANCE INFO SPEC: ABNORMAL
TOXIC GRANULES BLD QL SMEAR: ABNORMAL
VANA+VANB ISLT/SPM QL: ABNORMAL
VENTRICULAR RATE: 60
WBC # BLD AUTO: 7.85 K/UL (ref 3.8–10.5)

## 2021-11-01 PROCEDURE — 63600000 HC DRUGS/DETAIL CODE: Performed by: STUDENT IN AN ORGANIZED HEALTH CARE EDUCATION/TRAINING PROGRAM

## 2021-11-01 PROCEDURE — 85025 COMPLETE CBC W/AUTO DIFF WBC: CPT | Performed by: STUDENT IN AN ORGANIZED HEALTH CARE EDUCATION/TRAINING PROGRAM

## 2021-11-01 PROCEDURE — 25800000 HC PHARMACY IV SOLUTIONS: Performed by: STUDENT IN AN ORGANIZED HEALTH CARE EDUCATION/TRAINING PROGRAM

## 2021-11-01 PROCEDURE — 25800000 HC PHARMACY IV SOLUTIONS: Performed by: INTERNAL MEDICINE

## 2021-11-01 PROCEDURE — 87040 BLOOD CULTURE FOR BACTERIA: CPT | Performed by: STUDENT IN AN ORGANIZED HEALTH CARE EDUCATION/TRAINING PROGRAM

## 2021-11-01 PROCEDURE — 80170 ASSAY OF GENTAMICIN: CPT | Performed by: STUDENT IN AN ORGANIZED HEALTH CARE EDUCATION/TRAINING PROGRAM

## 2021-11-01 PROCEDURE — 36415 COLL VENOUS BLD VENIPUNCTURE: CPT | Performed by: STUDENT IN AN ORGANIZED HEALTH CARE EDUCATION/TRAINING PROGRAM

## 2021-11-01 PROCEDURE — 70450 CT HEAD/BRAIN W/O DYE: CPT | Mod: MG

## 2021-11-01 PROCEDURE — 97162 PT EVAL MOD COMPLEX 30 MIN: CPT | Mod: GP

## 2021-11-01 PROCEDURE — 63600000 HC DRUGS/DETAIL CODE: Mod: JG | Performed by: STUDENT IN AN ORGANIZED HEALTH CARE EDUCATION/TRAINING PROGRAM

## 2021-11-01 PROCEDURE — 99233 SBSQ HOSP IP/OBS HIGH 50: CPT | Performed by: INTERNAL MEDICINE

## 2021-11-01 PROCEDURE — 93010 ELECTROCARDIOGRAM REPORT: CPT | Performed by: INTERNAL MEDICINE

## 2021-11-01 PROCEDURE — 82310 ASSAY OF CALCIUM: CPT | Performed by: STUDENT IN AN ORGANIZED HEALTH CARE EDUCATION/TRAINING PROGRAM

## 2021-11-01 PROCEDURE — 63700000 HC SELF-ADMINISTRABLE DRUG: Performed by: STUDENT IN AN ORGANIZED HEALTH CARE EDUCATION/TRAINING PROGRAM

## 2021-11-01 PROCEDURE — 21400000 HC ROOM AND CARE CCU/INTERMEDIATE

## 2021-11-01 RX ORDER — CASIRIVIMAB/IMDEVIMAB 1332-1332
1200 VIAL (ML) INTRAVENOUS ONCE
Status: DISCONTINUED | OUTPATIENT
Start: 2021-11-01 | End: 2021-11-01

## 2021-11-01 RX ADMIN — HEPARIN SODIUM 5000 UNITS: 5000 INJECTION INTRAVENOUS; SUBCUTANEOUS at 05:27

## 2021-11-01 RX ADMIN — MOMETASONE FUROATE AND FORMOTEROL FUMARATE DIHYDRATE 2 PUFF: 200; 5 AEROSOL RESPIRATORY (INHALATION) at 21:22

## 2021-11-01 RX ADMIN — GENTAMICIN SULFATE 180 MG: 40 INJECTION, SOLUTION INTRAMUSCULAR; INTRAVENOUS at 18:48

## 2021-11-01 RX ADMIN — CEFTAZIDIME, AVIBACTAM 1.25 G: 2; .5 POWDER, FOR SOLUTION INTRAVENOUS at 21:15

## 2021-11-01 RX ADMIN — CEFTAZIDIME, AVIBACTAM 1.25 G: 2; .5 POWDER, FOR SOLUTION INTRAVENOUS at 15:14

## 2021-11-01 RX ADMIN — HEPARIN SODIUM 5000 UNITS: 5000 INJECTION INTRAVENOUS; SUBCUTANEOUS at 15:13

## 2021-11-01 RX ADMIN — Medication 1000 UNITS: at 21:14

## 2021-11-01 RX ADMIN — HEPARIN SODIUM 5000 UNITS: 5000 INJECTION INTRAVENOUS; SUBCUTANEOUS at 21:14

## 2021-11-01 RX ADMIN — FERROUS SULFATE TAB 325 MG (65 MG ELEMENTAL FE) 325 MG: 325 (65 FE) TAB at 09:26

## 2021-11-01 RX ADMIN — Medication 1000 UNITS: at 09:26

## 2021-11-01 RX ADMIN — ASPIRIN 81 MG: 81 TABLET, COATED ORAL at 09:26

## 2021-11-01 RX ADMIN — CYCLOSPORINE 1 DROP: 0.5 EMULSION OPHTHALMIC at 09:26

## 2021-11-01 RX ADMIN — ROSUVASTATIN CALCIUM 10 MG: 10 TABLET, FILM COATED ORAL at 09:26

## 2021-11-01 RX ADMIN — MOMETASONE FUROATE AND FORMOTEROL FUMARATE DIHYDRATE 2 PUFF: 200; 5 AEROSOL RESPIRATORY (INHALATION) at 09:30

## 2021-11-01 RX ADMIN — TAMSULOSIN HYDROCHLORIDE 0.4 MG: 0.4 CAPSULE ORAL at 09:26

## 2021-11-01 RX ADMIN — CYCLOSPORINE 1 DROP: 0.5 EMULSION OPHTHALMIC at 21:14

## 2021-11-01 RX ADMIN — SODIUM CHLORIDE: 9 INJECTION, SOLUTION INTRAVENOUS at 18:25

## 2021-11-01 ASSESSMENT — COGNITIVE AND FUNCTIONAL STATUS - GENERAL
CLIMB 3 TO 5 STEPS WITH RAILING: 3 - A LITTLE
WALKING IN HOSPITAL ROOM: 3 - A LITTLE
AFFECT: FLAT/BLUNTED AFFECT
MOVING TO AND FROM BED TO CHAIR: 3 - A LITTLE
STANDING UP FROM CHAIR USING ARMS: 3 - A LITTLE

## 2021-11-01 ASSESSMENT — PATIENT HEALTH QUESTIONNAIRE - PHQ9: SUM OF ALL RESPONSES TO PHQ9 QUESTIONS 1 & 2: 0

## 2021-11-01 NOTE — PROGRESS NOTES
Patient: Eulalio Gregg  Location:  [place] [address] [facility]  MRN:  [id number]  Today's date:  11/1/2021    Pt left in bedside chair, alarmed, with call bell and personal items within reach. RN informed of session completed.     Eulalio is a 76 y.o. male admitted on 10/31/2021 with Weakness [R53.1]  Elevated serum creatinine [R79.89]  Urinary tract infection associated with catheterization of urinary tract, unspecified indwelling urinary catheter type, initial encounter (CMS/[facility]) [T83.511A, N39.0]. Principal problem is Weakness.    Past Medical History  Eulalio has a past medical history of Anemia, Blepharospasm, BPH (benign prostatic hyperplasia), COVID-19 (08/21/2021), History of vertigo (2016), Lipid disorder, and Tendency toward bleeding easily (CMS/[facility]).    History of Present Illness   Admitted with weakness, +COVID      PT Vitals    Date/Time Pulse SpO2 Pt Activity O2 Therapy BP [facility]   11/01/21 1237 71 96 % At rest None (Room air) 101/51 CBK      PT Pain    Date/Time Pain Type Rating: Rest Rating: Activity [facility]   11/01/21 1237 Pain Assessment 0 - no pain 0 - no pain CBK          Prior Living Environment      Most Recent Value   Current Living Arrangements home   Living Environment Comment ML with spouse, 3STE with FF to bed/bath        Prior Level of Function      Most Recent Value   Ambulation independent   Transferring independent   Toileting independent   Bathing independent   Dressing independent   Eating independent   Prior Level of Function Comment pt reports ind PTA without use of AD,  started to use a SPC before admissions   Assistive Device Currently Used at Home cane, straight           PT Evaluation and Treatment - 11/01/21 1237        PT Time Calculation    Start Time 1237     Stop Time 1250     Time Calculation (min) 13 min        Session Details    Document Type initial evaluation     Mode of Treatment physical therapy        General Information    Patient Profile Reviewed  yes     Existing Precautions/Restrictions contact;droplet;fall   +COVID    Limitations/Impairments safety/cognitive        Cognition/Psychosocial    Affect/Mental Status (Cognition) flat/blunted affect     Behavioral Issues (Cognition) withdrawn     Orientation Status (Cognition) oriented to;person;place     Follows Commands (Cognition) WFL;delayed response/completion;increased processing time needed;repetition of directions required     Cognitive Function executive function deficit;safety deficit     Executive Function Deficit (Cognition) minimal deficit;information processing     Safety Deficit (Cognition) minimal deficit;insight into deficits/self-awareness        Sensory Assessment (Somatosensory)    Sensory Assessment (Somatosensory) LE sensation intact        Range of Motion (ROM)    Range of Motion ROM is WFL        Strength (Manual Muscle Testing)    Hip, Left (Strength) 3+     Knee, Left (Strength) 3+     Ankle, Left (Strength) 4     Hip, Right (Strength) 3+     Knee, Right (Strength) 3+     Ankle, Right (Strength) 4        Bed Mobility    [place], Sit to Supine minimum assist (75% or more patient effort)     Comment (Bed Mobility) assist with torso        Sit to Stand Transfer    [place], Sit to Stand Transfer minimum assist (75% or more patient effort);1 person assist     Verbal Cues safety;technique     Comment HHAx1, VCs for anterior weight shift, mild unsteadiness with initial stand        Stand to Sit Transfer    [place], Stand to Sit Transfer minimum assist (75% or more patient effort);1 person assist     Verbal Cues safety;technique     Comment assist with eccentric control        Gait Training    [place], Gait minimum assist (75% or more patient effort);1 person assist     Assistive Device other (see comments)     Distance in Feet 4 feet     Pattern (Gait) step-to     Deviations/Abnormal Patterns (Gait) arias decreased;festinating/shuffling     Comment (Gait/Stairs) bed>chair  tx performed, unsteadiness noted, inc RR, +fatigue/poor endurance        Stairs Training    [place], Stairs not tested        Safety Issues, Functional Mobility    Safety Issues Affecting Function (Mobility) insight into deficits/self-awareness;safety precaution awareness     Impairments Affecting Function (Mobility) balance;endurance/activity tolerance        Balance    Static Sitting Balance WFL     Dynamic Sitting Balance WFL     Static Standing Balance mild impairment     Dynamic Standing Balance mild impairment        AM-PAC (TM) - Mobility (Current Function)    Turning from your back to your side while in a flat bed without using bedrails? 4 - None     Moving from lying on your back to sitting on the side of a flat bed without using bedrails? 3 - A Little     Moving to and from a bed to a chair? 3 - A Little     Standing up from a chair using your arms? 3 - A Little     To walk in a hospital room? 3 - A Little     Climbing 3-5 steps with a railing? 3 - A Little     AM-PAC (TM) Mobility Score 19        Assessment/Plan (PT)    Daily Outcome Statement pt is a min assist for mobility, ind at baseline, decline in mobility over the last several weeks, anticipate benefit from SNF at [place] to address, strength, balance and endurance deficits     Rehab Potential good, to achieve stated therapy goals     Therapy Frequency 3-5 times/wk     Planned Therapy Interventions balance training;bed mobility training;gait training;transfer training               PT Assessment/Plan      Most Recent Value   PT Recommended Discharge Disposition skilled nursing facility at 11/01/2021 1237   Anticipated Equipment Needs at Discharge (PT) walker, front-wheeled at 11/01/2021 1237   Patient/Family Therapy Goals Statement get out of hospital at 11/01/2021 1237                    Education Documentation  Fall Prevention, taught by Ralph Joiner, PT at 11/1/2021  1:51 PM.  Learner: Patient  Readiness: Acceptance  Method: Explanation,  Demonstration  Response: Verbalizes Understanding, Demonstrated Understanding, Needs Reinforcement  Comment: PT POC, energy conservation, safety with mobility          PT Goals      Most Recent Value   Bed Mobility Goal 1    Activity/Assistive Device rolling to left, rolling to right, supine to sit, sit to supine at 11/01/2021 1237   [place] independent at 11/01/2021 1237   Time Frame by discharge at 11/01/2021 1237   Progress/Outcome goal ongoing at 11/01/2021 1237   Transfer Goal 1    Activity/Assistive Device bed-to-chair/chair-to-bed, sit-to-stand/stand-to-sit at 11/01/2021 1237   [place] independent at 11/01/2021 1237   Time Frame by discharge at 11/01/2021 1237   Progress/Outcome goal ongoing at 11/01/2021 1237   Gait Training Goal 1    Activity/Assistive Device gait (walking locomotion), walker, front-wheeled at 11/01/2021 1237   [place] supervision required at 11/01/2021 1237   Distance 100 at 11/01/2021 1237   Time Frame by discharge at 11/01/2021 1237   Progress/Outcome goal ongoing at 11/01/2021 1237

## 2021-11-01 NOTE — PROGRESS NOTES
Cardiology Inpatient  Progress Note       SUBJECTIVE   This is a 76 y.o. year-old male admitted on 10/31/2021 with Weakness [R53.1]  Elevated serum creatinine [R79.89]  Urinary tract infection associated with catheterization of urinary tract, unspecified indwelling urinary catheter type, initial encounter (CMS/[facility]) [T83.511A, N39.0].    Interval History: He reports feeling a little better than on admission.  He is frustrated with ongoing weakness and UTI.     A 14-point review of system was performed and was negative, or as documented above. 10 systems examined.   OBJECTIVE      Vital signs in last 24 hours:  Temp:  [36.8 °C (98.2 °F)-37.2 °C (98.9 °F)] 36.8 °C (98.3 °F)  Heart Rate:  [55-72] 71  Resp:  [16-20] 16  BP: ()/(47-75) 101/51      Intake/Output Summary (Last 24 hours) at 11/1/2021 1556  Last data filed at 11/1/2021 0300  Gross per 24 hour   Intake 700 ml   Output 350 ml   Net 350 ml     Weights (last 5 days)     Date/Time Weight    11/01/21 0614 65.7 kg (144 lb 13.5 oz)    10/31/21 1208 65.8 kg (145 lb)          PHYSICAL EXAMINATION      Physical Exam   General: Pleasant, weak, frail.  HEENT: No corneal arcus or xanthelasmas.  Sclerae are anicteric.  Nares patent.  Mucous membranes are slightly dry.  Neck: Supple.  JVP is 6 cm/H2O.  Carotids are equal with no audible bruits.  No lymphadenopathy or thyromegaly.  Heart: Regular.  Normal S1 and S2.  No S4. No S3. No murmur.  Chest: Symmetrical.  Lungs: Clear bilaterally without rales, wheezes nor rhonchi.  Abdomen: Soft, nontender.  No masses or bruits.  No organomegaly.  Normal bowel sounds.  Extremities: No cyanosis or clubbing. 1+ pitting pretibial edema bilaterally.  Distal pulses are easily palpable.  Skin: Deeply tanned.  Warm and dry and well perfused.   Neurologic: Alert and oriented ×3.  Cranial nerves II through XII are intact.  Psychiatric: normal mood, affect & judgment.   LABS / IMAGING / TELE/ MEDS      Labs  Results from last 7 days    Lab Units 11/01/21  0315 10/31/21  1324   SODIUM mEQ/L 134* 133*   POTASSIUM mEQ/L 4.3 3.9   CHLORIDE mEQ/L 102 103   CO2 mEQ/L 20* 22   BUN mg/dL 21* 28*   CREATININE mg/dL 1.2 1.5*   EGFR mL/min/1.73m*2 58.6* 45.3*   CALCIUM mg/dL 7.2* 7.4*   ALBUMIN g/dL  --  2.0*   BILIRUBIN TOTAL mg/dL  --  1.1   ALK PHOS IU/L  --  82   ALT IU/L  --  34   AST IU/L  --  37   GLUCOSE mg/dL 102* 122*     Results from last 7 days   Lab Units 11/01/21  0315 10/31/21  1324 10/29/21  0834   WBC K/uL 7.85 6.51 12.86*   HEMOGLOBIN g/dL 9.6* 9.2* 10.7*   HEMATOCRIT % 28.7* 28.0* 30.7*   PLATELETS K/uL 156 120* 40*      0   Lab Value Date/Time    INR 1.0 03/19/2019 1409     Lab Results   Component Value Date    CHOL 126 05/28/2021    TRIG 62 05/28/2021    HDL 43 (L) 05/28/2021    LDLCALC 71 05/28/2021     Results from last 7 days   Lab Units 10/31/21  1324   TROPONIN I ng/mL <0.03     Lab Results   Component Value Date    LACTATE 0.8 10/31/2021    LACTATE 2.1 (H) 10/31/2021       ECG/Telemetry  I have independently reviewed the ECG. Significant findings include Sinus bradycardia w few PACs, left axis deviation with an LAFB/RBBB.    Imaging    Echocardiogram 08/23/2021: Performed at [facility]  Normal left ventricular cavity size. There is mild concentric left ventricular hypertrophy. There is mild global hypokinesis involving all segments of the left ventricle.  Mild left ventricular systolic dysfunction.  The ejection fraction estimate is 40-45%. Abnormal left ventricular diastolic function.  The left atrial volume is normal.   The right ventricle size is borderline enlarged. The right ventricular systolic function is normal.    Normal opening and closure of the aortic valve. There is trace aortic regurgitation.  Grossly normal mitral valve.  Grossly normal tricuspid valve.  Trace tricuspid regurgitation.  Mean PA pressure is normal.  Normal IVC. Normal respiratory collapse.  No significant pericardial effusion.     Left  heart catheterization 9/18/2019:  Mid LAD 50% stenosis at the level of a diagonal branch which also has 50% ostial stenosis. Both LAD and diagonal branches were iFR negative. Normal LVEDP.     Stress echocardiogram 6/25/2019:  1. Conclusion: Stress echo does not meet criteria for ischemia.  2. Baseline Echo: Normal left ventricular size and function. No wall motion abnormalities. Ejection fraction 65%. Normal left atrial size.  Mild mitral annular calcification. Trace mitral regurgitation. Tricuspid aortic valve. Mild aortic regurgitation. Normal right atrium. Normal  right ventricular size and function. Trace tricuspid regurgitation. The jet is insufficient to estimate right ventricular systolic pressure.  3. Post-Stress Echo: All walls become hyperdynamic. Ejection fraction improves.  4. Stress ECG does not meet criteria for ischemia.  5. Good exercise tolerance. Holman treadmill score is 8, associated with low risk for near-future cardiac events.      Home Medications:  •  ferrous sulfate, Take 65 mg by mouth daily with breakfast.  •  aspirin, Take 1 tablet (81 mg total) by mouth daily.  •  cholecalciferol (vitamin D3), Take 2,000 Units by mouth daily.  •  coenzyme Q10, Take by mouth daily.    •  compress.stocking,knee,reg,med, 1 each daily. Apply in AM and remove in PM.  •  fluticasone propion-salmeteroL, Inhale 1 puff 2 (two) times a day.  Rinse mouth with water after use to reduce aftertaste and incidence of candidiasis.  Do not swallow. For patients not on a ventilator, a spacer is recommended to be used with this medication/inhaler.  •  furosemide, Take 1 tablet (20 mg total) by mouth daily as needed (weight gain of 3 lbs in 1 day or 5 lbs in 1 week).  •  multivit-min/folic/vit K/lycop (MEN'S MULTIVITAMIN ORAL), Take by mouth daily.    •  REVLIMID, Take  25 mg daily     •  rosuvastatin, Take 1 tablet (10 mg total) by mouth daily.  •  sulfamethoxazole-trimethoprim, Take 1 tablet by mouth 2 (two) times a day.     •  tamsulosin, Take 0.4 mg by mouth 2 (two) times a day.      Scheduled Meds:  • aspirin  81 mg oral Daily   • casirivimab, imdevimab 600 mg-600 mg IVPB in 50 mL NSS   intravenous Once   • ceftazidime-avibactam  1.25 g intravenous q8h INT   • cholecalciferol (vitamin D3)  1,000 Units oral BID   • cycloSPORINE  1 drop Both Eyes BID   • ferrous sulfate  325 mg oral Every other day   • gentamicin  3 mg/kg (Adjusted) intravenous q24h INT   • heparin (porcine)  5,000 Units subcutaneous q8h JENNYFER   • casirivimab, imdevimab 600 mg-600 mg IVPB in 50 mL NSS   intravenous Once   • [Provider Managed Hold] lenalidomide  25 mg oral Daily   • mometasone-formoterol  2 puff inhalation BID   • rosuvastatin  10 mg oral Daily   • tamsulosin  0.4 mg oral Daily          IMPRESSION/RECOMMENDATIONS:  1. UTI - He was hospitalized in August 2021 and again in September 2021. He was treated with a course of fosfomycin then Bactrim.  He was restarted on Bactrim as outpatient.  Urine this admission w ESBL MDRO.    ID recommends Avycaz.  2. Covid-19 pneumonia - He was hospitalized in August 2021. He had associated hypoxemic respiratory failure but fortunately did not require intubation.  He completed a course of Remdesivir.  He again tested positive this admission.  Unclear if new Covid-19 infection.  ID recommends monoclonal antibodies.  3. Left ventricular systolic dysfunction - His echocardiogram in August 2021 during his acute illness showed mild left ventricular systolic dysfunction with an EF of 40-45%.  He was started on metoprolol succinate 25 mg during his hospital stay however he has been significantly hypotensive.  I recommended that he remain off of it for now. Hold off on introducing an ACE-inhibitor or ARB in due to hypotension and recent acute renal insuffiency.  He was volume depleted on exam in the office.  He appears euvolemic on exam today.  I had stoped his daily loop diuretic.  Monitor Is and Os. Weigh daily.  I had  discussed sodium restriction.  He will need a repeat echocardiogram in 3 months to reassess his LV systolic function.    4. Hypotension - His blood pressure is improved.  I recommend that he remain off of metoprolol, doxazosin and daily loop diuretic.  I ask him to drink at least 48-64 ounces of fluid per day.   5. Lower extremity edema - His albumin is low and I suspect that his edema is more due to decreased osmotic pressure. I recommended that he increase his protein intake with diet and nutritional supplements.  I also wrote a prescription for compression stockings.  He should only use his furosemide as needed for weight gain of 3 lbs in 1 day or 5 lbs in 1 week.  6. Acute renal insuffiencey - He was volume depleted in the office due to poor oral intake and ongoing diuretic use.  I recommended that he increase his fluid intake and only use his diuretic as needed.    7. Moderate nonobstructive single vessel 50% mid LAD and 50% ostial D1 stenosis by catheterization 9/18/2019- he has no symptoms of angina pectoralis.  He should continue on aspirin and rosuvastatin therapy along with lifestyle modification.  Mild troponin elevation during acute illness due to demand ischemia.  No ischemic work up indicated at this time.  8. Exertional chest pain -in 2019 he was noting left-sided, graded 3-4/10 chest pain which resolved with rest.  Interestingly, however, he was doing a spinning class for 1 hour breaking a solid sweat and has absolutely no symptoms.  This has not recurred.  9. Hyperlipidemia -I reviewed his latest lipid profile outlined above.  I have asked him to stop atorvastatin and resume his rosuvastatin therapy.    10. Asthma -He is on Advair.  He follows with Suresh Cardoza.  11. MAC - This was noted on his stress echo which is outlined above.  12. Aortic insufficiency -this was graded mild on his stress echocardiogram and trace on his most recent echocardiogram in August 2021.  This is probably age-related  changes to the aortic valve.  13. Bifascicular block with LAFB/RBBB-I previously reviewed this issue with him.  It is probably of no clinical consequence and will be followed with an annual EKG and he will report any symptoms of syncope to me.  14. 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  He is on Revlimid.  15. BPH with urinary retention - He continue on Flomax. He was also on doxazosin but I instructed him to remain off of it until his blood pressure and lightheadedness improved.      CARSON Nation  11/1/2021

## 2021-11-01 NOTE — PLAN OF CARE
Problem: Adult Inpatient Plan of Care  Goal: Readiness for Transition of Care  Intervention: Mutually Develop Transition Plan  Flowsheets (Taken 11/1/2021 0360)  Anticipated Discharge Disposition: skilled nursing facility  Equipment Needed After Discharge: none  Assistive Device/Animal Currently Used at Home: cane, straight  Current Discharge Risk: dependent with mobility/activities of daily living  Readmission Within the Last 30 Days: no previous admission in last 30 days  Patient/Family Anticipated Services at Transition:  • rehabilitation services  • skilled nursing  Patient/Family Anticipates Transition to: (skilled nursing facility) other (see comments)  Transportation Anticipated: health plan transportation  Concerns to be Addressed: discharge planning  Type of Skilled Nursing Care Services:  • OT  • PT     SW spoke with Pt and partner (Julia: [phone]) for assessment. Pt with some confusion and information confirmed with Julia. Pt confirmed home address in medical chart and reports to live with his partner, Julia, in a [address]  with [address]. Julia states Pt was mostly independent until a few weeks ago and has now become weaker. She states he was mostly staying upstairs. Julia states Pt utilizes a cane. She reports use of [facility] for PT/OT in the past. She also states Pt had SNF stay at [facility] at the end of September for about 1 week. PT/OT to evaluate during admission. Julia states she is unable to care for Pt at this time at home. Pt was Covid+ on 10/31 and is fully vaccinated. SW to remain available for emotional support and d/c planning.    LAURENT Howard [id number]

## 2021-11-01 NOTE — CONSULTS
Infectious Disease Consult Note    Patient Name: Eulalio Gregg  MR#: [id number]  : 1945  Admission Date: 10/31/2021  Consult Date: 21 10:01 AM   Consultant: Tico Adams MD    Reason for Consult: ESBL klebsiella pneumonia resistent to carbapenems, past covid infection in August, new infection  Referring Provider: Dr Ayala      History of Present Illness     Eulalio Gregg is a 76 y.o. male with a prior history of CHF, coronary artery disease, hypertension, hyperlipidemia, BPH, multiple myeloma who was admitted on 10/31/2021 with a complaint of generalized weakness.  He reported that his symptoms originally began about 2 weeks prior to coming in.  He did notice some blood in the urine about 10 days ago and was seen by his primary care doctor who gave him a prescription for Bactrim.  Cultures were sent at that time.  He started taking Bactrim on 10/27/2021.  He did not note any significant improvement in his symptoms of weakness, fatigue and chills.  He did not measure any fevers at home.  Appetite was poor.  No flank or suprapubic discomfort.  He did notice cloudy discoloration but no frequency, dysuria, urgency.  He did go in for chemotherapy for multiple myeloma last Wednesday.  He was noted to have some low blood pressure and was evaluated by urologist who decreased some of his blood pressure medication.  Recent blood work did reveal an elevated white count on 10/29/2021 of 12.86.  Urinalysis was concerning for infection.  Recent urine culture sent on 10/21/2021 did grow ESBL Klebsiella although the organism was sensitive to Bactrim.  On admission the patient did undergo screening for COVID-19 which was positive.  He did receive 2 doses of the Moderna vaccine previously and states he also had a booster.          Allergies:   Allergies   Allergen Reactions   • Amoxicillin Itching     Other reaction(s): Unknown   • Penicillins Rash     amoxicllin only -rash . Patient can tolerate PCN        Medical History:   Past Medical History:   Diagnosis Date   • Anemia    • Blepharospasm     receives botox injections every 2-3 months   • BPH (benign prostatic hyperplasia)    • COVID-19 2021   • History of vertigo    • Lipid disorder    • Tendency toward bleeding easily (CMS/HCC)        Surgical History:   Past Surgical History:   Procedure Laterality Date   • CATARACT EXTRACTION W/  INTRAOCULAR LENS IMPLANT     • CHOLECYSTECTOMY     • HERNIA REPAIR         Social History:   Social History     Socioeconomic History   • Marital status: Significant Other     Spouse name: None   • Number of children: None   • Years of education: None   • Highest education level: None   Occupational History   • None   Tobacco Use   • Smoking status: Former Smoker     Packs/day: 1.00     Years: 10.00     Pack years: 10.00     Types: Cigarettes     Quit date:      Years since quittin.8   • Smokeless tobacco: Never Used   Substance and Sexual Activity   • Alcohol use: Yes     Comment: 3-4 drinks/week   • Drug use: No   • Sexual activity: Defer   Other Topics Concern   • None   Social History Narrative   • None     Social Determinants of Health     Financial Resource Strain:    • Difficulty of Paying Living Expenses: Not on file   Food Insecurity:    • Worried About Running Out of Food in the Last Year: Not on file   • Ran Out of Food in the Last Year: Not on file   Transportation Needs:    • Lack of Transportation (Medical): Not on file   • Lack of Transportation (Non-Medical): Not on file   Physical Activity:    • Days of Exercise per Week: Not on file   • Minutes of Exercise per Session: Not on file   Stress:    • Feeling of Stress : Not on file   Social Connections:    • Frequency of Communication with Friends and Family: Not on file   • Frequency of Social Gatherings with Friends and Family: Not on file   • Attends Confucianism Services: Not on file   • Active Member of Clubs or Organizations: Not on file    • Attends Club or Organization Meetings: Not on file   • Marital Status: Not on file   Intimate Partner Violence:    • Fear of Current or Ex-Partner: Not on file   • Emotionally Abused: Not on file   • Physically Abused: Not on file   • Sexually Abused: Not on file   Housing Stability:    • Unable to Pay for Housing in the Last Year: Not on file   • Number of Places Lived in the Last Year: Not on file   • Unstable Housing in the Last Year: Not on file          Travel Exposure:   Travel and Exposure Screening      Most Recent Value   Travel Screening    Overnight hospitalization outside the [place] in the last year? No Filed On: 10/31/2021 1210   Exposure Screening    Symptoms         Family History:   Family History   Problem Relation Age of Onset   • Cancer Biological Mother    • Colon cancer Biological Father        Review of Systems  As outlined above in the HPI.  No other acute issues noted at this time following comprehensive review of systems aside from what has already been outlined above.    Medications:    Current IP Meds (From admission, onward)        Frequency     gentamicin (GARAMYCIN) 180 mg in sodium chloride 0.9 % IVPB         Every 24 hours interval     aspirin enteric coated tablet 81 mg         Daily     cholecalciferol (vitamin D3) tablet 1,000 Units         2 times daily     ferrous sulfate tablet 325 mg         Every other day     lenalidomide (REVLIMID) capsule 25 mg         Daily     heparin (porcine) 5,000 unit/mL injection 5,000 Units  (Assessed at increased VTE risk (Padua score greater than or equal to 4))         Every 8 hours     cycloSPORINE (RESTASIS) 0.05 % ophthalmic emulsion 1 drop         2 times daily     mometasone-formoterol (DULERA 200) 200-5 mcg/actuation inhaler 2 puff         2 times daily     gentamicin (GARAMYCIN) 180 mg in sodium chloride 0.9 % IVPB  (Gentamicin IV with peak and trough)  Status:  Discontinued         Once     gentamicin (GARAMYCIN) 180 mg in sodium  "chloride 0.9 % IVPB  (Gentamicin IV with peak and trough)         Once     rosuvastatin (CRESTOR) tablet 10 mg         Daily     tamsulosin (FLOMAX) 24 hr ER capsule 0.4 mg         Daily     gentamicin (GARAMYCIN) 180 mg in sodium chloride 0.9 % IVPB  (Gentamicin IV with peak and trough)  Status:  Discontinued         Once     glucose chewable tablet 16-32 g of dextrose  (Hypoglycemia Treatment Protocol and Hyperglycemia Validation Protocol)        \"Or\" Linked Group Details    As needed     dextrose 40 % oral gel 15-30 g of dextrose  (Hypoglycemia Treatment Protocol and Hyperglycemia Validation Protocol)        \"Or\" Linked Group Details    As needed     glucagon (GLUCAGEN) injection 1 mg  (Hypoglycemia Treatment Protocol and Hyperglycemia Validation Protocol)        \"Or\" Linked Group Details    As needed     dextrose in water injection 12.5 g  (Hypoglycemia Treatment Protocol and Hyperglycemia Validation Protocol)        \"Or\" Linked Group Details    As needed     atropine injection 0.5 mg         Every 5 min PRN     nitroglycerin (NITROSTAT) SL tablet 0.4 mg         Every 5 min PRN     sodium chloride 0.9 % bolus 500 mL         Once     meropenem (MERREM) IVPB 1 g in 100 mL NSS vial in bag  (IV Antibiotics Panel)         Once          Anti-infectives (From admission, onward)    Start     Dose/Rate Route Frequency Ordered Stop    11/01/21 1800  gentamicin (GARAMYCIN) 180 mg in sodium chloride 0.9 % IVPB         3 mg/kg × 60.5 kg (Adjusted)  100 mL/hr over 60 Minutes intravenous Every 24 hours interval 11/01/21 0751 11/06/21 3329            Objective     Vital Signs:    Patient Vitals for the past 72 hrs:   BP Temp Temp src Pulse Resp SpO2 Height Weight   11/01/21 0922 (!) 106/47 37.2 °C (98.9 °F) Oral (!) 59 16 97 % -- --   11/01/21 0740 -- -- -- (!) 55 -- -- -- --   11/01/21 0614 92/60 36.8 °C (98.2 °F) Oral 62 16 97 % 1.6 m (5' 2.99\") 65.7 kg (144 lb 13.5 oz)   11/01/21 0300 92/60 36.8 °C (98.2 °F) Oral 62 16 97 % " "-- --   21 0000 100/62 36.8 °C (98.3 °F) Oral 61 -- 97 % -- --   10/31/21 2330 -- -- -- 72 -- 98 % -- --   10/31/21 2315 (!) 84/48 -- -- 72 20 98 % -- --   10/31/21 1959 123/75 -- -- 68 20 100 % -- --   10/31/21 1800 (!) 90/55 -- -- 60 19 99 % -- --   10/31/21 1600 (!) 97/53 37.2 °C (98.9 °F) Oral 64 19 100 % -- --   10/31/21 1400 (!) 90/54 -- -- 65 16 100 % -- --   10/31/21 1325 (!) 97/53 -- -- 74 20 98 % -- --   10/31/21 1208 (!) 103/54 36.1 °C (97 °F) Temporal 71 19 97 % 1.6 m (5' 3\") 65.8 kg (145 lb)       Temp (72hrs), Av.8 °C (98.3 °F), Min:36.1 °C (97 °F), Max:37.2 °C (98.9 °F)      Physical Exam:    General: alert, nontoxic   HEENT: NC/AT,anicteric sclera, no thrush  Neck: supple  Cardiovascular: regular S1/S2  Respiratory: clear to auscultation, no wheezing, rales or ronchi  GI/Abdomen: soft, +BS, NT/ND  Extremities: no clubbing, cyanosis or edema  Musculoskeletal: no acute arthritis  G.U.: No lesions, no suprapubic TTP or CVAT  Skin: warm and dry, no new rashes  Psych:cooperative with exam  Neuro: Moves all 4 extremities      Lines, Drains, Airways, Wounds:  Peripheral IV (Adult) 10/31/21 Right Hand (Active)   Number of days: 1       Labs:    CBC Results       11/01/21 10/31/21 10/29/21     0315 1324 0834    WBC 7.85 6.51 12.86    RBC 3.19 3.10 3.60    HGB 9.6 9.2 10.7    HCT 28.7 28.0 30.7    MCV 90.0 90.3 85.3    MCH 30.1 29.7 29.7    MCHC 33.4 32.9 34.9     120 40         Comment for PLT at 0315 on 21: RESULTS CHECKED    Comment for PLT at 1324 on 10/31/21: ALL RESULTS HAVE BEEN RECHECKED    Comment for PLT at 0834 on 10/29/21: This result has been called to ASHIA COX by Maren Wright on 10 29 21 at 08:45, and has been read back.       BMP Results       11/01/21 10/31/21 10/20/21     0315 1324 1330     133 133    K 4.3 3.9 4.2    Cl 102 103 97    CO2 20 22 22    Glucose 102 122 136    BUN 21 28 30    Creatinine 1.2 1.5 1.3    Calcium 7.2 7.4 8.7    Anion Gap 12 8 " 14    EGFR 58.6 45.3 53.4      PT/PTT Results       03/19/19     1409    PT 12.3    INR 1.0    PTT 28         Comment for INR at 1409 on 03/19/19:   INR has no defined significance when PT is within Reference Range.      UA Results       10/31/21     1439    Color Yellow    Clarity Turbid    Glucose Negative    Bilirubin Negative    Ketones Negative    Sp Grav 1.015    Blood +2    Ph 6.5    Protein +1    Urobilinogen 0.2    Nitrite Positive    Leuk Est +3    WBC Too Numerous To Count    RBC 5 TO 9    Bacteria +3         Comment for Blood at 1439 on 10/31/21: The sensitivity of the occult blood test is equivalent to approximately 4 intact RBC/HPF.      Lactate Results       10/31/21 10/31/21     1850 1340    Lactate 0.8 2.1          Microbiology Results     Procedure Component Value Units Date/Time    SARS-CoV-2 (COVID-19), PCR Nasopharynx [[id number]]  (Abnormal) Collected: 10/31/21 1630    Specimen: Nasopharyngeal Swab from Nasopharynx Updated: 10/31/21 1812    Narrative:      The following orders were created for panel order SARS-CoV-2 (COVID-19), PCR Nasopharynx.  Procedure                               Abnormality         Status                     ---------                               -----------         ------                     SARS-CoV-2 (COVID-19), P...[[id number]]  Abnormal            Final result                 Please view results for these tests on the individual orders.    SARS-CoV-2 (COVID-19), PCR Nasopharynx [[id number]]  (Abnormal) Collected: 10/31/21 1630    Specimen: Nasopharyngeal Swab from Nasopharynx Updated: 10/31/21 1812     SARS-CoV-2 (COVID-19) Positive    Narrative:      Nursing instructions: Obtain nasopharyngeal swab ONLY. Send swab in viral transport media.    Urine culture [[id number]]  (Abnormal)  (Susceptibility) Collected: 10/21/21 1531    Specimen: Urine, Clean Catch Updated: 10/23/21 1012     Urine Culture **Positive Culture**      >1 x 10^5 CFU/mL Klebsiella pneumoniae ESBL           Pathology Results     ** No results found for the last 720 hours. **          Echo:     Cardiac Imaging    ECHOCARDIOGRAM STRESS TEST 06/25/2019    Interpretation Summary  1. Conclusion: Stress echo does not meet criteria for ischemia.  2. Baseline Echo: Normal left ventricular size and function. No wall motion abnormalities. Ejection fraction 65%. Normal left atrial size.  Mild mitral annular calcification. Trace mitral regurgitation. Tricuspid aortic valve. Mild aortic regurgitation. Normal right atrium. Normal  right ventricular size and function. Trace tricuspid regurgitation. The jet is insufficient to estimate right ventricular systolic pressure.  3. Post-Stress Echo: All walls become hyperdynamic. Ejection fraction improves.  4. Stress ECG does not meet criteria for ischemia.  5. Good exercise tolerance. Holman treadmill score is 8, associated with low risk for near-future cardiac events.      Imaging:    Radiology Imaging    XR CHEST 1 VW    Narrative  CLINICAL HISTORY: Weakness.    COMPARISON: 11/2/2015    COMMENT: AP view of the chest.    No significant interval change. Linear lung markings bilateral lung bases, not  significantly changed, probable atelectasis or scarring. Heart size within  normal limits. Mediastinal contours appear unremarkable. Osseous structures are  intact.    --    Impression  No significant interval change. Probable minimal atelectasis or  scarring bilateral lung bases.      Assessment   COVID-19 pneumonia  Status post Covid vaccine x2 ( Moderna)  UTI  Leukocytosis  Multiple myeloma  Estimated Creatinine Clearance: 40.8 mL/min (by C-G formula based on SCr of 1.2 mg/dL).   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        Plan   Patient is Covid positive but currently satting well on room air  Patient started on meropenem and given a dose of gentamicin  for suspected UTI  Contact isolation for MDRO history/enhanced isolation precautions for COVID-19  He was recently treated for ESBL Klebsiella UTI with Bactrim to which the organism was susceptible  Unclear if his current issue is  infection versus Covid infection  Repeat urinalysis of note did show significant pyuria  Follow-up blood and urine culture results  Recent organism was meropenem resistant  Discontinue meropenem  No further gentamicin  Start Avycaz  No indication for Decadron or remdesivir given that the patient is currently stable from a respiratory standpoint  Patient is vaccinated but given his history of multiple myeloma on chemotherapy, cardiac disease and hypertension he remains at risk for severe infection  Would favor giving monoclonal ab given his high risk status  Findings and plan were reviewed with the patient, will review with the primary team  We will continue to monitor with you  Thank you for this consultation

## 2021-11-01 NOTE — PLAN OF CARE
Problem: Adult Inpatient Plan of Care  Goal: Plan of Care Review  Flowsheets (Taken 11/1/2021 0409)  Progress: improving  Plan of Care Reviewed With: patient  Outcome Summary: min assist, rec SNF at [place]

## 2021-11-01 NOTE — PROGRESS NOTES
Tried to discuss with patient regarding monoclonal antibody therapy for Covid as recommended per infectious disease however patient still little confused and tangential likely in the setting of acute illness from urinary tract infection and bacteremia.  Thus called and talked with patient's daughter and POA on the phone.  While on the phone with her I had her search for the fact sheet for patients, parents, and caregivers emergency use authorization information sheet for monoclonal antibody therapy for Covid and she was able to follow along on the PDF.  She was informed that at this time this therapy is not FDA approved and is experimental and she was informed of alternative therapies as well as the fact that therapy could be stopped at any time should she desire.  She was informed of the possible side effects as well as the somewhat unknown nature of all side effects known at this time.  She was appreciative of the fact she was able to review and at this time she gave consent for the patient to receive monoclonal antibody therapy.  I have discussed this with infectious disease as well who is going to order monoclonal antibody therapy for patient.  I have updated the patient's daughter regarding patient's clinical status and treatment plan.  Patient's daughter appreciative of updates.  She was interested in evaluating the patient for possible rehab and SNF placement up towards [place] where the patient has family and I have reached out to social work and case management regarding this possible dispo plan.

## 2021-11-01 NOTE — HOSPITAL COURSE
Eulalio is a 76 y.o. male admitted on 10/31/2021 with Weakness [R53.1]  Elevated serum creatinine [R79.89]  Urinary tract infection associated with catheterization of urinary tract, unspecified indwelling urinary catheter type, initial encounter (CMS/[facility]) [T83.511A, N39.0]. Principal problem is Weakness.    Past Medical History  Eulalio has a past medical history of Anemia, Blepharospasm, BPH (benign prostatic hyperplasia), COVID-19 (08/21/2021), History of vertigo (2016), Lipid disorder, and Tendency toward bleeding easily (CMS/[facility]).    History of Present Illness   Admitted with weakness, +COVID  11/16- COVID isolation removed 11/15 as pt finished quarantine during hospital stay

## 2021-11-01 NOTE — PROGRESS NOTES
Internal Medicine  Daily Progress Note        SUBJECTIVE   This is a 76 y.o. year-old male admitted on 10/31/2021 with Weakness [R53.1]  Elevated serum creatinine [R79.89]  Urinary tract infection associated with catheterization of urinary tract, unspecified indwelling urinary catheter type, initial encounter (CMS/[facility]) [T83.511A, N39.0].    Interval History: Pt seen and examined at the bedside this morning.  Overall patient with some difficulty urinating but no other complaints this morning.  A little confuse and continued to ask when he will be discharged.     OBJECTIVE   Vital signs in last 24 hours:  Temp:  [36.1 °C (97 °F)-37.2 °C (98.9 °F)] 36.8 °C (98.2 °F)  Heart Rate:  [55-74] 55  Resp:  [16-20] 16  BP: ()/(48-75) 92/60  SpO2:  [97 %-100 %] 97 %  Oxygen Therapy: None (Room air)    Weight & I/Os:  Weights (last 5 days)     Date/Time Weight    11/01/21 0614 65.7 kg (144 lb 13.5 oz)    10/31/21 1208 65.8 kg (145 lb)          Intake/Output Summary (Last 24 hours) at 11/1/2021 0659  Last data filed at 11/1/2021 0300  24 Hour Net Input/Output from 7AM Yesterday   Intake 700 ml   Output 350 ml   Net 350 ml     Telemetry/EKG:  Events reviewed     PHYSICAL EXAMINATION   Physical Exam  Constitutional:       Comments: Chronically ill-appearing thin   HENT:      Head: Normocephalic and atraumatic.      Mouth/Throat:      Mouth: Mucous membranes are moist.   Eyes:      Extraocular Movements: Extraocular movements intact.      Pupils: Pupils are equal, round, and reactive to light.   Cardiovascular:      Rate and Rhythm: Normal rate and regular rhythm.   Pulmonary:      Effort: Pulmonary effort is normal. No respiratory distress.      Breath sounds: No rales.   Abdominal:      General: Abdomen is flat.      Palpations: Abdomen is soft.      Tenderness: There is no abdominal tenderness. There is no right CVA tenderness or left CVA tenderness.   Musculoskeletal:         General: Swelling present. Normal range of  motion.      Cervical back: Normal range of motion. No rigidity or tenderness.   Skin:     General: Skin is warm.   Neurological:      General: No focal deficit present.      Mental Status: He is alert and oriented to person, place, and time.      LINES, CATHETERS, DRAINS, AIRWAYS, & WOUNDS   Lines, drains, airways, & wounds:  Peripheral IV (Adult) 10/31/21 Right Hand (Active)   Number of days: 1        LABS & IMAGING   Labs:  Results from last 7 days   Lab Units 11/01/21  0315 10/31/21  1324 10/31/21  1324   SODIUM mEQ/L 134*   < > 133*   POTASSIUM mEQ/L 4.3   < > 3.9   CHLORIDE mEQ/L 102   < > 103   CO2 mEQ/L 20*   < > 22   BUN mg/dL 21*   < > 28*   CREATININE mg/dL 1.2   < > 1.5*   CALCIUM mg/dL 7.2*   < > 7.4*   ALBUMIN g/dL  --   --  2.0*   BILIRUBIN TOTAL mg/dL  --   --  1.1   ALK PHOS IU/L  --   --  82   ALT IU/L  --   --  34   AST IU/L  --   --  37   GLUCOSE mg/dL 102*   < > 122*    < > = values in this interval not displayed.     Results from last 7 days   Lab Units 11/01/21 0315   WBC K/uL 7.85   HEMOGLOBIN g/dL 9.6*   HEMATOCRIT % 28.7*   PLATELETS K/uL 156         Results from last 7 days   Lab Units 11/01/21  0315 10/31/21  1324 10/29/21  0834   WBC K/uL 7.85 6.51 12.86*   HEMOGLOBIN g/dL 9.6* 9.2* 10.7*   HEMATOCRIT % 28.7* 28.0* 30.7*   PLATELETS K/uL 156 120* 40*       Results from last 7 days   Lab Units 11/01/21  0315 10/31/21  1324   SODIUM mEQ/L 134* 133*   POTASSIUM mEQ/L 4.3 3.9   CHLORIDE mEQ/L 102 103   CO2 mEQ/L 20* 22   BUN mg/dL 21* 28*   CREATININE mg/dL 1.2 1.5*   CALCIUM mg/dL 7.2* 7.4*   ALBUMIN g/dL  --  2.0*   BILIRUBIN TOTAL mg/dL  --  1.1   ALK PHOS IU/L  --  82   ALT IU/L  --  34   AST IU/L  --  37   GLUCOSE mg/dL 102* 122*     Lab Results   Component Value Date    CA 9.0 08/22/2019     Lab Results   Component Value Date    PT 12.3 03/19/2019    INR 1.0 03/19/2019    PTT 28 03/19/2019       Microbiology Results     Procedure Component Value Units Date/Time    SARS-CoV-2  (COVID-19), PCR Nasopharynx [[id number]]  (Abnormal) Collected: 10/31/21 1630    Specimen: Nasopharyngeal Swab from Nasopharynx Updated: 10/31/21 1812    Narrative:      The following orders were created for panel order SARS-CoV-2 (COVID-19), PCR Nasopharynx.  Procedure                               Abnormality         Status                     ---------                               -----------         ------                     SARS-CoV-2 (COVID-19), P...[[id number]]  Abnormal            Final result                 Please view results for these tests on the individual orders.    SARS-CoV-2 (COVID-19), PCR Nasopharynx [[id number]]  (Abnormal) Collected: 10/31/21 1630    Specimen: Nasopharyngeal Swab from Nasopharynx Updated: 10/31/21 1812     SARS-CoV-2 (COVID-19) Positive    Narrative:      Nursing instructions: Obtain nasopharyngeal swab ONLY. Send swab in viral transport media.          Imaging personally reviewed (does not include unread studies):  X-RAY CHEST 1 VIEW    Result Date: 10/31/2021  IMPRESSION: No significant interval change. Probable minimal atelectasis or scarring bilateral lung bases.     MEDS/DRIPS   Scheduled Meds:  • aspirin  81 mg oral Daily   • cholecalciferol (vitamin D3)  1,000 Units oral BID   • cycloSPORINE  1 drop Both Eyes BID   • ferrous sulfate  325 mg oral Every other day   • gentamicin  3 mg/kg (Adjusted) intravenous q24h INT   • heparin (porcine)  5,000 Units subcutaneous q8h JENNYFER   • lenalidomide  25 mg oral Daily   • mometasone-formoterol  2 puff inhalation BID   • rosuvastatin  10 mg oral Daily   • tamsulosin  0.4 mg oral Daily     Continuous Infusions:  PRN Meds:.atropine  •  glucose **OR** dextrose **OR** glucagon **OR** dextrose in water  •  nitroglycerin     ASSESSMENT & PLAN   UTI (urinary tract infection)  Assessment & Plan  Uclx prior to admission c/w ESBL klebsiella MDRO including meropenem  Received 5 days bactrim PTP  U/A TNTC WBC, 3+ Bacteria, pos leuk esterase and  nitrite  Lactate 2.1, Hypotensive with BP 90/54  No leukocytosis  - Continue with gent for now  - c/s ID to see in the AM given MDROs  - fu repeat UClx and Bclx  - s/p 500cc bolus fluids- pts BP runs chronically low; will hold off on further fluids at this time    Hyperlipidemia  Assessment & Plan  Continue home statin    Multiple myeloma (CMS/[facility])  Assessment & Plan  Currently on chemotherapy outpatient  - c/w outpatient management       History of COVID-19  Assessment & Plan  No current signs or sx's of acute infection  Continue home Advair    HFrEF (heart failure with reduced ejection fraction) (CMS/[facility])  Assessment & Plan  EF 40-45% on home lasix PRN  - appears euvolemic now  Recentrly saw Dr. Ellis who stopped his metoprolo and changes his lasix from daily to PRN due to hypotension   - can give lasix PRN  - daily standing weight and accurate I/Os     Anemia  Assessment & Plan  Presents with Hb 9/1 b/l 10  Has known MM on chemotherapy which likely explains this  - CBC daily  - c/w chemo outpatient  - transfuse Hb <7  - hold home iron supplementation given acute infection UTI    TRA (acute kidney injury) (CMS/[facility])  Assessment & Plan  Cr 1.5 b/l 1  UA c/f UTI  No CVA tenderness. No need for imaging studies at this point   - daily BMP  - trend with gentle IVF and tx of UTI; Cr improving to 1.2 this am  - avoid nephrotoxins , NSAIDs    CAD (coronary artery disease)  Assessment & Plan  Has known 50% LAD occlusion  CW home statin and  ASA    BPH (benign prostatic hyperplasia)  Assessment & Plan  C/w home Flomax  Follws with urology Dr. Mathew    * Weakness  Assessment & Plan  Likely 2/2 UTI  - PT/OT  - plan as per UTI       VTE Assessment: Padua    VTE Prophylaxis: Current anticoagulants:  heparin (porcine) 5,000 unit/mL injection 5,000 Units, subcutaneous, q8h JENNYFER      Code Status: Full Code  Estimated discharge date: 11/1/2021     ATTENDING DOCUMENTATION  ALSO SEE ATTENDING ATTESTATION SECTION OF NOTE

## 2021-11-02 ENCOUNTER — TELEPHONE (OUTPATIENT)
Dept: SCHEDULING | Facility: CLINIC | Age: 76
End: 2021-11-02

## 2021-11-02 LAB
ANION GAP SERPL CALC-SCNC: 14 MEQ/L (ref 3–15)
BASOPHILS # BLD: 0.02 K/UL (ref 0.01–0.1)
BASOPHILS NFR BLD: 0.2 %
BUN SERPL-MCNC: 16 MG/DL (ref 8–20)
CALCIUM SERPL-MCNC: 7.5 MG/DL (ref 8.9–10.3)
CHLORIDE SERPL-SCNC: 103 MEQ/L (ref 98–109)
CO2 SERPL-SCNC: 21 MEQ/L (ref 22–32)
CREAT SERPL-MCNC: 1.1 MG/DL (ref 0.8–1.3)
DIFFERENTIAL METHOD BLD: ABNORMAL
EOSINOPHIL # BLD: 0.03 K/UL (ref 0.04–0.54)
EOSINOPHIL NFR BLD: 0.4 %
ERYTHROCYTE [DISTWIDTH] IN BLOOD BY AUTOMATED COUNT: 19.8 % (ref 11.6–14.4)
GFR SERPL CREATININE-BSD FRML MDRD: >60 ML/MIN/1.73M*2
GLUCOSE SERPL-MCNC: 80 MG/DL (ref 70–99)
HCT VFR BLDCO AUTO: 27 % (ref 40.1–51)
HGB BLD-MCNC: 8.8 G/DL (ref 13.7–17.5)
IMM GRANULOCYTES # BLD AUTO: 0.11 K/UL (ref 0–0.08)
IMM GRANULOCYTES NFR BLD AUTO: 1.3 %
LYMPHOCYTES # BLD: 0.75 K/UL (ref 1.2–3.5)
LYMPHOCYTES NFR BLD: 9.1 %
MCH RBC QN AUTO: 29.6 PG (ref 28–33.2)
MCHC RBC AUTO-ENTMCNC: 32.6 G/DL (ref 32.2–36.5)
MCV RBC AUTO: 90.9 FL (ref 83–98)
MONOCYTES # BLD: 0.94 K/UL (ref 0.3–1)
MONOCYTES NFR BLD: 11.4 %
NEUTROPHILS # BLD: 6.38 K/UL (ref 1.7–7)
NEUTS SEG NFR BLD: 77.6 %
NRBC BLD-RTO: 0 %
PDW BLD AUTO: 12.2 FL (ref 9.4–12.4)
PLATELET # BLD AUTO: 182 K/UL (ref 150–350)
POTASSIUM SERPL-SCNC: 4.2 MEQ/L (ref 3.6–5.1)
RBC # BLD AUTO: 2.97 M/UL (ref 4.5–5.8)
SODIUM SERPL-SCNC: 138 MEQ/L (ref 136–144)
WBC # BLD AUTO: 8.23 K/UL (ref 3.8–10.5)

## 2021-11-02 PROCEDURE — 25800000 HC PHARMACY IV SOLUTIONS: Performed by: STUDENT IN AN ORGANIZED HEALTH CARE EDUCATION/TRAINING PROGRAM

## 2021-11-02 PROCEDURE — 97166 OT EVAL MOD COMPLEX 45 MIN: CPT | Mod: GO

## 2021-11-02 PROCEDURE — 80048 BASIC METABOLIC PNL TOTAL CA: CPT | Performed by: STUDENT IN AN ORGANIZED HEALTH CARE EDUCATION/TRAINING PROGRAM

## 2021-11-02 PROCEDURE — 63700000 HC SELF-ADMINISTRABLE DRUG: Performed by: STUDENT IN AN ORGANIZED HEALTH CARE EDUCATION/TRAINING PROGRAM

## 2021-11-02 PROCEDURE — 63600000 HC DRUGS/DETAIL CODE: Mod: JG | Performed by: STUDENT IN AN ORGANIZED HEALTH CARE EDUCATION/TRAINING PROGRAM

## 2021-11-02 PROCEDURE — 21400000 HC ROOM AND CARE CCU/INTERMEDIATE

## 2021-11-02 PROCEDURE — 36415 COLL VENOUS BLD VENIPUNCTURE: CPT | Performed by: STUDENT IN AN ORGANIZED HEALTH CARE EDUCATION/TRAINING PROGRAM

## 2021-11-02 PROCEDURE — 63600000 HC DRUGS/DETAIL CODE: Performed by: STUDENT IN AN ORGANIZED HEALTH CARE EDUCATION/TRAINING PROGRAM

## 2021-11-02 PROCEDURE — 99232 SBSQ HOSP IP/OBS MODERATE 35: CPT | Performed by: INTERNAL MEDICINE

## 2021-11-02 PROCEDURE — 85025 COMPLETE CBC W/AUTO DIFF WBC: CPT | Performed by: STUDENT IN AN ORGANIZED HEALTH CARE EDUCATION/TRAINING PROGRAM

## 2021-11-02 RX ADMIN — MOMETASONE FUROATE AND FORMOTEROL FUMARATE DIHYDRATE 2 PUFF: 200; 5 AEROSOL RESPIRATORY (INHALATION) at 21:07

## 2021-11-02 RX ADMIN — HEPARIN SODIUM 5000 UNITS: 5000 INJECTION INTRAVENOUS; SUBCUTANEOUS at 05:07

## 2021-11-02 RX ADMIN — HEPARIN SODIUM 5000 UNITS: 5000 INJECTION INTRAVENOUS; SUBCUTANEOUS at 13:40

## 2021-11-02 RX ADMIN — CEFTAZIDIME, AVIBACTAM 1.25 G: 2; .5 POWDER, FOR SOLUTION INTRAVENOUS at 13:40

## 2021-11-02 RX ADMIN — TAMSULOSIN HYDROCHLORIDE 0.4 MG: 0.4 CAPSULE ORAL at 09:15

## 2021-11-02 RX ADMIN — MOMETASONE FUROATE AND FORMOTEROL FUMARATE DIHYDRATE 2 PUFF: 200; 5 AEROSOL RESPIRATORY (INHALATION) at 09:19

## 2021-11-02 RX ADMIN — Medication 1000 UNITS: at 21:06

## 2021-11-02 RX ADMIN — ROSUVASTATIN CALCIUM 10 MG: 10 TABLET, FILM COATED ORAL at 09:15

## 2021-11-02 RX ADMIN — ASPIRIN 81 MG: 81 TABLET, COATED ORAL at 09:16

## 2021-11-02 RX ADMIN — CYCLOSPORINE 1 DROP: 0.5 EMULSION OPHTHALMIC at 21:06

## 2021-11-02 RX ADMIN — CEFTAZIDIME, AVIBACTAM 1.25 G: 2; .5 POWDER, FOR SOLUTION INTRAVENOUS at 05:07

## 2021-11-02 RX ADMIN — CYCLOSPORINE 1 DROP: 0.5 EMULSION OPHTHALMIC at 09:16

## 2021-11-02 RX ADMIN — HEPARIN SODIUM 5000 UNITS: 5000 INJECTION INTRAVENOUS; SUBCUTANEOUS at 21:06

## 2021-11-02 RX ADMIN — CEFTAZIDIME, AVIBACTAM 1.25 G: 2; .5 POWDER, FOR SOLUTION INTRAVENOUS at 21:06

## 2021-11-02 RX ADMIN — Medication 1000 UNITS: at 09:15

## 2021-11-02 ASSESSMENT — COGNITIVE AND FUNCTIONAL STATUS - GENERAL
TOILETING: 3 - A LITTLE
EATING MEALS: 4 - NONE
HELP NEEDED FOR PERSONAL GROOMING: 3 - A LITTLE
DRESSING REGULAR LOWER BODY CLOTHING: 3 - A LITTLE
AFFECT: FLAT/BLUNTED AFFECT
HELP NEEDED FOR BATHING: 3 - A LITTLE
DRESSING REGULAR UPPER BODY CLOTHING: 3 - A LITTLE

## 2021-11-02 NOTE — TELEPHONE ENCOUNTER
Dr Ellis patient is in [place] with Covid.    Patient wanted Dr Ellis to be aware.  He can be reached at [phone] if needed. ty

## 2021-11-02 NOTE — PROGRESS NOTES
Cardiology Inpatient  Progress Note       SUBJECTIVE   This is a 76 y.o. year-old male admitted on 10/31/2021 with Weakness [R53.1]  Elevated serum creatinine [R79.89]  Urinary tract infection associated with catheterization of urinary tract, unspecified indwelling urinary catheter type, initial encounter (CMS/[facility]) [T83.511A, N39.0].    Interval History: He reports feeling a little better.  He is frustrated with ongoing weakness and UTI.  Denies CP, SOB, orthopnea.     A 14-point review of system was performed and was negative, or as documented above. 10 systems examined.   OBJECTIVE      Vital signs in last 24 hours:  Temp:  [36.6 °C (97.8 °F)-37.4 °C (99.4 °F)] 37.2 °C (99 °F)  Heart Rate:  [55-87] 60  Resp:  [16-18] 17  BP: ()/(42-63) 109/52    No intake or output data in the 24 hours ending 11/02/21 1053  Weights (last 5 days)     Date/Time Weight    11/01/21 0614 65.7 kg (144 lb 13.5 oz)    10/31/21 1208 65.8 kg (145 lb)          PHYSICAL EXAMINATION      Physical Exam   General: Pleasant, weak, frail.  HEENT: No corneal arcus or xanthelasmas.  Sclerae are anicteric.  Nares patent.  Mucous membranes are slightly dry.  Neck: Supple.  JVP is 5 cm/H2O.  Carotids are equal with no audible bruits.  No lymphadenopathy or thyromegaly.  Heart: Regular.  Normal S1 and S2.  No S4. No S3. No murmur.  Chest: Symmetrical.  Lungs: Clear bilaterally without rales, wheezes nor rhonchi.  Abdomen: Soft, nontender.  No masses or bruits.  No organomegaly.  Normal bowel sounds.  Extremities: No cyanosis or clubbing. Trace pretibial edema bilaterally.  Distal pulses are easily palpable.  Skin: Deeply tanned.  Warm and dry and well perfused.   Neurologic: Alert and oriented ×2-3 with occasional confusion and forgetfulness.  Cranial nerves II through XII are intact.  Psychiatric: normal mood, affect & judgment.   LABS / IMAGING / TELE/ MEDS      Labs  Results from last 7 days   Lab Units 11/02/21  0552 11/01/21  9604  10/31/21  1324   SODIUM mEQ/L 138 134* 133*   POTASSIUM mEQ/L 4.2 4.3 3.9   CHLORIDE mEQ/L 103 102 103   CO2 mEQ/L 21* 20* 22   BUN mg/dL 16 21* 28*   CREATININE mg/dL 1.1 1.2 1.5*   EGFR mL/min/1.73m*2 >60.0 58.6* 45.3*   CALCIUM mg/dL 7.5* 7.2* 7.4*   ALBUMIN g/dL  --   --  2.0*   BILIRUBIN TOTAL mg/dL  --   --  1.1   ALK PHOS IU/L  --   --  82   ALT IU/L  --   --  34   AST IU/L  --   --  37   GLUCOSE mg/dL 80 102* 122*     Results from last 7 days   Lab Units 11/02/21  0552 11/01/21  0315 10/31/21  1324   WBC K/uL 8.23 7.85 6.51   HEMOGLOBIN g/dL 8.8* 9.6* 9.2*   HEMATOCRIT % 27.0* 28.7* 28.0*   PLATELETS K/uL 182 156 120*      0   Lab Value Date/Time    INR 1.0 03/19/2019 1409     Lab Results   Component Value Date    CHOL 126 05/28/2021    TRIG 62 05/28/2021    HDL 43 (L) 05/28/2021    LDLCALC 71 05/28/2021     Results from last 7 days   Lab Units 10/31/21  1324   TROPONIN I ng/mL <0.03     Lab Results   Component Value Date    LACTATE 0.8 10/31/2021    LACTATE 2.1 (H) 10/31/2021       ECG/Telemetry  I have independently reviewed the telemetry. Significant findings include NSR, sinus chris 40s.    Imaging    Echocardiogram 08/23/2021: Performed at [facility]  Normal left ventricular cavity size. There is mild concentric left ventricular hypertrophy. There is mild global hypokinesis involving all segments of the left ventricle.  Mild left ventricular systolic dysfunction.  The ejection fraction estimate is 40-45%. Abnormal left ventricular diastolic function.  The left atrial volume is normal.   The right ventricle size is borderline enlarged. The right ventricular systolic function is normal.    Normal opening and closure of the aortic valve. There is trace aortic regurgitation.  Grossly normal mitral valve.  Grossly normal tricuspid valve.  Trace tricuspid regurgitation.  Mean PA pressure is normal.  Normal IVC. Normal respiratory collapse.  No significant pericardial effusion.     Left heart  catheterization 9/18/2019:  Mid LAD 50% stenosis at the level of a diagonal branch which also has 50% ostial stenosis. Both LAD and diagonal branches were iFR negative. Normal LVEDP.     Stress echocardiogram 6/25/2019:  1. Conclusion: Stress echo does not meet criteria for ischemia.  2. Baseline Echo: Normal left ventricular size and function. No wall motion abnormalities. Ejection fraction 65%. Normal left atrial size.  Mild mitral annular calcification. Trace mitral regurgitation. Tricuspid aortic valve. Mild aortic regurgitation. Normal right atrium. Normal  right ventricular size and function. Trace tricuspid regurgitation. The jet is insufficient to estimate right ventricular systolic pressure.  3. Post-Stress Echo: All walls become hyperdynamic. Ejection fraction improves.  4. Stress ECG does not meet criteria for ischemia.  5. Good exercise tolerance. Holman treadmill score is 8, associated with low risk for near-future cardiac events.      Home Medications:  •  ferrous sulfate, Take 65 mg by mouth daily with breakfast.  •  aspirin, Take 1 tablet (81 mg total) by mouth daily.  •  cholecalciferol (vitamin D3), Take 2,000 Units by mouth daily.  •  coenzyme Q10, Take by mouth daily.    •  compress.stocking,knee,reg,med, 1 each daily. Apply in AM and remove in PM.  •  fluticasone propion-salmeteroL, Inhale 1 puff 2 (two) times a day.  Rinse mouth with water after use to reduce aftertaste and incidence of candidiasis.  Do not swallow. For patients not on a ventilator, a spacer is recommended to be used with this medication/inhaler.  •  furosemide, Take 1 tablet (20 mg total) by mouth daily as needed (weight gain of 3 lbs in 1 day or 5 lbs in 1 week).  •  multivit-min/folic/vit K/lycop (MEN'S MULTIVITAMIN ORAL), Take by mouth daily.    •  REVLIMID, Take  25 mg daily     •  rosuvastatin, Take 1 tablet (10 mg total) by mouth daily.  •  sulfamethoxazole-trimethoprim, Take 1 tablet by mouth 2 (two) times a day.    •   tamsulosin, Take 0.4 mg by mouth 2 (two) times a day.      Scheduled Meds:  • aspirin  81 mg oral Daily   • ceftazidime-avibactam  1.25 g intravenous q8h INT   • cholecalciferol (vitamin D3)  1,000 Units oral BID   • cycloSPORINE  1 drop Both Eyes BID   • ferrous sulfate  325 mg oral Every other day   • heparin (porcine)  5,000 Units subcutaneous q8h JENNYFER   • [Provider Managed Hold] lenalidomide  25 mg oral Daily   • mometasone-formoterol  2 puff inhalation BID   • rosuvastatin  10 mg oral Daily   • tamsulosin  0.4 mg oral Daily          IMPRESSION/RECOMMENDATIONS:  1. UTI - He was hospitalized in August 2021 and again in September 2021. He was treated with a course of fosfomycin then Bactrim.  He was restarted on Bactrim as outpatient.  Urine this admission w ESBL MDRO.    ID recommends Avycaz and stop gentamicin.  2. Klebsiella bacteremia - ID is following.  Hemodynamically stable.   3. Covid-19 pneumonia - He was hospitalized in August 2021. He had associated hypoxemic respiratory failure but fortunately did not require intubation.  He completed a course of Remdesivir.  He again tested positive this admission.  Unclear if new Covid-19 infection.  ID recommended monoclonal antibodies.  4. Left ventricular systolic dysfunction - His echocardiogram in August 2021 during his acute illness showed mild left ventricular systolic dysfunction with an EF of 40-45%.  He was started on metoprolol succinate 25 mg during his hospital stay however he has been significantly hypotensive and now with some bradycardia overnight.  I recommended that he remain off of it for now. Hold off on introducing an ACE-inhibitor or ARB in due to hypotension and recent acute renal insuffiency.  He was volume depleted on exam in the office.  He appears euvolemic on exam today.  I had stopped his daily loop diuretic.  Monitor Is and Os. Weigh daily.  I had discussed sodium restriction.  He will need a repeat echocardiogram in 3 months to reassess  his LV systolic function.    5. Hypotension - His blood pressure is improved.  I recommend that he remain off of metoprolol, doxazosin and daily loop diuretic.  I ask him to drink at least 48-64 ounces of fluid per day.   6. Lower extremity edema - His albumin is low and I suspect that his edema is more due to decreased osmotic pressure. I recommended that he increase his protein intake with diet and nutritional supplements.  I also wrote a prescription for compression stockings.  He should only use his furosemide as needed for weight gain of 3 lbs in 1 day or 5 lbs in 1 week.  7. Acute renal insuffiencey - He was volume depleted in the office due to poor oral intake and ongoing diuretic use.  I recommended that he increase his fluid intake and only use his diuretic as needed.    8. Moderate nonobstructive single vessel 50% mid LAD and 50% ostial D1 stenosis by catheterization 9/18/2019- he has no symptoms of angina pectoralis.  He should continue on aspirin and rosuvastatin therapy along with lifestyle modification.  Mild troponin elevation during acute illness due to demand ischemia.  No ischemic work up indicated at this time.  9. Exertional chest pain -in 2019 he was noting left-sided, graded 3-4/10 chest pain which resolved with rest.  Interestingly, however, he was doing a spinning class for 1 hour breaking a solid sweat and has absolutely no symptoms.  This has not recurred.  10. Hyperlipidemia -I reviewed his latest lipid profile outlined above.  I have asked him to stop atorvastatin and resume his rosuvastatin therapy.    11. Asthma -He is on Advair.  He follows with Suresh Cardoza.  12. MAC - This was noted on his stress echo which is outlined above.  13. Aortic insufficiency -this was graded mild on his stress echocardiogram and trace on his most recent echocardiogram in August 2021.  This is probably age-related changes to the aortic valve.  14. Bifascicular block with LAFB/RBBB-I previously reviewed this  issue with him.  It is probably of no clinical consequence and will be followed with an annual EKG and he will report any symptoms of syncope to me.  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  He is on Revlimid.  16. BPH with urinary retention - He continue on Flomax. He was also on doxazosin but I instructed him to remain off of it until his blood pressure and lightheadedness improved.      CARSON Nation  11/2/2021

## 2021-11-02 NOTE — PROGRESS NOTES
Internal Medicine  Daily Progress Note        SUBJECTIVE   This is a 76 y.o. year-old male admitted on 10/31/2021 with Weakness [R53.1]  Elevated serum creatinine [R79.89]  Urinary tract infection associated with catheterization of urinary tract, unspecified indwelling urinary catheter type, initial encounter (CMS/[facility]) [T83.511A, N39.0].    Interval History: Pt seen and examined at the bedside this morning.  Pt overall appears much less confused from yesterday.  Continues on Amycaz for bacteremia and UTI.  Will follow up with ID.     OBJECTIVE   Vital signs in last 24 hours:  Temp:  [36.6 °C (97.8 °F)-37.4 °C (99.4 °F)] 37.2 °C (99 °F)  Heart Rate:  [55-87] 58  Resp:  [16-18] 18  BP: ()/(42-63) 104/55  SpO2:  [95 %-99 %] 98 %  Oxygen Therapy: None (Room air)    Weight & I/Os:  Weights (last 5 days)     Date/Time Weight    11/01/21 0614 65.7 kg (144 lb 13.5 oz)    10/31/21 1208 65.8 kg (145 lb)        No intake or output data in the 24 hours ending 11/02/21 0659     Telemetry/EKG:  Sinus rhythm     PHYSICAL EXAMINATION   Physical Exam  Constitutional:       Comments: Chronically ill-appearing thin, appears less confused than yesterday  HENT:      Head: Normocephalic and atraumatic.      Mouth/Throat:      Mouth: Mucous membranes are moist.   Eyes:      Extraocular Movements: Extraocular movements intact.      Pupils: Pupils are equal, round, and reactive to light.   Cardiovascular:      Rate and Rhythm: Normal rate and regular rhythm.   Pulmonary:      Effort: Pulmonary effort is normal. No respiratory distress.      Breath sounds: No rales.   Abdominal:      General: Abdomen is flat.      Palpations: Abdomen is soft.      Tenderness: There is no abdominal tenderness. There is no right CVA tenderness or left CVA tenderness.   Musculoskeletal:         General: Swelling present. Normal range of motion.      Cervical back: Normal range of motion. No rigidity or tenderness.   Skin:     General: Skin is warm.    Neurological:      General: No focal deficit present.      Mental Status: He is alert and oriented to person, place, and time.      LINES, CATHETERS, DRAINS, AIRWAYS, & WOUNDS   Lines, drains, airways, & wounds:  Peripheral IV (Adult) 10/31/21 Right Hand (Active)   Number of days: 2        LABS & IMAGING   Labs:  Results from last 7 days   Lab Units 11/02/21  0552 11/01/21  0315 10/31/21  1324   SODIUM mEQ/L 138   < > 133*   POTASSIUM mEQ/L 4.2   < > 3.9   CHLORIDE mEQ/L 103   < > 103   CO2 mEQ/L 21*   < > 22   BUN mg/dL 16   < > 28*   CREATININE mg/dL 1.1   < > 1.5*   CALCIUM mg/dL 7.5*   < > 7.4*   ALBUMIN g/dL  --   --  2.0*   BILIRUBIN TOTAL mg/dL  --   --  1.1   ALK PHOS IU/L  --   --  82   ALT IU/L  --   --  34   AST IU/L  --   --  37   GLUCOSE mg/dL 80   < > 122*    < > = values in this interval not displayed.     Results from last 7 days   Lab Units 11/02/21  0552   WBC K/uL 8.23   HEMOGLOBIN g/dL 8.8*   HEMATOCRIT % 27.0*   PLATELETS K/uL 182         Results from last 7 days   Lab Units 11/02/21 0552 11/01/21  0315 10/31/21  1324   WBC K/uL 8.23 7.85 6.51   HEMOGLOBIN g/dL 8.8* 9.6* 9.2*   HEMATOCRIT % 27.0* 28.7* 28.0*   PLATELETS K/uL 182 156 120*       Results from last 7 days   Lab Units 11/02/21 0552 11/01/21  0315 10/31/21  1324   SODIUM mEQ/L 138 134* 133*   POTASSIUM mEQ/L 4.2 4.3 3.9   CHLORIDE mEQ/L 103 102 103   CO2 mEQ/L 21* 20* 22   BUN mg/dL 16 21* 28*   CREATININE mg/dL 1.1 1.2 1.5*   CALCIUM mg/dL 7.5* 7.2* 7.4*   ALBUMIN g/dL  --   --  2.0*   BILIRUBIN TOTAL mg/dL  --   --  1.1   ALK PHOS IU/L  --   --  82   ALT IU/L  --   --  34   AST IU/L  --   --  37   GLUCOSE mg/dL 80 102* 122*     Lab Results   Component Value Date    CA 9.0 08/22/2019     Lab Results   Component Value Date    PT 12.3 03/19/2019    INR 1.0 03/19/2019    PTT 28 03/19/2019       Microbiology Results     Procedure Component Value Units Date/Time    Urine culture Urine, Clean Catch [[id number]]  (Abnormal) Collected:  10/31/21 1439    Specimen: Urine, Clean Catch Updated: 11/02/21 0655     Urine Culture **Positive Culture**      >1 x 10^5 CFU/mL Klebsiella pneumoniae ESBL    Blood Culture PCR Panel Blood, Venous [[id number]]  (Abnormal) Collected: 10/31/21 1615    Specimen: Blood, Venous Updated: 11/01/21 1300     Candida albicans Not Detected     Candida auris Not Detected     Candida glabrata Not Detected     Candida krusei Not Detected     Candida parapsilosis Not Detected     Candida tropicalis Not Detected     Cryptococcus neoformans/gattii Not Detected     Enterococcus faecalis Not Detected     Enterococcus faecium Not Detected     Enterobacter cloacae complex Not Detected     Escherichia coli Not Detected     Klebsiella oxytoca Not Detected     Klebsiella pneumoniae Detected     Klebsiella aerogenes Not Detected     Proteus Not Detected     Salmonella species Not Detected     Serratia marcescens Not Detected     Haemophilus influenzae Not Detected     Listeria monocytogenes Not Detected     Neisseria meningitidis Not Detected     Pseudomonas aeruginosa Not Detected     Stenotrophomonas maltophilia Not Detected     Bacteroides fragilis Not Detected     Staph (not aureus) Not Detected     Staphylococcus aureus Not Detected     Staphylococcus ludgnensis Not Detected     Staphylococcus epidermidis Not Detected     Streptococcus Not Detected     Streptococcus agalactiae (Group B) Not Detected     Streptococcus pneumoniae Not Detected     Streptococcus pyogenes (Group A) Not Detected     KPC (Carbapenem Resistance Gene) Detected     mecA/C Not Applicable     mecA/C and MREJ (MRSA) Not Applicable     Manuel/B (Vancomycin Resistance Gene) Not Applicable     CTX-M (ESBL) Not Detected     IMP Not Detected     mcr-1 Not Detected     NDM Not Detected     OXA-48-like Not Detected     VIM Not Detected     Comment: --    Blood Culture Blood, Venous [[id number]]  (Abnormal) Collected: 10/31/21 1615    Specimen: Blood, Venous Updated: 11/01/21  1030     Culture **Positive Culture**     Gram Stain Result Gram negative bacilli    Blood Culture Blood, Venous [[id number]]  (Abnormal) Collected: 10/31/21 1615    Specimen: Blood, Venous Updated: 11/01/21 1027     Culture **Positive Culture**     Gram Stain Result Gram negative bacilli    SARS-CoV-2 (COVID-19), PCR Nasopharynx [[id number]]  (Abnormal) Collected: 10/31/21 1630    Specimen: Nasopharyngeal Swab from Nasopharynx Updated: 10/31/21 1812    Narrative:      The following orders were created for panel order SARS-CoV-2 (COVID-19), PCR Nasopharynx.  Procedure                               Abnormality         Status                     ---------                               -----------         ------                     SARS-CoV-2 (COVID-19), P...[[id number]]  Abnormal            Final result                 Please view results for these tests on the individual orders.    SARS-CoV-2 (COVID-19), PCR Nasopharynx [[id number]]  (Abnormal) Collected: 10/31/21 1630    Specimen: Nasopharyngeal Swab from Nasopharynx Updated: 10/31/21 1812     SARS-CoV-2 (COVID-19) Positive    Narrative:      Nursing instructions: Obtain nasopharyngeal swab ONLY. Send swab in viral transport media.          Imaging personally reviewed (does not include unread studies):  CT HEAD WITHOUT IV CONTRAST    Result Date: 11/2/2021  IMPRESSION: 1. No acute intracranial hemorrhage, acute infarct in a major vascular territory or mass-effect. 2. Progression of ventriculomegaly which may be from central atrophy.  Normal pressure hydrocephalus can have a similar appearance. COMMENT: Axial noncontrast CT images of the head were obtained. Sagittal and coronal reconstructions were created. CT DOSE:  One or more dose reduction techniques (e.g. automated exposure control, adjustment of the mA and/or kV according to patient size, use of iterative reconstruction technique) utilized for this examination. Comparison:  No prior studies are available for  comparison. Findings: Ventricles are enlarged, disproportionate to sulcal involutional changes and progressed from prior.  There are bilateral periventricular white matter lucencies which are nonspecific but compatible with microangiopathic change. There is no mass effect, midline shift, territorial infarction, acute hemorrhage or extra-axial fluid collection. Visualized paranasal sinuses and mastoid air cells are clear.    X-RAY CHEST 1 VIEW    Result Date: 10/31/2021  IMPRESSION: No significant interval change. Probable minimal atelectasis or scarring bilateral lung bases.     MEDS/DRIPS   Scheduled Meds:  • aspirin  81 mg oral Daily   • ceftazidime-avibactam  1.25 g intravenous q8h INT   • cholecalciferol (vitamin D3)  1,000 Units oral BID   • cycloSPORINE  1 drop Both Eyes BID   • ferrous sulfate  325 mg oral Every other day   • heparin (porcine)  5,000 Units subcutaneous q8h JENNYFER   • [Provider Managed Hold] lenalidomide  25 mg oral Daily   • mometasone-formoterol  2 puff inhalation BID   • rosuvastatin  10 mg oral Daily   • tamsulosin  0.4 mg oral Daily     Continuous Infusions:  PRN Meds:.atropine  •  glucose **OR** dextrose **OR** glucagon **OR** dextrose in water  •  nitroglycerin     ASSESSMENT & PLAN   UTI (urinary tract infection)  Assessment & Plan  Uclx prior to admission c/w ESBL klebsiella MDRO including meropenem  Received 5 days bactrim PTP  U/A TNTC WBC, 3+ Bacteria, pos leuk esterase and nitrite  Lactate 2.1, Hypotensive with BP 90/54  No leukocytosis    - Continue with Amycaz for now per ID  - ID following  - fu repeat UClx and Bclx  - pts BP runs chronically low; will hold off on further fluids at this time    Hyperlipidemia  Assessment & Plan  Continue home statin    Multiple myeloma (CMS/[facility])  Assessment & Plan  Currently on chemotherapy outpatient  - c/w outpatient management       History of COVID-19  Assessment & Plan  No current signs or sx's of acute infection  Continue home  Advair  Positive test on admission    - Receiving monoclonal ab therapy per ID    HFrEF (heart failure with reduced ejection fraction) (CMS/[facility])  Assessment & Plan  EF 40-45% on home lasix PRN  - appears euvolemic now  Recentrly saw Dr. Ellis who stopped his metoprolo and changes his lasix from daily to PRN due to hypotension   - can give lasix PRN  - daily standing weight and accurate I/Os   - Cardiology following    Anemia  Assessment & Plan  Presents with Hb 9/1 b/l 10  Has known MM on chemotherapy which likely explains this  - CBC daily  - c/w chemo outpatient  - transfuse Hb <7  - hold home iron supplementation given acute infection UTI    TRA (acute kidney injury) (CMS/[facility])  Assessment & Plan  Cr 1.5 b/l 1  UA c/f UTI  No CVA tenderness. No need for imaging studies at this point     - daily BMP  - trend with gentle IVF and tx of UTI; now resolved back to baseline  - avoid nephrotoxins , NSAIDs    CAD (coronary artery disease)  Assessment & Plan  Has known 50% LAD occlusion  CW home statin and  ASA    BPH (benign prostatic hyperplasia)  Assessment & Plan  C/w home Flomax  Follws with urology Dr. Mathew    * Weakness  Assessment & Plan  Likely 2/2 UTI  - PT/OT  - plan as per UTI       VTE Assessment: Padua    VTE Prophylaxis: Current anticoagulants:  heparin (porcine) 5,000 unit/mL injection 5,000 Units, subcutaneous, q8h JENNYFER      Code Status: Full Code  Estimated discharge date: 11/5/2021     ATTENDING DOCUMENTATION  ALSO SEE ATTENDING ATTESTATION SECTION OF NOTE

## 2021-11-02 NOTE — PROGRESS NOTES
Attempted to call daughter to update regarding pt care.  Received no answer.  Will continue to reach out to provide updates.

## 2021-11-02 NOTE — PLAN OF CARE
Problem: Adult Inpatient Plan of Care  Goal: Plan of Care Review  Outcome: Progressing  Flowsheets (Taken 11/2/2021 1331)  Progress: improving  Plan of Care Reviewed With: patient     Per information in medical rounds, Pt is medically stable for d/c end of the week pending improvement. Pt was Covid+ on 10/31 and fully vaccinated with booster. PT/OT rec'd SNF. MD states Pt's daughter prefers SNF placement in [place] near family.     SW followed up with Pt's daughter (Lynne: [phone]), who Pt states is his POA and lives in [place]. Lynne is agreeable with SNF placement, but prefers a facility in the [place] area as Pt's sister resides there.    SW spoke with Pt via room telephone, who is agreeable with SNF placement but does not want to go to [place] at this time. MD made aware. Referrals sent and awaiting responses. SW to remain available for emotional support and d/c planning.    LAURENT Howard [id number]

## 2021-11-02 NOTE — PROGRESS NOTES
Infectious Disease Progress Note    Patient Name: Eulalio Gregg  MR#: [id number]  : 1945  Admission Date: 10/31/2021  Date: 21   Time: 12:59 PM   Author: Tico Adams MD        Antibiotics:    Anti-infectives (From admission, onward)    Start     Dose/Rate Route Frequency Ordered Stop    21 1330  cefTAZidime-avibactam (AVYCAZ) 1.25 g in sodium chloride 0.9 % 50 mL IVPB         1.25 g  25 mL/hr over 2 Hours intravenous Every 8 hours interval 21 1247            Subjective     Review of Systems    Still feels tired.  Does feel little bit better.  No new overnight events.  States he is cold but is not having any shaking chills.  No new urinary complaints.  Tolerated monoclonal antibody treatment which was administered yesterday    Objective     Vital Signs:    Vitals:    21 1120   BP: (!) 104/55   Pulse: (!) 58   Resp: 18   Temp:    SpO2: 98%       Temp (72hrs), Av.9 °C (98.4 °F), Min:36.1 °C (97 °F), Max:37.4 °C (99.4 °F)      Physical Exam:    General: alert, nontoxic   HEENT: NC/AT,anicteric sclera, no thrush  Neck: supple  Cardiovascular: regular S1/S2  Respiratory: clear to auscultation, no wheezing, rales or ronchi  GI/Abdomen: soft, +BS, NT/ND  Extremities: no clubbing, cyanosis or edema  Musculoskeletal: no acute arthritis  G.U.: No lesions, no suprapubic TTP or CVAT  Skin: warm and dry, no new rashes  Psych:cooperative with exam  Neuro: Moves all 4 extremities       Lines, Drains, Airways, Wounds:  Peripheral IV (Adult) 10/31/21 Right Hand (Active)   Number of days: 2       Labs:    CBC Results       11/02/21 11/01/21 10/31/21     0552 0315 1324    WBC 8.23 7.85 6.51    RBC 2.97 3.19 3.10    HGB 8.8 9.6 9.2    HCT 27.0 28.7 28.0    MCV 90.9 90.0 90.3    MCH 29.6 30.1 29.7    MCHC 32.6 33.4 32.9     156 120         Comment for PLT at 0315 on 21: RESULTS CHECKED    Comment for PLT at 1324 on 10/31/21: ALL RESULTS HAVE BEEN RECHECKED         CMP Results        11/02/21 11/01/21 10/31/21     0552 0315 1324     134 133    K 4.2 4.3 3.9    Cl 103 102 103    CO2 21 20 22    Glucose 80 102 122    BUN 16 21 28    Creatinine 1.1 1.2 1.5    Calcium 7.5 7.2 7.4    Anion Gap 14 12 8    AST -- -- 37    ALT -- -- 34    Albumin -- -- 2.0    EGFR >60.0 58.6 45.3            Imaging:    Radiology Imaging    XR CHEST 1 VW    Narrative  CLINICAL HISTORY: Weakness.    COMPARISON: 11/2/2015    COMMENT: AP view of the chest.    No significant interval change. Linear lung markings bilateral lung bases, not  significantly changed, probable atelectasis or scarring. Heart size within  normal limits. Mediastinal contours appear unremarkable. Osseous structures are  intact.    --    Impression  No significant interval change. Probable minimal atelectasis or  scarring bilateral lung bases.      MICROBIOLOGY :   Microbiology Results     Procedure Component Value Units Date/Time    SARS-CoV-2 (COVID-19), PCR Nasopharynx [[id number]]  (Abnormal) Collected: 10/31/21 1630    Specimen: Nasopharyngeal Swab from Nasopharynx Updated: 10/31/21 1812    Narrative:      The following orders were created for panel order SARS-CoV-2 (COVID-19), PCR Nasopharynx.  Procedure                               Abnormality         Status                     ---------                               -----------         ------                     SARS-CoV-2 (COVID-19), P...[[id number]]  Abnormal            Final result                 Please view results for these tests on the individual orders.    SARS-CoV-2 (COVID-19), PCR Nasopharynx [[id number]]  (Abnormal) Collected: 10/31/21 1630    Specimen: Nasopharyngeal Swab from Nasopharynx Updated: 10/31/21 1812     SARS-CoV-2 (COVID-19) Positive    Narrative:      Nursing instructions: Obtain nasopharyngeal swab ONLY. Send swab in viral transport media.    Blood Culture Blood, Venous [[id number]]  (Abnormal) Collected: 10/31/21 1615    Specimen: Blood, Venous Updated:  11/02/21 1136     Culture **Positive Culture**      Klebsiella pneumoniae ssp pneumoniae     Gram Stain Result Gram negative bacilli    Blood Culture Blood, Venous [[id number]]  (Abnormal) Collected: 10/31/21 1615    Specimen: Blood, Venous Updated: 11/02/21 0855     Culture **Positive Culture**     Gram Stain Result Gram negative bacilli    Blood Culture PCR Panel Blood, Venous [[id number]]  (Abnormal) Collected: 10/31/21 1615    Specimen: Blood, Venous Updated: 11/01/21 1300     Candida albicans Not Detected     Candida auris Not Detected     Candida glabrata Not Detected     Candida krusei Not Detected     Candida parapsilosis Not Detected     Candida tropicalis Not Detected     Cryptococcus neoformans/gattii Not Detected     Enterococcus faecalis Not Detected     Enterococcus faecium Not Detected     Enterobacter cloacae complex Not Detected     Escherichia coli Not Detected     Klebsiella oxytoca Not Detected     Klebsiella pneumoniae Detected     Klebsiella aerogenes Not Detected     Proteus Not Detected     Salmonella species Not Detected     Serratia marcescens Not Detected     Haemophilus influenzae Not Detected     Listeria monocytogenes Not Detected     Neisseria meningitidis Not Detected     Pseudomonas aeruginosa Not Detected     Stenotrophomonas maltophilia Not Detected     Bacteroides fragilis Not Detected     Staph (not aureus) Not Detected     Staphylococcus aureus Not Detected     Staphylococcus ludgnensis Not Detected     Staphylococcus epidermidis Not Detected     Streptococcus Not Detected     Streptococcus agalactiae (Group B) Not Detected     Streptococcus pneumoniae Not Detected     Streptococcus pyogenes (Group A) Not Detected     KPC (Carbapenem Resistance Gene) Detected     mecA/C Not Applicable     mecA/C and MREJ (MRSA) Not Applicable     Manuel/B (Vancomycin Resistance Gene) Not Applicable     CTX-M (ESBL) Not Detected     IMP Not Detected     mcr-1 Not Detected     NDM Not Detected      OXA-48-like Not Detected     VIM Not Detected     Comment: --    Urine culture Urine, Clean Catch [[id number]]  (Abnormal) Collected: 10/31/21 1439    Specimen: Urine, Clean Catch Updated: 11/02/21 0655     Urine Culture **Positive Culture**      >1 x 10^5 CFU/mL Klebsiella pneumoniae ESBL    Urine culture [[id number]]  (Abnormal)  (Susceptibility) Collected: 10/21/21 1531    Specimen: Urine, Clean Catch Updated: 10/23/21 1012     Urine Culture **Positive Culture**      >1 x 10^5 CFU/mL Klebsiella pneumoniae ESBL          Assessment   COVID-19 pneumonia  Status post Covid vaccine x2 ( Moderna)  UTI  Leukocytosis  Multiple myeloma  Estimated Creatinine Clearance: 44.5 mL/min (by C-G formula based on SCr of 1.1 mg/dL).  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  Klebsiella bacteremia-likely from  source           Plan   Remains stable from a respiratory standpoint  Tolerated monoclonal antibody therapy which was given 11/1/2021  Continue with Avycaz for multidrug-resistant organism in the urine  He remains afebrile, resolved leukocytosis  Blood cultures are positive for Klebsiella-follow-up susceptibilities  Urine culture also positive for Klebsiella  Tolerating antibiotics  Renal dosing of medications for abnormal creatinine clearance  Contact isolation for drug-resistant taya  Enhanced isolation precautions for COVID-19  Findings and plan reviewed with patient  We will monitor with you  Thank you for this consultation

## 2021-11-02 NOTE — PROGRESS NOTES
Patient:  Eulalio Gregg  [facility]  [place] [address] [facility]  MRN:  [id number]  Today's date:  11/2/2021    Pt found lying supine in bed; RN cleared for session.  Ended session with pt seated OOB in chair, posey alarm on, call bell/personal items within reach, VSS/NAD, RN aware of session/ pt status.  Eulalio is a 76 y.o. male admitted on 10/31/2021 with Weakness [R53.1]  Elevated serum creatinine [R79.89]  Urinary tract infection associated with catheterization of urinary tract, unspecified indwelling urinary catheter type, initial encounter (CMS/[facility]) [T83.511A, N39.0]. Principal problem is Weakness.    Past Medical History  Eulalio has a past medical history of Anemia, Blepharospasm, BPH (benign prostatic hyperplasia), COVID-19 (08/21/2021), History of vertigo (2016), Lipid disorder, and Tendency toward bleeding easily (CMS/[facility]).    History of Present Illness   Admitted with weakness, +COVID      OT Vitals    Date/Time Pulse SpO2 Pt Activity O2 Therapy BP Pt Position [facility]   11/02/21 1205 65 98 % At rest None (Room air) 110/56 Lying EMS   11/02/21 1206 82 98 % At rest None (Room air) 104/72 Sitting EMS      OT Pain    Date/Time Pain Type Rating: Rest Rating: Activity [facility]   11/02/21 1205 Pain Assessment 0 0 EMS          Prior Living Environment      Most Recent Value   Current Living Arrangements home   Living Environment Comment ML with spouse, 3STE with FF to bed/bath        Prior Level of Function      Most Recent Value   Ambulation independent   Transferring independent   Toileting independent   Bathing independent   Dressing independent   Eating independent   Prior Level of Function Comment pt reports ind PTA without use of AD,  started to use a SPC before admissions   Assistive Device Currently Used at Home cane, straight        Occupational Profile      Most Recent Value   Reason for Services/Referral COVID19+,  dispo recs   Occupational History/Life Experiences Retired,  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     Existing Precautions/Restrictions contact;droplet;fall   COVID19+    Limitations/Impairments safety/cognitive        Cognition/Psychosocial    Affect/Mental Status (Cognition) flat/blunted affect   odd affect    Orientation Status (Cognition) oriented x 4     Follows Commands (Cognition) follows multi-step commands;delayed response/completion;initiation impaired;repetition of directions required     Cognitive Function executive function deficit;attention deficit     Attention Deficit (Cognition) minimal deficit;concentration;focused/sustained attention     Executive Function Deficit (Cognition) minimal deficit;information processing;initiation     Safety Deficit (Cognition) minimal deficit;at risk behavior observed     Comment, Cognition Min cog deficits with odd affect        Hearing Assessment    Hearing Status WFL        Vision Assessment/Intervention    Visual Impairment/Limitations corrective lenses full-time        Sensory Assessment (Somatosensory)    Sensory Assessment (Somatosensory) UE sensation intact        Range of Motion (ROM)    Range of Motion bilateral upper extremities;ROM is WFL        Strength (Manual Muscle Testing)    Strength (Manual Muscle Testing) bilateral upper extremities;strength is WFL        Bed Mobility    [place], Supine to Sit close supervision;increased time to complete     Assistive Device head of bed elevated;bed rails     Comment (Bed Mobility) Increased time to perform        Sit to Stand Transfer    [place], Sit to Stand Transfer minimum assist  (75% or more patient effort);1 person assist     Verbal Cues hand placement;safety;technique        Stand to Sit Transfer    [place], Stand to Sit Transfer close supervision     Verbal Cues hand placement;technique;safety        Toilet Transfer    Transfer Technique sit-stand;stand-sit     [place], Toilet Transfer close supervision     Verbal Cues hand placement     Assistive Device grab bars/safety frame        Safety Issues, Functional Mobility    Comment, Safety Issues/Impairments (Mobility) Pt able to ambulate into bathroom and back. Increased time to perform, minAx1 for mobility without AD.        Balance    Static Sitting Balance WFL     Dynamic Sitting Balance WFL     Dynamic Standing Balance mild impairment     Balance Interventions occupation based/functional task        Motor Skills    Motor Skills neuro-muscular function     Functional Endurance Dec'd from baseline, mild-mod deficits. Increased time to perform tasks and increased fatigue     Neuromuscular Function bilateral;upper extremity   tremulous at rest and with activity       Lower Body Dressing    Tasks don;socks     [place] close supervision     Position supported sitting        Grooming    Self-Performance washes, rinses and dries hands     [place] close supervision     Position sink side;unsupported standing        Toileting    [place] perform bowel hygiene;perform bladder hygiene;minimum assist (75% or more patient effort);adjust/manage clothing     Position supported standing   unilateral UE support on sink    Comment Req'd unilateral support on sink, assist for clothing mngmnt. Increased time to perform.        BADL Safety/Performance    Impairments, BADL Safety/Performance balance;endurance/activity tolerance     Skilled BADL Treatment/Intervention BADL process/adaptation training        ADL Interventions    Self-Care (BADL) Promotion out of bed for BADLs encouraged   Left OOB in chair at end of session       AM-PAC  (TM) - ADL (Current Function)    Putting on and taking off regular lower body clothing? 3 - A Little     Bathing? 3 - A Little     Toileting? 3 - A Little     Putting on/taking off regular upper body clothing? 3 - A Little     How much help for taking care of personal grooming? 3 - A Little     Eating meals? 4 - None     AM-PAC (TM) ADL Score 19        Assessment/Plan (OT)    Daily Outcome Statement OT eval completed, pt overall Lucia-CLS for functional txfer and ADLs. Limited by dec'd endurance and functional strength. Cont OT in hospital setting.     Rehab Potential good, to achieve stated therapy goals     Therapy Frequency 3-5 times/wk     Planned Therapy Interventions BADL retraining;occupation/activity based interventions;activity tolerance training;functional balance retraining;strengthening exercise;transfer/mobility retraining               OT Assessment/Plan      Most Recent Value   OT Recommended Discharge Disposition skilled nursing facility  [SNF at this time/pending progress, hopeful for improvement and DC home] at 11/02/2021 1201   Anticipated Equipment Needs At Discharge (OT) other (see comments)  [TBD] at 11/02/2021 1201   Patient/Family Therapy Goal Statement To get better at 11/02/2021 1201                    Education Documentation  Activity, taught by Marilee Lee OT at 11/2/2021  2:40 PM.  Learner: Patient  Readiness: Acceptance  Method: Explanation  Response: Needs Reinforcement  Comment: OT POC, OTs role, ADLs, safety, benefits of OOB in chair          OT Goals      Most Recent Value   Bed Mobility Goal 1    Activity/Assistive Device bed mobility activities, all at 11/02/2021 1201   [place] independent at 11/02/2021 1201   Time Frame short-term goal (STG) at 11/02/2021 1201   Progress/Outcome goal ongoing at 11/02/2021 1201   Transfer Goal 1    Activity/Assistive Device all transfers at 11/02/2021 1201   [place] independent at 11/02/2021 1201   Time Frame short-term goal (STG)  at 11/02/2021 1201   Progress/Outcome goal ongoing at 11/02/2021 1201   Dressing Goal 1    Activity/Adaptive Equipment dressing skills, all at 11/02/2021 1201   [place] independent at 11/02/2021 1201   Time Frame short-term goal (STG) at 11/02/2021 1201   Progress/Outcome goal ongoing at 11/02/2021 1201   Toileting Goal 1    Activity/Assistive Device toileting skills, all at 11/02/2021 1201   [place] independent at 11/02/2021 1201   Time Frame short-term goal (STG) at 11/02/2021 1201   Progress/Outcome goal ongoing at 11/02/2021 1201   Grooming Goal 1    Activity/Assistive Device grooming skills, all at 11/02/2021 1201   [place] independent at 11/02/2021 1201   Time Frame short-term goal (STG) at 11/02/2021 1201   Progress/Outcome goal ongoing at 11/02/2021 1201

## 2021-11-02 NOTE — PLAN OF CARE
Problem: Adult Inpatient Plan of Care  Goal: Plan of Care Review  Outcome: Progressing  Flowsheets (Taken 11/2/2021 1440)  Progress: improving  Plan of Care Reviewed With: patient  Outcome Summary: OT eval completed, pt overall Lucia-CLS for functional txfer and ADLs. Limited by dec'd endurance and functional strength. Cont OT in hospital setting.

## 2021-11-03 PROBLEM — M79.89 SWELLING OF RIGHT UPPER EXTREMITY: Status: ACTIVE | Noted: 2021-11-03

## 2021-11-03 PROBLEM — N17.9 AKI (ACUTE KIDNEY INJURY) (CMS/HCC): Status: RESOLVED | Noted: 2021-10-31 | Resolved: 2021-11-03

## 2021-11-03 LAB
ANION GAP SERPL CALC-SCNC: 14 MEQ/L (ref 3–15)
BASOPHILS # BLD: 0.03 K/UL (ref 0.01–0.1)
BASOPHILS NFR BLD: 0.3 %
BUN SERPL-MCNC: 12 MG/DL (ref 8–20)
CALCIUM SERPL-MCNC: 7.5 MG/DL (ref 8.9–10.3)
CHLORIDE SERPL-SCNC: 102 MEQ/L (ref 98–109)
CO2 SERPL-SCNC: 22 MEQ/L (ref 22–32)
CREAT SERPL-MCNC: 1 MG/DL (ref 0.8–1.3)
DIFFERENTIAL METHOD BLD: ABNORMAL
EOSINOPHIL # BLD: 0.03 K/UL (ref 0.04–0.54)
EOSINOPHIL NFR BLD: 0.3 %
ERYTHROCYTE [DISTWIDTH] IN BLOOD BY AUTOMATED COUNT: 19.4 % (ref 11.6–14.4)
GFR SERPL CREATININE-BSD FRML MDRD: >60 ML/MIN/1.73M*2
GLUCOSE SERPL-MCNC: 85 MG/DL (ref 70–99)
HCT VFR BLDCO AUTO: 28.3 % (ref 40.1–51)
HGB BLD-MCNC: 9.1 G/DL (ref 13.7–17.5)
IMM GRANULOCYTES # BLD AUTO: 0.16 K/UL (ref 0–0.08)
IMM GRANULOCYTES NFR BLD AUTO: 1.7 %
LYMPHOCYTES # BLD: 0.91 K/UL (ref 1.2–3.5)
LYMPHOCYTES NFR BLD: 9.7 %
MCH RBC QN AUTO: 29.4 PG (ref 28–33.2)
MCHC RBC AUTO-ENTMCNC: 32.2 G/DL (ref 32.2–36.5)
MCV RBC AUTO: 91.6 FL (ref 83–98)
MONOCYTES # BLD: 1.09 K/UL (ref 0.3–1)
MONOCYTES NFR BLD: 11.7 %
NEUTROPHILS # BLD: 7.13 K/UL (ref 1.7–7)
NEUTS SEG NFR BLD: 76.3 %
NRBC BLD-RTO: 0 %
PDW BLD AUTO: 11.3 FL (ref 9.4–12.4)
PLATELET # BLD AUTO: 206 K/UL (ref 150–350)
POTASSIUM SERPL-SCNC: 4.8 MEQ/L (ref 3.6–5.1)
RBC # BLD AUTO: 3.09 M/UL (ref 4.5–5.8)
SODIUM SERPL-SCNC: 138 MEQ/L (ref 136–144)
WBC # BLD AUTO: 9.35 K/UL (ref 3.8–10.5)

## 2021-11-03 PROCEDURE — 63600000 HC DRUGS/DETAIL CODE: Performed by: STUDENT IN AN ORGANIZED HEALTH CARE EDUCATION/TRAINING PROGRAM

## 2021-11-03 PROCEDURE — 21400000 HC ROOM AND CARE CCU/INTERMEDIATE

## 2021-11-03 PROCEDURE — 80048 BASIC METABOLIC PNL TOTAL CA: CPT | Performed by: STUDENT IN AN ORGANIZED HEALTH CARE EDUCATION/TRAINING PROGRAM

## 2021-11-03 PROCEDURE — 99232 SBSQ HOSP IP/OBS MODERATE 35: CPT | Performed by: INTERNAL MEDICINE

## 2021-11-03 PROCEDURE — 36415 COLL VENOUS BLD VENIPUNCTURE: CPT | Performed by: STUDENT IN AN ORGANIZED HEALTH CARE EDUCATION/TRAINING PROGRAM

## 2021-11-03 PROCEDURE — 87040 BLOOD CULTURE FOR BACTERIA: CPT | Performed by: STUDENT IN AN ORGANIZED HEALTH CARE EDUCATION/TRAINING PROGRAM

## 2021-11-03 PROCEDURE — 63700000 HC SELF-ADMINISTRABLE DRUG: Performed by: STUDENT IN AN ORGANIZED HEALTH CARE EDUCATION/TRAINING PROGRAM

## 2021-11-03 PROCEDURE — 25800000 HC PHARMACY IV SOLUTIONS: Performed by: STUDENT IN AN ORGANIZED HEALTH CARE EDUCATION/TRAINING PROGRAM

## 2021-11-03 PROCEDURE — 85025 COMPLETE CBC W/AUTO DIFF WBC: CPT | Performed by: STUDENT IN AN ORGANIZED HEALTH CARE EDUCATION/TRAINING PROGRAM

## 2021-11-03 PROCEDURE — 63600000 HC DRUGS/DETAIL CODE: Mod: JG | Performed by: STUDENT IN AN ORGANIZED HEALTH CARE EDUCATION/TRAINING PROGRAM

## 2021-11-03 RX ADMIN — ROSUVASTATIN CALCIUM 10 MG: 10 TABLET, FILM COATED ORAL at 08:12

## 2021-11-03 RX ADMIN — HEPARIN SODIUM 5000 UNITS: 5000 INJECTION INTRAVENOUS; SUBCUTANEOUS at 21:01

## 2021-11-03 RX ADMIN — Medication 1000 UNITS: at 08:13

## 2021-11-03 RX ADMIN — Medication 1000 UNITS: at 20:49

## 2021-11-03 RX ADMIN — MOMETASONE FUROATE AND FORMOTEROL FUMARATE DIHYDRATE 2 PUFF: 200; 5 AEROSOL RESPIRATORY (INHALATION) at 20:49

## 2021-11-03 RX ADMIN — FERROUS SULFATE TAB 325 MG (65 MG ELEMENTAL FE) 325 MG: 325 (65 FE) TAB at 08:13

## 2021-11-03 RX ADMIN — MOMETASONE FUROATE AND FORMOTEROL FUMARATE DIHYDRATE 2 PUFF: 200; 5 AEROSOL RESPIRATORY (INHALATION) at 09:00

## 2021-11-03 RX ADMIN — ASPIRIN 81 MG: 81 TABLET, COATED ORAL at 08:12

## 2021-11-03 RX ADMIN — CEFTAZIDIME, AVIBACTAM 1.25 G: 2; .5 POWDER, FOR SOLUTION INTRAVENOUS at 05:59

## 2021-11-03 RX ADMIN — CEFTAZIDIME, AVIBACTAM 1.25 G: 2; .5 POWDER, FOR SOLUTION INTRAVENOUS at 14:58

## 2021-11-03 RX ADMIN — CYCLOSPORINE 1 DROP: 0.5 EMULSION OPHTHALMIC at 08:13

## 2021-11-03 RX ADMIN — TAMSULOSIN HYDROCHLORIDE 0.4 MG: 0.4 CAPSULE ORAL at 08:12

## 2021-11-03 RX ADMIN — HEPARIN SODIUM 5000 UNITS: 5000 INJECTION INTRAVENOUS; SUBCUTANEOUS at 14:58

## 2021-11-03 RX ADMIN — CYCLOSPORINE 1 DROP: 0.5 EMULSION OPHTHALMIC at 20:49

## 2021-11-03 RX ADMIN — CEFTAZIDIME, AVIBACTAM 1.25 G: 2; .5 POWDER, FOR SOLUTION INTRAVENOUS at 20:50

## 2021-11-03 RX ADMIN — HEPARIN SODIUM 5000 UNITS: 5000 INJECTION INTRAVENOUS; SUBCUTANEOUS at 05:59

## 2021-11-03 NOTE — PROGRESS NOTES
Internal Medicine  Daily Progress Note        SUBJECTIVE   This is a 76 y.o. year-old male admitted on 10/31/2021 with Weakness [R53.1]  Elevated serum creatinine [R79.89]  Urinary tract infection associated with catheterization of urinary tract, unspecified indwelling urinary catheter type, initial encounter (CMS/[facility]) [T83.511A, N39.0].    Interval History: Pt seen at bedside, still a litlte disoriented, resting comfortably.  With no complaints at this time.  Blood cultures positive again for GNRs.  Will follow up with ID.     OBJECTIVE   Vital signs in last 24 hours:  Temp:  [36.6 °C (97.8 °F)-37.2 °C (98.9 °F)] 36.7 °C (98.1 °F)  Heart Rate:  [56-82] 59  Resp:  [16-20] 20  BP: ()/(42-72) 102/42  SpO2:  [97 %-98 %] 97 %  Oxygen Therapy: None (Room air)    Weight & I/Os:  Weights (last 5 days)     Date/Time Weight    11/01/21 0614 65.7 kg (144 lb 13.5 oz)    10/31/21 1208 65.8 kg (145 lb)          Intake/Output Summary (Last 24 hours) at 11/3/2021 0659  Last data filed at 11/2/2021 1800  24 Hour Net Input/Output from 7AM Yesterday   Intake 480 ml   Output --   Net 480 ml     Telemetry/EKG:  Sinus rhythm     PHYSICAL EXAMINATION   Physical Exam  Constitutional:       Comments: Chronically ill-appearing thin, appears less confused than yesterday  HENT:      Head: Normocephalic and atraumatic.      Mouth/Throat:      Mouth: Mucous membranes are moist.   Eyes:      Extraocular Movements: Extraocular movements intact.      Pupils: Pupils are equal, round, and reactive to light.   Cardiovascular:      Rate and Rhythm: Normal rate and regular rhythm.   Pulmonary:      Effort: Pulmonary effort is normal. No respiratory distress.      Breath sounds: No rales.   Abdominal:      General: Abdomen is flat.      Palpations: Abdomen is soft.      Tenderness: There is no abdominal tenderness. There is no right CVA tenderness or left CVA tenderness.   Musculoskeletal:         General: Swelling present. Normal range of  motion.      Cervical back: Normal range of motion. No rigidity or tenderness. Swelling of RUE, no localizing erythema  Skin:     General: Skin is warm.   Neurological:      General: No focal deficit present. AAOx1-2     Mental Status: He is alert and oriented to person, place, and time.      LINES, CATHETERS, DRAINS, AIRWAYS, & WOUNDS   Lines, drains, airways, & wounds:  Peripheral IV (Adult) 10/31/21 Right Hand (Active)   Number of days: 3        LABS & IMAGING   Labs:  Results from last 7 days   Lab Units 11/03/21  0602 11/01/21  0315 10/31/21  1324   SODIUM mEQ/L 138   < > 133*   POTASSIUM mEQ/L 4.8   < > 3.9   CHLORIDE mEQ/L 102   < > 103   CO2 mEQ/L 22   < > 22   BUN mg/dL 12   < > 28*   CREATININE mg/dL 1.0   < > 1.5*   CALCIUM mg/dL 7.5*   < > 7.4*   ALBUMIN g/dL  --   --  2.0*   BILIRUBIN TOTAL mg/dL  --   --  1.1   ALK PHOS IU/L  --   --  82   ALT IU/L  --   --  34   AST IU/L  --   --  37   GLUCOSE mg/dL 85   < > 122*    < > = values in this interval not displayed.     Results from last 7 days   Lab Units 11/03/21  0602   WBC K/uL 9.35   HEMOGLOBIN g/dL 9.1*   HEMATOCRIT % 28.3*   PLATELETS K/uL 206         Results from last 7 days   Lab Units 11/03/21  0602 11/02/21 0552 11/01/21 0315   WBC K/uL 9.35 8.23 7.85   HEMOGLOBIN g/dL 9.1* 8.8* 9.6*   HEMATOCRIT % 28.3* 27.0* 28.7*   PLATELETS K/uL 206 182 156       Results from last 7 days   Lab Units 11/03/21  0602 11/02/21  0552 11/01/21  0315 10/31/21  1324 10/31/21  1324   SODIUM mEQ/L 138 138 134*   < > 133*   POTASSIUM mEQ/L 4.8 4.2 4.3   < > 3.9   CHLORIDE mEQ/L 102 103 102   < > 103   CO2 mEQ/L 22 21* 20*   < > 22   BUN mg/dL 12 16 21*   < > 28*   CREATININE mg/dL 1.0 1.1 1.2   < > 1.5*   CALCIUM mg/dL 7.5* 7.5* 7.2*   < > 7.4*   ALBUMIN g/dL  --   --   --   --  2.0*   BILIRUBIN TOTAL mg/dL  --   --   --   --  1.1   ALK PHOS IU/L  --   --   --   --  82   ALT IU/L  --   --   --   --  34   AST IU/L  --   --   --   --  37   GLUCOSE mg/dL 85 80 102*   < >  122*    < > = values in this interval not displayed.     Lab Results   Component Value Date    CA 9.0 08/22/2019     Lab Results   Component Value Date    PT 12.3 03/19/2019    INR 1.0 03/19/2019    PTT 28 03/19/2019       Microbiology Results     Procedure Component Value Units Date/Time    Urine culture Urine, Clean Catch [[id number]]  (Abnormal) Collected: 10/31/21 1439    Specimen: Urine, Clean Catch Updated: 11/03/21 0634     Urine Culture **Positive Culture**      >1 x 10^5 CFU/mL Klebsiella pneumoniae ESBL    Blood Culture Blood, Venous [[id number]]  (Abnormal) Collected: 11/01/21 1509    Specimen: Blood, Venous Updated: 11/02/21 2251     Culture **Positive Culture**     Gram Stain Result Gram negative bacilli    Blood Culture Blood, Venous [[id number]]  (Normal) Collected: 11/01/21 1509    Specimen: Blood, Venous Updated: 11/02/21 1801     Culture No growth at 18-24 hours    Blood Culture Blood, Venous [[id number]]  (Abnormal) Collected: 10/31/21 1615    Specimen: Blood, Venous Updated: 11/02/21 1136     Culture **Positive Culture**      Klebsiella pneumoniae ssp pneumoniae     Gram Stain Result Gram negative bacilli    Blood Culture Blood, Venous [[id number]]  (Abnormal) Collected: 10/31/21 1615    Specimen: Blood, Venous Updated: 11/02/21 0855     Culture **Positive Culture**     Gram Stain Result Gram negative bacilli    Blood Culture PCR Panel Blood, Venous [[id number]]  (Abnormal) Collected: 10/31/21 1615    Specimen: Blood, Venous Updated: 11/01/21 1300     Candida albicans Not Detected     Candida auris Not Detected     Candida glabrata Not Detected     Candida krusei Not Detected     Candida parapsilosis Not Detected     Candida tropicalis Not Detected     Cryptococcus neoformans/gattii Not Detected     Enterococcus faecalis Not Detected     Enterococcus faecium Not Detected     Enterobacter cloacae complex Not Detected     Escherichia coli Not Detected     Klebsiella oxytoca Not Detected     Klebsiella  pneumoniae Detected     Klebsiella aerogenes Not Detected     Proteus Not Detected     Salmonella species Not Detected     Serratia marcescens Not Detected     Haemophilus influenzae Not Detected     Listeria monocytogenes Not Detected     Neisseria meningitidis Not Detected     Pseudomonas aeruginosa Not Detected     Stenotrophomonas maltophilia Not Detected     Bacteroides fragilis Not Detected     Staph (not aureus) Not Detected     Staphylococcus aureus Not Detected     Staphylococcus ludgnensis Not Detected     Staphylococcus epidermidis Not Detected     Streptococcus Not Detected     Streptococcus agalactiae (Group B) Not Detected     Streptococcus pneumoniae Not Detected     Streptococcus pyogenes (Group A) Not Detected     KPC (Carbapenem Resistance Gene) Detected     mecA/C Not Applicable     mecA/C and MREJ (MRSA) Not Applicable     Manuel/B (Vancomycin Resistance Gene) Not Applicable     CTX-M (ESBL) Not Detected     IMP Not Detected     mcr-1 Not Detected     NDM Not Detected     OXA-48-like Not Detected     VIM Not Detected     Comment: --    SARS-CoV-2 (COVID-19), PCR Nasopharynx [[id number]]  (Abnormal) Collected: 10/31/21 1630    Specimen: Nasopharyngeal Swab from Nasopharynx Updated: 10/31/21 1812    Narrative:      The following orders were created for panel order SARS-CoV-2 (COVID-19), PCR Nasopharynx.  Procedure                               Abnormality         Status                     ---------                               -----------         ------                     SARS-CoV-2 (COVID-19), P...[[id number]]  Abnormal            Final result                 Please view results for these tests on the individual orders.    SARS-CoV-2 (COVID-19), PCR Nasopharynx [[id number]]  (Abnormal) Collected: 10/31/21 1630    Specimen: Nasopharyngeal Swab from Nasopharynx Updated: 10/31/21 1812     SARS-CoV-2 (COVID-19) Positive    Narrative:      Nursing instructions: Obtain nasopharyngeal swab ONLY. Send swab  in viral transport media.          Imaging personally reviewed (does not include unread studies):  CT HEAD WITHOUT IV CONTRAST    Result Date: 11/2/2021  IMPRESSION: 1. No acute intracranial hemorrhage, acute infarct in a major vascular territory or mass-effect. 2. Progression of ventriculomegaly which may be from central atrophy.  Normal pressure hydrocephalus can have a similar appearance. COMMENT: Axial noncontrast CT images of the head were obtained. Sagittal and coronal reconstructions were created. CT DOSE:  One or more dose reduction techniques (e.g. automated exposure control, adjustment of the mA and/or kV according to patient size, use of iterative reconstruction technique) utilized for this examination. Comparison:  No prior studies are available for comparison. Findings: Ventricles are enlarged, disproportionate to sulcal involutional changes and progressed from prior.  There are bilateral periventricular white matter lucencies which are nonspecific but compatible with microangiopathic change. There is no mass effect, midline shift, territorial infarction, acute hemorrhage or extra-axial fluid collection. Visualized paranasal sinuses and mastoid air cells are clear.    X-RAY CHEST 1 VIEW    Result Date: 10/31/2021  IMPRESSION: No significant interval change. Probable minimal atelectasis or scarring bilateral lung bases.     MEDS/DRIPS   Scheduled Meds:  • aspirin  81 mg oral Daily   • ceftazidime-avibactam  1.25 g intravenous q8h INT   • cholecalciferol (vitamin D3)  1,000 Units oral BID   • cycloSPORINE  1 drop Both Eyes BID   • ferrous sulfate  325 mg oral Every other day   • heparin (porcine)  5,000 Units subcutaneous q8h JENNYFER   • [Provider Managed Hold] lenalidomide  25 mg oral Daily   • mometasone-formoterol  2 puff inhalation BID   • rosuvastatin  10 mg oral Daily   • tamsulosin  0.4 mg oral Daily     Continuous Infusions:  PRN Meds:.atropine  •  glucose **OR** dextrose **OR** glucagon **OR**  dextrose in water  •  nitroglycerin     ASSESSMENT & PLAN   UTI (urinary tract infection)  Assessment & Plan  Uclx prior to admission c/w ESBL klebsiella MDRO including meropenem  Received 5 days bactrim PTP  U/A TNTC WBC, 3+ Bacteria, pos leuk esterase and nitrite  Lactate 2.1, Hypotensive with BP 90/54  No leukocytosis    - Continue with Amycaz for now per ID (day 3)  - Obtain US of kidneys, bladder, ureters to assess for obstruction  - ID following  - fu repeat UClx and Bclx; repeat blood cultures growing GNRs, thus repeat cultures  - pts BP runs chronically low; will hold off on further fluids at this time    Hyperlipidemia  Assessment & Plan  Continue home statin    Swelling of right upper extremity  Assessment & Plan  Right arm more swollen today  Painful to palpation but no localized erythema    - Obtain US RUE this am  - Continuing on abx for UTI and bacteremia    Multiple myeloma (CMS/[facility])  Assessment & Plan  Currently on chemotherapy outpatient  - c/w outpatient management       History of COVID-19  Assessment & Plan  No current signs or sx's of acute infection  Continue home Advair  Positive test on admission    - Receiving monoclonal ab therapy per ID    HFrEF (heart failure with reduced ejection fraction) (CMS/[facility])  Assessment & Plan  EF 40-45% on home lasix PRN  - appears euvolemic now  Recentrly saw Dr. Ellis who stopped his metoprolo and changes his lasix from daily to PRN due to hypotension   - can give lasix PRN  - daily standing weight and accurate I/Os   - Cardiology following    Anemia  Assessment & Plan  Presents with Hb 9/1 b/l 10  Has known MM on chemotherapy which likely explains this  - CBC daily  - c/w chemo outpatient  - transfuse Hb <7  - hold home iron supplementation given acute infection UTI    CAD (coronary artery disease)  Assessment & Plan  Has known 50% LAD occlusion  CW home statin and  ASA    BPH (benign prostatic hyperplasia)  Assessment & Plan  C/w home Flomax  Follws with  urology Dr. Mathew    * Weakness  Assessment & Plan  Likely 2/2 UTI  - PT/OT  - plan as per UTI       VTE Assessment: [place]    VTE Prophylaxis: Current anticoagulants:  heparin (porcine) 5,000 unit/mL injection 5,000 Units, subcutaneous, q8h JENNYFER      Code Status: Full Code  Estimated discharge date: 11/5/2021     ATTENDING DOCUMENTATION  ALSO SEE ATTENDING ATTESTATION SECTION OF NOTE

## 2021-11-03 NOTE — PROGRESS NOTES
Infectious Disease Progress Note    Patient Name: Eulalio Gregg  MR#: [id number]  : 1945  Admission Date: 10/31/2021  Date: 21   Time: 7:38 AM   Author: Tico Adams MD        Antibiotics:    Anti-infectives (From admission, onward)    Start     Dose/Rate Route Frequency Ordered Stop    21 1330  cefTAZidime-avibactam (AVYCAZ) 1.25 g in sodium chloride 0.9 % 50 mL IVPB         1.25 g  25 mL/hr over 2 Hours intravenous Every 8 hours interval 21 1247            Subjective     Review of Systems  Remains afebrile.  No new overnight events noted.  Denies shortness of breath or cough      Objective     Vital Signs:    Vitals:    21 0500   BP: (!) 105/51   Pulse: (!) 58   Resp: 20   Temp: 36.7 °C (98 °F)   SpO2: 98%       Temp (72hrs), Av.9 °C (98.4 °F), Min:36.1 °C (97 °F), Max:37.4 °C (99.4 °F)      Physical Exam:     General: alert, nontoxic   HEENT: NC/AT,anicteric sclera, no thrush  Neck: supple  Cardiovascular: regular S1/S2  Respiratory: clear to auscultation, no wheezing, rales or ronchi  GI/Abdomen: soft, +BS, NT/ND  Extremities: no clubbing, cyanosis or edema  Musculoskeletal: no acute arthritis  G.U.: No lesions, no suprapubic TTP or CVAT  Skin: warm and dry, no new rashes  Psych:cooperative with exam  Neuro: Moves all 4 extremities      Lines, Drains, Airways, Wounds:  Peripheral IV (Adult) 10/31/21 Right Hand (Active)   Number of days: 3       Labs:    CBC Results       21     0602 0552 0315    WBC 9.35 8.23 7.85    RBC 3.09 2.97 3.19    HGB 9.1 8.8 9.6    HCT 28.3 27.0 28.7    MCV 91.6 90.9 90.0    MCH 29.4 29.6 30.1    MCHC 32.2 32.6 33.4     182 156         Comment for PLT at 0315 on 21: RESULTS CHECKED         CMP Results       21     0602 0552 0315     138 134    K 4.8 4.2 4.3    Cl 102 103 102    CO2 22 21 20    Glucose 85 80 102    BUN 12 16 21    Creatinine 1.0 1.1 1.2    Calcium 7.5 7.5 7.2     Anion Gap 14 14 12    EGFR >60.0 >60.0 58.6            Imaging:    Radiology Imaging    XR CHEST 1 VW    Narrative  CLINICAL HISTORY: Weakness.    COMPARISON: 11/2/2015    COMMENT: AP view of the chest.    No significant interval change. Linear lung markings bilateral lung bases, not  significantly changed, probable atelectasis or scarring. Heart size within  normal limits. Mediastinal contours appear unremarkable. Osseous structures are  intact.    --    Impression  No significant interval change. Probable minimal atelectasis or  scarring bilateral lung bases.      MICROBIOLOGY :   Microbiology Results     Procedure Component Value Units Date/Time    Blood Culture Blood, Venous [[id number]]  (Abnormal) Collected: 11/01/21 1509    Specimen: Blood, Venous Updated: 11/02/21 2251     Culture **Positive Culture**     Gram Stain Result Gram negative bacilli    Blood Culture Blood, Venous [[id number]]  (Normal) Collected: 11/01/21 1509    Specimen: Blood, Venous Updated: 11/02/21 1801     Culture No growth at 18-24 hours    SARS-CoV-2 (COVID-19), PCR Nasopharynx [[id number]]  (Abnormal) Collected: 10/31/21 1630    Specimen: Nasopharyngeal Swab from Nasopharynx Updated: 10/31/21 1812    Narrative:      The following orders were created for panel order SARS-CoV-2 (COVID-19), PCR Nasopharynx.  Procedure                               Abnormality         Status                     ---------                               -----------         ------                     SARS-CoV-2 (COVID-19), P...[[id number]]  Abnormal            Final result                 Please view results for these tests on the individual orders.    SARS-CoV-2 (COVID-19), PCR Nasopharynx [[id number]]  (Abnormal) Collected: 10/31/21 1630    Specimen: Nasopharyngeal Swab from Nasopharynx Updated: 10/31/21 1812     SARS-CoV-2 (COVID-19) Positive    Narrative:      Nursing instructions: Obtain nasopharyngeal swab ONLY. Send swab in viral transport media.    Blood  Culture Blood, Venous [[id number]]  (Abnormal) Collected: 10/31/21 1615    Specimen: Blood, Venous Updated: 11/02/21 1136     Culture **Positive Culture**      Klebsiella pneumoniae ssp pneumoniae     Gram Stain Result Gram negative bacilli    Blood Culture Blood, Venous [[id number]]  (Abnormal) Collected: 10/31/21 1615    Specimen: Blood, Venous Updated: 11/02/21 0855     Culture **Positive Culture**     Gram Stain Result Gram negative bacilli    Blood Culture PCR Panel Blood, Venous [[id number]]  (Abnormal) Collected: 10/31/21 1615    Specimen: Blood, Venous Updated: 11/01/21 1300     Candida albicans Not Detected     Candida auris Not Detected     Candida glabrata Not Detected     Candida krusei Not Detected     Candida parapsilosis Not Detected     Candida tropicalis Not Detected     Cryptococcus neoformans/gattii Not Detected     Enterococcus faecalis Not Detected     Enterococcus faecium Not Detected     Enterobacter cloacae complex Not Detected     Escherichia coli Not Detected     Klebsiella oxytoca Not Detected     Klebsiella pneumoniae Detected     Klebsiella aerogenes Not Detected     Proteus Not Detected     Salmonella species Not Detected     Serratia marcescens Not Detected     Haemophilus influenzae Not Detected     Listeria monocytogenes Not Detected     Neisseria meningitidis Not Detected     Pseudomonas aeruginosa Not Detected     Stenotrophomonas maltophilia Not Detected     Bacteroides fragilis Not Detected     Staph (not aureus) Not Detected     Staphylococcus aureus Not Detected     Staphylococcus ludgnensis Not Detected     Staphylococcus epidermidis Not Detected     Streptococcus Not Detected     Streptococcus agalactiae (Group B) Not Detected     Streptococcus pneumoniae Not Detected     Streptococcus pyogenes (Group A) Not Detected     KPC (Carbapenem Resistance Gene) Detected     mecA/C Not Applicable     mecA/C and MREJ (MRSA) Not Applicable     Manuel/B (Vancomycin Resistance Gene) Not  Applicable     CTX-M (ESBL) Not Detected     IMP Not Detected     mcr-1 Not Detected     NDM Not Detected     OXA-48-like Not Detected     VIM Not Detected     Comment: --    Urine culture Urine, Clean Catch [[id number]]  (Abnormal) Collected: 10/31/21 1439    Specimen: Urine, Clean Catch Updated: 11/03/21 0634     Urine Culture **Positive Culture**      >1 x 10^5 CFU/mL Klebsiella pneumoniae ESBL    Urine culture [[id number]]  (Abnormal)  (Susceptibility) Collected: 10/21/21 1531    Specimen: Urine, Clean Catch Updated: 10/23/21 1012     Urine Culture **Positive Culture**      >1 x 10^5 CFU/mL Klebsiella pneumoniae ESBL          Assessment   COVID-19 pneumonia  UTD with COVID vaccine  UTI-complicated  Leukocytosis  Multiple myeloma  Estimated Creatinine Clearance: Estimated Creatinine Clearance: 49 mL/min (by C-G formula based on SCr of 1 mg/dL).  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  Klebsiella bacteremia-likely from  source         Plan   Repeat blood cultures have remained positive for gram-negative rods  Awaiting susceptibilities but prior positive blood cultures look consistent with organisms from the urine  Please recheck blood cultures x2  Recommend ultrasound to rule out  obstruction or collection given his sustained bacteremia  Continue with Avycaz day 3, recent urine culture with highly drug-resistant Klebsiella  Follow-up sensitivities from recent urine culture and blood cultures  Enhanced isolation precautions for Covid  Status post monoclonal antibody therapy  Clinically stable from a respiratory standpoint

## 2021-11-03 NOTE — PROGRESS NOTES
Cardiology Inpatient  Progress Note       SUBJECTIVE   This is a 76 y.o. year-old male admitted on 10/31/2021 with Weakness [R53.1]  Elevated serum creatinine [R79.89]  Urinary tract infection associated with catheterization of urinary tract, unspecified indwelling urinary catheter type, initial encounter (CMS/[facility]) [T83.511A, N39.0].    Interval History: He reports feeling OK.  Denies CP, SOB, orthopnea.   RN notes that he has been more confused.    A 14-point review of system was performed and was negative, or as documented above. 10 systems examined.   OBJECTIVE      Vital signs in last 24 hours:  Temp:  [36.6 °C (97.8 °F)-37.2 °C (98.9 °F)] 36.7 °C (98.1 °F)  Heart Rate:  [56-82] 59  Resp:  [16-20] 20  BP: ()/(42-72) 102/42      Intake/Output Summary (Last 24 hours) at 11/3/2021 0807  Last data filed at 11/2/2021 1800  Gross per 24 hour   Intake 480 ml   Output --   Net 480 ml     Weights (last 5 days)     Date/Time Weight    11/01/21 0614 65.7 kg (144 lb 13.5 oz)    10/31/21 1208 65.8 kg (145 lb)          PHYSICAL EXAMINATION      Physical Exam   General: Pleasant, weak, frail.  HEENT: No corneal arcus or xanthelasmas.  Sclerae are anicteric.  Nares patent.  Mucous membranes are slightly dry.  Neck: Supple.  JVP is 5 cm/H2O.  Carotids are equal with no audible bruits.  No lymphadenopathy or thyromegaly.  Heart: Regular.  Normal S1 and S2.  No S4. No S3. No murmur.  Chest: Symmetrical.  Lungs: Clear bilaterally without rales, wheezes nor rhonchi.  Abdomen: Soft, nontender.  No masses or bruits.  No organomegaly.  Normal bowel sounds.  Extremities: No cyanosis or clubbing. No edema.  Distal pulses are easily palpable.  Skin: Deeply tanned.  Warm and dry and well perfused.   Neurologic: Alert and oriented ×2-3 with occasional confusion and forgetfulness.  Cranial nerves II through XII are intact.  Psychiatric: normal mood, affect & judgment.   LABS / IMAGING / TELE/ MEDS      Labs  Results from last 7  days   Lab Units 11/03/21  0602 11/02/21  0552 11/01/21  0315 10/31/21  1324 10/31/21  1324   SODIUM mEQ/L 138 138 134*   < > 133*   POTASSIUM mEQ/L 4.8 4.2 4.3   < > 3.9   CHLORIDE mEQ/L 102 103 102   < > 103   CO2 mEQ/L 22 21* 20*   < > 22   BUN mg/dL 12 16 21*   < > 28*   CREATININE mg/dL 1.0 1.1 1.2   < > 1.5*   EGFR mL/min/1.73m*2 >60.0 >60.0 58.6*   < > 45.3*   CALCIUM mg/dL 7.5* 7.5* 7.2*   < > 7.4*   ALBUMIN g/dL  --   --   --   --  2.0*   BILIRUBIN TOTAL mg/dL  --   --   --   --  1.1   ALK PHOS IU/L  --   --   --   --  82   ALT IU/L  --   --   --   --  34   AST IU/L  --   --   --   --  37   GLUCOSE mg/dL 85 80 102*   < > 122*    < > = values in this interval not displayed.     Results from last 7 days   Lab Units 11/03/21  0602 11/02/21 0552 11/01/21 0315   WBC K/uL 9.35 8.23 7.85   HEMOGLOBIN g/dL 9.1* 8.8* 9.6*   HEMATOCRIT % 28.3* 27.0* 28.7*   PLATELETS K/uL 206 182 156      0   Lab Value Date/Time    INR 1.0 03/19/2019 1409     Lab Results   Component Value Date    CHOL 126 05/28/2021    TRIG 62 05/28/2021    HDL 43 (L) 05/28/2021    LDLCALC 71 05/28/2021     Results from last 7 days   Lab Units 10/31/21  1324   TROPONIN I ng/mL <0.03     Lab Results   Component Value Date    LACTATE 0.8 10/31/2021    LACTATE 2.1 (H) 10/31/2021       ECG/Telemetry  I have independently reviewed the telemetry. Significant findings include NSR, sinus chris 40s.    Imaging    Echocardiogram 08/23/2021: Performed at [facility]  Normal left ventricular cavity size. There is mild concentric left ventricular hypertrophy. There is mild global hypokinesis involving all segments of the left ventricle.  Mild left ventricular systolic dysfunction.  The ejection fraction estimate is 40-45%. Abnormal left ventricular diastolic function.  The left atrial volume is normal.   The right ventricle size is borderline enlarged. The right ventricular systolic function is normal.    Normal opening and closure of the aortic  valve. There is trace aortic regurgitation.  Grossly normal mitral valve.  Grossly normal tricuspid valve.  Trace tricuspid regurgitation.  Mean PA pressure is normal.  Normal IVC. Normal respiratory collapse.  No significant pericardial effusion.     Left heart catheterization 9/18/2019:  Mid LAD 50% stenosis at the level of a diagonal branch which also has 50% ostial stenosis. Both LAD and diagonal branches were iFR negative. Normal LVEDP.     Stress echocardiogram 6/25/2019:  1. Conclusion: Stress echo does not meet criteria for ischemia.  2. Baseline Echo: Normal left ventricular size and function. No wall motion abnormalities. Ejection fraction 65%. Normal left atrial size.  Mild mitral annular calcification. Trace mitral regurgitation. Tricuspid aortic valve. Mild aortic regurgitation. Normal right atrium. Normal  right ventricular size and function. Trace tricuspid regurgitation. The jet is insufficient to estimate right ventricular systolic pressure.  3. Post-Stress Echo: All walls become hyperdynamic. Ejection fraction improves.  4. Stress ECG does not meet criteria for ischemia.  5. Good exercise tolerance. Holman treadmill score is 8, associated with low risk for near-future cardiac events.      Home Medications:  •  ferrous sulfate, Take 65 mg by mouth daily with breakfast.  •  aspirin, Take 1 tablet (81 mg total) by mouth daily.  •  cholecalciferol (vitamin D3), Take 2,000 Units by mouth daily.  •  coenzyme Q10, Take by mouth daily.    •  compress.stocking,knee,reg,med, 1 each daily. Apply in AM and remove in PM.  •  fluticasone propion-salmeteroL, Inhale 1 puff 2 (two) times a day.  Rinse mouth with water after use to reduce aftertaste and incidence of candidiasis.  Do not swallow. For patients not on a ventilator, a spacer is recommended to be used with this medication/inhaler.  •  furosemide, Take 1 tablet (20 mg total) by mouth daily as needed (weight gain of 3 lbs in 1 day or 5 lbs in 1 week).  •   multivit-min/folic/vit K/lycop (MEN'S MULTIVITAMIN ORAL), Take by mouth daily.    •  REVLIMID, Take  25 mg daily     •  rosuvastatin, Take 1 tablet (10 mg total) by mouth daily.  •  sulfamethoxazole-trimethoprim, Take 1 tablet by mouth 2 (two) times a day.    •  tamsulosin, Take 0.4 mg by mouth 2 (two) times a day.      Scheduled Meds:  • aspirin  81 mg oral Daily   • ceftazidime-avibactam  1.25 g intravenous q8h INT   • cholecalciferol (vitamin D3)  1,000 Units oral BID   • cycloSPORINE  1 drop Both Eyes BID   • ferrous sulfate  325 mg oral Every other day   • heparin (porcine)  5,000 Units subcutaneous q8h JENNYFER   • [Provider Managed Hold] lenalidomide  25 mg oral Daily   • mometasone-formoterol  2 puff inhalation BID   • rosuvastatin  10 mg oral Daily   • tamsulosin  0.4 mg oral Daily          IMPRESSION/RECOMMENDATIONS:  1. UTI - He was hospitalized in August 2021 and again in September 2021. He was treated with a course of fosfomycin then Bactrim.  He was restarted on Bactrim as outpatient.  Urine this admission w ESBL MDRO.    ID recommends Avycaz and stop gentamicin.  2. Persistent Klebsiella bacteremia - ID is following.  Hemodynamically stable.  ID recommends US to rule  obstruction or collection. He has native valves therefore do not suspect valvular vegetation with gram negative bacteria.    3. Covid-19 pneumonia - He was hospitalized in August 2021. He had associated hypoxemic respiratory failure but fortunately did not require intubation.  He completed a course of Remdesivir.  He again tested positive this admission.  Unclear if new Covid-19 infection.  He is status post monoclonal antibodies.  ID following.   4. Altered mental status - CT head negative for infarct, mass or ICH; there was progression of ventriculomegaly.   5. Left ventricular systolic dysfunction - His echocardiogram in August 2021 during his acute illness showed mild left ventricular systolic dysfunction with an EF of 40-45%.  He was  started on metoprolol succinate 25 mg during his hospital stay however he has been significantly hypotensive and now with some bradycardia overnight.  I recommended that he remain off of it for now. Hold off on introducing an ACE-inhibitor or ARB in due to hypotension and recent acute renal insuffiency.  He was volume depleted on exam in the office.  He appears euvolemic on exam today.  I had stopped his daily loop diuretic.  Monitor Is and Os. Weigh daily.  I had discussed sodium restriction.  He will need a repeat echocardiogram in 3 months to reassess his LV systolic function.    6. Hypotension - His blood pressure is improved.  I recommend that he remain off of metoprolol, doxazosin and daily loop diuretic.  I ask him to drink at least 48-64 ounces of fluid per day.   7. Lower extremity edema - This has resolved.  His albumin is low and I suspect that his edema is more due to decreased osmotic pressure. I recommended that he increase his protein intake with diet and nutritional supplements.  I also recommended compression stockings.  He should only use his furosemide as needed for weight gain of 3 lbs in 1 day or 5 lbs in 1 week.  8. Acute renal insuffiencey - He was volume depleted in the office due to poor oral intake and ongoing diuretic use.  I recommended that he increase his fluid intake and only use his diuretic as needed.    9. Moderate nonobstructive single vessel 50% mid LAD and 50% ostial D1 stenosis by catheterization 9/18/2019- he has no symptoms of angina pectoralis.  He should continue on aspirin and rosuvastatin therapy along with lifestyle modification.  Mild troponin elevation during acute illness due to demand ischemia.  No ischemic work up indicated at this time.  10. Exertional chest pain -in 2019 he was noting left-sided, graded 3-4/10 chest pain which resolved with rest.  Interestingly, however, he was doing a spinning class for 1 hour breaking a solid sweat and has absolutely no symptoms.   This has not recurred.  11. Hyperlipidemia -I reviewed his latest lipid profile outlined above.  I have asked him to stop atorvastatin and resume his rosuvastatin therapy.    12. Asthma -He is on Advair.  He follows with Suresh Cardoza.  13. MAC - This was noted on his stress echo which is outlined above.  14. Aortic insufficiency -this was graded mild on his stress echocardiogram and trace on his most recent echocardiogram in August 2021.  This is probably age-related changes to the aortic valve.  15. Bifascicular block with LAFB/RBBB-I previously reviewed this issue with him.  It is probably of no clinical consequence and will be followed with an annual EKG and he will report any symptoms of syncope to me.  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  He is on Revlimid.  17. BPH with urinary retention - He continue on Flomax. He was also on doxazosin but I instructed him to remain off of it until his blood pressure and lightheadedness improved.      CARSON Nation  11/3/2021

## 2021-11-03 NOTE — ASSESSMENT & PLAN NOTE
Right arm more swollen today  Painful to palpation but no localized erythema    - US RUE negative for DVT; RUE swelling improved  - Continuing on abx for UTI and bacteremia

## 2021-11-04 ENCOUNTER — APPOINTMENT (INPATIENT)
Dept: RADIOLOGY | Facility: HOSPITAL | Age: 76
DRG: 871 | End: 2021-11-04
Attending: STUDENT IN AN ORGANIZED HEALTH CARE EDUCATION/TRAINING PROGRAM
Payer: MEDICARE

## 2021-11-04 ENCOUNTER — APPOINTMENT (INPATIENT)
Dept: RADIOLOGY | Facility: HOSPITAL | Age: 76
DRG: 871 | End: 2021-11-04
Attending: UROLOGY
Payer: MEDICARE

## 2021-11-04 PROBLEM — D64.9 ANEMIA: Chronic | Status: ACTIVE | Noted: 2021-10-31

## 2021-11-04 PROBLEM — U07.1 COVID-19: Status: ACTIVE | Noted: 2021-10-31

## 2021-11-04 PROBLEM — R78.81 BACTEREMIA DUE TO KLEBSIELLA PNEUMONIAE: Status: ACTIVE | Noted: 2021-11-04

## 2021-11-04 PROBLEM — I25.10 CAD (CORONARY ARTERY DISEASE): Chronic | Status: ACTIVE | Noted: 2021-10-31

## 2021-11-04 PROBLEM — B96.1 BACTEREMIA DUE TO KLEBSIELLA PNEUMONIAE: Status: ACTIVE | Noted: 2021-11-04

## 2021-11-04 PROBLEM — C90.00 MULTIPLE MYELOMA (CMS/HCC): Chronic | Status: ACTIVE | Noted: 2021-10-31

## 2021-11-04 PROBLEM — E78.5 HYPERLIPIDEMIA: Chronic | Status: ACTIVE | Noted: 2019-09-18

## 2021-11-04 LAB
ANION GAP SERPL CALC-SCNC: 13 MEQ/L (ref 3–15)
BACTERIA BLD CULT: ABNORMAL
BACTERIA BLD CULT: ABNORMAL
BACTERIA UR CULT: ABNORMAL
BACTERIA UR CULT: ABNORMAL
BASOPHILS # BLD: 0.04 K/UL (ref 0.01–0.1)
BASOPHILS NFR BLD: 0.4 %
BUN SERPL-MCNC: 10 MG/DL (ref 8–20)
CALCIUM SERPL-MCNC: 7.2 MG/DL (ref 8.9–10.3)
CHLORIDE SERPL-SCNC: 101 MEQ/L (ref 98–109)
CO2 SERPL-SCNC: 22 MEQ/L (ref 22–32)
CREAT SERPL-MCNC: 1 MG/DL (ref 0.8–1.3)
DIFFERENTIAL METHOD BLD: ABNORMAL
EOSINOPHIL # BLD: 0.02 K/UL (ref 0.04–0.54)
EOSINOPHIL NFR BLD: 0.2 %
ERYTHROCYTE [DISTWIDTH] IN BLOOD BY AUTOMATED COUNT: 19.3 % (ref 11.6–14.4)
GFR SERPL CREATININE-BSD FRML MDRD: >60 ML/MIN/1.73M*2
GLUCOSE SERPL-MCNC: 76 MG/DL (ref 70–99)
GRAM STN SPEC: ABNORMAL
HCT VFR BLDCO AUTO: 28.6 % (ref 40.1–51)
HGB BLD-MCNC: 9.2 G/DL (ref 13.7–17.5)
IMM GRANULOCYTES # BLD AUTO: 0.13 K/UL (ref 0–0.08)
IMM GRANULOCYTES NFR BLD AUTO: 1.3 %
LYMPHOCYTES # BLD: 1.12 K/UL (ref 1.2–3.5)
LYMPHOCYTES NFR BLD: 11.2 %
MAGNESIUM SERPL-MCNC: 1.9 MG/DL (ref 1.8–2.5)
MCH RBC QN AUTO: 30 PG (ref 28–33.2)
MCHC RBC AUTO-ENTMCNC: 32.2 G/DL (ref 32.2–36.5)
MCV RBC AUTO: 93.2 FL (ref 83–98)
MONOCYTES # BLD: 1.12 K/UL (ref 0.3–1)
MONOCYTES NFR BLD: 11.2 %
NEUTROPHILS # BLD: 7.53 K/UL (ref 1.7–7)
NEUTS SEG NFR BLD: 75.7 %
NRBC BLD-RTO: 0 %
PDW BLD AUTO: 10.8 FL (ref 9.4–12.4)
PLATELET # BLD AUTO: 247 K/UL (ref 150–350)
POTASSIUM SERPL-SCNC: 4.9 MEQ/L (ref 3.6–5.1)
RBC # BLD AUTO: 3.07 M/UL (ref 4.5–5.8)
SODIUM SERPL-SCNC: 136 MEQ/L (ref 136–144)
WBC # BLD AUTO: 9.96 K/UL (ref 3.8–10.5)

## 2021-11-04 PROCEDURE — 76770 US EXAM ABDO BACK WALL COMP: CPT

## 2021-11-04 PROCEDURE — 63600000 HC DRUGS/DETAIL CODE: Mod: JG | Performed by: STUDENT IN AN ORGANIZED HEALTH CARE EDUCATION/TRAINING PROGRAM

## 2021-11-04 PROCEDURE — 63600105 HC IODINE BASED CONTRAST: Performed by: UROLOGY

## 2021-11-04 PROCEDURE — 83735 ASSAY OF MAGNESIUM: CPT | Performed by: STUDENT IN AN ORGANIZED HEALTH CARE EDUCATION/TRAINING PROGRAM

## 2021-11-04 PROCEDURE — 99231 SBSQ HOSP IP/OBS SF/LOW 25: CPT | Performed by: INTERNAL MEDICINE

## 2021-11-04 PROCEDURE — G1004 CDSM NDSC: HCPCS

## 2021-11-04 PROCEDURE — 63600000 HC DRUGS/DETAIL CODE: Performed by: STUDENT IN AN ORGANIZED HEALTH CARE EDUCATION/TRAINING PROGRAM

## 2021-11-04 PROCEDURE — 80048 BASIC METABOLIC PNL TOTAL CA: CPT | Performed by: STUDENT IN AN ORGANIZED HEALTH CARE EDUCATION/TRAINING PROGRAM

## 2021-11-04 PROCEDURE — 93971 EXTREMITY STUDY: CPT | Mod: RT

## 2021-11-04 PROCEDURE — 25800000 HC PHARMACY IV SOLUTIONS: Performed by: STUDENT IN AN ORGANIZED HEALTH CARE EDUCATION/TRAINING PROGRAM

## 2021-11-04 PROCEDURE — 36415 COLL VENOUS BLD VENIPUNCTURE: CPT | Performed by: STUDENT IN AN ORGANIZED HEALTH CARE EDUCATION/TRAINING PROGRAM

## 2021-11-04 PROCEDURE — 97116 GAIT TRAINING THERAPY: CPT | Mod: GP

## 2021-11-04 PROCEDURE — 97530 THERAPEUTIC ACTIVITIES: CPT | Mod: GO

## 2021-11-04 PROCEDURE — 85025 COMPLETE CBC W/AUTO DIFF WBC: CPT | Performed by: STUDENT IN AN ORGANIZED HEALTH CARE EDUCATION/TRAINING PROGRAM

## 2021-11-04 PROCEDURE — 21400000 HC ROOM AND CARE CCU/INTERMEDIATE

## 2021-11-04 PROCEDURE — 63700000 HC SELF-ADMINISTRABLE DRUG: Performed by: STUDENT IN AN ORGANIZED HEALTH CARE EDUCATION/TRAINING PROGRAM

## 2021-11-04 RX ADMIN — Medication 1000 UNITS: at 08:42

## 2021-11-04 RX ADMIN — TAMSULOSIN HYDROCHLORIDE 0.4 MG: 0.4 CAPSULE ORAL at 08:42

## 2021-11-04 RX ADMIN — CYCLOSPORINE 1 DROP: 0.5 EMULSION OPHTHALMIC at 20:10

## 2021-11-04 RX ADMIN — CYCLOSPORINE 1 DROP: 0.5 EMULSION OPHTHALMIC at 08:42

## 2021-11-04 RX ADMIN — MOMETASONE FUROATE AND FORMOTEROL FUMARATE DIHYDRATE 2 PUFF: 200; 5 AEROSOL RESPIRATORY (INHALATION) at 08:33

## 2021-11-04 RX ADMIN — HEPARIN SODIUM 5000 UNITS: 5000 INJECTION INTRAVENOUS; SUBCUTANEOUS at 05:42

## 2021-11-04 RX ADMIN — MOMETASONE FUROATE AND FORMOTEROL FUMARATE DIHYDRATE 2 PUFF: 200; 5 AEROSOL RESPIRATORY (INHALATION) at 20:10

## 2021-11-04 RX ADMIN — ROSUVASTATIN CALCIUM 10 MG: 10 TABLET, FILM COATED ORAL at 08:42

## 2021-11-04 RX ADMIN — HEPARIN SODIUM 5000 UNITS: 5000 INJECTION INTRAVENOUS; SUBCUTANEOUS at 13:42

## 2021-11-04 RX ADMIN — Medication 1000 UNITS: at 20:10

## 2021-11-04 RX ADMIN — CEFTAZIDIME, AVIBACTAM 1.25 G: 2; .5 POWDER, FOR SOLUTION INTRAVENOUS at 20:11

## 2021-11-04 RX ADMIN — HEPARIN SODIUM 5000 UNITS: 5000 INJECTION INTRAVENOUS; SUBCUTANEOUS at 22:12

## 2021-11-04 RX ADMIN — CEFTAZIDIME, AVIBACTAM 1.25 G: 2; .5 POWDER, FOR SOLUTION INTRAVENOUS at 13:43

## 2021-11-04 RX ADMIN — CEFTAZIDIME, AVIBACTAM 1.25 G: 2; .5 POWDER, FOR SOLUTION INTRAVENOUS at 05:42

## 2021-11-04 RX ADMIN — ASPIRIN 81 MG: 81 TABLET, COATED ORAL at 08:42

## 2021-11-04 RX ADMIN — IOHEXOL 120 ML: 350 INJECTION, SOLUTION INTRAVENOUS at 12:32

## 2021-11-04 ASSESSMENT — COGNITIVE AND FUNCTIONAL STATUS - GENERAL
EATING MEALS: 4 - NONE
HELP NEEDED FOR BATHING: 3 - A LITTLE
AFFECT: FLAT/BLUNTED AFFECT;CONFUSED
WALKING IN HOSPITAL ROOM: 3 - A LITTLE
DRESSING REGULAR LOWER BODY CLOTHING: 3 - A LITTLE
MOVING TO AND FROM BED TO CHAIR: 3 - A LITTLE
DRESSING REGULAR UPPER BODY CLOTHING: 3 - A LITTLE
TOILETING: 3 - A LITTLE
STANDING UP FROM CHAIR USING ARMS: 3 - A LITTLE
CLIMB 3 TO 5 STEPS WITH RAILING: 3 - A LITTLE
HELP NEEDED FOR PERSONAL GROOMING: 3 - A LITTLE
AFFECT: FLAT/BLUNTED AFFECT

## 2021-11-04 NOTE — PLAN OF CARE
Problem: Adult Inpatient Plan of Care  Goal: Plan of Care Review  Outcome: Progressing  Flowsheets (Taken 11/4/2021 1242)  Progress: no change  Plan of Care Reviewed With:   patient   daughter     Per information in medical rounds, Pt is medically stable for d/c next week pending improvement. Pt still with positive blood cultures and receiving IV abx. Pt was Covid+ on 10/31 and fully vaccinated with booster. PT/OT evaluated and rec'd SNF. SW in communication with Pt and daughter ([facility]: [phone]), who are agreeable with SNF placement.     Pt's daughter originally preferred SNF in the [place] area for Pt to be near his sister. SW in contact with Pt, who prefers SNF near the hospital and area near his home. SW received call from [facility] this morning stating she spoke with Pt and they both now prefer SNF in this area instead of [place]. Referrals sent to Covid+ SNFs and awaiting responses. SW to remain available for emotional support and d/c planning.    LAURENT Howard [id number]

## 2021-11-04 NOTE — DISCHARGE SUMMARY
Internal Medicine  Inpatient Discharge Summary        BRIEF OVERVIEW   Admitting Provider: Ralph Ayala MD  Attending Provider: Otto Dennison MD Attending phys phone: [phone]    PCP: Otto Dennison [phone]    Admission Date: 10/31/2021  Discharge Date: 11/11/2021     DISCHARGE DIAGNOSES      Primary Discharge Diagnosis  Bacteremia due to Klebsiella pneumoniae    Secondary Discharge Diagnoses  Active Hospital Problems    Diagnosis Date Noted   • Sepsis due to Klebsiella pneumoniae (CMS/HCC) 11/05/2021     Priority: High   • Bacteremia due to Klebsiella pneumoniae 11/04/2021     Priority: High   • Hyperlipidemia 09/18/2019     Priority: High   • Weakness 10/31/2021   • UTI (urinary tract infection) 10/31/2021   • CAD (coronary artery disease) 10/31/2021   • Anemia 10/31/2021   • Chronic systolic heart failure (CMS/HCC) 10/31/2021   • COVID-19 10/31/2021   • Multiple myeloma (CMS/HCC) 10/31/2021   • BPH (benign prostatic hyperplasia)       Resolved Hospital Problems    Diagnosis Date Noted Date Resolved   • Swelling of right upper extremity 11/03/2021 11/10/2021   • TRA (acute kidney injury) (CMS/HCC) 10/31/2021 11/03/2021       Active Problem List on Day of Discharge  Patient Active Problem List   Diagnosis   • BPH (benign prostatic hyperplasia)   • RBBB   • Hyperlipidemia   • Weakness   • UTI (urinary tract infection)   • CAD (coronary artery disease)   • Anemia   • Chronic systolic heart failure (CMS/HCC)   • COVID-19   • Multiple myeloma (CMS/HCC)   • Bacteremia due to Klebsiella pneumoniae   • Sepsis due to Klebsiella pneumoniae (CMS/HCC)     SUMMARY OF HOSPITALIZATION      Presenting Problem/History of Present Illness  This is a 76 y.o. year-old male admitted on 10/31/2021 with Weakness [R53.1]  Elevated serum creatinine [R79.89]  Urinary tract infection associated with catheterization of urinary tract, unspecified indwelling urinary catheter type, initial encounter (CMS/HCC)  [T83.511A, N39.0].    Hospital Course  This is a 76 y.o. male with a past medical history of HFrEF 40-45%, CAD, HTN, HLP, MM, BPH,  who presents with generalized weakness and fatigue and outpatient diagnosed UTI.  In the ED, his blood pressure was 103/54, went down to 90/54.  Heart rate 71, afebrile, satting well on room air.  He received 1 dose of meropenem and 500 cc of fluids.  Urine cultures and blood cultures were sent    UTI (urinary tract infection)  Assessment & Plan  Uclx prior to admission c/w ESBL klebsiella MDRO including meropenem  Received 5 days bactrim PTP  U/A TNTC WBC, 3+ Bacteria, pos leuk esterase and nitrite  Lactate 2.1.   No leukocytosis; 2 sets of blood cultures positive for Klebsiella; 3rd set negative  - Treated with Avycaz per ID (requires 14 day course from time of negative culture 11/3)  - Midline placed  - Needs ID follow up in 1 week and needs CMP and CBC repeated in 1 week  - IVF provided PRN  - Given persistent bacteremia Urology consulted to look for structural cause; US showed mild hydro, thus serrano placed and CT Urogram obtained: There is a bilateral large extrarenal pelvis bilaterally, right greater than left with segmental dilatation of ectatic ureters but no obstructing mass or calcification. There are numerous small parenchymal lesions in both kidneys, many small and difficult to characterize. 2 more prominent areas of subtle decreased attenuation on the right are noted and subtle masses or lesion certainly not excluded. There is a distended urinary bladder with numerous diverticula. Intraluminal mass at the left base is noted uncertain whether an extension of the prostate or separate intraluminal lesion, further evaluation advised.   - Will be DC with serrano and follow-up with Dr. Mathew as an outpatient when he can undergo void trial and discuss further treatments.     Swelling of right upper extremity  Assessment & Plan  Right arm more swollen when inpatient  Painful to  palpation but no localized erythema  - US RUE negative for DVT; RUE swelling and pain improved     Multiple myeloma (CMS/HCC)  Assessment & Plan         Currently on chemotherapy outpatient        - Curb side heme onc: hold revlimid iso infection, he will f/u with his oncologist            after DC    COVID-19  Assessment & Plan  No current signs or sx's of acute infection; + on 10/31  Continue home Advair  - Received monoclonal ab therapy per ID     Chronic systolic heart failure (CMS/HCC)  Assessment & Plan  EF 40-45% on home lasix PRN. His BP runs low, usually SBP 80-90s.   - Euvolemic during hospitalization  Recentrly saw Dr. Ellis who stopped his metoprolol and changed his lasix from daily to PRN due to hypotension   - Lasix PRN  - daily standing weight and accurate I/Os   - Cardiology followed     Anemia  Assessment & Plan  Presents with Hb 9/1 b/l 10  Has known MM on chemotherapy which likely explains this  - CBC daily  - transfuse Hb <7     CAD (coronary artery disease)  Assessment & Plan  Has known 50% LAD occlusion  CW home statin and  ASA     Weakness  Assessment & Plan  Likely 2/2 UTI  - PT/OT  - plan as per UTI     Hyperlipidemia  Assessment & Plan  Continue home statin     BPH (benign prostatic hyperplasia)  Assessment & Plan  C/w home Flomax  Follws with urology Dr. Quincy Mancia placed inpatient; needs Uro follow up    Exam on Day of Discharge  Physical Exam    Consults During Admission  IP CONSULT TO INFECTIOUS DISEASE  IP CONSULT TO UROLOGY  IP CONSULT TO IV TEAM    DISCHARGE MEDICATIONS   New, changed, or stopped medications from this admission:       Medication List      CONTINUE taking these medications    compress.stocking,knee,reg,med misc  1 each daily. Apply in AM and remove in PM.  Dose: 1 each        ASK your doctor about these medications    ASPIR-81 81 mg enteric coated tablet  Take 1 tablet (81 mg total) by mouth daily.  Dose: 81 mg  Generic drug: aspirin     coenzyme Q10 200 mg  capsule  Commonly known as: COQ10  Take by mouth daily.     ferrous sulfate 325 mg (65 mg iron) tablet  Take 65 mg by mouth daily with breakfast.  Dose: 65 mg     fluticasone propion-salmeteroL 250-50 mcg/dose diskus inhaler  Commonly known as: ADVAIR DISKUS  Inhale 1 puff 2 (two) times a day.   Rinse mouth with water after use to reduce aftertaste and incidence of candidiasis.   Do not swallow.  For patients not on a ventilator, a spacer is recommended to be used with this medication/inhaler.  Dose: 1 puff     furosemide 20 mg tablet  Commonly known as: LASIX  Take 1 tablet (20 mg total) by mouth daily as needed (weight gain of 3 lbs in 1 day or 5 lbs in 1 week).  Dose: 20 mg     MEN'S MULTIVITAMIN ORAL  Take by mouth daily.     REVLIMID 25 mg capsule  Take  25 mg daily  Dose: 25 mg  Generic drug: lenalidomide     rosuvastatin 10 mg tablet  Commonly known as: CRESTOR  Take 1 tablet (10 mg total) by mouth daily.  Dose: 10 mg     sulfamethoxazole-trimethoprim 800-160 mg per tablet  Commonly known as: BACTRIM DS  Take 1 tablet by mouth 2 (two) times a day.  Dose: 1 tablet     tamsulosin 0.4 mg capsule  Commonly known as: FLOMAX  Take 0.4 mg by mouth 2 (two) times a day.  Dose: 0.4 mg     VITAMIN D3 50 mcg (2,000 unit) capsule  Take 2,000 Units by mouth daily.  Dose: 2,000 Units  Generic drug: cholecalciferol (vitamin D3)            Non-Medication orders at discharge:   {Discharge non-med orders (select AFTER ordering discharge orders):79990}          PROCEDURES / LABS / IMAGING      Operative Procedures  ***    Other Procedures  {procedures:57041}    Pertinent Labs  {diagnostics:90555}    Pertinent Imaging  CT HEAD WITHOUT IV CONTRAST    Result Date: 11/2/2021  IMPRESSION: 1. No acute intracranial hemorrhage, acute infarct in a major vascular territory or mass-effect. 2. Progression of ventriculomegaly which may be from central atrophy.  Normal pressure hydrocephalus can have a similar appearance. COMMENT: Axial  noncontrast CT images of the head were obtained. Sagittal and coronal reconstructions were created. CT DOSE:  One or more dose reduction techniques (e.g. automated exposure control, adjustment of the mA and/or kV according to patient size, use of iterative reconstruction technique) utilized for this examination. Comparison:  No prior studies are available for comparison. Findings: Ventricles are enlarged, disproportionate to sulcal involutional changes and progressed from prior.  There are bilateral periventricular white matter lucencies which are nonspecific but compatible with microangiopathic change. There is no mass effect, midline shift, territorial infarction, acute hemorrhage or extra-axial fluid collection. Visualized paranasal sinuses and mastoid air cells are clear.    ULTRASOUND KIDNEYS / BLADDER    Result Date: 11/4/2021  IMPRESSION: 1.  Mild left hydronephrosis 2.  Complex cyst versus solid mass lower pole right kidney measuring up to 3.2 cm. Contrast-enhanced renal mass protocol CT or MRI is recommended for further evaluation. 3.  Debris within the bladder     X-RAY CHEST 1 VIEW    Result Date: 10/31/2021  IMPRESSION: No significant interval change. Probable minimal atelectasis or scarring bilateral lung bases.    Ultrasound venous arm right    Result Date: 11/4/2021  IMPRESSION: No upper extremity deep vein thrombosis.     CT UROGRAM WITH AND WITHOUT IV CONTRAST    Result Date: 11/4/2021  IMPRESSION: There is a bilateral large extrarenal pelvis bilaterally, right greater than left with segmental dilatation of ectatic ureters but no obstructing mass or calcification. There are numerous small parenchymal lesions in both kidneys, many small and difficult to characterize. 2 more prominent areas of subtle decreased attenuation on the right are noted and subtle masses or lesion certainly not excluded. There is a distended urinary bladder with numerous diverticula. Intraluminal mass at the left base is noted  uncertain whether an extension of the prostate or separate intraluminal lesion, further evaluation advised. Multiple hepatic lesions are noted, many too small to characterize, the larger to reflect cysts. See comment above for delineation of findings. Results were made available to the clinical service at time of study dictation. In accordance with [place] Act 112,  the patient will receive a letter notifying them to follow up with their physician.      OUTPATIENT  FOLLOW-UP / REFERRALS / PENDING TESTS      Outpatient Follow-Up Appointments            In 2 months [facility]    In 2 months [facility]          Referrals  No orders of the defined types were placed in this encounter.      Test Results Pending at Discharge  Unresulted Labs (From admission, onward)             Start     Ordered    11/04/21 0600  Magnesium  Daily      Question:  Release to patient  Answer:  Immediate   Start Status   11/05/21 0600 Scheduled   11/06/21 0600 Scheduled   11/07/21 0600 Scheduled       11/03/21 1558    11/01/21 0600  CBC and Differential  Daily      Question:  Release to patient  Answer:  Immediate   Start Status   11/05/21 0600 Scheduled   11/06/21 0600 Scheduled   11/07/21 0600 Scheduled       10/31/21 1656    11/01/21 0600  Basic metabolic panel  Daily      Question:  Release to patient  Answer:  Immediate   Start Status   11/05/21 0600 Scheduled   11/06/21 0600 Scheduled   11/07/21 0600 Scheduled       10/31/21 1656                Important Issues to Address in Follow-Up  ***  DISCHARGE DISPOSITION      Disposition:      Code Status At Discharge: Full Code    Physician Order for Life-Sustaining Treatment Document Status      No documents found                 ATTENDING DOCUMENTATION  ALSO SEE ATTENDING ATTESTATION SECTION OF NOTE

## 2021-11-04 NOTE — PROGRESS NOTES
Patient:  Eulalio Gregg  Location:  [facility] [address]  MRN:  [id number]  Today's date:  11/4/2021    Pt found lying supine in bed; RN cleared for session.  Ended session with pt seated OOB in chair, posey alarm on, call bell/personal items within reach, VSS/NAD, RN aware of session/ pt status.  Eulalio is a 76 y.o. male admitted on 10/31/2021 with Weakness [R53.1]  Elevated serum creatinine [R79.89]  Urinary tract infection associated with catheterization of urinary tract, unspecified indwelling urinary catheter type, initial encounter (CMS/[facility]) [T83.511A, N39.0]. Principal problem is Weakness.    Past Medical History  Eulalio has a past medical history of Anemia, Blepharospasm, BPH (benign prostatic hyperplasia), COVID-19 (08/21/2021), History of vertigo (2016), Lipid disorder, and Tendency toward bleeding easily (CMS/[facility]).    History of Present Illness   Admitted with weakness, +COVID      OT Vitals    Date/Time Pulse SpO2 Pt Activity O2 Therapy [facility]   11/04/21 0907 65 95 % At rest None (Room air) CBK      OT Pain    Date/Time Pain Type Rating: Rest Rating: Activity [facility]   11/04/21 0907 Pain Assessment 0 - no pain 0 - no pain CBK          Prior Living Environment      Most Recent Value   Current Living Arrangements home   Living Environment Comment ML with spouse, 3STE with FF to bed/bath        Prior Level of Function      Most Recent Value   Ambulation independent   Transferring independent   Toileting independent   Bathing independent   Dressing independent   Eating independent   Prior Level of Function Comment pt reports ind PTA without use of AD,  started to use a SPC before admissions   Assistive Device Currently Used at Home cane, straight        Occupational Profile      Most Recent Value   Reason for Services/Referral COVID19+,  dispo recs   Occupational History/Life Experiences Retired,  Was a [employer]           OT Evaluation and Treatment - 11/04/21 0906        OT  "Time Calculation    Start Time 0906     Stop Time 0924     Time Calculation (min) 18 min        Session Details    Document Type daily treatment/progress note     Mode of Treatment occupational therapy        General Information    Patient Profile Reviewed yes     General Observations of Patient Pt lying supine in bed; agreeable to OT, RN cleared for session. PT Present for cotx.     Existing Precautions/Restrictions contact;droplet;fall   COVID19+    Limitations/Impairments safety/cognitive        Cognition/Psychosocial    Affect/Mental Status (Cognition) flat/blunted affect;confused     Orientation Status (Cognition) oriented to;person;place;time;situation   GROSSLY; confused with higher level questioning    Follows Commands (Cognition) follows one-step commands;increased processing time needed;repetition of directions required;verbal cues/prompting required;delayed response/completion     Cognitive Function executive function deficit;attention deficit;safety deficit     Attention Deficit (Cognition) minimal deficit;moderate deficit;focused/sustained attention;concentration;alternating attention     Executive Function Deficit (Cognition) minimal deficit;moderate deficit;information processing;insight/awareness of deficits;judgment;problem-solving/reasoning;planning/decision-making     Safety Deficit (Cognition) minimal deficit;moderate deficit;at risk behavior observed;safety precautions awareness;safety precautions follow-through/compliance;insight into deficits/self-awareness     Comment, Cognition Min-mod cog deficits noted, pt AAOx~4 however with increased confusion. Perseverative on going \"downstairs\" , with further questioning pt unable to verbalize why. Repeating to himself, \"I can't believe this is happening\". VCing and redirection req'd t/o session.        Bed Mobility    [place], Supine to Sit close supervision     Assistive Device bed rails;head of bed elevated     Comment (Bed Mobility) Increased " time and effort to complete        Sit to Stand Transfer    [place], Sit to Stand Transfer minimum assist (75% or more patient effort);1 person assist     Verbal Cues safety;technique        Stand to Sit Transfer    [place], Stand to Sit Transfer touching/steadying assist     Verbal Cues safety;technique        Balance    Static Sitting Balance WFL     Dynamic Sitting Balance WFL     Static Standing Balance mild impairment     Dynamic Standing Balance mild impairment     Balance Interventions occupation based/functional task        Motor Skills    Functional Endurance Dec'd from baseline, mild-mod deficits. Increased time to perform tasks and increased fatigue. Improved from last session, able to increase mobility/endurance.        Grooming    Self-Performance washes, rinses and dries hands     [place] close supervision     Position sink side;unsupported standing        Toileting    Setup Assistance change pad/brief;obtain supplies     Comment Soiled bed chucks upon getting out of bed, pt limited insight/awareness of soiled bed.        BADL Safety/Performance    Impairments, BADL Safety/Performance balance;endurance/activity tolerance;cognition     Cognitive Impairments, BADL Safety/Performance attention;insight into deficits/self-awareness;judgment;problem-solving/reasoning;safety precaution follow-through;safety precaution awareness     Skilled BADL Treatment/Intervention BADL process/adaptation training     Progress in BADL Status effort and initiation;cueing required        ADL Interventions    Self-Care (BADL) Promotion out of bed for BADLs encouraged   left OOB in chair following session       AM-PAC (TM) - ADL (Current Function)    Putting on and taking off regular lower body clothing? 3 - A Little     Bathing? 3 - A Little     Toileting? 3 - A Little     Putting on/taking off regular upper body clothing? 3 - A Little     How much help for taking care of personal grooming? 3 - A Little      Eating meals? 4 - None     AM-PAC (TM) ADL Score 19        Assessment/Plan (OT)    Daily Outcome Statement OT tx session completed, pt overall CLS-Lucia for functional txfers and ADL tasks. AAOx4 grossly, however increased confusion continues. Mild-mod cog deficits. RN aware of confusion. Cont OT POC.               OT Assessment/Plan      Most Recent Value   OT Recommended Discharge Disposition skilled nursing facility  [pending progress, hopeful for home] at 11/04/2021 0906   Anticipated Equipment Needs At Discharge (OT) other (see comments)  [TBD] at 11/02/2021 1201   Patient/Family Therapy Goal Statement To get better at 11/02/2021 1201            OT Goals      Most Recent Value   Bed Mobility Goal 1    Activity/Assistive Device bed mobility activities, all at 11/02/2021 1201   [place] independent at 11/02/2021 1201   Time Frame short-term goal (STG) at 11/02/2021 1201   Progress/Outcome goal ongoing at 11/02/2021 1201   Transfer Goal 1    Activity/Assistive Device all transfers at 11/02/2021 1201   [place] independent at 11/02/2021 1201   Time Frame short-term goal (STG) at 11/02/2021 1201   Progress/Outcome goal ongoing at 11/02/2021 1201   Dressing Goal 1    Activity/Adaptive Equipment dressing skills, all at 11/02/2021 1201   [place] independent at 11/02/2021 1201   Time Frame short-term goal (STG) at 11/02/2021 1201   Progress/Outcome goal ongoing at 11/02/2021 1201   Toileting Goal 1    Activity/Assistive Device toileting skills, all at 11/02/2021 1201   [place] independent at 11/02/2021 1201   Time Frame short-term goal (STG) at 11/02/2021 1201   Progress/Outcome goal ongoing at 11/02/2021 1201   Grooming Goal 1    Activity/Assistive Device grooming skills, all at 11/02/2021 1201   [place] independent at 11/02/2021 1201   Time Frame short-term goal (STG) at 11/02/2021 1201   Progress/Outcome goal ongoing at 11/02/2021 1201

## 2021-11-04 NOTE — PROGRESS NOTES
Patient: Eulalio Gregg  [facility]  [place] [address] [facility]  MRN:  [id number]  Today's date:  11/4/2021    Pt left in bedside chair, alarmed, with call bell and personal items within reach. RN informed of session completed.     Eulalio is a 76 y.o. male admitted on 10/31/2021 with Weakness [R53.1]  Elevated serum creatinine [R79.89]  Urinary tract infection associated with catheterization of urinary tract, unspecified indwelling urinary catheter type, initial encounter (CMS/[facility]) [T83.511A, N39.0]. Principal problem is Weakness.    Past Medical History  Eulalio has a past medical history of Anemia, Blepharospasm, BPH (benign prostatic hyperplasia), COVID-19 (08/21/2021), History of vertigo (2016), Lipid disorder, and Tendency toward bleeding easily (CMS/[facility]).    History of Present Illness   Admitted with weakness, +COVID      PT Vitals    Date/Time Pulse SpO2 Pt Activity O2 Therapy [facility]   11/04/21 0907 65 95 % At rest None (Room air) CBK      PT Pain    Date/Time Pain Type Rating: Rest Rating: Activity [facility]   11/04/21 0907 Pain Assessment 0 - no pain 0 - no pain CBK          Prior Living Environment      Most Recent Value   Current Living Arrangements home   Living Environment Comment ML with spouse, 3STE with FF to bed/bath        Prior Level of Function      Most Recent Value   Ambulation independent   Transferring independent   Toileting independent   Bathing independent   Dressing independent   Eating independent   Prior Level of Function Comment pt reports ind PTA without use of AD,  started to use a SPC before admissions   Assistive Device Currently Used at Home cane, straight           PT Evaluation and Treatment - 11/04/21 0907        PT Time Calculation    Start Time 0907     Stop Time 0923     Time Calculation (min) 16 min        Session Details    Document Type daily treatment/progress note     Mode of Treatment physical therapy        General Information    Patient Profile Reviewed  yes     Existing Precautions/Restrictions droplet;contact;fall   +COVID    Limitations/Impairments safety/cognitive        Cognition/Psychosocial    Affect/Mental Status (Cognition) flat/blunted affect     Orientation Status (Cognition) oriented to;person;place     Follows Commands (Cognition) follows one-step commands;50-74% accuracy;delayed response/completion;increased processing time needed;repetition of directions required     Cognitive Function executive function deficit;safety deficit     Attention Deficit (Cognition) minimal deficit;perseverates on recent conversation     Executive Function Deficit (Cognition) minimal deficit;insight/awareness of deficits     Safety Deficit (Cognition) minimal deficit;problem-solving;safety precautions awareness        Bed Mobility    [place], Supine to Sit close supervision     Comment (Bed Mobility) inc effort/time to complete task, multiple repetition of direction        Sit to Stand Transfer    [place], Sit to Stand Transfer minimum assist (75% or more patient effort);1 person assist     Verbal Cues technique;safety     Assistive Device other (see comments)     Comment HHAx1; unsteady with initial stand        Stand to Sit Transfer    [place], Stand to Sit Transfer touching/steadying assist;1 person assist     Verbal Cues safety;technique        Gait Training    [place], Gait minimum assist (75% or more patient effort);1 person assist     Assistive Device other (see comments)     Distance in Feet 20 feet     Pattern (Gait) step-through     Deviations/Abnormal Patterns (Gait) arias decreased;base of support, wide     Comment (Gait/Stairs) amb to bathroom followed by within the room; unsteady, reaching for objects to steady        Stairs Training    [place], Stairs not tested        Safety Issues, Functional Mobility    Safety Issues Affecting Function (Mobility) ability to follow commands;insight into deficits/self-awareness;sequencing  abilities;safety precaution awareness;problem-solving     Impairments Affecting Function (Mobility) balance;cognition;strength        Balance    Static Sitting Balance WFL     Dynamic Sitting Balance WFL     Static Standing Balance mild impairment     Dynamic Standing Balance mild impairment        AM-PAC (TM) - Mobility (Current Function)    Turning from your back to your side while in a flat bed without using bedrails? 4 - None     Moving from lying on your back to sitting on the side of a flat bed without using bedrails? 3 - A Little     Moving to and from a bed to a chair? 3 - A Little     Standing up from a chair using your arms? 3 - A Little     To walk in a hospital room? 3 - A Little     Climbing 3-5 steps with a railing? 3 - A Little     AM-PAC (TM) Mobility Score 19        Assessment/Plan (PT)    Daily Outcome Statement pt is a min assist for mobility, continues to demo strength, balance and endurance deficits, anticipate progress, rec SNF at [place]     Rehab Potential good, to achieve stated therapy goals     Therapy Frequency 3-5 times/wk     Planned Therapy Interventions bed mobility training;gait training;balance training;transfer training               PT Assessment/Plan      Most Recent Value   PT Recommended Discharge Disposition skilled nursing facility at 11/04/2021 0907   Anticipated Equipment Needs at Discharge (PT) walker, front-wheeled  [TBD, anticipate RW] at 11/04/2021 0907   Patient/Family Therapy Goals Statement go home at 11/04/2021 0907                    Education Documentation  Fall Prevention, taught by Ralph Joiner, PT at 11/4/2021 11:26 AM.  Learner: Patient  Readiness: Acceptance  Method: Demonstration, Explanation  Response: Verbalizes Understanding, Demonstrated Understanding, Needs Reinforcement  Comment: PT POC, energy conservation, safety with mobility          PT Goals      Most Recent Value   Bed Mobility Goal 1    Activity/Assistive Device rolling to left, rolling to right,  supine to sit, sit to supine at 11/01/2021 1237   [place] independent at 11/01/2021 1237   Time Frame by discharge at 11/01/2021 1237   Progress/Outcome goal ongoing at 11/01/2021 1237   Transfer Goal 1    Activity/Assistive Device bed-to-chair/chair-to-bed, sit-to-stand/stand-to-sit at 11/01/2021 1237   [place] independent at 11/01/2021 1237   Time Frame by discharge at 11/01/2021 1237   Progress/Outcome goal ongoing at 11/01/2021 1237   Gait Training Goal 1    Activity/Assistive Device gait (walking locomotion), walker, front-wheeled at 11/01/2021 1237   [place] supervision required at 11/01/2021 1237   Distance 100 at 11/01/2021 1237   Time Frame by discharge at 11/01/2021 1237   Progress/Outcome goal ongoing at 11/01/2021 1237

## 2021-11-04 NOTE — PATIENT CARE CONFERENCE
Care Progression Rounds Note  Date: 11/4/2021  Time: 12:46 PM     Patient Name: Eulalio Gregg     Medical Record Number: [id number]   YOB: 1945  Sex: Male      Room/Bed: 0202    Admitting Diagnosis: Weakness [R53.1]  Elevated serum creatinine [R79.89]  Urinary tract infection associated with catheterization of urinary tract, unspecified indwelling urinary catheter type, initial encounter (CMS/[facility]) [T83.511A, N39.0]   Admit Date/Time: 10/31/2021 12:15 PM    Primary Diagnosis: Weakness  Principal Problem: Weakness    GMLOS: pending  Anticipated Discharge Date: 11/8/2021    AM-PAC:  Mobility Score: 19    Discharge Planning:  Current Living Arrangements: home  Anticipated Discharge Disposition: skilled nursing facility  Type of Skilled Nursing Care Services: OT,PT    Barriers to Discharge:  Barriers to Discharge: None  Comment: repeat BC    Participants:  ,social work/services,physical therapy,nursing,physician

## 2021-11-04 NOTE — PROGRESS NOTES
Internal Medicine  Daily Progress Note        SUBJECTIVE   This is a 76 y.o. year-old male admitted on 10/31/2021 with Weakness [R53.1]  Elevated serum creatinine [R79.89]  Urinary tract infection associated with catheterization of urinary tract, unspecified indwelling urinary catheter type, initial encounter (CMS/[facility]) [T83.511A, N39.0].    Interval History: Pt feeling well this morning with no complaints.  Slightly irritated that he has not gotten food yet this morning.  Blood cultures continue to be positive but not toxic appearing at this time.  Will follow up ID recs, abx day 4     OBJECTIVE   Vital signs in last 24 hours:  Temp:  [36.7 °C (98.1 °F)-37.1 °C (98.7 °F)] 36.9 °C (98.5 °F)  Heart Rate:  [55-83] 63  Resp:  [18-20] 18  BP: ()/(41-68) 103/54  SpO2:  [97 %-100 %] 97 %  Oxygen Therapy: None (Room air)    Weight & I/Os:  Weights (last 5 days)     Date/Time Weight    11/04/21 0548 63.2 kg (139 lb 5.3 oz)    11/01/21 0614 65.7 kg (144 lb 13.5 oz)    10/31/21 1208 65.8 kg (145 lb)          Intake/Output Summary (Last 24 hours) at 11/4/2021 0659  Last data filed at 11/3/2021 1600  24 Hour Net Input/Output from 7AM Yesterday   Intake 480 ml   Output 100 ml   Net 380 ml        PHYSICAL EXAMINATION   Physical Exam  Constitutional:       Comments: Chronically ill-appearing thin, appears less confused than yesterday  HENT:      Head: Normocephalic and atraumatic.      Mouth/Throat:      Mouth: Mucous membranes are moist.   Eyes:      Extraocular Movements: Extraocular movements intact.      Pupils: Pupils are equal, round, and reactive to light.   Cardiovascular:      Rate and Rhythm: Normal rate and regular rhythm.   Pulmonary:      Effort: Pulmonary effort is normal. No respiratory distress.      Breath sounds: No rales.   Abdominal:      General: Abdomen is flat.      Palpations: Abdomen is soft.      Tenderness: There is no abdominal tenderness. There is no right CVA tenderness or left CVA  tenderness.   Musculoskeletal:         General: Swelling present. Normal range of motion.      Cervical back: Normal range of motion. No rigidity or tenderness. Swelling of RUE, no localizing erythema  Skin:     General: Skin is warm.   Neurological:      General: No focal deficit present. AAOx1-2     Mental Status: He is alert and oriented to person, place, and time.         LINES, CATHETERS, DRAINS, AIRWAYS, & WOUNDS   Lines, drains, airways, & wounds:  Peripheral IV (Adult) 11/03/21 Anterior;Right Forearm (Active)   Number of days: 1        Labs   Labs:  Results from last 7 days   Lab Units 11/04/21  0414 11/01/21  0315 10/31/21  1324   SODIUM mEQ/L 136   < > 133*   POTASSIUM mEQ/L 4.9   < > 3.9   CHLORIDE mEQ/L 101   < > 103   CO2 mEQ/L 22   < > 22   BUN mg/dL 10   < > 28*   CREATININE mg/dL 1.0   < > 1.5*   CALCIUM mg/dL 7.2*   < > 7.4*   ALBUMIN g/dL  --   --  2.0*   BILIRUBIN TOTAL mg/dL  --   --  1.1   ALK PHOS IU/L  --   --  82   ALT IU/L  --   --  34   AST IU/L  --   --  37   GLUCOSE mg/dL 76   < > 122*    < > = values in this interval not displayed.     Results from last 7 days   Lab Units 11/04/21 0414   WBC K/uL 9.96   HEMOGLOBIN g/dL 9.2*   HEMATOCRIT % 28.6*   PLATELETS K/uL 247         Results from last 7 days   Lab Units 11/04/21 0414 11/03/21 0602 11/02/21  0552   WBC K/uL 9.96 9.35 8.23   HEMOGLOBIN g/dL 9.2* 9.1* 8.8*   HEMATOCRIT % 28.6* 28.3* 27.0*   PLATELETS K/uL 247 206 182       Results from last 7 days   Lab Units 11/04/21 0414 11/03/21  0602 11/02/21  0552 11/01/21  0315 10/31/21  1324   SODIUM mEQ/L 136 138 138   < > 133*   POTASSIUM mEQ/L 4.9 4.8 4.2   < > 3.9   CHLORIDE mEQ/L 101 102 103   < > 103   CO2 mEQ/L 22 22 21*   < > 22   BUN mg/dL 10 12 16   < > 28*   CREATININE mg/dL 1.0 1.0 1.1   < > 1.5*   CALCIUM mg/dL 7.2* 7.5* 7.5*   < > 7.4*   ALBUMIN g/dL  --   --   --   --  2.0*   BILIRUBIN TOTAL mg/dL  --   --   --   --  1.1   ALK PHOS IU/L  --   --   --   --  82   ALT IU/L  --    --   --   --  34   AST IU/L  --   --   --   --  37   GLUCOSE mg/dL 76 85 80   < > 122*    < > = values in this interval not displayed.     Lab Results   Component Value Date    MG 1.9 11/04/2021    CA 9.0 08/22/2019     Lab Results   Component Value Date    PT 12.3 03/19/2019    INR 1.0 03/19/2019    PTT 28 03/19/2019       Microbiology Results     Procedure Component Value Units Date/Time    Blood Culture Blood, Venous [[id number]]  (Abnormal) Collected: 11/01/21 1509    Specimen: Blood, Venous Updated: 11/04/21 0646     Culture **Positive Culture**      Klebsiella pneumoniae ESBL     Gram Stain Result Gram negative bacilli    Narrative:      Susceptibilities previously reported on culture accession number:[id number] .      Blood Culture Blood, Venous [[id number]]  (Abnormal) Collected: 10/31/21 1615    Specimen: Blood, Venous Updated: 11/04/21 0645     Culture **Positive Culture**      Klebsiella pneumoniae ESBL     Gram Stain Result Gram negative bacilli    Narrative:      Susceptibilities previously reported on culture accession number:[id number] .      Blood Culture Blood, Venous [[id number]]  (Abnormal) Collected: 11/01/21 1509    Specimen: Blood, Venous Updated: 11/04/21 0645     Culture **Positive Culture**      Klebsiella pneumoniae ESBL     Gram Stain Result Gram negative bacilli    Narrative:      Susceptibilities previously reported on culture accession number:[id number].      Blood Culture Blood, Venous [[id number]]  (Abnormal)  (Susceptibility) Collected: 10/31/21 1615    Specimen: Blood, Venous Updated: 11/04/21 0643     Culture **Positive Culture**      Klebsiella pneumoniae ESBL     Gram Stain Result Gram negative bacilli    Urine culture Urine, Clean Catch [[id number]]  (Abnormal)  (Susceptibility) Collected: 10/31/21 1439    Specimen: Urine, Clean Catch Updated: 11/04/21 0620     Urine Culture **Positive Culture**      >1 x 10^5 CFU/mL Klebsiella pneumoniae ESBL    Blood Culture PCR Panel Blood,  Venous [[id number]]  (Abnormal) Collected: 10/31/21 1615    Specimen: Blood, Venous Updated: 11/01/21 1300     Candida albicans Not Detected     Candida auris Not Detected     Candida glabrata Not Detected     Candida krusei Not Detected     Candida parapsilosis Not Detected     Candida tropicalis Not Detected     Cryptococcus neoformans/gattii Not Detected     Enterococcus faecalis Not Detected     Enterococcus faecium Not Detected     Enterobacter cloacae complex Not Detected     Escherichia coli Not Detected     Klebsiella oxytoca Not Detected     Klebsiella pneumoniae Detected     Klebsiella aerogenes Not Detected     Proteus Not Detected     Salmonella species Not Detected     Serratia marcescens Not Detected     Haemophilus influenzae Not Detected     Listeria monocytogenes Not Detected     Neisseria meningitidis Not Detected     Pseudomonas aeruginosa Not Detected     Stenotrophomonas maltophilia Not Detected     Bacteroides fragilis Not Detected     Staph (not aureus) Not Detected     Staphylococcus aureus Not Detected     Staphylococcus ludgnensis Not Detected     Staphylococcus epidermidis Not Detected     Streptococcus Not Detected     Streptococcus agalactiae (Group B) Not Detected     Streptococcus pneumoniae Not Detected     Streptococcus pyogenes (Group A) Not Detected     KPC (Carbapenem Resistance Gene) Detected     mecA/C Not Applicable     mecA/C and MREJ (MRSA) Not Applicable     Manuel/B (Vancomycin Resistance Gene) Not Applicable     CTX-M (ESBL) Not Detected     IMP Not Detected     mcr-1 Not Detected     NDM Not Detected     OXA-48-like Not Detected     VIM Not Detected     Comment: --    SARS-CoV-2 (COVID-19), PCR Nasopharynx [[id number]]  (Abnormal) Collected: 10/31/21 1630    Specimen: Nasopharyngeal Swab from Nasopharynx Updated: 10/31/21 1812    Narrative:      The following orders were created for panel order SARS-CoV-2 (COVID-19), PCR Nasopharynx.  Procedure                                Abnormality         Status                     ---------                               -----------         ------                     SARS-CoV-2 (COVID-19), P...[[id number]]  Abnormal            Final result                 Please view results for these tests on the individual orders.    SARS-CoV-2 (COVID-19), PCR Nasopharynx [[id number]]  (Abnormal) Collected: 10/31/21 1630    Specimen: Nasopharyngeal Swab from Nasopharynx Updated: 10/31/21 1812     SARS-CoV-2 (COVID-19) Positive    Narrative:      Nursing instructions: Obtain nasopharyngeal swab ONLY. Send swab in viral transport media.          Imaging personally reviewed (does not include unread studies):  CT HEAD WITHOUT IV CONTRAST    Result Date: 11/2/2021  IMPRESSION: 1. No acute intracranial hemorrhage, acute infarct in a major vascular territory or mass-effect. 2. Progression of ventriculomegaly which may be from central atrophy.  Normal pressure hydrocephalus can have a similar appearance. COMMENT: Axial noncontrast CT images of the head were obtained. Sagittal and coronal reconstructions were created. CT DOSE:  One or more dose reduction techniques (e.g. automated exposure control, adjustment of the mA and/or kV according to patient size, use of iterative reconstruction technique) utilized for this examination. Comparison:  No prior studies are available for comparison. Findings: Ventricles are enlarged, disproportionate to sulcal involutional changes and progressed from prior.  There are bilateral periventricular white matter lucencies which are nonspecific but compatible with microangiopathic change. There is no mass effect, midline shift, territorial infarction, acute hemorrhage or extra-axial fluid collection. Visualized paranasal sinuses and mastoid air cells are clear.    ULTRASOUND KIDNEYS / BLADDER    Result Date: 11/4/2021  IMPRESSION: 1.  Mild left hydronephrosis 2.  Complex cyst versus solid mass lower pole right kidney measuring up to 3.2 cm.  Contrast-enhanced renal mass protocol CT or MRI is recommended for further evaluation. 3.  Debris within the bladder     X-RAY CHEST 1 VIEW    Result Date: 10/31/2021  IMPRESSION: No significant interval change. Probable minimal atelectasis or scarring bilateral lung bases.    Ultrasound venous arm right    Result Date: 11/4/2021  IMPRESSION: No upper extremity deep vein thrombosis.      MEDS/DRIPS   Scheduled Meds:  • aspirin  81 mg oral Daily   • ceftazidime-avibactam  1.25 g intravenous q8h INT   • cholecalciferol (vitamin D3)  1,000 Units oral BID   • cycloSPORINE  1 drop Both Eyes BID   • ferrous sulfate  325 mg oral Every other day   • heparin (porcine)  5,000 Units subcutaneous q8h JENNYFER   • [Provider Managed Hold] lenalidomide  25 mg oral Daily   • mometasone-formoterol  2 puff inhalation BID   • rosuvastatin  10 mg oral Daily   • tamsulosin  0.4 mg oral Daily     Continuous Infusions:  PRN Meds:.atropine  •  glucose **OR** dextrose **OR** glucagon **OR** dextrose in water  •  nitroglycerin     ASSESSMENT & PLAN   UTI (urinary tract infection)  Assessment & Plan  Uclx prior to admission c/w ESBL klebsiella MDRO including meropenem  Received 5 days bactrim PTP  U/A TNTC WBC, 3+ Bacteria, pos leuk esterase and nitrite  Lactate 2.1, Hypotensive with BP 90/54  No leukocytosis    - Continue with Amycaz for now per ID (day 4)  - Obtain US of kidneys, bladder, ureters to assess for obstruction  - ID following  - fu repeat UClx and Bclx; repeat blood cultures x2 growing GNRs, thus repeat cultures  - pts BP runs chronically low; will hold off on further fluids at this time  - Will consult Urology given mild hydro on US, assess for structural cause of persistent bacteremia    Hyperlipidemia  Assessment & Plan  Continue home statin    Swelling of right upper extremity  Assessment & Plan  Right arm more swollen today  Painful to palpation but no localized erythema    - US RUE negative for DVT; RUE swelling improved  -  Continuing on abx for UTI and bacteremia    Multiple myeloma (CMS/[facility])  Assessment & Plan  Currently on chemotherapy outpatient  - c/w outpatient management       History of COVID-19  Assessment & Plan  No current signs or sx's of acute infection  Continue home Advair  Positive test on admission    - Receiving monoclonal ab therapy per ID    HFrEF (heart failure with reduced ejection fraction) (CMS/[facility])  Assessment & Plan  EF 40-45% on home lasix PRN  - appears euvolemic now  Recentrly saw Dr. Ellis who stopped his metoprolo and changes his lasix from daily to PRN due to hypotension   - can give lasix PRN  - daily standing weight and accurate I/Os   - Cardiology following    Anemia  Assessment & Plan  Presents with Hb 9/1 b/l 10  Has known MM on chemotherapy which likely explains this  - CBC daily  - c/w chemo outpatient  - transfuse Hb <7  - hold home iron supplementation given acute infection UTI    CAD (coronary artery disease)  Assessment & Plan  Has known 50% LAD occlusion  CW home statin and  ASA    BPH (benign prostatic hyperplasia)  Assessment & Plan  C/w home Flomax  Follws with urology Dr. Mathew    * Weakness  Assessment & Plan  Likely 2/2 UTI  - PT/OT  - plan as per UTI       VTE Assessment: [place]    VTE Prophylaxis: Current anticoagulants:  heparin (porcine) 5,000 unit/mL injection 5,000 Units, subcutaneous, q8h JENNYFER      Code Status: Full Code  Estimated discharge date: 11/5/2021       ATTENDING DOCUMENTATION  ALSO SEE ATTENDING ATTESTATION SECTION OF NOTE

## 2021-11-04 NOTE — PROGRESS NOTES
Infectious Disease Progress Note    Patient Name: Eulalio Gregg  MR#: [id number]  : 1945  Admission Date: 10/31/2021  Date: 21   Time: 10:05 AM   Author: Tico Adams MD        Antibiotics:    Anti-infectives (From admission, onward)    Start     Dose/Rate Route Frequency Ordered Stop    21 1330  cefTAZidime-avibactam (AVYCAZ) 1.25 g in sodium chloride 0.9 % 50 mL IVPB         1.25 g  25 mL/hr over 2 Hours intravenous Every 8 hours interval 21 1247            Subjective     Review of Systems  Patient still feels tired but no other new complaints.  No fever or chills.  No pain or urinary complaints noted.      Objective     Vital Signs:    Vitals:    21 0744   BP: (!) 103/54   Pulse: 63   Resp: 18   Temp: 36.9 °C (98.5 °F)   SpO2: 97%       Temp (72hrs), Av.9 °C (98.4 °F), Min:36.6 °C (97.8 °F), Max:37.4 °C (99.4 °F)      Physical Exam:      General: alert, NAD   HEENT: NC/AT,anicteric sclera, no thrush  Neck: supple  Cardiovascular: regular S1/S2  Respiratory: clear to auscultation, no wheezing, rales or ronchi  GI/Abdomen: soft, +BS, NT/ND  Extremities: no clubbing, cyanosis or edema  Musculoskeletal: no acute arthritis  G.U.: No lesions, no suprapubic TTP or CVAT  Skin: warm and dry, no new rashes  Psych:cooperative with exam  Neuro: Moves all 4 extremities        Lines, Drains, Airways, Wounds:  Peripheral IV (Adult) 21 Anterior;Right Forearm (Active)   Number of days: 1       Labs:    CBC Results       21     0414 0602 0552    WBC 9.96 9.35 8.23    RBC 3.07 3.09 2.97    HGB 9.2 9.1 8.8    HCT 28.6 28.3 27.0    MCV 93.2 91.6 90.9    MCH 30.0 29.4 29.6    MCHC 32.2 32.2 32.6     206 182         CMP Results       21     0414 0602 0552     138 138    K 4.9 4.8 4.2    Cl 101 102 103    CO2 22 22 21    Glucose 76 85 80    BUN 10 12 16    Creatinine 1.0 1.0 1.1    Calcium 7.2 7.5 7.5    Anion Gap 13 14 14     EGFR >60.0 >60.0 >60.0         Comment for K at 0414 on 11/04/21: SLIGHT HEMOLYSIS, RESULT MAY BE INCREASED.            Imaging:    Radiology Imaging    XR CHEST 1 VW    Narrative  CLINICAL HISTORY: Weakness.    COMPARISON: 11/2/2015    COMMENT: AP view of the chest.    No significant interval change. Linear lung markings bilateral lung bases, not  significantly changed, probable atelectasis or scarring. Heart size within  normal limits. Mediastinal contours appear unremarkable. Osseous structures are  intact.    --    Impression  No significant interval change. Probable minimal atelectasis or  scarring bilateral lung bases.      MICROBIOLOGY :   Microbiology Results     Procedure Component Value Units Date/Time    Blood Culture Blood, Venous [[id number]]  (Abnormal) Collected: 11/01/21 1509    Specimen: Blood, Venous Updated: 11/04/21 0646     Culture **Positive Culture**      Klebsiella pneumoniae ESBL     Gram Stain Result Gram negative bacilli    Narrative:      Susceptibilities previously reported on culture accession number:[id number] .      Blood Culture Blood, Venous [[id number]]  (Abnormal) Collected: 11/01/21 1509    Specimen: Blood, Venous Updated: 11/04/21 0645     Culture **Positive Culture**      Klebsiella pneumoniae ESBL     Gram Stain Result Gram negative bacilli    Narrative:      Susceptibilities previously reported on culture accession number:[id number].      SARS-CoV-2 (COVID-19), PCR Nasopharynx [[id number]]  (Abnormal) Collected: 10/31/21 1630    Specimen: Nasopharyngeal Swab from Nasopharynx Updated: 10/31/21 1812    Narrative:      The following orders were created for panel order SARS-CoV-2 (COVID-19), PCR Nasopharynx.  Procedure                               Abnormality         Status                     ---------                               -----------         ------                     SARS-CoV-2 (COVID-19), P...[[id number]]  Abnormal            Final result                 Please  view results for these tests on the individual orders.    SARS-CoV-2 (COVID-19), PCR Nasopharynx [[id number]]  (Abnormal) Collected: 10/31/21 1630    Specimen: Nasopharyngeal Swab from Nasopharynx Updated: 10/31/21 1812     SARS-CoV-2 (COVID-19) Positive    Narrative:      Nursing instructions: Obtain nasopharyngeal swab ONLY. Send swab in viral transport media.    Blood Culture Blood, Venous [[id number]]  (Abnormal)  (Susceptibility) Collected: 10/31/21 1615    Specimen: Blood, Venous Updated: 11/04/21 0643     Culture **Positive Culture**      Klebsiella pneumoniae ESBL     Gram Stain Result Gram negative bacilli    Blood Culture Blood, Venous [[id number]]  (Abnormal) Collected: 10/31/21 1615    Specimen: Blood, Venous Updated: 11/04/21 0645     Culture **Positive Culture**      Klebsiella pneumoniae ESBL     Gram Stain Result Gram negative bacilli    Narrative:      Susceptibilities previously reported on culture accession number:[id number] .      Blood Culture PCR Panel Blood, Venous [[id number]]  (Abnormal) Collected: 10/31/21 1615    Specimen: Blood, Venous Updated: 11/01/21 1300     Candida albicans Not Detected     Candida auris Not Detected     Candida glabrata Not Detected     Candida krusei Not Detected     Candida parapsilosis Not Detected     Candida tropicalis Not Detected     Cryptococcus neoformans/gattii Not Detected     Enterococcus faecalis Not Detected     Enterococcus faecium Not Detected     Enterobacter cloacae complex Not Detected     Escherichia coli Not Detected     Klebsiella oxytoca Not Detected     Klebsiella pneumoniae Detected     Klebsiella aerogenes Not Detected     Proteus Not Detected     Salmonella species Not Detected     Serratia marcescens Not Detected     Haemophilus influenzae Not Detected     Listeria monocytogenes Not Detected     Neisseria meningitidis Not Detected     Pseudomonas aeruginosa Not Detected     Stenotrophomonas maltophilia Not Detected     Bacteroides fragilis  Not Detected     Staph (not aureus) Not Detected     Staphylococcus aureus Not Detected     Staphylococcus ludgnensis Not Detected     Staphylococcus epidermidis Not Detected     Streptococcus Not Detected     Streptococcus agalactiae (Group B) Not Detected     Streptococcus pneumoniae Not Detected     Streptococcus pyogenes (Group A) Not Detected     KPC (Carbapenem Resistance Gene) Detected     mecA/C Not Applicable     mecA/C and MREJ (MRSA) Not Applicable     Manuel/B (Vancomycin Resistance Gene) Not Applicable     CTX-M (ESBL) Not Detected     IMP Not Detected     mcr-1 Not Detected     NDM Not Detected     OXA-48-like Not Detected     VIM Not Detected     Comment: --    Urine culture Urine, Clean Catch [[id number]]  (Abnormal)  (Susceptibility) Collected: 10/31/21 1439    Specimen: Urine, Clean Catch Updated: 11/04/21 0620     Urine Culture **Positive Culture**      >1 x 10^5 CFU/mL Klebsiella pneumoniae ESBL    Urine culture [[id number]]  (Abnormal)  (Susceptibility) Collected: 10/21/21 1531    Specimen: Urine, Clean Catch Updated: 10/23/21 1012     Urine Culture **Positive Culture**      >1 x 10^5 CFU/mL Klebsiella pneumoniae ESBL          Assessment   COVID-19 pneumonia  UTD with COVID vaccine  UTI-complicated  Leukocytosis  Multiple myeloma  Estimated Creatinine Clearance: Estimated Creatinine Clearance: Estimated Creatinine Clearance: 49 mL/min (by C-G formula based on SCr of 1 mg/dL).   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  Highly drug-resistant   10/31/2021 blood cultures with the same organism, cultures from 11/1/2021 also positive  Repeat blood cultures now with no growth to date from 11/3/2021  Ultrasound with mild left  hydronephrosis, complex cyst versus possible mass in the right kidney for which MRI is recommended  Would ask urology to evaluate  Continue with Avycaz day 3, recent urine culture with highly drug-resistant Klebsiella  Enhanced isolation precautions for Covid  Status post monoclonal antibody therapy  Clinically stable from a respiratory standpoint  Endings and plan reviewed with patient  Tolerating antibiotics  We will monitor

## 2021-11-04 NOTE — PROGRESS NOTES
Cardiology Inpatient  Progress Note       SUBJECTIVE   This is a 76 y.o. year-old male admitted on 10/31/2021 with Weakness [R53.1]  Elevated serum creatinine [R79.89]  Urinary tract infection associated with catheterization of urinary tract, unspecified indwelling urinary catheter type, initial encounter (CMS/[facility]) [T83.511A, N39.0].    Interval History: He reports feeling OK.  Denies CP, SOB, orthopnea. He is feeling hungry and thirsty this AM. He was struggling to find words at times.     A 14-point review of system was performed and was negative, or as documented above. 10 systems examined.   OBJECTIVE      Vital signs in last 24 hours:  Temp:  [36.7 °C (98.1 °F)-37.1 °C (98.7 °F)] 36.9 °C (98.5 °F)  Heart Rate:  [55-83] 63  Resp:  [18-20] 18  BP: ()/(41-68) 103/54      Intake/Output Summary (Last 24 hours) at 11/4/2021 0831  Last data filed at 11/3/2021 1600  Gross per 24 hour   Intake 240 ml   Output --   Net 240 ml     Weights (last 5 days)     Date/Time Weight    11/04/21 0548 63.2 kg (139 lb 5.3 oz)    11/01/21 0614 65.7 kg (144 lb 13.5 oz)    10/31/21 1208 65.8 kg (145 lb)          PHYSICAL EXAMINATION      Physical Exam   General: Pleasant, weak.  HEENT: No corneal arcus or xanthelasmas.  Sclerae are anicteric.  Nares patent.  Mucous membranes are slightly dry.  Neck: Supple.  JVP is 5 cm/H2O.  Carotids are equal with no audible bruits.  No lymphadenopathy or thyromegaly.  Heart: Regular.  Normal S1 and S2.  No S4. No S3. No murmur.  Chest: Symmetrical.  Lungs: Clear bilaterally without rales, wheezes nor rhonchi.  Abdomen: Soft, nontender.  No masses or bruits.  No organomegaly.  Normal bowel sounds.  Extremities: No cyanosis or clubbing. No edema.  Distal pulses are easily palpable.  Skin: Deeply tanned.  Warm and dry and well perfused.   Neurologic: Alert and oriented ×3 with occasional confusion and forgetfulness.  Cranial nerves II through XII are intact.  Psychiatric: normal mood, affect &  judgment.   LABS / IMAGING / TELE/ MEDS      Labs  Results from last 7 days   Lab Units 11/04/21 0414 11/03/21  0602 11/02/21  0552 11/01/21 0315 10/31/21  1324   SODIUM mEQ/L 136 138 138   < > 133*   POTASSIUM mEQ/L 4.9 4.8 4.2   < > 3.9   MAGNESIUM mg/dL 1.9  --   --   --   --    CHLORIDE mEQ/L 101 102 103   < > 103   CO2 mEQ/L 22 22 21*   < > 22   BUN mg/dL 10 12 16   < > 28*   CREATININE mg/dL 1.0 1.0 1.1   < > 1.5*   EGFR mL/min/1.73m*2 >60.0 >60.0 >60.0   < > 45.3*   CALCIUM mg/dL 7.2* 7.5* 7.5*   < > 7.4*   ALBUMIN g/dL  --   --   --   --  2.0*   BILIRUBIN TOTAL mg/dL  --   --   --   --  1.1   ALK PHOS IU/L  --   --   --   --  82   ALT IU/L  --   --   --   --  34   AST IU/L  --   --   --   --  37   GLUCOSE mg/dL 76 85 80   < > 122*    < > = values in this interval not displayed.     Results from last 7 days   Lab Units 11/04/21 0414 11/03/21 0602 11/02/21  0552   WBC K/uL 9.96 9.35 8.23   HEMOGLOBIN g/dL 9.2* 9.1* 8.8*   HEMATOCRIT % 28.6* 28.3* 27.0*   PLATELETS K/uL 247 206 182      0   Lab Value Date/Time    INR 1.0 03/19/2019 1409     Lab Results   Component Value Date    CHOL 126 05/28/2021    TRIG 62 05/28/2021    HDL 43 (L) 05/28/2021    LDLCALC 71 05/28/2021     Results from last 7 days   Lab Units 10/31/21  1324   TROPONIN I ng/mL <0.03     Lab Results   Component Value Date    LACTATE 0.8 10/31/2021    LACTATE 2.1 (H) 10/31/2021       ECG/Telemetry  I have independently reviewed the telemetry. No events for the last 24 hours.    Imaging    Echocardiogram 08/23/2021: Performed at [facility]  Normal left ventricular cavity size. There is mild concentric left ventricular hypertrophy. There is mild global hypokinesis involving all segments of the left ventricle.  Mild left ventricular systolic dysfunction.  The ejection fraction estimate is 40-45%. Abnormal left ventricular diastolic function.  The left atrial volume is normal.   The right ventricle size is borderline enlarged. The right  ventricular systolic function is normal.    Normal opening and closure of the aortic valve. There is trace aortic regurgitation.  Grossly normal mitral valve.  Grossly normal tricuspid valve.  Trace tricuspid regurgitation.  Mean PA pressure is normal.  Normal IVC. Normal respiratory collapse.  No significant pericardial effusion.     Left heart catheterization 9/18/2019:  Mid LAD 50% stenosis at the level of a diagonal branch which also has 50% ostial stenosis. Both LAD and diagonal branches were iFR negative. Normal LVEDP.     Stress echocardiogram 6/25/2019:  1. Conclusion: Stress echo does not meet criteria for ischemia.  2. Baseline Echo: Normal left ventricular size and function. No wall motion abnormalities. Ejection fraction 65%. Normal left atrial size.  Mild mitral annular calcification. Trace mitral regurgitation. Tricuspid aortic valve. Mild aortic regurgitation. Normal right atrium. Normal  right ventricular size and function. Trace tricuspid regurgitation. The jet is insufficient to estimate right ventricular systolic pressure.  3. Post-Stress Echo: All walls become hyperdynamic. Ejection fraction improves.  4. Stress ECG does not meet criteria for ischemia.  5. Good exercise tolerance. Holman treadmill score is 8, associated with low risk for near-future cardiac events.      Home Medications:  •  ferrous sulfate, Take 65 mg by mouth daily with breakfast.  •  aspirin, Take 1 tablet (81 mg total) by mouth daily.  •  cholecalciferol (vitamin D3), Take 2,000 Units by mouth daily.  •  coenzyme Q10, Take by mouth daily.    •  compress.stocking,knee,reg,med, 1 each daily. Apply in AM and remove in PM.  •  fluticasone propion-salmeteroL, Inhale 1 puff 2 (two) times a day.  Rinse mouth with water after use to reduce aftertaste and incidence of candidiasis.  Do not swallow. For patients not on a ventilator, a spacer is recommended to be used with this medication/inhaler.  •  furosemide, Take 1 tablet (20 mg  total) by mouth daily as needed (weight gain of 3 lbs in 1 day or 5 lbs in 1 week).  •  multivit-min/folic/vit K/lycop (MEN'S MULTIVITAMIN ORAL), Take by mouth daily.    •  REVLIMID, Take  25 mg daily     •  rosuvastatin, Take 1 tablet (10 mg total) by mouth daily.  •  sulfamethoxazole-trimethoprim, Take 1 tablet by mouth 2 (two) times a day.    •  tamsulosin, Take 0.4 mg by mouth 2 (two) times a day.      Scheduled Meds:  • aspirin  81 mg oral Daily   • ceftazidime-avibactam  1.25 g intravenous q8h INT   • cholecalciferol (vitamin D3)  1,000 Units oral BID   • cycloSPORINE  1 drop Both Eyes BID   • ferrous sulfate  325 mg oral Every other day   • heparin (porcine)  5,000 Units subcutaneous q8h JENNYFER   • [Provider Managed Hold] lenalidomide  25 mg oral Daily   • mometasone-formoterol  2 puff inhalation BID   • rosuvastatin  10 mg oral Daily   • tamsulosin  0.4 mg oral Daily          IMPRESSION/RECOMMENDATIONS:  1. UTI - He was hospitalized in August 2021 and again in September 2021. He was treated with a course of fosfomycin then Bactrim.  He was restarted on Bactrim as outpatient.  Urine this admission w ESBL MDRO.    ID recommends Avycaz and stop gentamicin.  2. Persistent Klebsiella bacteremia - ID is following.  Hemodynamically stable. US kidneys/bladder showed mild left hydro, complex cyst vs solid mass L kidney and debris in the bladder. He has native valves therefore do not suspect valvular vegetation with gram negative bacteria.   3. Covid-19 pneumonia - He was hospitalized in August 2021. He had associated hypoxemic respiratory failure but fortunately did not require intubation.  He completed a course of Remdesivir.  He again tested positive this admission.  Unclear if new Covid-19 infection.  He is status post monoclonal antibodies.  ID following.   4. Altered mental status - CT head negative for infarct, mass or ICH; there was progression of ventriculomegaly.   5. Left ventricular systolic dysfunction - His  echocardiogram in August 2021 during his acute illness showed mild left ventricular systolic dysfunction with an EF of 40-45%.  He was started on metoprolol succinate 25 mg during his prior hospital stay however he has been hypotensive with intermittent bradycardia overnight.  I recommended that he remain off of it for now. Hold off on introducing an ACE-inhibitor or ARB in due to hypotension and recent acute renal insuffiency.  He appears euvolemic on exam today.  I had stopped his daily loop diuretic.  Monitor Is and Os. Weigh daily.  He will need a repeat echocardiogram in 3 months to reassess his LV systolic function.    6. Hypotension - His blood pressure is improved.  I recommend that he remain off of metoprolol, doxazosin and daily loop diuretic.  I ask him to drink at least 48-64 ounces of fluid per day.    7. Lower extremity edema - This has resolved.  His albumin is low and I suspect that his edema is more due to decreased osmotic pressure. I recommended that he increase his protein intake with diet and nutritional supplements.  I also recommended compression stockings.  He should only use his furosemide as needed for weight gain of 3 lbs in 1 day or 5 lbs in 1 week.  8. Acute renal insuffiencey - He was volume depleted in the office due to poor oral intake and ongoing diuretic use.  I recommended that he increase his fluid intake and only use his diuretic as needed.    9. Moderate nonobstructive single vessel 50% mid LAD and 50% ostial D1 stenosis by catheterization 9/18/2019- he has no symptoms of angina pectoralis.  He should continue on aspirin and rosuvastatin therapy along with lifestyle modification.  Mild troponin elevation during acute illness due to demand ischemia.  No ischemic work up indicated at this time.  10. Exertional chest pain -in 2019 he was noting left-sided, graded 3-4/10 chest pain which resolved with rest.  Interestingly, however, he was doing a spinning class for 1 hour breaking a  solid sweat and has absolutely no symptoms.  This has not recurred.  11. Hyperlipidemia -I reviewed his latest lipid profile outlined above.  I have asked him to stop atorvastatin and resume his rosuvastatin therapy.    12. Asthma -He is on Advair.  He follows with Suresh Cardoza.  13. MAC - This was noted on his stress echo which is outlined above.  14. Aortic insufficiency -this was graded mild on his stress echocardiogram and trace on his most recent echocardiogram in August 2021.  This is probably age-related changes to the aortic valve.  15. Bifascicular block with LAFB/RBBB-I previously reviewed this issue with him.  It is probably of no clinical consequence and will be followed with an annual EKG and he will report any symptoms of syncope to me.  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  He is on Revlimid.  17. BPH with urinary retention - He continue on Flomax. He was also on doxazosin but I instructed him to remain off of it until his blood pressure and lightheadedness improved.      CARSON Nation  11/4/2021

## 2021-11-04 NOTE — CONSULTS
Subjective     Eulalio Gregg is a 76 y.o. male who was admitted for Weakness [R53.1]  Elevated serum creatinine [R79.89]  Urinary tract infection associated with catheterization of urinary tract, unspecified indwelling urinary catheter type, initial encounter (CMS/[facility]) [T83.511A, N39.0].     Patient is a 68-year-old male with a past medical history of CHF, CAD, hypertension, hyper lipidemia, multiple myeloma, BPH status post UroLift in 2019 who presented with generalized weakness and fatigue. Patient was subsequently found to have Covid pneumonia as well as urinary tract infection. Urology was consulted for hydronephrosis on the right side in the setting of positive urine and blood cultures. Per nursing patient was very confused up until today where he started to become more oriented. However patient is not the best historian. He is unsure if he has had urinary cultures positive in the past or if this is his first urinary tract infection. He does state that he sees Dr. Mathew for his urologic care and says that he helps him with his urination. He denies any history of prostate cancer, bladder cancer, kidney cancer or nephrolithiasis. Patient underwent renal ultrasound on 11/4 which demonstrated mild left hydronephrosis with a right lower pole mass or possible complex cyst as well as diverticulum with no calculi. Follow-up CT urogram demonstrated bilateral large extrarenal pelvises with right greater than left dilation of the ureters but without obstructing mass or calcification. There is distended urinary bladder with numerous diverticulum. Patient has been afebrile and hemodynamically stable. His white blood cell count is 9.9 with a creatinine of 1.0. Urine and blood cultures from this admission are positive for ESBL Klebsiella.  Mancia catheter was placed for about 900 cc of urine and is draining clear.    Outside records reviewed..    Medical History:   Past Medical History:   Diagnosis Date   • Anemia    •  Blepharospasm     receives botox injections every 2-3 months   • BPH (benign prostatic hyperplasia)    • COVID-19 08/21/2021   • History of vertigo 2016   • Lipid disorder    • Tendency toward bleeding easily (CMS/HCC)        Surgical History:   Past Surgical History:   Procedure Laterality Date   • CATARACT EXTRACTION W/  INTRAOCULAR LENS IMPLANT     • CHOLECYSTECTOMY  1992   • HERNIA REPAIR         Social History:   Social History     Social History Narrative   • Not on file       Family History:   Family History   Problem Relation Age of Onset   • Cancer Biological Mother    • Colon cancer Biological Father        Allergies: Amoxicillin and Penicillins    Current Facility-Administered Medications   Medication Dose Route Frequency Provider Last Rate Last Admin   • aspirin enteric coated tablet 81 mg  81 mg oral Daily Daniel White MD   81 mg at 11/04/21 0842   • atropine injection 0.5 mg  0.5 mg intravenous q5 min PRDaniel Grant MD       • cefTAZidime-avibactam (AVYCAZ) 1.25 g in sodium chloride 0.9 % 50 mL IVPB  1.25 g intravenous q8h INT Keith Le DO 25 mL/hr at 11/04/21 1343 1.25 g at 11/04/21 1343   • cholecalciferol (vitamin D3) tablet 1,000 Units  1,000 Units oral BID Daniel White MD   1,000 Units at 11/04/21 0842   • cycloSPORINE (RESTASIS) 0.05 % ophthalmic emulsion 1 drop  1 drop Both Eyes BID Daniel White MD   1 drop at 11/04/21 0842   • glucose chewable tablet 16-32 g of dextrose  16-32 g of dextrose oral PRDaniel Grant MD        Or   • dextrose 40 % oral gel 15-30 g of dextrose  15-30 g of dextrose oral PRDaniel Grant MD        Or   • glucagon (GLUCAGEN) injection 1 mg  1 mg intramuscular PRDaniel Grant MD        Or   • dextrose in water injection 12.5 g  25 mL intravenous PRDaniel Grant MD       • ferrous sulfate tablet 325 mg  325 mg oral Every other day Daniel White MD   325 mg at 11/03/21 0813   • heparin (porcine) 5,000 unit/mL injection 5,000 Units  5,000 Units  subcutaneous q8h JENNYFER Daniel White MD   5,000 Units at 11/04/21 1342   • [Provider Managed Hold] lenalidomide (REVLIMID) capsule 25 mg  25 mg oral Daily Daniel White MD       • mometasone-formoterol (DULERA 200) 200-5 mcg/actuation inhaler 2 puff  2 puff inhalation BID Daniel White MD   2 puff at 11/04/21 0833   • nitroglycerin (NITROSTAT) SL tablet 0.4 mg  0.4 mg sublingual q5 min PRN Daniel White MD       • rosuvastatin (CRESTOR) tablet 10 mg  10 mg oral Daily Daniel White MD   10 mg at 11/04/21 0842   • tamsulosin (FLOMAX) 24 hr ER capsule 0.4 mg  0.4 mg oral Daily Daneil White MD   0.4 mg at 11/04/21 0842        Medication List      CONTINUE taking these medications    compress.stocking,knee,reg,med misc  1 each daily. Apply in AM and remove in PM.  Dose: 1 each        ASK your doctor about these medications    ASPIR-81 81 mg enteric coated tablet  Take 1 tablet (81 mg total) by mouth daily.  Dose: 81 mg  Generic drug: aspirin     coenzyme Q10 200 mg capsule  Commonly known as: COQ10  Take by mouth daily.     ferrous sulfate 325 mg (65 mg iron) tablet  Take 65 mg by mouth daily with breakfast.  Dose: 65 mg     fluticasone propion-salmeteroL 250-50 mcg/dose diskus inhaler  Commonly known as: ADVAIR DISKUS  Inhale 1 puff 2 (two) times a day.   Rinse mouth with water after use to reduce aftertaste and incidence of candidiasis.   Do not swallow.  For patients not on a ventilator, a spacer is recommended to be used with this medication/inhaler.  Dose: 1 puff     furosemide 20 mg tablet  Commonly known as: LASIX  Take 1 tablet (20 mg total) by mouth daily as needed (weight gain of 3 lbs in 1 day or 5 lbs in 1 week).  Dose: 20 mg     MEN'S MULTIVITAMIN ORAL  Take by mouth daily.     REVLIMID 25 mg capsule  Take  25 mg daily  Dose: 25 mg  Generic drug: lenalidomide     rosuvastatin 10 mg tablet  Commonly known as: CRESTOR  Take 1 tablet (10 mg total) by mouth daily.  Dose: 10 mg     sulfamethoxazole-trimethoprim  "800-160 mg per tablet  Commonly known as: BACTRIM DS  Take 1 tablet by mouth 2 (two) times a day.  Dose: 1 tablet     tamsulosin 0.4 mg capsule  Commonly known as: FLOMAX  Take 0.4 mg by mouth 2 (two) times a day.  Dose: 0.4 mg     VITAMIN D3 50 mcg (2,000 unit) capsule  Take 2,000 Units by mouth daily.  Dose: 2,000 Units  Generic drug: cholecalciferol (vitamin D3)          Review of Systems  Pertinent items are noted in HPI.    Objective   Labs  CBC Results       11/04/21 11/03/21 11/02/21     0414 0602 0552    WBC 9.96 9.35 8.23    RBC 3.07 3.09 2.97    HGB 9.2 9.1 8.8    HCT 28.6 28.3 27.0    MCV 93.2 91.6 90.9    MCH 30.0 29.4 29.6    MCHC 32.2 32.2 32.6     206 182        BMP Results       11/04/21 11/03/21 11/02/21     0414 0602 0552     138 138    K 4.9 4.8 4.2    Cl 101 102 103    CO2 22 22 21    Glucose 76 85 80    BUN 10 12 16    Creatinine 1.0 1.0 1.1    Calcium 7.2 7.5 7.5    Anion Gap 13 14 14    EGFR >60.0 >60.0 >60.0         Comment for K at 0414 on 11/04/21: SLIGHT HEMOLYSIS, RESULT MAY BE INCREASED.        UA Results       10/31/21     1439    Color Yellow    Clarity Turbid    Glucose Negative    Bilirubin Negative    Ketones Negative    Sp Grav 1.015    Blood +2    Ph 6.5    Protein +1    Urobilinogen 0.2    Nitrite Positive    Leuk Est +3    WBC Too Numerous To Count    RBC 5 TO 9    Bacteria +3         Comment for Blood at 1439 on 10/31/21: The sensitivity of the occult blood test is equivalent to approximately 4 intact RBC/HPF.            Imaging  I have reviewed the Imaging from the last 24 hrs. please see report for CT urogram      Physicial Exam  Visit Vitals  BP (!) 105/57 (BP Location: Right upper arm, Patient Position: Lying)   Pulse 76   Temp 37 °C (98.6 °F) (Oral)   Resp 16   Ht 1.6 m (5' 2.99\")   Wt 63.2 kg (139 lb 5.3 oz)   SpO2 97%   BMI 24.69 kg/m²     General appearance: alert, appears stated age and cooperative  Head: normocephalic, without obvious abnormality, " atraumatic  Back: symmetric, no curvature. ROM normal. No CVA tenderness.  Lungs: Equal chest rise bilaterally no labored breathing  Heart: Regular rate  Abdomen: soft, non-tender; bowel sounds normal; no masses, no organomegaly  Male genitalia: penis: no lesions or discharge. Testes: no masses or tenderness. No hernias. Mancia catheter in place draining clear yellow urine  Extremities: extremities normal, warm and well-perfused; no cyanosis, clubbing, or edema  Neurologic: Grossly normal          Assessment   76 y.o. male with past medical history of CHF, CAD, hypertension, BPH status post UroLift in 2019, multiple myeloma who presented with Covid pneumonia as well as urinary tract infection. Urology being consulted for hydronephrosis in the setting of positive blood and urine cultures       Plan     -Maintain Mancia catheter in place. This could contribute to both hydronephrosis and urinary tract infection. Can consider repeat renal ultrasound or CT abdomen pelvis without contrast in 2 to 3 days for resolution of hydronephrosis.  -Possible UPJ obstruction on CT. If hydronephrosis does not resolve with placement of Mancia catheter this may need to be investigated further.  -Urine culture positive for ESBL Klebsiella. Patient currently on Avycaz. Antibiotics per infectious disease  -Continue Flomax  -Bowel regimen  -Out of bed as able  -Covid per primary team  -Antibiotics per infectious disease  -No acute urologic intervention indicated at this time  -Please call with questions    Edgar Casas DO, PGY2  [facility]  [facility] [phone]  [place] [address]

## 2021-11-05 PROBLEM — I50.22 CHRONIC SYSTOLIC HEART FAILURE (CMS/HCC): Chronic | Status: ACTIVE | Noted: 2021-10-31

## 2021-11-05 PROBLEM — I50.20 HFREF (HEART FAILURE WITH REDUCED EJECTION FRACTION) (CMS/HCC): Chronic | Status: ACTIVE | Noted: 2021-10-31

## 2021-11-05 PROBLEM — A41.4 SEPSIS DUE TO KLEBSIELLA PNEUMONIAE (CMS/HCC): Status: ACTIVE | Noted: 2021-11-05

## 2021-11-05 LAB
ANION GAP SERPL CALC-SCNC: 10 MEQ/L (ref 3–15)
BASOPHILS # BLD: 0.05 K/UL (ref 0.01–0.1)
BASOPHILS NFR BLD: 0.7 %
BUN SERPL-MCNC: 10 MG/DL (ref 8–20)
CALCIUM SERPL-MCNC: 6.8 MG/DL (ref 8.9–10.3)
CHLORIDE SERPL-SCNC: 103 MEQ/L (ref 98–109)
CO2 SERPL-SCNC: 20 MEQ/L (ref 22–32)
CREAT SERPL-MCNC: 1 MG/DL (ref 0.8–1.3)
DIFFERENTIAL METHOD BLD: ABNORMAL
EOSINOPHIL # BLD: 0.01 K/UL (ref 0.04–0.54)
EOSINOPHIL NFR BLD: 0.1 %
ERYTHROCYTE [DISTWIDTH] IN BLOOD BY AUTOMATED COUNT: 19.4 % (ref 11.6–14.4)
GFR SERPL CREATININE-BSD FRML MDRD: >60 ML/MIN/1.73M*2
GLUCOSE SERPL-MCNC: 80 MG/DL (ref 70–99)
HCT VFR BLDCO AUTO: 24.9 % (ref 40.1–51)
HGB BLD-MCNC: 8.1 G/DL (ref 13.7–17.5)
IMM GRANULOCYTES # BLD AUTO: 0.07 K/UL (ref 0–0.08)
IMM GRANULOCYTES NFR BLD AUTO: 0.9 %
LACTATE SERPL-SCNC: 1.1 MMOL/L (ref 0.4–2)
LYMPHOCYTES # BLD: 1.05 K/UL (ref 1.2–3.5)
LYMPHOCYTES NFR BLD: 14 %
MAGNESIUM SERPL-MCNC: 1.8 MG/DL (ref 1.8–2.5)
MCH RBC QN AUTO: 29.6 PG (ref 28–33.2)
MCHC RBC AUTO-ENTMCNC: 32.5 G/DL (ref 32.2–36.5)
MCV RBC AUTO: 90.9 FL (ref 83–98)
MONOCYTES # BLD: 0.91 K/UL (ref 0.3–1)
MONOCYTES NFR BLD: 12.1 %
NEUTROPHILS # BLD: 5.41 K/UL (ref 1.7–7)
NEUTS SEG NFR BLD: 72.2 %
NRBC BLD-RTO: 0 %
PDW BLD AUTO: 10.5 FL (ref 9.4–12.4)
PLATELET # BLD AUTO: 283 K/UL (ref 150–350)
POTASSIUM SERPL-SCNC: 4.2 MEQ/L (ref 3.6–5.1)
RBC # BLD AUTO: 2.74 M/UL (ref 4.5–5.8)
SODIUM SERPL-SCNC: 133 MEQ/L (ref 136–144)
WBC # BLD AUTO: 7.5 K/UL (ref 3.8–10.5)

## 2021-11-05 PROCEDURE — 83735 ASSAY OF MAGNESIUM: CPT | Performed by: STUDENT IN AN ORGANIZED HEALTH CARE EDUCATION/TRAINING PROGRAM

## 2021-11-05 PROCEDURE — 21400000 HC ROOM AND CARE CCU/INTERMEDIATE

## 2021-11-05 PROCEDURE — 63600000 HC DRUGS/DETAIL CODE: Mod: JG | Performed by: STUDENT IN AN ORGANIZED HEALTH CARE EDUCATION/TRAINING PROGRAM

## 2021-11-05 PROCEDURE — 83605 ASSAY OF LACTIC ACID: CPT | Performed by: STUDENT IN AN ORGANIZED HEALTH CARE EDUCATION/TRAINING PROGRAM

## 2021-11-05 PROCEDURE — 85025 COMPLETE CBC W/AUTO DIFF WBC: CPT | Performed by: STUDENT IN AN ORGANIZED HEALTH CARE EDUCATION/TRAINING PROGRAM

## 2021-11-05 PROCEDURE — 25800000 HC PHARMACY IV SOLUTIONS: Performed by: STUDENT IN AN ORGANIZED HEALTH CARE EDUCATION/TRAINING PROGRAM

## 2021-11-05 PROCEDURE — 80048 BASIC METABOLIC PNL TOTAL CA: CPT | Performed by: STUDENT IN AN ORGANIZED HEALTH CARE EDUCATION/TRAINING PROGRAM

## 2021-11-05 PROCEDURE — 63600000 HC DRUGS/DETAIL CODE: Performed by: STUDENT IN AN ORGANIZED HEALTH CARE EDUCATION/TRAINING PROGRAM

## 2021-11-05 PROCEDURE — 36415 COLL VENOUS BLD VENIPUNCTURE: CPT | Performed by: STUDENT IN AN ORGANIZED HEALTH CARE EDUCATION/TRAINING PROGRAM

## 2021-11-05 PROCEDURE — 99232 SBSQ HOSP IP/OBS MODERATE 35: CPT | Performed by: INTERNAL MEDICINE

## 2021-11-05 PROCEDURE — 63700000 HC SELF-ADMINISTRABLE DRUG: Performed by: STUDENT IN AN ORGANIZED HEALTH CARE EDUCATION/TRAINING PROGRAM

## 2021-11-05 PROCEDURE — 02HV33Z INSERTION OF INFUSION DEVICE INTO SUPERIOR VENA CAVA, PERCUTANEOUS APPROACH: ICD-10-PCS | Performed by: STUDENT IN AN ORGANIZED HEALTH CARE EDUCATION/TRAINING PROGRAM

## 2021-11-05 RX ADMIN — ROSUVASTATIN CALCIUM 10 MG: 10 TABLET, FILM COATED ORAL at 08:47

## 2021-11-05 RX ADMIN — MOMETASONE FUROATE AND FORMOTEROL FUMARATE DIHYDRATE 2 PUFF: 200; 5 AEROSOL RESPIRATORY (INHALATION) at 20:11

## 2021-11-05 RX ADMIN — SODIUM CHLORIDE 250 ML: 9 INJECTION, SOLUTION INTRAVENOUS at 16:18

## 2021-11-05 RX ADMIN — HEPARIN SODIUM 5000 UNITS: 5000 INJECTION INTRAVENOUS; SUBCUTANEOUS at 22:06

## 2021-11-05 RX ADMIN — FERROUS SULFATE TAB 325 MG (65 MG ELEMENTAL FE) 325 MG: 325 (65 FE) TAB at 08:46

## 2021-11-05 RX ADMIN — CEFTAZIDIME, AVIBACTAM 1.25 G: 2; .5 POWDER, FOR SOLUTION INTRAVENOUS at 13:42

## 2021-11-05 RX ADMIN — SODIUM CHLORIDE 250 ML: 9 INJECTION, SOLUTION INTRAVENOUS at 12:18

## 2021-11-05 RX ADMIN — ASPIRIN 81 MG: 81 TABLET, COATED ORAL at 08:46

## 2021-11-05 RX ADMIN — MOMETASONE FUROATE AND FORMOTEROL FUMARATE DIHYDRATE 2 PUFF: 200; 5 AEROSOL RESPIRATORY (INHALATION) at 08:47

## 2021-11-05 RX ADMIN — CYCLOSPORINE 1 DROP: 0.5 EMULSION OPHTHALMIC at 08:47

## 2021-11-05 RX ADMIN — CYCLOSPORINE 1 DROP: 0.5 EMULSION OPHTHALMIC at 20:11

## 2021-11-05 RX ADMIN — TAMSULOSIN HYDROCHLORIDE 0.4 MG: 0.4 CAPSULE ORAL at 08:46

## 2021-11-05 RX ADMIN — CALCIUM GLUCONATE 1000 MG: 98 INJECTION, SOLUTION INTRAVENOUS at 09:53

## 2021-11-05 RX ADMIN — HEPARIN SODIUM 5000 UNITS: 5000 INJECTION INTRAVENOUS; SUBCUTANEOUS at 05:18

## 2021-11-05 RX ADMIN — CEFTAZIDIME, AVIBACTAM 1.25 G: 2; .5 POWDER, FOR SOLUTION INTRAVENOUS at 05:18

## 2021-11-05 RX ADMIN — CEFTAZIDIME, AVIBACTAM 1.25 G: 2; .5 POWDER, FOR SOLUTION INTRAVENOUS at 20:12

## 2021-11-05 RX ADMIN — HEPARIN SODIUM 5000 UNITS: 5000 INJECTION INTRAVENOUS; SUBCUTANEOUS at 13:42

## 2021-11-05 RX ADMIN — Medication 1000 UNITS: at 08:46

## 2021-11-05 RX ADMIN — Medication 1000 UNITS: at 20:11

## 2021-11-05 NOTE — PROGRESS NOTES
Infectious Disease Progress Note    Patient Name: Eulalio Gregg  MR#: [id number]  : 1945  Admission Date: 10/31/2021  Date: 21   Time: 10:47 AM   Author: Tico Adams MD        Antibiotics:    Anti-infectives (From admission, onward)    Start     Dose/Rate Route Frequency Ordered Stop    21 1330  cefTAZidime-avibactam (AVYCAZ) 1.25 g in sodium chloride 0.9 % 50 mL IVPB         1.25 g  25 mL/hr over 2 Hours intravenous Every 8 hours interval 21 1247            Subjective     Review of Systems  Feels about the same.  Denies fever or shaking chills.  No urinary complaints.  No overnight events reported.    Objective     Vital Signs:    Vitals:    21 0730   BP: (!) 93/47   Pulse: 66   Resp: 18   Temp: 37.2 °C (98.9 °F)   SpO2: 96%       Temp (72hrs), Av.1 °C (98.7 °F), Min:36.6 °C (97.8 °F), Max:37.8 °C (100 °F)      Physical Exam:      General: alert, NAD   HEENT: NC/AT,anicteric sclera, no thrush  Neck: supple  Cardiovascular: regular S1/S2  Respiratory: clear to auscultation, no wheezing, rales or ronchi  GI/Abdomen: soft, +BS, NT/ND  Extremities: no clubbing, cyanosis or edema  Musculoskeletal: no acute arthritis  G.U.: No lesions, no suprapubic TTP or CVAT  Skin: warm and dry, no new rashes  Psych:cooperative with exam  Neuro: Moves all 4 extremities        Lines, Drains, Airways, Wounds:  Peripheral IV (Adult) 21 Left;Posterior Forearm (Active)   Number of days: 1       Urethral Catheter 18 Fr (Active)   Number of days: 1       Labs:    CBC Results       21     0406 0414 0602    WBC 7.50 9.96 9.35    RBC 2.74 3.07 3.09    HGB 8.1 9.2 9.1    HCT 24.9 28.6 28.3    MCV 90.9 93.2 91.6    MCH 29.6 30.0 29.4    MCHC 32.5 32.2 32.2     247 206         CMP Results       21     0406 0414 0602     136 138    K 4.2 4.9 4.8    Cl 103 101 102    CO2 20 22 22    Glucose 80 76 85    BUN 10 10 12    Creatinine 1.0 1.0  1.0    Calcium 6.8 7.2 7.5    Anion Gap 10 13 14    EGFR >60.0 >60.0 >60.0         Comment for K at 0414 on 11/04/21: SLIGHT HEMOLYSIS, RESULT MAY BE INCREASED.            Imaging:    Radiology Imaging    XR CHEST 1 VW    Narrative  CLINICAL HISTORY: Weakness.    COMPARISON: 11/2/2015    COMMENT: AP view of the chest.    No significant interval change. Linear lung markings bilateral lung bases, not  significantly changed, probable atelectasis or scarring. Heart size within  normal limits. Mediastinal contours appear unremarkable. Osseous structures are  intact.    --    Impression  No significant interval change. Probable minimal atelectasis or  scarring bilateral lung bases.      MICROBIOLOGY :   Microbiology Results     Procedure Component Value Units Date/Time    Blood Culture Blood, Venous [[id number]]  (Normal) Collected: 11/03/21 0924    Specimen: Blood, Venous Updated: 11/04/21 1101     Culture No growth at 18-24 hours    Blood Culture Blood, Venous [[id number]]  (Normal) Collected: 11/03/21 0924    Specimen: Blood, Venous Updated: 11/04/21 1101     Culture No growth at 18-24 hours    Blood Culture Blood, Venous [[id number]]  (Abnormal) Collected: 11/01/21 1509    Specimen: Blood, Venous Updated: 11/04/21 0646     Culture **Positive Culture**      Klebsiella pneumoniae ESBL     Gram Stain Result Gram negative bacilli    Narrative:      Susceptibilities previously reported on culture accession number:[id number] .      Blood Culture Blood, Venous [[id number]]  (Abnormal) Collected: 11/01/21 1509    Specimen: Blood, Venous Updated: 11/04/21 0645     Culture **Positive Culture**      Klebsiella pneumoniae ESBL     Gram Stain Result Gram negative bacilli    Narrative:      Susceptibilities previously reported on culture accession number:[id number].      SARS-CoV-2 (COVID-19), PCR Nasopharynx [[id number]]  (Abnormal) Collected: 10/31/21 1630    Specimen: Nasopharyngeal Swab from Nasopharynx Updated: 10/31/21 1812     Narrative:      The following orders were created for panel order SARS-CoV-2 (COVID-19), PCR Nasopharynx.  Procedure                               Abnormality         Status                     ---------                               -----------         ------                     SARS-CoV-2 (COVID-19), P...[[id number]]  Abnormal            Final result                 Please view results for these tests on the individual orders.    SARS-CoV-2 (COVID-19), PCR Nasopharynx [[id number]]  (Abnormal) Collected: 10/31/21 1630    Specimen: Nasopharyngeal Swab from Nasopharynx Updated: 10/31/21 1812     SARS-CoV-2 (COVID-19) Positive    Narrative:      Nursing instructions: Obtain nasopharyngeal swab ONLY. Send swab in viral transport media.    Blood Culture Blood, Venous [[id number]]  (Abnormal)  (Susceptibility) Collected: 10/31/21 1615    Specimen: Blood, Venous Updated: 11/04/21 0643     Culture **Positive Culture**      Klebsiella pneumoniae ESBL     Gram Stain Result Gram negative bacilli    Blood Culture Blood, Venous [[id number]]  (Abnormal) Collected: 10/31/21 1615    Specimen: Blood, Venous Updated: 11/04/21 0645     Culture **Positive Culture**      Klebsiella pneumoniae ESBL     Gram Stain Result Gram negative bacilli    Narrative:      Susceptibilities previously reported on culture accession number:[id number] .      Blood Culture PCR Panel Blood, Venous [[id number]]  (Abnormal) Collected: 10/31/21 1615    Specimen: Blood, Venous Updated: 11/01/21 1300     Candida albicans Not Detected     Candida auris Not Detected     Candida glabrata Not Detected     Candida krusei Not Detected     Candida parapsilosis Not Detected     Candida tropicalis Not Detected     Cryptococcus neoformans/gattii Not Detected     Enterococcus faecalis Not Detected     Enterococcus faecium Not Detected     Enterobacter cloacae complex Not Detected     Escherichia coli Not Detected     Klebsiella oxytoca Not Detected     Klebsiella pneumoniae  Detected     Klebsiella aerogenes Not Detected     Proteus Not Detected     Salmonella species Not Detected     Serratia marcescens Not Detected     Haemophilus influenzae Not Detected     Listeria monocytogenes Not Detected     Neisseria meningitidis Not Detected     Pseudomonas aeruginosa Not Detected     Stenotrophomonas maltophilia Not Detected     Bacteroides fragilis Not Detected     Staph (not aureus) Not Detected     Staphylococcus aureus Not Detected     Staphylococcus ludgnensis Not Detected     Staphylococcus epidermidis Not Detected     Streptococcus Not Detected     Streptococcus agalactiae (Group B) Not Detected     Streptococcus pneumoniae Not Detected     Streptococcus pyogenes (Group A) Not Detected     KPC (Carbapenem Resistance Gene) Detected     mecA/C Not Applicable     mecA/C and MREJ (MRSA) Not Applicable     Manuel/B (Vancomycin Resistance Gene) Not Applicable     CTX-M (ESBL) Not Detected     IMP Not Detected     mcr-1 Not Detected     NDM Not Detected     OXA-48-like Not Detected     VIM Not Detected     Comment: --    Urine culture Urine, Clean Catch [[id number]]  (Abnormal)  (Susceptibility) Collected: 10/31/21 1439    Specimen: Urine, Clean Catch Updated: 11/04/21 0620     Urine Culture **Positive Culture**      >1 x 10^5 CFU/mL Klebsiella pneumoniae ESBL    Urine culture [[id number]]  (Abnormal)  (Susceptibility) Collected: 10/21/21 1531    Specimen: Urine, Clean Catch Updated: 10/23/21 1012     Urine Culture **Positive Culture**      >1 x 10^5 CFU/mL Klebsiella pneumoniae ESBL          Assessment   COVID-19 +- s/p monoclonal Ab   UTD with COVID vaccine  UTI-complicated  Leukocytosis  Multiple myeloma  Estimated Creatinine Clearance: Estimated Creatinine Clearance: 49 mL/min (by C-G formula based on SCr of 1 mg/dL).  History of allergy to amoxicillin- rash, has reportedly tolerated penicillin in the past  Known history of multidrug-resistant organisms including ESBL and CRE   Klebsiella  bacteremia-likely from  source          Plan     Urine culture from admission growing ESBL Klebsiella which is susceptible to Avycaz.  Highly drug-resistant   10/31/2021 blood cultures with the same organism, cultures from 11/1/2021 also positive  Repeat blood cultures now with no growth to date from 11/3/2021  Ultrasound with mild left hydronephrosis, complex cyst versus possible mass in the right kidney   CT urogram done yesterday did not show any evidence of mass.  Appreciate urology input  Continue with Avycaz day 5, recent urine culture with highly drug-resistant Klebsiella  Enhanced isolation precautions for Covid  Status post monoclonal antibody therapy  Clinically stable from a respiratory standpoint  Findings and plan reviewed with patient  Tolerating antibiotics  Anticipate 14 days total of treatment from the first negative blood culture on 11/3/2021  Today is day 3 of 14  CBC and CMP in 1 week  Can get a midline placed

## 2021-11-05 NOTE — PROGRESS NOTES
Internal Medicine  Daily Progress Note        SUBJECTIVE   This is a 76 y.o. year-old male admitted on 10/31/2021 with Weakness [R53.1]  Elevated serum creatinine [R79.89]  Urinary tract infection associated with catheterization of urinary tract, unspecified indwelling urinary catheter type, initial encounter (CMS/[facility]) [T83.511A, N39.0].    Interval History: Pt evaluated at the bedside this morning.  Overall pt feels well, some discomfort from serrano but otherwise no complaints.  Cultures from third set have been negative thus far.  Remains on Avycaz.     OBJECTIVE   Vital signs in last 24 hours:  Temp:  [37 °C (98.6 °F)-37.8 °C (100 °F)] 37.2 °C (98.9 °F)  Heart Rate:  [66-76] 72  Resp:  [16-18] 18  BP: ()/(47-69) 94/52  SpO2:  [96 %-98 %] 96 %  Oxygen Therapy: None (Room air)    Weight & I/Os:  Weights (last 5 days)     Date/Time Weight    11/05/21 0025 67.4 kg (148 lb 9.6 oz)    11/04/21 0548 63.2 kg (139 lb 5.3 oz)    11/01/21 0614 65.7 kg (144 lb 13.5 oz)    10/31/21 1208 65.8 kg (145 lb)          Intake/Output Summary (Last 24 hours) at 11/5/2021 0659  Last data filed at 11/5/2021 0500  24 Hour Net Input/Output from 7AM Yesterday   Intake 730 ml   Output 2300 ml   Net -1570 ml        PHYSICAL EXAMINATION   Physical Exam  Constitutional:       Comments: Chronically ill-appearing thin, appears less confused than yesterday  HENT:      Head: Normocephalic and atraumatic.      Mouth/Throat:      Mouth: Mucous membranes are moist.   Eyes:      Extraocular Movements: Extraocular movements intact.      Pupils: Pupils are equal, round, and reactive to light.   Cardiovascular:      Rate and Rhythm: Normal rate and regular rhythm.   Pulmonary:      Effort: Pulmonary effort is normal. No respiratory distress.      Breath sounds: No rales.   Abdominal:      General: Abdomen is flat.      Palpations: Abdomen is soft.      Tenderness: There is no abdominal tenderness. There is no right CVA tenderness or left CVA  tenderness.   Musculoskeletal:         General: Swelling present. Normal range of motion.      Cervical back: Normal range of motion. No rigidity or tenderness. Swelling of RUE, no localizing erythema  Skin:     General: Skin is warm.   Neurological:      General: No focal deficit present. AAOx1-2     Mental Status: He is alert and oriented to person, place, and time.      LINES, CATHETERS, DRAINS, AIRWAYS, & WOUNDS   Lines, drains, airways, & wounds:  Peripheral IV (Adult) 11/04/21 Left;Posterior Forearm (Active)   Number of days: 1       Urethral Catheter 18 Fr (Active)   Number of days: 1        LABS & IMAGING   Labs:  Results from last 7 days   Lab Units 11/05/21  0406 11/01/21  0315 10/31/21  1324   SODIUM mEQ/L 133*   < > 133*   POTASSIUM mEQ/L 4.2   < > 3.9   CHLORIDE mEQ/L 103   < > 103   CO2 mEQ/L 20*   < > 22   BUN mg/dL 10   < > 28*   CREATININE mg/dL 1.0   < > 1.5*   CALCIUM mg/dL 6.8*   < > 7.4*   ALBUMIN g/dL  --   --  2.0*   BILIRUBIN TOTAL mg/dL  --   --  1.1   ALK PHOS IU/L  --   --  82   ALT IU/L  --   --  34   AST IU/L  --   --  37   GLUCOSE mg/dL 80   < > 122*    < > = values in this interval not displayed.     Results from last 7 days   Lab Units 11/05/21 0406   WBC K/uL 7.50   HEMOGLOBIN g/dL 8.1*   HEMATOCRIT % 24.9*   PLATELETS K/uL 283         Results from last 7 days   Lab Units 11/05/21  0406 11/04/21 0414 11/03/21  0602   WBC K/uL 7.50 9.96 9.35   HEMOGLOBIN g/dL 8.1* 9.2* 9.1*   HEMATOCRIT % 24.9* 28.6* 28.3*   PLATELETS K/uL 283 247 206       Results from last 7 days   Lab Units 11/05/21 0406 11/04/21 0414 11/03/21  0602 11/01/21  0315 10/31/21  1324   SODIUM mEQ/L 133* 136 138   < > 133*   POTASSIUM mEQ/L 4.2 4.9 4.8   < > 3.9   CHLORIDE mEQ/L 103 101 102   < > 103   CO2 mEQ/L 20* 22 22   < > 22   BUN mg/dL 10 10 12   < > 28*   CREATININE mg/dL 1.0 1.0 1.0   < > 1.5*   CALCIUM mg/dL 6.8* 7.2* 7.5*   < > 7.4*   ALBUMIN g/dL  --   --   --   --  2.0*   BILIRUBIN TOTAL mg/dL  --   --    --   --  1.1   ALK PHOS IU/L  --   --   --   --  82   ALT IU/L  --   --   --   --  34   AST IU/L  --   --   --   --  37   GLUCOSE mg/dL 80 76 85   < > 122*    < > = values in this interval not displayed.     Lab Results   Component Value Date    MG 1.8 11/05/2021    CA 9.0 08/22/2019     Lab Results   Component Value Date    PT 12.3 03/19/2019    INR 1.0 03/19/2019    PTT 28 03/19/2019       Microbiology Results     Procedure Component Value Units Date/Time    Blood Culture Blood, Venous [[id number]]  (Normal) Collected: 11/03/21 0924    Specimen: Blood, Venous Updated: 11/04/21 1101     Culture No growth at 18-24 hours    Blood Culture Blood, Venous [[id number]]  (Normal) Collected: 11/03/21 0924    Specimen: Blood, Venous Updated: 11/04/21 1101     Culture No growth at 18-24 hours    Blood Culture Blood, Venous [[id number]]  (Abnormal) Collected: 11/01/21 1509    Specimen: Blood, Venous Updated: 11/04/21 0646     Culture **Positive Culture**      Klebsiella pneumoniae ESBL     Gram Stain Result Gram negative bacilli    Narrative:      Susceptibilities previously reported on culture accession number:[id number] .      Blood Culture Blood, Venous [[id number]]  (Abnormal) Collected: 10/31/21 1615    Specimen: Blood, Venous Updated: 11/04/21 0645     Culture **Positive Culture**      Klebsiella pneumoniae ESBL     Gram Stain Result Gram negative bacilli    Narrative:      Susceptibilities previously reported on culture accession number:[id number] .      Blood Culture Blood, Venous [[id number]]  (Abnormal) Collected: 11/01/21 1509    Specimen: Blood, Venous Updated: 11/04/21 0645     Culture **Positive Culture**      Klebsiella pneumoniae ESBL     Gram Stain Result Gram negative bacilli    Narrative:      Susceptibilities previously reported on culture accession number:[id number].      Blood Culture Blood, Venous [[id number]]  (Abnormal)  (Susceptibility) Collected: 10/31/21 1615    Specimen: Blood, Venous Updated:  11/04/21 0643     Culture **Positive Culture**      Klebsiella pneumoniae ESBL     Gram Stain Result Gram negative bacilli    Urine culture Urine, Clean Catch [[id number]]  (Abnormal)  (Susceptibility) Collected: 10/31/21 1439    Specimen: Urine, Clean Catch Updated: 11/04/21 0620     Urine Culture **Positive Culture**      >1 x 10^5 CFU/mL Klebsiella pneumoniae ESBL    Blood Culture PCR Panel Blood, Venous [[id number]]  (Abnormal) Collected: 10/31/21 1615    Specimen: Blood, Venous Updated: 11/01/21 1300     Candida albicans Not Detected     Candida auris Not Detected     Candida glabrata Not Detected     Candida krusei Not Detected     Candida parapsilosis Not Detected     Candida tropicalis Not Detected     Cryptococcus neoformans/gattii Not Detected     Enterococcus faecalis Not Detected     Enterococcus faecium Not Detected     Enterobacter cloacae complex Not Detected     Escherichia coli Not Detected     Klebsiella oxytoca Not Detected     Klebsiella pneumoniae Detected     Klebsiella aerogenes Not Detected     Proteus Not Detected     Salmonella species Not Detected     Serratia marcescens Not Detected     Haemophilus influenzae Not Detected     Listeria monocytogenes Not Detected     Neisseria meningitidis Not Detected     Pseudomonas aeruginosa Not Detected     Stenotrophomonas maltophilia Not Detected     Bacteroides fragilis Not Detected     Staph (not aureus) Not Detected     Staphylococcus aureus Not Detected     Staphylococcus ludgnensis Not Detected     Staphylococcus epidermidis Not Detected     Streptococcus Not Detected     Streptococcus agalactiae (Group B) Not Detected     Streptococcus pneumoniae Not Detected     Streptococcus pyogenes (Group A) Not Detected     KPC (Carbapenem Resistance Gene) Detected     mecA/C Not Applicable     mecA/C and MREJ (MRSA) Not Applicable     Manuel/B (Vancomycin Resistance Gene) Not Applicable     CTX-M (ESBL) Not Detected     IMP Not Detected     mcr-1 Not Detected      NDM Not Detected     OXA-48-like Not Detected     VIM Not Detected     Comment: --    SARS-CoV-2 (COVID-19), PCR Nasopharynx [[id number]]  (Abnormal) Collected: 10/31/21 1630    Specimen: Nasopharyngeal Swab from Nasopharynx Updated: 10/31/21 1812    Narrative:      The following orders were created for panel order SARS-CoV-2 (COVID-19), PCR Nasopharynx.  Procedure                               Abnormality         Status                     ---------                               -----------         ------                     SARS-CoV-2 (COVID-19), P...[[id number]]  Abnormal            Final result                 Please view results for these tests on the individual orders.    SARS-CoV-2 (COVID-19), PCR Nasopharynx [[id number]]  (Abnormal) Collected: 10/31/21 1630    Specimen: Nasopharyngeal Swab from Nasopharynx Updated: 10/31/21 1812     SARS-CoV-2 (COVID-19) Positive    Narrative:      Nursing instructions: Obtain nasopharyngeal swab ONLY. Send swab in viral transport media.          Imaging personally reviewed (does not include unread studies):  CT HEAD WITHOUT IV CONTRAST    Result Date: 11/2/2021  IMPRESSION: 1. No acute intracranial hemorrhage, acute infarct in a major vascular territory or mass-effect. 2. Progression of ventriculomegaly which may be from central atrophy.  Normal pressure hydrocephalus can have a similar appearance. COMMENT: Axial noncontrast CT images of the head were obtained. Sagittal and coronal reconstructions were created. CT DOSE:  One or more dose reduction techniques (e.g. automated exposure control, adjustment of the mA and/or kV according to patient size, use of iterative reconstruction technique) utilized for this examination. Comparison:  No prior studies are available for comparison. Findings: Ventricles are enlarged, disproportionate to sulcal involutional changes and progressed from prior.  There are bilateral periventricular white matter lucencies which are nonspecific but  compatible with microangiopathic change. There is no mass effect, midline shift, territorial infarction, acute hemorrhage or extra-axial fluid collection. Visualized paranasal sinuses and mastoid air cells are clear.    ULTRASOUND KIDNEYS / BLADDER    Result Date: 11/4/2021  IMPRESSION: 1.  Mild left hydronephrosis 2.  Complex cyst versus solid mass lower pole right kidney measuring up to 3.2 cm. Contrast-enhanced renal mass protocol CT or MRI is recommended for further evaluation. 3.  Debris within the bladder     X-RAY CHEST 1 VIEW    Result Date: 10/31/2021  IMPRESSION: No significant interval change. Probable minimal atelectasis or scarring bilateral lung bases.    Ultrasound venous arm right    Result Date: 11/4/2021  IMPRESSION: No upper extremity deep vein thrombosis.     CT UROGRAM WITH AND WITHOUT IV CONTRAST    Result Date: 11/4/2021  IMPRESSION: There is a bilateral large extrarenal pelvis bilaterally, right greater than left with segmental dilatation of ectatic ureters but no obstructing mass or calcification. There are numerous small parenchymal lesions in both kidneys, many small and difficult to characterize. 2 more prominent areas of subtle decreased attenuation on the right are noted and subtle masses or lesion certainly not excluded. There is a distended urinary bladder with numerous diverticula. Intraluminal mass at the left base is noted uncertain whether an extension of the prostate or separate intraluminal lesion, further evaluation advised. Multiple hepatic lesions are noted, many too small to characterize, the larger to reflect cysts. See comment above for delineation of findings. Results were made available to the clinical service at time of study dictation. In accordance with [place] Act 112,  the patient will receive a letter notifying them to follow up with their physician.     MEDS/DRIPS   Scheduled Meds:  • aspirin  81 mg oral Daily   • ceftazidime-avibactam  1.25 g intravenous q8h INT   •  cholecalciferol (vitamin D3)  1,000 Units oral BID   • cycloSPORINE  1 drop Both Eyes BID   • ferrous sulfate  325 mg oral Every other day   • heparin (porcine)  5,000 Units subcutaneous q8h [facility]   • [Provider Managed Hold] lenalidomide  25 mg oral Daily   • mometasone-formoterol  2 puff inhalation BID   • rosuvastatin  10 mg oral Daily   • sodium chloride  250 mL intravenous Once   • tamsulosin  0.4 mg oral Daily     Continuous Infusions:  PRN Meds:.atropine  •  glucose **OR** dextrose **OR** glucagon **OR** dextrose in water  •  nitroglycerin     ASSESSMENT & PLAN   UTI (urinary tract infection)  Assessment & Plan  Uclx prior to admission c/w ESBL klebsiella MDRO including meropenem  Received 5 days bactrim PTP  U/A TNTC WBC, 3+ Bacteria, pos leuk esterase and nitrite  Lactate 2.1, Hypotensive with BP 90/54  No leukocytosis    - Continue with Amycaz for now per ID (day 5)  - ID following  - fu repeat UClx and Bclx; repeat blood cultures x2 growing GNRs, thus repeat cultures  - pts BP runs chronically low; will hold off on further fluids at this time  - s/p CT urogram; may need to repeat in 2 to 3 days, Uro following  - Maintain serrano for now    Swelling of right upper extremity  Assessment & Plan  Right arm more swollen today  Painful to palpation but no localized erythema    - US RUE negative for DVT; RUE swelling improved  - Continuing on abx for UTI and bacteremia    Multiple myeloma (CMS/HCC)  Assessment & Plan  Currently on chemotherapy outpatient  - c/w outpatient management       COVID-19  Assessment & Plan  No current signs or sx's of acute infection  Continue home Advair  Positive test on admission    - Receiving monoclonal ab therapy per ID    Chronic systolic heart failure (CMS/HCC)  Assessment & Plan  EF 40-45% on home lasix PRN  - appears euvolemic now  Recentrly saw Dr. Ellis who stopped his metoprolo and changes his lasix from daily to PRN due to hypotension   - can give lasix PRN  - daily standing  weight and accurate I/Os   - Cardiology following    Anemia  Assessment & Plan  Presents with Hb 9/1 b/l 10  Has known MM on chemotherapy which likely explains this  - CBC daily  - c/w chemo outpatient  - transfuse Hb <7  - hold home iron supplementation given acute infection UTI    CAD (coronary artery disease)  Assessment & Plan  Has known 50% LAD occlusion  CW home statin and  ASA    Weakness  Assessment & Plan  Likely 2/2 UTI  - PT/OT  - plan as per UTI    Hyperlipidemia  Assessment & Plan  Continue home statin    BPH (benign prostatic hyperplasia)  Assessment & Plan  C/w home Flomax  Follws with urology Dr. Mathew       VTE Assessment: [place]    VTE Prophylaxis: Current anticoagulants:  heparin (porcine) 5,000 unit/mL injection 5,000 Units, subcutaneous, q8h [id number]      Code Status: Full Code  Estimated discharge date: 11/8/2021     ATTENDING DOCUMENTATION  ALSO SEE ATTENDING ATTESTATION SECTION OF NOTE

## 2021-11-05 NOTE — PROGRESS NOTES
Called and updated pts daughter regarding pts clinical status and treatment plan.  Answered all questions.

## 2021-11-05 NOTE — PROGRESS NOTES
Evaluated patient at the bedside for low blood pressures of 83/49.  Repeat Blood pressure of 85/50 with MAP of 60.  Pt HR at 65bpm, satting well at 95% on room air.  No acute complaints or discomfort at this time.  Does not appear overloaded and extremities are warm and ell perfused.  Mucous membranes dry.  States not really eating a lot. Of note pt taken off of BP meds as outpatient due to known hypotension but in setting of sepsis and history fo reduced EF will provide light fluids with 250cc bolus as pt appears dry.  Will consider another bolus if responds.  Thus far repeat cultures have bene negative on current abx and pt hernandez snot appear toxic, no fevers or white count today but will obtain lactate.  mentation same as this am.

## 2021-11-05 NOTE — PROGRESS NOTES
Cardiology Inpatient  Progress Note       SUBJECTIVE   This is a 76 y.o. year-old male admitted on 10/31/2021 with Weakness [R53.1]  Elevated serum creatinine [R79.89]  Urinary tract infection associated with catheterization of urinary tract, unspecified indwelling urinary catheter type, initial encounter (CMS/[facility]) [T83.511A, N39.0].    Interval History: He reports feeling better.  Denies CP, SOB, orthopnea. He is feeling hungry and thirsty this AM.     A 14-point review of system was performed and was negative, or as documented above. 10 systems examined.   OBJECTIVE      Vital signs in last 24 hours:  Temp:  [37 °C (98.6 °F)-37.8 °C (100 °F)] 37.2 °C (98.9 °F)  Heart Rate:  [65-76] 66  Resp:  [16-18] 18  BP: ()/(47-69) 93/47      Intake/Output Summary (Last 24 hours) at 11/5/2021 0907  Last data filed at 11/5/2021 0500  Gross per 24 hour   Intake 730 ml   Output 2300 ml   Net -1570 ml     Weights (last 5 days)     Date/Time Weight    11/05/21 0025 67.4 kg (148 lb 9.6 oz)    11/04/21 0548 63.2 kg (139 lb 5.3 oz)    11/01/21 0614 65.7 kg (144 lb 13.5 oz)    10/31/21 1208 65.8 kg (145 lb)          PHYSICAL EXAMINATION      Physical Exam   General: Pleasant, weak.  HEENT: No corneal arcus or xanthelasmas.  Sclerae are anicteric.  Nares patent.  Mucous membranes are slightly dry.  Neck: Supple.  JVP is 5 cm/H2O.  Carotids are equal with no audible bruits.  No lymphadenopathy or thyromegaly.  Heart: Regular.  Normal S1 and S2.  No S4. No S3. No murmur.  Chest: Symmetrical.  Lungs: Clear bilaterally without rales, wheezes nor rhonchi.  Abdomen: Soft, nontender.  No masses or bruits.  No organomegaly.  Normal bowel sounds.  Extremities: No cyanosis or clubbing. Trace pedal edema, L>R.  Distal pulses are easily palpable.  Skin: Deeply tanned.  Warm and dry and well perfused.   Neurologic: Alert and oriented ×3 with occasional confusion and forgetfulness.  Cranial nerves II through XII are intact.  Psychiatric:  normal mood, affect & judgment.   LABS / IMAGING / TELE/ MEDS      Labs  Results from last 7 days   Lab Units 11/05/21  0406 11/04/21  0414 11/03/21  0602 11/01/21  0315 10/31/21  1324   SODIUM mEQ/L 133* 136 138   < > 133*   POTASSIUM mEQ/L 4.2 4.9 4.8   < > 3.9   MAGNESIUM mg/dL 1.8 1.9  --   --   --    CHLORIDE mEQ/L 103 101 102   < > 103   CO2 mEQ/L 20* 22 22   < > 22   BUN mg/dL 10 10 12   < > 28*   CREATININE mg/dL 1.0 1.0 1.0   < > 1.5*   EGFR mL/min/1.73m*2 >60.0 >60.0 >60.0   < > 45.3*   CALCIUM mg/dL 6.8* 7.2* 7.5*   < > 7.4*   ALBUMIN g/dL  --   --   --   --  2.0*   BILIRUBIN TOTAL mg/dL  --   --   --   --  1.1   ALK PHOS IU/L  --   --   --   --  82   ALT IU/L  --   --   --   --  34   AST IU/L  --   --   --   --  37   GLUCOSE mg/dL 80 76 85   < > 122*    < > = values in this interval not displayed.     Results from last 7 days   Lab Units 11/05/21  0406 11/04/21 0414 11/03/21  0602   WBC K/uL 7.50 9.96 9.35   HEMOGLOBIN g/dL 8.1* 9.2* 9.1*   HEMATOCRIT % 24.9* 28.6* 28.3*   PLATELETS K/uL 283 247 206      0   Lab Value Date/Time    INR 1.0 03/19/2019 1409     Lab Results   Component Value Date    CHOL 126 05/28/2021    TRIG 62 05/28/2021    HDL 43 (L) 05/28/2021    LDLCALC 71 05/28/2021     Results from last 7 days   Lab Units 10/31/21  1324   TROPONIN I ng/mL <0.03     Lab Results   Component Value Date    LACTATE 0.8 10/31/2021    LACTATE 2.1 (H) 10/31/2021       ECG/Telemetry  I have independently reviewed the telemetry. No events for the last 24 hours.    Imaging    Echocardiogram 08/23/2021: Performed at [facility]  Normal left ventricular cavity size. There is mild concentric left ventricular hypertrophy. There is mild global hypokinesis involving all segments of the left ventricle.  Mild left ventricular systolic dysfunction.  The ejection fraction estimate is 40-45%. Abnormal left ventricular diastolic function.  The left atrial volume is normal.   The right ventricle size is  borderline enlarged. The right ventricular systolic function is normal.    Normal opening and closure of the aortic valve. There is trace aortic regurgitation.  Grossly normal mitral valve.  Grossly normal tricuspid valve.  Trace tricuspid regurgitation.  Mean PA pressure is normal.  Normal IVC. Normal respiratory collapse.  No significant pericardial effusion.     Left heart catheterization 9/18/2019:  Mid LAD 50% stenosis at the level of a diagonal branch which also has 50% ostial stenosis. Both LAD and diagonal branches were iFR negative. Normal LVEDP.     Stress echocardiogram 6/25/2019:  1. Conclusion: Stress echo does not meet criteria for ischemia.  2. Baseline Echo: Normal left ventricular size and function. No wall motion abnormalities. Ejection fraction 65%. Normal left atrial size.  Mild mitral annular calcification. Trace mitral regurgitation. Tricuspid aortic valve. Mild aortic regurgitation. Normal right atrium. Normal  right ventricular size and function. Trace tricuspid regurgitation. The jet is insufficient to estimate right ventricular systolic pressure.  3. Post-Stress Echo: All walls become hyperdynamic. Ejection fraction improves.  4. Stress ECG does not meet criteria for ischemia.  5. Good exercise tolerance. Holman treadmill score is 8, associated with low risk for near-future cardiac events.      Home Medications:  •  ferrous sulfate, Take 65 mg by mouth daily with breakfast.  •  aspirin, Take 1 tablet (81 mg total) by mouth daily.  •  cholecalciferol (vitamin D3), Take 2,000 Units by mouth daily.  •  coenzyme Q10, Take by mouth daily.    •  compress.stocking,knee,reg,med, 1 each daily. Apply in AM and remove in PM.  •  fluticasone propion-salmeteroL, Inhale 1 puff 2 (two) times a day.  Rinse mouth with water after use to reduce aftertaste and incidence of candidiasis.  Do not swallow. For patients not on a ventilator, a spacer is recommended to be used with this medication/inhaler.  •   furosemide, Take 1 tablet (20 mg total) by mouth daily as needed (weight gain of 3 lbs in 1 day or 5 lbs in 1 week).  •  multivit-min/folic/vit K/lycop (MEN'S MULTIVITAMIN ORAL), Take by mouth daily.    •  REVLIMID, Take  25 mg daily     •  rosuvastatin, Take 1 tablet (10 mg total) by mouth daily.  •  sulfamethoxazole-trimethoprim, Take 1 tablet by mouth 2 (two) times a day.    •  tamsulosin, Take 0.4 mg by mouth 2 (two) times a day.      Scheduled Meds:  • aspirin  81 mg oral Daily   • calcium gluconate  1 g intravenous Once   • ceftazidime-avibactam  1.25 g intravenous q8h INT   • cholecalciferol (vitamin D3)  1,000 Units oral BID   • cycloSPORINE  1 drop Both Eyes BID   • ferrous sulfate  325 mg oral Every other day   • heparin (porcine)  5,000 Units subcutaneous q8h JENNYFER   • [Provider Managed Hold] lenalidomide  25 mg oral Daily   • mometasone-formoterol  2 puff inhalation BID   • rosuvastatin  10 mg oral Daily   • tamsulosin  0.4 mg oral Daily          IMPRESSION/RECOMMENDATIONS:  1. Klebsiella UTI - He was hospitalized in August 2021 and again in September 2021. He was treated with a course of fosfomycin then Bactrim.  He was restarted on Bactrim as outpatient.  Urine this admission w ESBL MDRO.    ID recommends Avycaz.  2. BPH with urinary retention - He continue on Flomax. He was also on doxazosin but I instructed him to remain off of it until his blood pressure and lightheadedness improved.  He is status post serrano placement with 900 ml in bladder. Urology following.   3. Persistent Klebsiella bacteremia - ID is following.  Hemodynamically stable. He has native valves therefore do not suspect valvular vegetation with gram negative bacteria.  Repeat blood cx on 11/3 is negative thus far.   4. Covid-19 pneumonia - He was hospitalized in August 2021. He had associated hypoxemic respiratory failure but fortunately did not require intubation.  He completed a course of Remdesivir.  He again tested positive this  admission.  Unclear if new Covid-19 infection.  He is status post monoclonal antibodies.  ID following.   5. Altered mental status - CT head negative for infarct, mass or ICH; there was progression of ventriculomegaly.  This seems to be improving.   6. Left ventricular systolic dysfunction - His echocardiogram in August 2021 during his acute illness showed mild left ventricular systolic dysfunction with an EF of 40-45%.  He was started on metoprolol succinate 25 mg during his prior hospital stay however he has been hypotensive with intermittent bradycardia on admission.  I recommended that he remain off of it for now. Hold off on introducing an ACE-inhibitor or ARB in due to hypotension and recent acute renal insuffiency.  He appears euvolemic on exam today.  I had stopped his daily loop diuretic.  Monitor Is and Os. Weigh daily.  He will need a repeat echocardiogram in 3 months to reassess his LV systolic function.    7. Hypotension - His blood pressure remains low but stable.  I recommend that he remain off of metoprolol, doxazosin and daily loop diuretic.  I ask him to drink at least 48-64 ounces of fluid per day.    8. Lower extremity edema - This has significantly improved.  His albumin is low and I suspect that his edema is more due to decreased osmotic pressure. I recommended that he increase his protein intake with diet and nutritional supplements.  I also recommended compression stockings.  He should only use his furosemide as needed for weight gain of 3 lbs in 1 day or 5 lbs in 1 week.  9. Acute renal insuffiencey - He was volume depleted in the office due to poor oral intake and ongoing diuretic use.  I recommended that he increase his fluid intake and only use his diuretic as needed.    10. Moderate nonobstructive single vessel 50% mid LAD and 50% ostial D1 stenosis by catheterization 9/18/2019- he has no symptoms of angina pectoralis.  He should continue on aspirin and rosuvastatin therapy along with  lifestyle modification.  Mild troponin elevation during acute illness due to demand ischemia.  No ischemic work up indicated at this time.  11. Exertional chest pain -in 2019 he was noting left-sided, graded 3-4/10 chest pain which resolved with rest.  Interestingly, however, he was doing a spinning class for 1 hour breaking a solid sweat and has absolutely no symptoms.  This has not recurred.  12. Hyperlipidemia -I reviewed his latest lipid profile outlined above.  I have asked him to stop atorvastatin and resume his rosuvastatin therapy.    13. Asthma -He is on Advair.  He follows with Suresh Cardoza.  14. MAC - This was noted on his stress echo which is outlined above.  15. Aortic insufficiency -this was graded mild on his stress echocardiogram and trace on his most recent echocardiogram in August 2021.  This is probably age-related changes to the aortic valve.  16. Bifascicular block with LAFB/RBBB-I previously reviewed this issue with him.  It is probably of no clinical consequence and will be followed with an annual EKG and he will report any symptoms of syncope to me.  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  He is on Revlimid.      CARSON Nation  11/5/2021

## 2021-11-05 NOTE — PLAN OF CARE
Problem: Adult Inpatient Plan of Care  Goal: Plan of Care Review  Outcome: Progressing  Flowsheets (Taken 11/5/2021 0514)  Progress: improving  Plan of Care Reviewed With: patient  Outcome Summary: AAOx2-3. Involved in care and asking questions appropriately. VSS aside from consistent hypotension. IV ABX given. Adhering to 2000cc FR. Mancia remains intact draining yellow urine. I&Os documented. Safety measures in place. Will monitor.  Goal: Patient-Specific Goal (Individualized)  Outcome: Progressing  Goal: Absence of Hospital-Acquired Illness or Injury  Outcome: Progressing  Goal: Optimal Comfort and Wellbeing  Outcome: Progressing  Goal: Readiness for Transition of Care  Outcome: Progressing

## 2021-11-05 NOTE — PLAN OF CARE
Problem: Adult Inpatient Plan of Care  Goal: Plan of Care Review  Outcome: Progressing  Flowsheets (Taken 11/5/2021 1404)  Progress: no change     Per information in medical rounds, Pt is medically stable for d/c over the weekend pending improvement. Pt was Covid+ on 10/31 and fully vaccinated. PT/OT rec'd SNF. Pt and daughter agreeable with placement. SW in contact with Pt's daughter (Talli: [phone]), who no longer prefers placement in [place] and is agreeable with referrals being sent to SNFs near the hospital. SW sent referrals to Covid+ facilities and awaiting responses. Pt on day 3 of 14 IV Avycaz; SNFs updated on need for IV abx. Awaiting responses. SW to remain available for emotional support and d/c planning.    LAURENT Howard [phone]

## 2021-11-06 LAB
ANION GAP SERPL CALC-SCNC: 9 MEQ/L (ref 3–15)
BACTERIA BLD CULT: ABNORMAL
BASOPHILS # BLD: 0.05 K/UL (ref 0.01–0.1)
BASOPHILS NFR BLD: 0.6 %
BUN SERPL-MCNC: 10 MG/DL (ref 8–20)
CALCIUM SERPL-MCNC: 6.9 MG/DL (ref 8.9–10.3)
CHLORIDE SERPL-SCNC: 103 MEQ/L (ref 98–109)
CO2 SERPL-SCNC: 20 MEQ/L (ref 22–32)
CREAT SERPL-MCNC: 0.9 MG/DL (ref 0.8–1.3)
DIFFERENTIAL METHOD BLD: ABNORMAL
EOSINOPHIL # BLD: 0.02 K/UL (ref 0.04–0.54)
EOSINOPHIL NFR BLD: 0.3 %
ERYTHROCYTE [DISTWIDTH] IN BLOOD BY AUTOMATED COUNT: 19 % (ref 11.6–14.4)
GFR SERPL CREATININE-BSD FRML MDRD: >60 ML/MIN/1.73M*2
GLUCOSE SERPL-MCNC: 78 MG/DL (ref 70–99)
GRAM STN SPEC: ABNORMAL
GRAM STN SPEC: ABNORMAL
HCT VFR BLDCO AUTO: 24.5 % (ref 40.1–51)
HGB BLD-MCNC: 8 G/DL (ref 13.7–17.5)
IMM GRANULOCYTES # BLD AUTO: 0.08 K/UL (ref 0–0.08)
IMM GRANULOCYTES NFR BLD AUTO: 1 %
LYMPHOCYTES # BLD: 1.18 K/UL (ref 1.2–3.5)
LYMPHOCYTES NFR BLD: 14.9 %
MAGNESIUM SERPL-MCNC: 1.8 MG/DL (ref 1.8–2.5)
MCH RBC QN AUTO: 30 PG (ref 28–33.2)
MCHC RBC AUTO-ENTMCNC: 32.7 G/DL (ref 32.2–36.5)
MCV RBC AUTO: 91.8 FL (ref 83–98)
MONOCYTES # BLD: 0.92 K/UL (ref 0.3–1)
MONOCYTES NFR BLD: 11.6 %
NEUTROPHILS # BLD: 5.65 K/UL (ref 1.7–7)
NEUTS SEG NFR BLD: 71.6 %
NRBC BLD-RTO: 0 %
PDW BLD AUTO: 10.1 FL (ref 9.4–12.4)
PLATELET # BLD AUTO: 295 K/UL (ref 150–350)
POTASSIUM SERPL-SCNC: 4.1 MEQ/L (ref 3.6–5.1)
RBC # BLD AUTO: 2.67 M/UL (ref 4.5–5.8)
SODIUM SERPL-SCNC: 132 MEQ/L (ref 136–144)
WBC # BLD AUTO: 7.9 K/UL (ref 3.8–10.5)

## 2021-11-06 PROCEDURE — 83735 ASSAY OF MAGNESIUM: CPT | Performed by: STUDENT IN AN ORGANIZED HEALTH CARE EDUCATION/TRAINING PROGRAM

## 2021-11-06 PROCEDURE — 63600000 HC DRUGS/DETAIL CODE: Performed by: STUDENT IN AN ORGANIZED HEALTH CARE EDUCATION/TRAINING PROGRAM

## 2021-11-06 PROCEDURE — 36415 COLL VENOUS BLD VENIPUNCTURE: CPT | Performed by: STUDENT IN AN ORGANIZED HEALTH CARE EDUCATION/TRAINING PROGRAM

## 2021-11-06 PROCEDURE — 80048 BASIC METABOLIC PNL TOTAL CA: CPT | Performed by: STUDENT IN AN ORGANIZED HEALTH CARE EDUCATION/TRAINING PROGRAM

## 2021-11-06 PROCEDURE — 63700000 HC SELF-ADMINISTRABLE DRUG: Performed by: STUDENT IN AN ORGANIZED HEALTH CARE EDUCATION/TRAINING PROGRAM

## 2021-11-06 PROCEDURE — 85025 COMPLETE CBC W/AUTO DIFF WBC: CPT | Performed by: STUDENT IN AN ORGANIZED HEALTH CARE EDUCATION/TRAINING PROGRAM

## 2021-11-06 PROCEDURE — 25800000 HC PHARMACY IV SOLUTIONS: Performed by: STUDENT IN AN ORGANIZED HEALTH CARE EDUCATION/TRAINING PROGRAM

## 2021-11-06 PROCEDURE — 21400000 HC ROOM AND CARE CCU/INTERMEDIATE

## 2021-11-06 RX ADMIN — CYCLOSPORINE 1 DROP: 0.5 EMULSION OPHTHALMIC at 09:17

## 2021-11-06 RX ADMIN — CEFTAZIDIME, AVIBACTAM 1.25 G: 2; .5 POWDER, FOR SOLUTION INTRAVENOUS at 21:19

## 2021-11-06 RX ADMIN — MOMETASONE FUROATE AND FORMOTEROL FUMARATE DIHYDRATE 2 PUFF: 200; 5 AEROSOL RESPIRATORY (INHALATION) at 19:49

## 2021-11-06 RX ADMIN — HEPARIN SODIUM 5000 UNITS: 5000 INJECTION INTRAVENOUS; SUBCUTANEOUS at 14:46

## 2021-11-06 RX ADMIN — ASPIRIN 81 MG: 81 TABLET, COATED ORAL at 09:17

## 2021-11-06 RX ADMIN — Medication 1000 UNITS: at 19:48

## 2021-11-06 RX ADMIN — HEPARIN SODIUM 5000 UNITS: 5000 INJECTION INTRAVENOUS; SUBCUTANEOUS at 21:19

## 2021-11-06 RX ADMIN — CEFTAZIDIME, AVIBACTAM 1.25 G: 2; .5 POWDER, FOR SOLUTION INTRAVENOUS at 06:35

## 2021-11-06 RX ADMIN — Medication 1000 UNITS: at 09:17

## 2021-11-06 RX ADMIN — ROSUVASTATIN CALCIUM 10 MG: 10 TABLET, FILM COATED ORAL at 09:17

## 2021-11-06 RX ADMIN — SODIUM CHLORIDE 250 ML: 9 INJECTION, SOLUTION INTRAVENOUS at 10:19

## 2021-11-06 RX ADMIN — CYCLOSPORINE 1 DROP: 0.5 EMULSION OPHTHALMIC at 19:49

## 2021-11-06 RX ADMIN — HEPARIN SODIUM 5000 UNITS: 5000 INJECTION INTRAVENOUS; SUBCUTANEOUS at 05:13

## 2021-11-06 RX ADMIN — CALCIUM GLUCONATE 1000 MG: 98 INJECTION, SOLUTION INTRAVENOUS at 13:05

## 2021-11-06 RX ADMIN — TAMSULOSIN HYDROCHLORIDE 0.4 MG: 0.4 CAPSULE ORAL at 09:17

## 2021-11-06 RX ADMIN — MOMETASONE FUROATE AND FORMOTEROL FUMARATE DIHYDRATE 2 PUFF: 200; 5 AEROSOL RESPIRATORY (INHALATION) at 09:19

## 2021-11-06 RX ADMIN — CEFTAZIDIME, AVIBACTAM 1.25 G: 2; .5 POWDER, FOR SOLUTION INTRAVENOUS at 14:46

## 2021-11-06 NOTE — PROGRESS NOTES
Per pt medical rounds today. Pt is stable for d/c today. SW made referrals on Fri and no acceptances as of Sat. SW added [facility] to referral list.  Per PT notes (11/4)pt was walking 20 feet/Min A. Pt should see pt again top get updated PT eval. Sw will follow for d/c planning needs. Jerrica Carver secure chat

## 2021-11-06 NOTE — PROGRESS NOTES
Internal Medicine  Daily Progress Note       SUBJECTIVE   This is a 76 y.o. year-old male admitted on 10/31/2021 with Weakness [R53.1]  Elevated serum creatinine [R79.89]  Urinary tract infection associated with catheterization of urinary tract, unspecified indwelling urinary catheter type, initial encounter (CMS/[facility]) [T83.511A, N39.0].    Interval History: Pt without acute events over night, still not eating a lot, states urinating ok with serrano.  No discomfort noted, no SOB per patient.  Plan is for midline placement today.     OBJECTIVE   Vital signs in last 24 hours:  Temp:  [37.2 °C (98.9 °F)-38.1 °C (100.6 °F)] 37.6 °C (99.7 °F)  Heart Rate:  [60-72] 60  Resp:  [16-18] 18  BP: ()/(45-52) 102/51  SpO2:  [85 %-98 %] 98 %  Oxygen Therapy: None (Room air)    Weight & I/Os:  Weights (last 5 days)     Date/Time Weight    11/06/21 0400 64 kg (141 lb 3.2 oz)    11/05/21 0025 67.4 kg (148 lb 9.6 oz)    11/04/21 0548 63.2 kg (139 lb 5.3 oz)    11/01/21 0614 65.7 kg (144 lb 13.5 oz)          Intake/Output Summary (Last 24 hours) at 11/6/2021 0659  Last data filed at 11/6/2021 0511  24 Hour Net Input/Output from 7AM Yesterday   Intake 840 ml   Output 1900 ml   Net -1060 ml        PHYSICAL EXAMINATION   Physical Exam  Constitutional:       Comments: Chronically ill-appearing thin and frail, appears less confused than yesterday, sunglasses in place  HENT:      Head: Normocephalic and atraumatic.      Mouth/Throat:      Mouth: Mucous membranes are moist.   Eyes:      Extraocular Movements: Extraocular movements intact.      Pupils: Pupils are equal, round, and reactive to light.   Cardiovascular:      Rate and Rhythm: Normal rate and regular rhythm.   Pulmonary:      Effort: Pulmonary effort is normal. No respiratory distress.      Breath sounds: No rales.   Abdominal:      General: Abdomen is flat.      Palpations: Abdomen is soft.      Tenderness: There is no abdominal tenderness. There is no right CVA  tenderness or left CVA tenderness. Mancia in place  Musculoskeletal:         General: Normal range of motion.      Cervical back: Normal range of motion. No rigidity or tenderness. Swelling of RUE, no localizing erythema  Skin:     General: Skin is warm.   Neurological:      General: No focal deficit present. AAOx1-2     Mental Status: He is alert and oriented to person, place, and     LINES, CATHETERS, DRAINS, AIRWAYS, & WOUNDS   Lines, drains, airways, & wounds:  Peripheral IV (Adult) 11/04/21 Left;Posterior Forearm (Active)   Number of days: 2       Urethral Catheter 18 Fr (Active)   Number of days: 2        LABS, IMAGING, & TELE   Labs:  Results from last 7 days   Lab Units 11/06/21  0418 11/01/21  0315 10/31/21  1324   SODIUM mEQ/L 132*   < > 133*   POTASSIUM mEQ/L 4.1   < > 3.9   CHLORIDE mEQ/L 103   < > 103   CO2 mEQ/L 20*   < > 22   BUN mg/dL 10   < > 28*   CREATININE mg/dL 0.9   < > 1.5*   CALCIUM mg/dL 6.9*   < > 7.4*   ALBUMIN g/dL  --   --  2.0*   BILIRUBIN TOTAL mg/dL  --   --  1.1   ALK PHOS IU/L  --   --  82   ALT IU/L  --   --  34   AST IU/L  --   --  37   GLUCOSE mg/dL 78   < > 122*    < > = values in this interval not displayed.     Results from last 7 days   Lab Units 11/06/21 0418   WBC K/uL 7.90   HEMOGLOBIN g/dL 8.0*   HEMATOCRIT % 24.5*   PLATELETS K/uL 295         Results from last 7 days   Lab Units 11/06/21 0418 11/05/21 0406 11/04/21  0414   WBC K/uL 7.90 7.50 9.96   HEMOGLOBIN g/dL 8.0* 8.1* 9.2*   HEMATOCRIT % 24.5* 24.9* 28.6*   PLATELETS K/uL 295 283 247       Results from last 7 days   Lab Units 11/06/21 0418 11/05/21 0406 11/04/21  0414 11/01/21  0315 10/31/21  1324   SODIUM mEQ/L 132* 133* 136   < > 133*   POTASSIUM mEQ/L 4.1 4.2 4.9   < > 3.9   CHLORIDE mEQ/L 103 103 101   < > 103   CO2 mEQ/L 20* 20* 22   < > 22   BUN mg/dL 10 10 10   < > 28*   CREATININE mg/dL 0.9 1.0 1.0   < > 1.5*   CALCIUM mg/dL 6.9* 6.8* 7.2*   < > 7.4*   ALBUMIN g/dL  --   --   --   --  2.0*   BILIRUBIN  TOTAL mg/dL  --   --   --   --  1.1   ALK PHOS IU/L  --   --   --   --  82   ALT IU/L  --   --   --   --  34   AST IU/L  --   --   --   --  37   GLUCOSE mg/dL 78 80 76   < > 122*    < > = values in this interval not displayed.     Imaging personally reviewed (does not include unread studies):  CT HEAD WITHOUT IV CONTRAST    Result Date: 11/2/2021  IMPRESSION: 1. No acute intracranial hemorrhage, acute infarct in a major vascular territory or mass-effect. 2. Progression of ventriculomegaly which may be from central atrophy.  Normal pressure hydrocephalus can have a similar appearance. COMMENT: Axial noncontrast CT images of the head were obtained. Sagittal and coronal reconstructions were created. CT DOSE:  One or more dose reduction techniques (e.g. automated exposure control, adjustment of the mA and/or kV according to patient size, use of iterative reconstruction technique) utilized for this examination. Comparison:  No prior studies are available for comparison. Findings: Ventricles are enlarged, disproportionate to sulcal involutional changes and progressed from prior.  There are bilateral periventricular white matter lucencies which are nonspecific but compatible with microangiopathic change. There is no mass effect, midline shift, territorial infarction, acute hemorrhage or extra-axial fluid collection. Visualized paranasal sinuses and mastoid air cells are clear.    ULTRASOUND KIDNEYS / BLADDER    Result Date: 11/4/2021  IMPRESSION: 1.  Mild left hydronephrosis 2.  Complex cyst versus solid mass lower pole right kidney measuring up to 3.2 cm. Contrast-enhanced renal mass protocol CT or MRI is recommended for further evaluation. 3.  Debris within the bladder     X-RAY CHEST 1 VIEW    Result Date: 10/31/2021  IMPRESSION: No significant interval change. Probable minimal atelectasis or scarring bilateral lung bases.    Ultrasound venous arm right    Result Date: 11/4/2021  IMPRESSION: No upper extremity deep vein  thrombosis.     CT UROGRAM WITH AND WITHOUT IV CONTRAST    Result Date: 11/4/2021  IMPRESSION: There is a bilateral large extrarenal pelvis bilaterally, right greater than left with segmental dilatation of ectatic ureters but no obstructing mass or calcification. There are numerous small parenchymal lesions in both kidneys, many small and difficult to characterize. 2 more prominent areas of subtle decreased attenuation on the right are noted and subtle masses or lesion certainly not excluded. There is a distended urinary bladder with numerous diverticula. Intraluminal mass at the left base is noted uncertain whether an extension of the prostate or separate intraluminal lesion, further evaluation advised. Multiple hepatic lesions are noted, many too small to characterize, the larger to reflect cysts. See comment above for delineation of findings. Results were made available to the clinical service at time of study dictation. In accordance with [place] Act 112,  the patient will receive a letter notifying them to follow up with their physician.    Telemetry/EKG:  Sinus rhythm     ASSESSMENT & PLAN   UTI (urinary tract infection)  Assessment & Plan  Uclx prior to admission c/w ESBL klebsiella MDRO including meropenem  Received 5 days bactrim PTP  U/A TNTC WBC, 3+ Bacteria, pos leuk esterase and nitrite  Lactate 2.1, Hypotensive with BP 90/54  No leukocytosis    - Continue with Amycaz for now per ID (day 4/14)  - ID following  - fu repeat UClx and Bclx; repeat blood cultures x2 growing GNRs,repeat cxs with NGTD  - IV team consulted for Midline placement  - pts BP runs chronically low; gentle fluids with Na of 132 this am  - s/p CT urogram; may need to repeat in 2 to 3 days, Uro following  - Maintain serrano for now; likely with uro outpatient follow up    Swelling of right upper extremity  Assessment & Plan  Right arm more swollen today  Painful to palpation but no localized erythema    - US RUE negative for DVT; RUE swelling  improved  - Continuing on abx for UTI and bacteremia    Multiple myeloma (CMS/HCC)  Assessment & Plan  Currently on chemotherapy outpatient  - c/w outpatient management       COVID-19  Assessment & Plan  No current signs or sx's of acute infection  Continue home Advair  Positive test on admission    - Receiving monoclonal ab therapy per ID    Chronic systolic heart failure (CMS/HCC)  Assessment & Plan  EF 40-45% on home lasix PRN  - appears euvolemic now  Recentrly saw Dr. Ellis who stopped his metoprolo and changes his lasix from daily to PRN due to hypotension   - can give lasix PRN  - daily standing weight and accurate I/Os   - Cardiology following    Anemia  Assessment & Plan  Presents with Hb 9/1 b/l 10  Has known MM on chemotherapy which likely explains this  - CBC daily  - c/w chemo outpatient  - transfuse Hb <7  - hold home iron supplementation given acute infection UTI    CAD (coronary artery disease)  Assessment & Plan  Has known 50% LAD occlusion  CW home statin and  ASA    Weakness  Assessment & Plan  Likely 2/2 UTI  - PT/OT  - plan as per UTI    Hyperlipidemia  Assessment & Plan  Continue home statin    BPH (benign prostatic hyperplasia)  Assessment & Plan  C/w home Flomax  Follws with urology Dr. Mathew       VTE Assessment: [place]    VTE Prophylaxis: Current anticoagulants:  heparin (porcine) 5,000 unit/mL injection 5,000 Units, subcutaneous, q8h JENNYFER      Code Status: Full Code  Estimated discharge date: 11/8/2021     ATTENDING DOCUMENTATION  ALSO SEE ATTENDING ATTESTATION SECTION OF NOTE

## 2021-11-07 LAB
ANION GAP SERPL CALC-SCNC: 8 MEQ/L (ref 3–15)
BACTERIA BLD CULT: ABNORMAL
BACTERIA BLD CULT: ABNORMAL
BASOPHILS # BLD: 0.05 K/UL (ref 0.01–0.1)
BASOPHILS NFR BLD: 0.7 %
BUN SERPL-MCNC: 9 MG/DL (ref 8–20)
CALCIUM SERPL-MCNC: 7.3 MG/DL (ref 8.9–10.3)
CHLORIDE SERPL-SCNC: 100 MEQ/L (ref 98–109)
CO2 SERPL-SCNC: 23 MEQ/L (ref 22–32)
CREAT SERPL-MCNC: 0.9 MG/DL (ref 0.8–1.3)
DIFFERENTIAL METHOD BLD: ABNORMAL
EOSINOPHIL # BLD: 0.01 K/UL (ref 0.04–0.54)
EOSINOPHIL NFR BLD: 0.1 %
ERYTHROCYTE [DISTWIDTH] IN BLOOD BY AUTOMATED COUNT: 18.7 % (ref 11.6–14.4)
GFR SERPL CREATININE-BSD FRML MDRD: >60 ML/MIN/1.73M*2
GLUCOSE SERPL-MCNC: 83 MG/DL (ref 70–99)
GRAM STN SPEC: ABNORMAL
HCT VFR BLDCO AUTO: 26.8 % (ref 40.1–51)
HGB BLD-MCNC: 8.7 G/DL (ref 13.7–17.5)
IMM GRANULOCYTES # BLD AUTO: 0.07 K/UL (ref 0–0.08)
IMM GRANULOCYTES NFR BLD AUTO: 0.9 %
LYMPHOCYTES # BLD: 1.2 K/UL (ref 1.2–3.5)
LYMPHOCYTES NFR BLD: 15.8 %
MAGNESIUM SERPL-MCNC: 1.9 MG/DL (ref 1.8–2.5)
MCH RBC QN AUTO: 30.1 PG (ref 28–33.2)
MCHC RBC AUTO-ENTMCNC: 32.5 G/DL (ref 32.2–36.5)
MCV RBC AUTO: 92.7 FL (ref 83–98)
MONOCYTES # BLD: 0.97 K/UL (ref 0.3–1)
MONOCYTES NFR BLD: 12.8 %
NEUTROPHILS # BLD: 5.3 K/UL (ref 1.7–7)
NEUTS SEG NFR BLD: 69.7 %
NRBC BLD-RTO: 0 %
PDW BLD AUTO: 9.5 FL (ref 9.4–12.4)
PLATELET # BLD AUTO: 333 K/UL (ref 150–350)
POTASSIUM SERPL-SCNC: 4.3 MEQ/L (ref 3.6–5.1)
RBC # BLD AUTO: 2.89 M/UL (ref 4.5–5.8)
SODIUM SERPL-SCNC: 131 MEQ/L (ref 136–144)
WBC # BLD AUTO: 7.6 K/UL (ref 3.8–10.5)

## 2021-11-07 PROCEDURE — 63600000 HC DRUGS/DETAIL CODE: Performed by: STUDENT IN AN ORGANIZED HEALTH CARE EDUCATION/TRAINING PROGRAM

## 2021-11-07 PROCEDURE — 21400000 HC ROOM AND CARE CCU/INTERMEDIATE

## 2021-11-07 PROCEDURE — 25800000 HC PHARMACY IV SOLUTIONS: Performed by: STUDENT IN AN ORGANIZED HEALTH CARE EDUCATION/TRAINING PROGRAM

## 2021-11-07 PROCEDURE — 83735 ASSAY OF MAGNESIUM: CPT | Performed by: STUDENT IN AN ORGANIZED HEALTH CARE EDUCATION/TRAINING PROGRAM

## 2021-11-07 PROCEDURE — 63600000 HC DRUGS/DETAIL CODE: Mod: JG | Performed by: STUDENT IN AN ORGANIZED HEALTH CARE EDUCATION/TRAINING PROGRAM

## 2021-11-07 PROCEDURE — 80048 BASIC METABOLIC PNL TOTAL CA: CPT | Performed by: STUDENT IN AN ORGANIZED HEALTH CARE EDUCATION/TRAINING PROGRAM

## 2021-11-07 PROCEDURE — 36415 COLL VENOUS BLD VENIPUNCTURE: CPT | Performed by: STUDENT IN AN ORGANIZED HEALTH CARE EDUCATION/TRAINING PROGRAM

## 2021-11-07 PROCEDURE — 85025 COMPLETE CBC W/AUTO DIFF WBC: CPT | Performed by: STUDENT IN AN ORGANIZED HEALTH CARE EDUCATION/TRAINING PROGRAM

## 2021-11-07 PROCEDURE — 63700000 HC SELF-ADMINISTRABLE DRUG: Performed by: STUDENT IN AN ORGANIZED HEALTH CARE EDUCATION/TRAINING PROGRAM

## 2021-11-07 RX ADMIN — CYCLOSPORINE 1 DROP: 0.5 EMULSION OPHTHALMIC at 09:05

## 2021-11-07 RX ADMIN — MOMETASONE FUROATE AND FORMOTEROL FUMARATE DIHYDRATE 2 PUFF: 200; 5 AEROSOL RESPIRATORY (INHALATION) at 20:10

## 2021-11-07 RX ADMIN — CYCLOSPORINE 1 DROP: 0.5 EMULSION OPHTHALMIC at 20:11

## 2021-11-07 RX ADMIN — ASPIRIN 81 MG: 81 TABLET, COATED ORAL at 09:05

## 2021-11-07 RX ADMIN — Medication 1000 UNITS: at 20:11

## 2021-11-07 RX ADMIN — HEPARIN SODIUM 5000 UNITS: 5000 INJECTION INTRAVENOUS; SUBCUTANEOUS at 05:22

## 2021-11-07 RX ADMIN — HEPARIN SODIUM 5000 UNITS: 5000 INJECTION INTRAVENOUS; SUBCUTANEOUS at 22:19

## 2021-11-07 RX ADMIN — CEFTAZIDIME, AVIBACTAM 1.25 G: 2; .5 POWDER, FOR SOLUTION INTRAVENOUS at 05:22

## 2021-11-07 RX ADMIN — HEPARIN SODIUM 5000 UNITS: 5000 INJECTION INTRAVENOUS; SUBCUTANEOUS at 14:51

## 2021-11-07 RX ADMIN — MOMETASONE FUROATE AND FORMOTEROL FUMARATE DIHYDRATE 2 PUFF: 200; 5 AEROSOL RESPIRATORY (INHALATION) at 09:05

## 2021-11-07 RX ADMIN — TAMSULOSIN HYDROCHLORIDE 0.4 MG: 0.4 CAPSULE ORAL at 09:05

## 2021-11-07 RX ADMIN — CEFTAZIDIME, AVIBACTAM 1.25 G: 2; .5 POWDER, FOR SOLUTION INTRAVENOUS at 16:40

## 2021-11-07 RX ADMIN — Medication 1000 UNITS: at 09:05

## 2021-11-07 RX ADMIN — CEFTAZIDIME, AVIBACTAM 1.25 G: 2; .5 POWDER, FOR SOLUTION INTRAVENOUS at 22:20

## 2021-11-07 RX ADMIN — ROSUVASTATIN CALCIUM 10 MG: 10 TABLET, FILM COATED ORAL at 09:05

## 2021-11-07 RX ADMIN — FERROUS SULFATE TAB 325 MG (65 MG ELEMENTAL FE) 325 MG: 325 (65 FE) TAB at 09:05

## 2021-11-07 NOTE — PROGRESS NOTES
Urology progress note    No acute events.  Patient continued to improve on IV antibiotics.  Most recent blood cultures no growth to date at 96 hours.  Mancia catheter in place draining clear urine.    Plan: Given that patient has improved on antibiotics and repeat blood cultures have been no growth, no need for additional imaging at this time.  Recommend leaving Mancia catheter in place until patient able to follow-up with Dr. Mathew as an outpatient when he can undergo void trial and discuss further treatments.    Please call with questions.  Urology will sign off at this time    Edgar Casas DO, PGY2  [facility]  [facility] [phone]  [place] [phone]

## 2021-11-07 NOTE — PROGRESS NOTES
Internal Medicine  Daily Progress Note       SUBJECTIVE   This is a 76 y.o. year-old male admitted on 10/31/2021 with Weakness [R53.1]  Elevated serum creatinine [R79.89]  Urinary tract infection associated with catheterization of urinary tract, unspecified indwelling urinary catheter type, initial encounter (CMS/[facility]) [T83.511A, N39.0].    Interval History: No events overnight. VSS. Patient denies SOB, CP, abdominal pain. Oriented to person and place only. Pt to get midline placed prior to discharge to facility, awaiting acceptance and placement.      OBJECTIVE   Vital signs in last 24 hours:  Temp:  [36.6 °C (97.8 °F)-37.4 °C (99.4 °F)] 36.6 °C (97.8 °F)  Heart Rate:  [62-78] 65  Resp:  [16-18] 16  BP: ()/(38-64) 113/64  SpO2:  [96 %-98 %] 97 %  Oxygen Therapy: None (Room air)    Weight & I/Os:  Weights (last 5 days)     Date/Time Weight    11/07/21 0600 64.8 kg (142 lb 13.7 oz)    11/06/21 0400 64 kg (141 lb 3.2 oz)    11/05/21 0025 67.4 kg (148 lb 9.6 oz)    11/04/21 0548 63.2 kg (139 lb 5.3 oz)          Intake/Output Summary (Last 24 hours) at 11/7/2021 0659  Last data filed at 11/7/2021 0540  24 Hour Net Input/Output from 7AM Yesterday   Intake 1080 ml   Output 2350 ml   Net -1270 ml        PHYSICAL EXAMINATION   Physical Exam  Constitutional:       General: He is not in acute distress.     Appearance: Normal appearance. He is not ill-appearing, toxic-appearing or diaphoretic.   HENT:      Head: Normocephalic and atraumatic.      Nose: Nose normal. No congestion or rhinorrhea.      Mouth/Throat:      Mouth: Mucous membranes are moist.      Pharynx: Oropharynx is clear. No oropharyngeal exudate or posterior oropharyngeal erythema.   Eyes:      General: No scleral icterus.     Extraocular Movements: Extraocular movements intact.      Conjunctiva/sclera: Conjunctivae normal.      Pupils: Pupils are equal, round, and reactive to light.   Cardiovascular:      Rate and Rhythm: Normal rate and regular rhythm.       Pulses: Normal pulses.      Heart sounds: Normal heart sounds. No murmur heard.  No friction rub. No gallop.    Pulmonary:      Effort: Pulmonary effort is normal. No respiratory distress.      Breath sounds: Normal breath sounds. No stridor. No wheezing, rhonchi or rales.   Abdominal:      General: Bowel sounds are normal. There is no distension.      Palpations: Abdomen is soft.      Tenderness: There is abdominal tenderness (diffuse to deep palpation). There is no guarding or rebound.   Musculoskeletal:      Cervical back: Normal range of motion and neck supple. No tenderness.      Right lower leg: No edema.      Left lower leg: No edema.   Lymphadenopathy:      Cervical: No cervical adenopathy.   Skin:     General: Skin is warm and dry.      Findings: No rash.   Neurological:      Mental Status: He is alert.      Comments: Oriented to person and place only   Psychiatric:         Mood and Affect: Mood normal.         Behavior: Behavior normal.         Thought Content: Thought content normal.          LINES, CATHETERS, DRAINS, AIRWAYS, & WOUNDS   Lines, drains, airways, & wounds:  Peripheral IV (Adult) 11/04/21 Left;Posterior Forearm (Active)   Number of days: 3       Urethral Catheter 18 Fr (Active)   Number of days: 3        LABS, IMAGING, & TELE   Labs:  I have reviewed the patient's labs to the time of note. No new clinical concern.    Results from last 7 days   Lab Units 11/07/21 0339 11/06/21 0418 11/05/21  0406   WBC K/uL 7.60 7.90 7.50   HEMOGLOBIN g/dL 8.7* 8.0* 8.1*   HEMATOCRIT % 26.8* 24.5* 24.9*   PLATELETS K/uL 333 295 283       Results from last 7 days   Lab Units 11/07/21  0339 11/06/21 0418 11/05/21  0406 11/01/21  0315 10/31/21  1324   SODIUM mEQ/L 131* 132* 133*   < > 133*   POTASSIUM mEQ/L 4.3 4.1 4.2   < > 3.9   CHLORIDE mEQ/L 100 103 103   < > 103   CO2 mEQ/L 23 20* 20*   < > 22   BUN mg/dL 9 10 10   < > 28*   CREATININE mg/dL 0.9 0.9 1.0   < > 1.5*   CALCIUM mg/dL 7.3* 6.9* 6.8*   <  > 7.4*   ALBUMIN g/dL  --   --   --   --  2.0*   BILIRUBIN TOTAL mg/dL  --   --   --   --  1.1   ALK PHOS IU/L  --   --   --   --  82   ALT IU/L  --   --   --   --  34   AST IU/L  --   --   --   --  37   GLUCOSE mg/dL 83 78 80   < > 122*    < > = values in this interval not displayed.     Imaging personally reviewed (does not include unread studies):  CT HEAD WITHOUT IV CONTRAST    Result Date: 11/2/2021  IMPRESSION: 1. No acute intracranial hemorrhage, acute infarct in a major vascular territory or mass-effect. 2. Progression of ventriculomegaly which may be from central atrophy.  Normal pressure hydrocephalus can have a similar appearance. COMMENT: Axial noncontrast CT images of the head were obtained. Sagittal and coronal reconstructions were created. CT DOSE:  One or more dose reduction techniques (e.g. automated exposure control, adjustment of the mA and/or kV according to patient size, use of iterative reconstruction technique) utilized for this examination. Comparison:  No prior studies are available for comparison. Findings: Ventricles are enlarged, disproportionate to sulcal involutional changes and progressed from prior.  There are bilateral periventricular white matter lucencies which are nonspecific but compatible with microangiopathic change. There is no mass effect, midline shift, territorial infarction, acute hemorrhage or extra-axial fluid collection. Visualized paranasal sinuses and mastoid air cells are clear.    ULTRASOUND KIDNEYS / BLADDER    Result Date: 11/4/2021  IMPRESSION: 1.  Mild left hydronephrosis 2.  Complex cyst versus solid mass lower pole right kidney measuring up to 3.2 cm. Contrast-enhanced renal mass protocol CT or MRI is recommended for further evaluation. 3.  Debris within the bladder     X-RAY CHEST 1 VIEW    Result Date: 10/31/2021  IMPRESSION: No significant interval change. Probable minimal atelectasis or scarring bilateral lung bases.    Ultrasound venous arm  right    Result Date: 11/4/2021  IMPRESSION: No upper extremity deep vein thrombosis.     CT UROGRAM WITH AND WITHOUT IV CONTRAST    Result Date: 11/4/2021  IMPRESSION: There is a bilateral large extrarenal pelvis bilaterally, right greater than left with segmental dilatation of ectatic ureters but no obstructing mass or calcification. There are numerous small parenchymal lesions in both kidneys, many small and difficult to characterize. 2 more prominent areas of subtle decreased attenuation on the right are noted and subtle masses or lesion certainly not excluded. There is a distended urinary bladder with numerous diverticula. Intraluminal mass at the left base is noted uncertain whether an extension of the prostate or separate intraluminal lesion, further evaluation advised. Multiple hepatic lesions are noted, many too small to characterize, the larger to reflect cysts. See comment above for delineation of findings. Results were made available to the clinical service at time of study dictation. In accordance with [place] Act 112,  the patient will receive a letter notifying them to follow up with their physician.    Telemetry/EKG:  I have independently reviewed the telemetry. No events for the last 24 hours.     ASSESSMENT & PLAN   Hyperlipidemia  Assessment & Plan  Continue home statin    Swelling of right upper extremity  Assessment & Plan  Right arm more swollen today  Painful to palpation but no localized erythema    - US RUE negative for DVT; RUE swelling improved  - Continuing on abx for UTI and bacteremia    Multiple myeloma (CMS/HCC)  Assessment & Plan  Currently on chemotherapy outpatient  - c/w outpatient management       COVID-19  Assessment & Plan  No current signs or sx's of acute infection  Continue home Advair  Positive test on admission    - Receiving monoclonal ab therapy per ID    Chronic systolic heart failure (CMS/HCC)  Assessment & Plan  EF 40-45% on home lasix PRN  - appears euvolemic  now  Recentrly saw Dr. Ellis who stopped his metoprolo and changes his lasix from daily to PRN due to hypotension   - can give lasix PRN  - daily standing weight and accurate I/Os   - Cardiology following    Anemia  Assessment & Plan  Presents with Hb 9/1 b/l 10  Has known MM on chemotherapy which likely explains this  - CBC daily  - c/w chemo outpatient  - transfuse Hb <7  - hold home iron supplementation given acute infection UTI    CAD (coronary artery disease)  Assessment & Plan  Has known 50% LAD occlusion  CW home statin and  ASA    UTI (urinary tract infection)  Assessment & Plan  Uclx prior to admission c/w ESBL klebsiella MDRO including meropenem  Received 5 days bactrim PTP  U/A TNTC WBC, 3+ Bacteria, pos leuk esterase and nitrite  Lactate 2.1, Hypotensive with BP 90/54 improved to 113/64 today  No leukocytosis    - Continue with Amycaz for now per ID (day 5/14)  - ID following  - fu repeat UClx and Bclx; repeat blood cultures x2 growing GNRs,repeat cxs with NGTD  - IV team consulted for Midline placement  - pts BP runs chronically low; gentle fluids yesterday, Na remains low at 131  - s/p CT urogram; may need to repeat today vs. tomorrow if still inpatient, Uro following  - Maintain serrano for now; likely with uro outpatient follow up    Weakness  Assessment & Plan  Likely 2/2 UTI  - PT/OT  - plan as per UTI    BPH (benign prostatic hyperplasia)  Assessment & Plan  C/w home Flomax  Follws with urology Dr. Mathew       VTE Assessment: Padua    VTE Prophylaxis: Current anticoagulants:  heparin (porcine) 5,000 unit/mL injection 5,000 Units, subcutaneous, q8h JENNYFER      Code Status: Full Code  Estimated discharge date: 11/8/2021     ATTENDING DOCUMENTATION  ALSO SEE ATTENDING ATTESTATION SECTION OF NOTE

## 2021-11-07 NOTE — PLAN OF CARE
Problem: Adult Inpatient Plan of Care  Goal: Plan of Care Review  Outcome: Progressing  Flowsheets (Taken 11/7/2021 1405)  Progress: improving  Outcome Summary: Dispo: SNF  Goal: Readiness for Transition of Care  Outcome: Progressing     SW followed up with pt's d/c plan. As per resident (MICHAEL Laws) pt is not medically stable at this time.  Pt requires midline for IV - Avycaz. (Day 5 of 14).  RAO: Mon - 11/8    No accepting SNFs at this time.     Barriers:  - CRE  - IV - Avycaz  - COVID dx window    SW will continue to follow for emotional support and d/c planning needs.      Kaleb Veronica, ELE, LSW, C-SW, ASW-G  (Pgr: [id number])

## 2021-11-08 LAB
ANION GAP SERPL CALC-SCNC: 10 MEQ/L (ref 3–15)
BACTERIA BLD CULT: NORMAL
BACTERIA BLD CULT: NORMAL
BASOPHILS # BLD: 0.06 K/UL (ref 0.01–0.1)
BASOPHILS NFR BLD: 0.7 %
BUN SERPL-MCNC: 10 MG/DL (ref 8–20)
CALCIUM SERPL-MCNC: 7.3 MG/DL (ref 8.9–10.3)
CHLORIDE SERPL-SCNC: 100 MEQ/L (ref 98–109)
CO2 SERPL-SCNC: 22 MEQ/L (ref 22–32)
CREAT SERPL-MCNC: 0.8 MG/DL (ref 0.8–1.3)
DIFFERENTIAL METHOD BLD: ABNORMAL
EOSINOPHIL # BLD: 0.01 K/UL (ref 0.04–0.54)
EOSINOPHIL NFR BLD: 0.1 %
ERYTHROCYTE [DISTWIDTH] IN BLOOD BY AUTOMATED COUNT: 18.6 % (ref 11.6–14.4)
GFR SERPL CREATININE-BSD FRML MDRD: >60 ML/MIN/1.73M*2
GLUCOSE SERPL-MCNC: 90 MG/DL (ref 70–99)
HCT VFR BLDCO AUTO: 25.9 % (ref 40.1–51)
HGB BLD-MCNC: 8.5 G/DL (ref 13.7–17.5)
IMM GRANULOCYTES # BLD AUTO: 0.08 K/UL (ref 0–0.08)
IMM GRANULOCYTES NFR BLD AUTO: 1 %
LYMPHOCYTES # BLD: 1.31 K/UL (ref 1.2–3.5)
LYMPHOCYTES NFR BLD: 16.3 %
MAGNESIUM SERPL-MCNC: 1.9 MG/DL (ref 1.8–2.5)
MCH RBC QN AUTO: 30.1 PG (ref 28–33.2)
MCHC RBC AUTO-ENTMCNC: 32.8 G/DL (ref 32.2–36.5)
MCV RBC AUTO: 91.8 FL (ref 83–98)
MONOCYTES # BLD: 0.98 K/UL (ref 0.3–1)
MONOCYTES NFR BLD: 12.2 %
NEUTROPHILS # BLD: 5.58 K/UL (ref 1.7–7)
NEUTS SEG NFR BLD: 69.7 %
NRBC BLD-RTO: 0 %
PDW BLD AUTO: 10.1 FL (ref 9.4–12.4)
PLATELET # BLD AUTO: 357 K/UL (ref 150–350)
POTASSIUM SERPL-SCNC: 4.2 MEQ/L (ref 3.6–5.1)
RBC # BLD AUTO: 2.82 M/UL (ref 4.5–5.8)
SODIUM SERPL-SCNC: 132 MEQ/L (ref 136–144)
WBC # BLD AUTO: 8.02 K/UL (ref 3.8–10.5)

## 2021-11-08 PROCEDURE — 63600000 HC DRUGS/DETAIL CODE: Mod: JG | Performed by: STUDENT IN AN ORGANIZED HEALTH CARE EDUCATION/TRAINING PROGRAM

## 2021-11-08 PROCEDURE — 63700000 HC SELF-ADMINISTRABLE DRUG: Performed by: STUDENT IN AN ORGANIZED HEALTH CARE EDUCATION/TRAINING PROGRAM

## 2021-11-08 PROCEDURE — 36415 COLL VENOUS BLD VENIPUNCTURE: CPT | Performed by: STUDENT IN AN ORGANIZED HEALTH CARE EDUCATION/TRAINING PROGRAM

## 2021-11-08 PROCEDURE — 83735 ASSAY OF MAGNESIUM: CPT | Performed by: STUDENT IN AN ORGANIZED HEALTH CARE EDUCATION/TRAINING PROGRAM

## 2021-11-08 PROCEDURE — 36573 INSJ PICC RS&I 5 YR+: CPT

## 2021-11-08 PROCEDURE — 25800000 HC PHARMACY IV SOLUTIONS: Performed by: INTERNAL MEDICINE

## 2021-11-08 PROCEDURE — 63600000 HC DRUGS/DETAIL CODE: Performed by: INTERNAL MEDICINE

## 2021-11-08 PROCEDURE — 80048 BASIC METABOLIC PNL TOTAL CA: CPT | Performed by: STUDENT IN AN ORGANIZED HEALTH CARE EDUCATION/TRAINING PROGRAM

## 2021-11-08 PROCEDURE — 85025 COMPLETE CBC W/AUTO DIFF WBC: CPT | Performed by: STUDENT IN AN ORGANIZED HEALTH CARE EDUCATION/TRAINING PROGRAM

## 2021-11-08 PROCEDURE — 21400000 HC ROOM AND CARE CCU/INTERMEDIATE

## 2021-11-08 PROCEDURE — 63600000 HC DRUGS/DETAIL CODE: Performed by: STUDENT IN AN ORGANIZED HEALTH CARE EDUCATION/TRAINING PROGRAM

## 2021-11-08 PROCEDURE — C1751 CATH, INF, PER/CENT/MIDLINE: HCPCS

## 2021-11-08 PROCEDURE — 25800000 HC PHARMACY IV SOLUTIONS: Performed by: STUDENT IN AN ORGANIZED HEALTH CARE EDUCATION/TRAINING PROGRAM

## 2021-11-08 RX ADMIN — MOMETASONE FUROATE AND FORMOTEROL FUMARATE DIHYDRATE 2 PUFF: 200; 5 AEROSOL RESPIRATORY (INHALATION) at 19:20

## 2021-11-08 RX ADMIN — Medication 1000 UNITS: at 19:22

## 2021-11-08 RX ADMIN — CEFTAZIDIME, AVIBACTAM 1.25 G: 2; .5 POWDER, FOR SOLUTION INTRAVENOUS at 12:48

## 2021-11-08 RX ADMIN — CYCLOSPORINE 1 DROP: 0.5 EMULSION OPHTHALMIC at 07:50

## 2021-11-08 RX ADMIN — TAMSULOSIN HYDROCHLORIDE 0.4 MG: 0.4 CAPSULE ORAL at 07:49

## 2021-11-08 RX ADMIN — CYCLOSPORINE 1 DROP: 0.5 EMULSION OPHTHALMIC at 19:22

## 2021-11-08 RX ADMIN — ROSUVASTATIN CALCIUM 10 MG: 10 TABLET, FILM COATED ORAL at 07:49

## 2021-11-08 RX ADMIN — CEFTAZIDIME, AVIBACTAM 2.5 G: 2; .5 POWDER, FOR SOLUTION INTRAVENOUS at 21:23

## 2021-11-08 RX ADMIN — CEFTAZIDIME, AVIBACTAM 1.25 G: 2; .5 POWDER, FOR SOLUTION INTRAVENOUS at 06:22

## 2021-11-08 RX ADMIN — MOMETASONE FUROATE AND FORMOTEROL FUMARATE DIHYDRATE 2 PUFF: 200; 5 AEROSOL RESPIRATORY (INHALATION) at 09:19

## 2021-11-08 RX ADMIN — MAGNESIUM SULFATE HEPTAHYDRATE 2 G: 40 INJECTION, SOLUTION INTRAVENOUS at 09:15

## 2021-11-08 RX ADMIN — HEPARIN SODIUM 5000 UNITS: 5000 INJECTION INTRAVENOUS; SUBCUTANEOUS at 21:24

## 2021-11-08 RX ADMIN — ASPIRIN 81 MG: 81 TABLET, COATED ORAL at 07:50

## 2021-11-08 RX ADMIN — Medication 1000 UNITS: at 07:49

## 2021-11-08 RX ADMIN — HEPARIN SODIUM 5000 UNITS: 5000 INJECTION INTRAVENOUS; SUBCUTANEOUS at 13:47

## 2021-11-08 RX ADMIN — HEPARIN SODIUM 5000 UNITS: 5000 INJECTION INTRAVENOUS; SUBCUTANEOUS at 06:22

## 2021-11-08 NOTE — PATIENT CARE CONFERENCE
Care Progression Rounds Note  Date: 11/8/2021  Time: 11:46 AM     Patient Name: Eulalio Gregg     Medical Record Number: [id number]   YOB: 1945  Sex: Male      Room/Bed: 0202    Admitting Diagnosis: Weakness [R53.1]  Elevated serum creatinine [R79.89]  Urinary tract infection associated with catheterization of urinary tract, unspecified indwelling urinary catheter type, initial encounter (CMS/[facility]) [T83.511A, N39.0]   Admit Date/Time: 10/31/2021 12:15 PM    Primary Diagnosis: Bacteremia due to Klebsiella pneumoniae  Principal Problem: Bacteremia due to Klebsiella pneumoniae    GMLOS: pending  Anticipated Discharge Date: 11/10/2021    AM-PAC:  Mobility Score: 19    Discharge Planning:  Current Living Arrangements: home  Anticipated Discharge Disposition: skilled nursing facility  Type of Skilled Nursing Care Services: OT,PT    Barriers to Discharge:  Barriers to Discharge: Facility availability,Other (see comments)  Comment: possible [facility] placement for outpt antibx    Participants:  ,nursing,physician,social work/services

## 2021-11-08 NOTE — PROGRESS NOTES
Internal Medicine  Daily Progress Note       SUBJECTIVE   This is a 76 y.o. year-old male admitted on 10/31/2021 with Weakness [R53.1]  Elevated serum creatinine [R79.89]  Urinary tract infection associated with catheterization of urinary tract, unspecified indwelling urinary catheter type, initial encounter (CMS/[facility]) [T83.511A, N39.0].    Interval History: NAEON. Patient reports feeling slightly better, denies CP, sob, fever, chills, diarrhea.      OBJECTIVE   Vital signs in last 24 hours:  Temp:  [36.7 °C (98 °F)-37.4 °C (99.4 °F)] 36.8 °C (98.2 °F)  Heart Rate:  [56-70] 56  Resp:  [16-18] 18  BP: ()/(44-54) 96/49  SpO2:  [95 %-98 %] 98 %  Oxygen Therapy: None (Room air)    Weight & I/Os:  Weights (last 5 days)     Date/Time Weight    11/08/21 0428 65.2 kg (143 lb 11.8 oz)    11/07/21 0600 64.8 kg (142 lb 13.7 oz)    11/06/21 0400 64 kg (141 lb 3.2 oz)    11/05/21 0025 67.4 kg (148 lb 9.6 oz)    11/04/21 0548 63.2 kg (139 lb 5.3 oz)          Intake/Output Summary (Last 24 hours) at 11/8/2021 0659  Last data filed at 11/8/2021 0622  24 Hour Net Input/Output from 7AM Yesterday   Intake 120 ml   Output 2075 ml   Net -1955 ml        PHYSICAL EXAMINATION   Physical Exam  General: non toxic, alert, frail   HEENT: NC/AT/ anicteric sclera, pharynx clear   Cardiovascular: regular S1/S2, no mrg     Respiratory: crackles at bases R>L  GI/Abdomen: soft, NT/ND,  no peritoneal signs  Extremities: no  edema  MSK/JOINTS:  R pinky burn   Neurology and psych: no focal deficits, cooperative with exam  Skin: no rashes, lines clean dry intact  G.U.: serrano, clear urine      LINES, CATHETERS, DRAINS, AIRWAYS, & WOUNDS   Lines, drains, airways, & wounds:  Peripheral IV (Adult) 11/04/21 Left;Posterior Forearm (Active)   Number of days: 4       Urethral Catheter 18 Fr (Active)   Number of days: 4        LABS, IMAGING, & TELE   Labs:      Results from last 7 days   Lab Units 11/08/21  0320 11/07/21  0339 11/06/21  0418   WBC K/uL  8.02 7.60 7.90   HEMOGLOBIN g/dL 8.5* 8.7* 8.0*   HEMATOCRIT % 25.9* 26.8* 24.5*   PLATELETS K/uL 357* 333 295       Results from last 7 days   Lab Units 11/08/21  0320 11/07/21  0339 11/06/21  0418   SODIUM mEQ/L 132* 131* 132*   POTASSIUM mEQ/L 4.2 4.3 4.1   CHLORIDE mEQ/L 100 100 103   CO2 mEQ/L 22 23 20*   BUN mg/dL 10 9 10   CREATININE mg/dL 0.8 0.9 0.9   CALCIUM mg/dL 7.3* 7.3* 6.9*   GLUCOSE mg/dL 90 83 78     Imaging personally reviewed (does not include unread studies):  CT HEAD WITHOUT IV CONTRAST    Result Date: 11/2/2021  IMPRESSION: 1. No acute intracranial hemorrhage, acute infarct in a major vascular territory or mass-effect. 2. Progression of ventriculomegaly which may be from central atrophy.  Normal pressure hydrocephalus can have a similar appearance. COMMENT: Axial noncontrast CT images of the head were obtained. Sagittal and coronal reconstructions were created. CT DOSE:  One or more dose reduction techniques (e.g. automated exposure control, adjustment of the mA and/or kV according to patient size, use of iterative reconstruction technique) utilized for this examination. Comparison:  No prior studies are available for comparison. Findings: Ventricles are enlarged, disproportionate to sulcal involutional changes and progressed from prior.  There are bilateral periventricular white matter lucencies which are nonspecific but compatible with microangiopathic change. There is no mass effect, midline shift, territorial infarction, acute hemorrhage or extra-axial fluid collection. Visualized paranasal sinuses and mastoid air cells are clear.    ULTRASOUND KIDNEYS / BLADDER    Result Date: 11/4/2021  IMPRESSION: 1.  Mild left hydronephrosis 2.  Complex cyst versus solid mass lower pole right kidney measuring up to 3.2 cm. Contrast-enhanced renal mass protocol CT or MRI is recommended for further evaluation. 3.  Debris within the bladder     X-RAY CHEST 1 VIEW    Result Date: 10/31/2021  IMPRESSION: No  significant interval change. Probable minimal atelectasis or scarring bilateral lung bases.    Ultrasound venous arm right    Result Date: 11/4/2021  IMPRESSION: No upper extremity deep vein thrombosis.     CT UROGRAM WITH AND WITHOUT IV CONTRAST    Result Date: 11/4/2021  IMPRESSION: There is a bilateral large extrarenal pelvis bilaterally, right greater than left with segmental dilatation of ectatic ureters but no obstructing mass or calcification. There are numerous small parenchymal lesions in both kidneys, many small and difficult to characterize. 2 more prominent areas of subtle decreased attenuation on the right are noted and subtle masses or lesion certainly not excluded. There is a distended urinary bladder with numerous diverticula. Intraluminal mass at the left base is noted uncertain whether an extension of the prostate or separate intraluminal lesion, further evaluation advised. Multiple hepatic lesions are noted, many too small to characterize, the larger to reflect cysts. See comment above for delineation of findings. Results were made available to the clinical service at time of study dictation. In accordance with [place] Act 112,  the patient will receive a letter notifying them to follow up with their physician.    Telemetry/EKG:       ASSESSMENT & PLAN   Hyperlipidemia  Assessment & Plan  Continue home statin    Swelling of right upper extremity  Assessment & Plan  Right arm more swollen today  Painful to palpation but no localized erythema    - US RUE negative for DVT; RUE swelling improved  - Continuing on abx for UTI and bacteremia    Multiple myeloma (CMS/HCC)  Assessment & Plan  Currently on chemotherapy outpatient  - c/w outpatient management       COVID-19  Assessment & Plan  No current signs or sx's of acute infection  Continue home Advair  Positive test on admission    - Status post monoclonal antibody therapy    Chronic systolic heart failure (CMS/HCC)  Assessment & Plan  EF 40-45% on home  lasix PRN  - appears euvolemic now  Recentrly saw Dr. Ellis who stopped his metoprolo and changes his lasix from daily to PRN due to hypotension   - can give lasix PRN  - daily standing weight and accurate I/Os   - Cardiology following    Anemia  Assessment & Plan  Presents with Hb 9/1 b/l 10  Has known MM on chemotherapy which likely explains this  - CBC daily  - c/w chemo outpatient  - transfuse Hb <7  - hold home iron supplementation given acute infection UTI    CAD (coronary artery disease)  Assessment & Plan  Has known 50% LAD occlusion  CW home statin and  ASA    UTI (urinary tract infection)  Assessment & Plan  Uclx prior to admission c/w ESBL klebsiella MDRO including meropenem  Received 5 days bactrim PTP  U/A TNTC WBC, 3+ Bacteria, pos leuk esterase and nitrite  Lactate 2.1, Hypotensive with BP 90/54 improved to 113/64 today  No leukocytosis    - Continue with Amycaz for now per ID (day 7/14)  - ID following  - fu repeat UClx and Bclx; repeat blood cultures x2 growing GNRs,repeat cxs with NGTD  - IV team consulted for Midline placement  - pts BP runs chronically low; gentle fluids yesterday, Na remains low at 131  - s/p CT urogram; may need to repeat today, Uro following  - Maintain serrano for now; likely with uro outpatient follow up    Weakness  Assessment & Plan  Likely 2/2 UTI  - PT/OT  - plan as per UTI    BPH (benign prostatic hyperplasia)  Assessment & Plan  C/w home Flomax  Follws with urology Dr. Mathew       VTE Assessment: Padua    VTE Prophylaxis: Current anticoagulants:  heparin (porcine) 5,000 unit/mL injection 5,000 Units, subcutaneous, q8h JENNYFER      Code Status: Full Code  Estimated discharge date: 11/8/2021     ATTENDING DOCUMENTATION  ALSO SEE ATTENDING ATTESTATION SECTION OF NOTE

## 2021-11-08 NOTE — PLAN OF CARE
Problem: Adult Inpatient Plan of Care  Goal: Plan of Care Review  Outcome: Progressing  Flowsheets (Taken 11/8/2021 6416)  Progress: improving  Plan of Care Reviewed With:  • patient  • daughter     Per information in medical rounds, Pt remains medically stable for d/c pending placement. Pt was Covid+ on 10/31 and fully vaccinated. Pt on day 6 of 14 IV Avycaz. Pt also on isolation for CRE and ESBL. PT/OT continue to rec SNF. SW in communication with Pt and daughter (Talli: [phone]), who are still agreeable with SNF placement near the hospital. Referrals sent to Covid+ facilities with no accepting facilities at this time.    Pt denied from [facility] and [facility] due to CRE isolation. [facility] denied due to IV Avycaz price. [facility] and [facility] actively reviewing. SW to remain available for emotional support and d/c planning.    LAURENT Howard [id number]

## 2021-11-08 NOTE — CONSULTS
"Brief Nutrition Note    Recommendations     Continue with current diet    Will add Boost Plus BID         Clinical Course: Patient is a 76 y.o. male who was admitted on 10/31/2021 with a diagnosis of Weakness [R53.1]  Elevated serum creatinine [R79.89]  Urinary tract infection associated with catheterization of urinary tract, unspecified indwelling urinary catheter type, initial encounter (CMS/[facility]) [T83.511A, N39.0].     Past Medical History:   Diagnosis Date   • Anemia    • Blepharospasm     receives botox injections every 2-3 months   • BPH (benign prostatic hyperplasia)    • COVID-19 08/21/2021   • History of vertigo 2016   • Lipid disorder    • Tendency toward bleeding easily (CMS/[facility])      Past Surgical History:   Procedure Laterality Date   • CATARACT EXTRACTION W/  INTRAOCULAR LENS IMPLANT     • CHOLECYSTECTOMY  1992   • HERNIA REPAIR         Reason for Assessment  Reason For Assessment: per organizational policy (LOS)     [facility] Nutrition Screen Tool  Has patient lost weight without trying?: 0-->No  Has patient been eating poorly due to decreased appetite?: 0-->No  [facility] Nutrition Screen Score: 0     Nutrition/Diet History  Intake (%): 25%    Physical Findings  Last Bowel Movement: 11/06/21  Skin: edema (+1 DALE)     [id number] Physical Appearance  Last Bowel Movement: 11/06/21  Skin: edema (+1 DALE)     Nutrition Order  Nutrition Order: meets nutritional requirements  Nutrition Order Comments: Adiel BeckerFR, Regular     Anthropometrics  Height: 160 cm (5' 2.99\")           Current Weight  Weight Method: Bed scale  Weight: 65.2 kg (143 lb 11.8 oz)     Ideal Body Weight (IBW)  Ideal Body Weight (IBW) (kg): 56.9  % Ideal Body Weight: 115.47            Body Mass Index (BMI)  BMI (Calculated): 25.5     Labs/Procedures/Meds  Lab Results Reviewed: reviewed, pertinent   BMP Results       11/08/21 11/07/21 11/06/21     0320 0339 0418     131 132    K 4.2 4.3 4.1    Cl 100 100 103    CO2 22 23 20    Glucose 90 83 78    " BUN 10 9 10    Creatinine 0.8 0.9 0.9    Calcium 7.3 7.3 6.9    Anion Gap 10 8 9    EGFR >60.0 >60.0 >60.0              Medications  Pertinent Medications Reviewed: reviewed, pertinent   • aspirin  81 mg oral Daily   • ceftazidime-avibactam  1.25 g intravenous q8h INT   • cholecalciferol (vitamin D3)  1,000 Units oral BID   • cycloSPORINE  1 drop Both Eyes BID   • ferrous sulfate  325 mg oral Every other day   • heparin (porcine)  5,000 Units subcutaneous q8h JENNYFER   • [Provider Managed Hold] lenalidomide  25 mg oral Daily   • magnesium sulfate  2 g intravenous Once   • mometasone-formoterol  2 puff inhalation BID   • rosuvastatin  10 mg oral Daily   • tamsulosin  0.4 mg oral Daily           Skin: +1 DALE    Clinical comments: Per [facility] COVID pt visitation restrictions, RD not able to enter room. Unable to reach pt by phone. Spoke with RN who confirms poor appetite. Will send Boost Plus BID. 15# weight loss over 1 year (9%), not significant. Continue with liberalized diet as appetite is poor.     Goals: Will eat % of meals  Monitor: appetite    Recommendations: See above       Date: 11/08/21  Signature: Elly Erickson RD

## 2021-11-09 LAB
ANION GAP SERPL CALC-SCNC: 12 MEQ/L (ref 3–15)
BASOPHILS # BLD: 0.07 K/UL (ref 0.01–0.1)
BASOPHILS NFR BLD: 0.8 %
BUN SERPL-MCNC: 13 MG/DL (ref 8–20)
CALCIUM SERPL-MCNC: 7.7 MG/DL (ref 8.9–10.3)
CHLORIDE SERPL-SCNC: 99 MEQ/L (ref 98–109)
CO2 SERPL-SCNC: 21 MEQ/L (ref 22–32)
CREAT SERPL-MCNC: 0.8 MG/DL (ref 0.8–1.3)
DIFFERENTIAL METHOD BLD: ABNORMAL
EOSINOPHIL # BLD: 0.01 K/UL (ref 0.04–0.54)
EOSINOPHIL NFR BLD: 0.1 %
ERYTHROCYTE [DISTWIDTH] IN BLOOD BY AUTOMATED COUNT: 18.6 % (ref 11.6–14.4)
GFR SERPL CREATININE-BSD FRML MDRD: >60 ML/MIN/1.73M*2
GLUCOSE SERPL-MCNC: 87 MG/DL (ref 70–99)
HCT VFR BLDCO AUTO: 25.6 % (ref 40.1–51)
HGB BLD-MCNC: 8.5 G/DL (ref 13.7–17.5)
IMM GRANULOCYTES # BLD AUTO: 0.1 K/UL (ref 0–0.08)
IMM GRANULOCYTES NFR BLD AUTO: 1.1 %
LYMPHOCYTES # BLD: 1.47 K/UL (ref 1.2–3.5)
LYMPHOCYTES NFR BLD: 16.8 %
MAGNESIUM SERPL-MCNC: 2.1 MG/DL (ref 1.8–2.5)
MCH RBC QN AUTO: 29.9 PG (ref 28–33.2)
MCHC RBC AUTO-ENTMCNC: 33.2 G/DL (ref 32.2–36.5)
MCV RBC AUTO: 90.1 FL (ref 83–98)
MONOCYTES # BLD: 1.02 K/UL (ref 0.3–1)
MONOCYTES NFR BLD: 11.7 %
NEUTROPHILS # BLD: 6.08 K/UL (ref 1.7–7)
NEUTS SEG NFR BLD: 69.5 %
NRBC BLD-RTO: 0 %
PDW BLD AUTO: 9.8 FL (ref 9.4–12.4)
PLATELET # BLD AUTO: 367 K/UL (ref 150–350)
POTASSIUM SERPL-SCNC: 4.6 MEQ/L (ref 3.6–5.1)
RBC # BLD AUTO: 2.84 M/UL (ref 4.5–5.8)
SODIUM SERPL-SCNC: 132 MEQ/L (ref 136–144)
WBC # BLD AUTO: 8.75 K/UL (ref 3.8–10.5)

## 2021-11-09 PROCEDURE — 85025 COMPLETE CBC W/AUTO DIFF WBC: CPT | Performed by: STUDENT IN AN ORGANIZED HEALTH CARE EDUCATION/TRAINING PROGRAM

## 2021-11-09 PROCEDURE — 36415 COLL VENOUS BLD VENIPUNCTURE: CPT | Performed by: STUDENT IN AN ORGANIZED HEALTH CARE EDUCATION/TRAINING PROGRAM

## 2021-11-09 PROCEDURE — 25800000 HC PHARMACY IV SOLUTIONS: Performed by: INTERNAL MEDICINE

## 2021-11-09 PROCEDURE — 97116 GAIT TRAINING THERAPY: CPT | Mod: GP

## 2021-11-09 PROCEDURE — 21400000 HC ROOM AND CARE CCU/INTERMEDIATE

## 2021-11-09 PROCEDURE — 80048 BASIC METABOLIC PNL TOTAL CA: CPT | Performed by: STUDENT IN AN ORGANIZED HEALTH CARE EDUCATION/TRAINING PROGRAM

## 2021-11-09 PROCEDURE — 63700000 HC SELF-ADMINISTRABLE DRUG: Performed by: STUDENT IN AN ORGANIZED HEALTH CARE EDUCATION/TRAINING PROGRAM

## 2021-11-09 PROCEDURE — 63600000 HC DRUGS/DETAIL CODE: Performed by: STUDENT IN AN ORGANIZED HEALTH CARE EDUCATION/TRAINING PROGRAM

## 2021-11-09 PROCEDURE — 63600000 HC DRUGS/DETAIL CODE: Mod: JG | Performed by: INTERNAL MEDICINE

## 2021-11-09 PROCEDURE — 83735 ASSAY OF MAGNESIUM: CPT | Performed by: STUDENT IN AN ORGANIZED HEALTH CARE EDUCATION/TRAINING PROGRAM

## 2021-11-09 RX ADMIN — HEPARIN SODIUM 5000 UNITS: 5000 INJECTION INTRAVENOUS; SUBCUTANEOUS at 22:11

## 2021-11-09 RX ADMIN — CYCLOSPORINE 1 DROP: 0.5 EMULSION OPHTHALMIC at 09:29

## 2021-11-09 RX ADMIN — ROSUVASTATIN CALCIUM 10 MG: 10 TABLET, FILM COATED ORAL at 09:30

## 2021-11-09 RX ADMIN — CEFTAZIDIME, AVIBACTAM 2.5 G: 2; .5 POWDER, FOR SOLUTION INTRAVENOUS at 12:32

## 2021-11-09 RX ADMIN — Medication 1000 UNITS: at 20:10

## 2021-11-09 RX ADMIN — ASPIRIN 81 MG: 81 TABLET, COATED ORAL at 09:30

## 2021-11-09 RX ADMIN — MOMETASONE FUROATE AND FORMOTEROL FUMARATE DIHYDRATE 2 PUFF: 200; 5 AEROSOL RESPIRATORY (INHALATION) at 20:10

## 2021-11-09 RX ADMIN — MOMETASONE FUROATE AND FORMOTEROL FUMARATE DIHYDRATE 2 PUFF: 200; 5 AEROSOL RESPIRATORY (INHALATION) at 09:30

## 2021-11-09 RX ADMIN — FERROUS SULFATE TAB 325 MG (65 MG ELEMENTAL FE) 325 MG: 325 (65 FE) TAB at 09:30

## 2021-11-09 RX ADMIN — CEFTAZIDIME, AVIBACTAM 2.5 G: 2; .5 POWDER, FOR SOLUTION INTRAVENOUS at 20:21

## 2021-11-09 RX ADMIN — Medication 1000 UNITS: at 09:29

## 2021-11-09 RX ADMIN — TAMSULOSIN HYDROCHLORIDE 0.4 MG: 0.4 CAPSULE ORAL at 09:29

## 2021-11-09 RX ADMIN — CYCLOSPORINE 1 DROP: 0.5 EMULSION OPHTHALMIC at 20:10

## 2021-11-09 RX ADMIN — HEPARIN SODIUM 5000 UNITS: 5000 INJECTION INTRAVENOUS; SUBCUTANEOUS at 05:11

## 2021-11-09 RX ADMIN — HEPARIN SODIUM 5000 UNITS: 5000 INJECTION INTRAVENOUS; SUBCUTANEOUS at 14:18

## 2021-11-09 RX ADMIN — CEFTAZIDIME, AVIBACTAM 2.5 G: 2; .5 POWDER, FOR SOLUTION INTRAVENOUS at 04:21

## 2021-11-09 ASSESSMENT — COGNITIVE AND FUNCTIONAL STATUS - GENERAL
WALKING IN HOSPITAL ROOM: 3 - A LITTLE
STANDING UP FROM CHAIR USING ARMS: 3 - A LITTLE
MOVING TO AND FROM BED TO CHAIR: 3 - A LITTLE
AFFECT: FLAT/BLUNTED AFFECT
CLIMB 3 TO 5 STEPS WITH RAILING: 3 - A LITTLE

## 2021-11-09 NOTE — PROGRESS NOTES
Internal Medicine  Daily Progress Note       SUBJECTIVE   This is a 76 y.o. year-old male admitted on 10/31/2021 with Weakness [R53.1]  Elevated serum creatinine [R79.89]  Urinary tract infection associated with catheterization of urinary tract, unspecified indwelling urinary catheter type, initial encounter (CMS/[facility]) [T83.511A, N39.0].    Interval History: NAEON, this am he feels better. Denies fever, chills chest pain.      OBJECTIVE   Vital signs in last 24 hours:  Temp:  [36.4 °C (97.5 °F)-37.6 °C (99.6 °F)] 36.9 °C (98.5 °F)  Heart Rate:  [57-67] 65  Resp:  [18] 18  BP: ()/(45-66) 102/66  SpO2:  [95 %-96 %] 96 %    Weight & I/Os:  Weights (last 5 days)     Date/Time Weight    11/09/21 0422 65.1 kg (143 lb 9.6 oz)    11/08/21 0428 65.2 kg (143 lb 11.8 oz)    11/07/21 0600 64.8 kg (142 lb 13.7 oz)    11/06/21 0400 64 kg (141 lb 3.2 oz)    11/05/21 0025 67.4 kg (148 lb 9.6 oz)    11/04/21 0548 63.2 kg (139 lb 5.3 oz)          Intake/Output Summary (Last 24 hours) at 11/9/2021 0659  Last data filed at 11/9/2021 0422  24 Hour Net Input/Output from 7AM Yesterday   Intake 690 ml   Output 1100 ml   Net -410 ml        PHYSICAL EXAMINATION   Physical Exam  General: non toxic, alert, frail   HEENT: NC/AT/ anicteric sclera, pharynx clear   Cardiovascular: regular S1/S2, no mrg     Respiratory: crackles at bases R>L  GI/Abdomen: soft, NT/ND,  no peritoneal signs  Extremities: no  edema  MSK/JOINTS:  R pinky burn   Neurology and psych: no focal deficits, cooperative with exam  Skin: no rashes, lines clean dry intact, R midline   G.U.: serrano, clear urine   LINES, CATHETERS, DRAINS, AIRWAYS, & WOUNDS   Lines, drains, airways, & wounds:  Midline Catheter 11/08/21 0946 (Active)   Number of days: 1       Peripheral IV (Adult) 11/04/21 Left;Posterior Forearm (Active)   Number of days: 5       Urethral Catheter 18 Fr (Active)   Number of days: 5        LABS, IMAGING, & TELE   Labs:      Results from last 7 days   Lab Units  11/09/21 0419 11/08/21 0320 11/07/21  0339   WBC K/uL 8.75 8.02 7.60   HEMOGLOBIN g/dL 8.5* 8.5* 8.7*   HEMATOCRIT % 25.6* 25.9* 26.8*   PLATELETS K/uL 367* 357* 333       Results from last 7 days   Lab Units 11/09/21 0419 11/08/21 0320 11/07/21  0339   SODIUM mEQ/L 132* 132* 131*   POTASSIUM mEQ/L 4.6 4.2 4.3   CHLORIDE mEQ/L 99 100 100   CO2 mEQ/L 21* 22 23   BUN mg/dL 13 10 9   CREATININE mg/dL 0.8 0.8 0.9   CALCIUM mg/dL 7.7* 7.3* 7.3*   GLUCOSE mg/dL 87 90 83     Imaging personally reviewed (does not include unread studies):  CT HEAD WITHOUT IV CONTRAST    Result Date: 11/2/2021  IMPRESSION: 1. No acute intracranial hemorrhage, acute infarct in a major vascular territory or mass-effect. 2. Progression of ventriculomegaly which may be from central atrophy.  Normal pressure hydrocephalus can have a similar appearance. COMMENT: Axial noncontrast CT images of the head were obtained. Sagittal and coronal reconstructions were created. CT DOSE:  One or more dose reduction techniques (e.g. automated exposure control, adjustment of the mA and/or kV according to patient size, use of iterative reconstruction technique) utilized for this examination. Comparison:  No prior studies are available for comparison. Findings: Ventricles are enlarged, disproportionate to sulcal involutional changes and progressed from prior.  There are bilateral periventricular white matter lucencies which are nonspecific but compatible with microangiopathic change. There is no mass effect, midline shift, territorial infarction, acute hemorrhage or extra-axial fluid collection. Visualized paranasal sinuses and mastoid air cells are clear.    ULTRASOUND KIDNEYS / BLADDER    Result Date: 11/4/2021  IMPRESSION: 1.  Mild left hydronephrosis 2.  Complex cyst versus solid mass lower pole right kidney measuring up to 3.2 cm. Contrast-enhanced renal mass protocol CT or MRI is recommended for further evaluation. 3.  Debris within the bladder     X-RAY  CHEST 1 VIEW    Result Date: 10/31/2021  IMPRESSION: No significant interval change. Probable minimal atelectasis or scarring bilateral lung bases.    Ultrasound venous arm right    Result Date: 11/4/2021  IMPRESSION: No upper extremity deep vein thrombosis.     CT UROGRAM WITH AND WITHOUT IV CONTRAST    Result Date: 11/4/2021  IMPRESSION: There is a bilateral large extrarenal pelvis bilaterally, right greater than left with segmental dilatation of ectatic ureters but no obstructing mass or calcification. There are numerous small parenchymal lesions in both kidneys, many small and difficult to characterize. 2 more prominent areas of subtle decreased attenuation on the right are noted and subtle masses or lesion certainly not excluded. There is a distended urinary bladder with numerous diverticula. Intraluminal mass at the left base is noted uncertain whether an extension of the prostate or separate intraluminal lesion, further evaluation advised. Multiple hepatic lesions are noted, many too small to characterize, the larger to reflect cysts. See comment above for delineation of findings. Results were made available to the clinical service at time of study dictation. In accordance with [place] Act 112,  the patient will receive a letter notifying them to follow up with their physician.    Telemetry/EKG:       ASSESSMENT & PLAN   Hyperlipidemia  Assessment & Plan  Continue home statin    Swelling of right upper extremity  Assessment & Plan  Right arm more swollen today  Painful to palpation but no localized erythema    - US RUE negative for DVT; RUE swelling improved  - Continuing on abx for UTI and bacteremia    Multiple myeloma (CMS/HCC)  Assessment & Plan  Currently on chemotherapy outpatient  - Curb side heme onc: hold revlimid iso infection, he will f/u with his oncologist after DC    COVID-19  Assessment & Plan  No current signs or sx's of acute infection  Continue home Advair  Positive test on admission    - Status  post monoclonal antibody therapy    Chronic systolic heart failure (CMS/HCC)  Assessment & Plan  EF 40-45% on home lasix PRN  - appears euvolemic now  Recentrly saw Dr. Ellis who stopped his metoprolo and changes his lasix from daily to PRN due to hypotension   - can give lasix PRN  - daily standing weight and accurate I/Os   - Cardiology following    Anemia  Assessment & Plan  Presents with Hb 9/1 b/l 10  Has known MM on chemotherapy which likely explains this  - CBC daily  - c/w chemo outpatient  - transfuse Hb <7  - hold home iron supplementation given acute infection UTI    CAD (coronary artery disease)  Assessment & Plan  Has known 50% LAD occlusion  CW home statin and  ASA    UTI (urinary tract infection)  Assessment & Plan  Uclx prior to admission c/w ESBL klebsiella MDRO including meropenem  Received 5 days bactrim PTP  U/A TNTC WBC, 3+ Bacteria, pos leuk esterase and nitrite  Lactate 2.1, Hypotensive with BP 90/54 improved to 113/64 today  No leukocytosis    - Continue with Amycaz for now per ID (day 7/14)  - ID following  - fu repeat UClx and Bclx; repeat blood cultures x2 growing GNRs,repeat cxs with NGTD  - IV team consulted for Midline placement  - pts BP runs chronically low; gentle fluids yesterday, Na remains low at 131  - s/p CT urogram; may need to repeat today, Uro following  - Maintain serrano for now; likely with uro outpatient follow up    Weakness  Assessment & Plan  Likely 2/2 UTI  - PT/OT  - plan as per UTI    BPH (benign prostatic hyperplasia)  Assessment & Plan  C/w home Flomax  Follws with urology Dr. Mathew       VTE Assessment: Pad[place]    VTE Prophylaxis: Current anticoagulants:  heparin (porcine) 5,000 unit/mL injection 5,000 Units, subcutaneous, q8h [id number]      Code Status: Full Code  Estimated discharge date: 11/10/2021     ATTENDING DOCUMENTATION  ALSO SEE ATTENDING ATTESTATION SECTION OF NOTE

## 2021-11-09 NOTE — PATIENT CARE CONFERENCE
Care Progression Rounds Note  Date: 11/9/2021  Time: 11:53 AM     Patient Name: Eulalio Gregg     Medical Record Number: [id number]   YOB: 1945  Sex: Male      Room/Bed: 0202    Admitting Diagnosis: Weakness [R53.1]  Elevated serum creatinine [R79.89]  Urinary tract infection associated with catheterization of urinary tract, unspecified indwelling urinary catheter type, initial encounter (CMS/[facility]) [T83.511A, N39.0]   Admit Date/Time: 10/31/2021 12:15 PM    Primary Diagnosis: Bacteremia due to Klebsiella pneumoniae  Principal Problem: Bacteremia due to Klebsiella pneumoniae    GMLOS: pending  Anticipated Discharge Date: 11/10/2021    AM-PAC:  Mobility Score: 19    Discharge Planning:  Current Living Arrangements: home  Anticipated Discharge Disposition: skilled nursing facility  Type of Skilled Nursing Care Services: OT,PT    Barriers to Discharge:  Barriers to Discharge: Facility availability  Comment: possible picc placement for outpt antibx    Participants:  ,physical therapy,nursing,social work/services

## 2021-11-09 NOTE — PROGRESS NOTES
Patient: Eulalio Gregg  [facility]  [place] [address] [facility]  MRN:  [id number]  Today's date:  11/9/2021    Pt left in bedside chair, alarmed, with call bell and personal items within reach. RN informed of session completed and informed of low BP.     Eulalio is a 76 y.o. male admitted on 10/31/2021 with Weakness [R53.1]  Elevated serum creatinine [R79.89]  Urinary tract infection associated with catheterization of urinary tract, unspecified indwelling urinary catheter type, initial encounter (CMS/[facility]) [T83.511A, N39.0]. Principal problem is Weakness.    Past Medical History  Eulalio has a past medical history of Anemia, Blepharospasm, BPH (benign prostatic hyperplasia), COVID-19 (08/21/2021), History of vertigo (2016), Lipid disorder, and Tendency toward bleeding easily (CMS/[facility]).    History of Present Illness   Admitted with weakness, +COVID      PT Vitals    Date/Time Pulse SpO2 Pt Activity O2 Therapy BP BP Method Pt Position Observations [facility]   11/09/21 1336 66 96 % At rest None (Room air) 89/42 Automatic Lying y CBK   11/09/21 1337 -- -- -- -- 91/64 Automatic Sitting -- CBK   11/09/21 1338 -- -- -- post activity -- 118/56 Automatic Sitting -- CBK      PT Pain    Date/Time Pain Type Rating: Rest Rating: Activity [facility]   11/09/21 1336 Pain Assessment 0 - no pain 0 - no pain CBK          Prior Living Environment      Most Recent Value   Current Living Arrangements home   Living Environment Comment ML with spouse, 3STE with FF to bed/bath        Prior Level of Function      Most Recent Value   Ambulation independent   Transferring independent   Toileting independent   Bathing independent   Dressing independent   Eating independent   Prior Level of Function Comment pt reports ind PTA without use of AD,  started to use a SPC before admissions   Assistive Device Currently Used at Home cane, straight           PT Evaluation and Treatment - 11/09/21 1336        PT Time Calculation    Start Time 1336      Stop Time 5747     Time Calculation (min) 21 min        Session Details    Document Type daily treatment/progress note     Mode of Treatment physical therapy        General Information    Patient Profile Reviewed yes     Existing Precautions/Restrictions droplet;contact;fall   +COVID    Limitations/Impairments safety/cognitive        Cognition/Psychosocial    Affect/Mental Status (Cognition) flat/blunted affect     Behavioral Issues (Cognition) withdrawn     Orientation Status (Cognition) oriented to;person     Follows Commands (Cognition) follows one-step commands;delayed response/completion;increased processing time needed;repetition of directions required     Attention Deficit (Cognition) minimal deficit;concentration     Executive Function Deficit (Cognition) moderate deficit;information processing;insight/awareness of deficits        Bed Mobility    [place], Supine to Sit minimum assist (75% or more patient effort)     Comment (Bed Mobility) inc effort/time to complete, dec insight into safety (VCs to scoot forward to feet are safely on the ground before attempting to stand)        Sit to Stand Transfer    [place], Sit to Stand Transfer minimum assist (75% or more patient effort);1 person assist     Verbal Cues safety;technique     Assistive Device other (see comments)     Comment HHAx1; unsteady, VCs for upright stance before transfer to chair        Stand to Sit Transfer    [place], Stand to Sit Transfer minimum assist (75% or more patient effort)     Verbal Cues technique;safety     Comment dec eccentric control, VCs to reach back for safety        Gait Training    [place], Gait minimum assist (75% or more patient effort);1 person assist     Assistive Device other (see comments)     Distance in Feet 4 feet     Pattern (Gait) step-to     Deviations/Abnormal Patterns (Gait) arias decreased;festinating/shuffling     Comment (Gait/Stairs) HHAx1 provided, pt with short/shuffled steps, LOB  posterior req assist, deferring further mobility at this time 2* safety concerns        Stairs Training    [place], Stairs not tested        Safety Issues, Functional Mobility    Safety Issues Affecting Function (Mobility) ability to follow commands;insight into deficits/self-awareness;problem-solving;safety precaution awareness;sequencing abilities     Impairments Affecting Function (Mobility) balance;cognition        Balance    Static Sitting Balance WFL     Dynamic Sitting Balance mild impairment     Static Standing Balance mild impairment     Dynamic Standing Balance mild impairment        AM-PAC (TM) - Mobility (Current Function)    Turning from your back to your side while in a flat bed without using bedrails? 4 - None     Moving from lying on your back to sitting on the side of a flat bed without using bedrails? 3 - A Little     Moving to and from a bed to a chair? 3 - A Little     Standing up from a chair using your arms? 3 - A Little     To walk in a hospital room? 3 - A Little     Climbing 3-5 steps with a railing? 3 - A Little     AM-PAC (TM) Mobility Score 19        Assessment/Plan (PT)    Daily Outcome Statement pt continues to be a min assist for mobility, intermittent confusion throughout session, demo dec balance and endurance, rec SNF at [place]     Rehab Potential good, to achieve stated therapy goals     Therapy Frequency 3-5 times/wk     Planned Therapy Interventions balance training;bed mobility training;gait training;transfer training               PT Assessment/Plan      Most Recent Value   PT Recommended Discharge Disposition skilled nursing facility at 11/09/2021 1336   Anticipated Equipment Needs at Discharge (PT) walker, front-wheeled  [TBD] at 11/09/2021 1336   Patient/Family Therapy Goals Statement none stated at 11/09/2021 1336                    Education Documentation  Fall Prevention, taught by Ralph Joiner, PT at 11/9/2021  2:51 PM.  Learner: Patient  Readiness: Acceptance  Method:  Explanation, Demonstration  Response: Needs Reinforcement  Comment: PT POC, energy conservation, safety with mobility          PT Goals      Most Recent Value   Bed Mobility Goal 1    Activity/Assistive Device rolling to left, rolling to right, supine to sit, sit to supine at 11/01/2021 1237   [place] independent at 11/01/2021 1237   Time Frame by discharge at 11/01/2021 1237   Progress/Outcome goal ongoing at 11/01/2021 1237   Transfer Goal 1    Activity/Assistive Device bed-to-chair/chair-to-bed, sit-to-stand/stand-to-sit at 11/01/2021 1237   [place] independent at 11/01/2021 1237   Time Frame by discharge at 11/01/2021 1237   Progress/Outcome goal ongoing at 11/01/2021 1237   Gait Training Goal 1    Activity/Assistive Device gait (walking locomotion), walker, front-wheeled at 11/01/2021 1237   [place] supervision required at 11/01/2021 1237   Distance 100 at 11/01/2021 1237   Time Frame by discharge at 11/01/2021 1237   Progress/Outcome goal ongoing at 11/01/2021 1237

## 2021-11-09 NOTE — PLAN OF CARE
Problem: Adult Inpatient Plan of Care  Goal: Plan of Care Review  Outcome: Progressing  Flowsheets (Taken 11/9/2021 1322)  Progress: improving  Plan of Care Reviewed With: patient     Per information in medical rounds, Pt remains medically stable for d/c pending placement. Pt was Covid+ on 10/31 and fully vaccinated. Pt on day 7 of 14 IV Avycaz. Also on isolation for CRE and ESBL. PT/OT continue to rec SNF. Pt and daughter agreeable with SNF placement. Referrals sent with no accepting facilities at this time. Additional referrals sent today. Barriers are Covid+ status, CRE isolation, and IV abx course. MD made aware. SW to remain available for emotional support and d/c planning.    LAURENT Howard [phone]

## 2021-11-10 PROBLEM — M79.89 SWELLING OF RIGHT UPPER EXTREMITY: Status: RESOLVED | Noted: 2021-11-03 | Resolved: 2021-11-10

## 2021-11-10 LAB
ANION GAP SERPL CALC-SCNC: 11 MEQ/L (ref 3–15)
BASOPHILS # BLD: 0.12 K/UL (ref 0.01–0.1)
BASOPHILS NFR BLD: 1.3 %
BUN SERPL-MCNC: 15 MG/DL (ref 8–20)
CALCIUM SERPL-MCNC: 8.2 MG/DL (ref 8.9–10.3)
CHLORIDE SERPL-SCNC: 98 MEQ/L (ref 98–109)
CO2 SERPL-SCNC: 24 MEQ/L (ref 22–32)
CREAT SERPL-MCNC: 0.8 MG/DL (ref 0.8–1.3)
DIFFERENTIAL METHOD BLD: ABNORMAL
EOSINOPHIL # BLD: 0.03 K/UL (ref 0.04–0.54)
EOSINOPHIL NFR BLD: 0.3 %
ERYTHROCYTE [DISTWIDTH] IN BLOOD BY AUTOMATED COUNT: 18.6 % (ref 11.6–14.4)
GFR SERPL CREATININE-BSD FRML MDRD: >60 ML/MIN/1.73M*2
GLUCOSE SERPL-MCNC: 97 MG/DL (ref 70–99)
HCT VFR BLDCO AUTO: 25.5 % (ref 40.1–51)
HGB BLD-MCNC: 8.4 G/DL (ref 13.7–17.5)
IMM GRANULOCYTES # BLD AUTO: 0.12 K/UL (ref 0–0.08)
IMM GRANULOCYTES NFR BLD AUTO: 1.3 %
LYMPHOCYTES # BLD: 1.68 K/UL (ref 1.2–3.5)
LYMPHOCYTES NFR BLD: 17.8 %
MAGNESIUM SERPL-MCNC: 1.9 MG/DL (ref 1.8–2.5)
MCH RBC QN AUTO: 29.9 PG (ref 28–33.2)
MCHC RBC AUTO-ENTMCNC: 32.9 G/DL (ref 32.2–36.5)
MCV RBC AUTO: 90.7 FL (ref 83–98)
MONOCYTES # BLD: 0.99 K/UL (ref 0.3–1)
MONOCYTES NFR BLD: 10.5 %
NEUTROPHILS # BLD: 6.48 K/UL (ref 1.7–7)
NEUTS SEG NFR BLD: 68.8 %
NRBC BLD-RTO: 0 %
PDW BLD AUTO: 10.1 FL (ref 9.4–12.4)
PLATELET # BLD AUTO: 393 K/UL (ref 150–350)
POTASSIUM SERPL-SCNC: 4.7 MEQ/L (ref 3.6–5.1)
RBC # BLD AUTO: 2.81 M/UL (ref 4.5–5.8)
SODIUM SERPL-SCNC: 133 MEQ/L (ref 136–144)
WBC # BLD AUTO: 9.42 K/UL (ref 3.8–10.5)

## 2021-11-10 PROCEDURE — 63600000 HC DRUGS/DETAIL CODE: Performed by: STUDENT IN AN ORGANIZED HEALTH CARE EDUCATION/TRAINING PROGRAM

## 2021-11-10 PROCEDURE — 85025 COMPLETE CBC W/AUTO DIFF WBC: CPT | Performed by: STUDENT IN AN ORGANIZED HEALTH CARE EDUCATION/TRAINING PROGRAM

## 2021-11-10 PROCEDURE — 97535 SELF CARE MNGMENT TRAINING: CPT | Mod: GO

## 2021-11-10 PROCEDURE — 63600000 HC DRUGS/DETAIL CODE: Performed by: INTERNAL MEDICINE

## 2021-11-10 PROCEDURE — 21400000 HC ROOM AND CARE CCU/INTERMEDIATE

## 2021-11-10 PROCEDURE — 80048 BASIC METABOLIC PNL TOTAL CA: CPT | Performed by: STUDENT IN AN ORGANIZED HEALTH CARE EDUCATION/TRAINING PROGRAM

## 2021-11-10 PROCEDURE — 36415 COLL VENOUS BLD VENIPUNCTURE: CPT | Performed by: STUDENT IN AN ORGANIZED HEALTH CARE EDUCATION/TRAINING PROGRAM

## 2021-11-10 PROCEDURE — 83735 ASSAY OF MAGNESIUM: CPT | Performed by: STUDENT IN AN ORGANIZED HEALTH CARE EDUCATION/TRAINING PROGRAM

## 2021-11-10 PROCEDURE — 25800000 HC PHARMACY IV SOLUTIONS: Performed by: INTERNAL MEDICINE

## 2021-11-10 PROCEDURE — 63700000 HC SELF-ADMINISTRABLE DRUG: Performed by: STUDENT IN AN ORGANIZED HEALTH CARE EDUCATION/TRAINING PROGRAM

## 2021-11-10 RX ADMIN — Medication 1000 UNITS: at 19:58

## 2021-11-10 RX ADMIN — TAMSULOSIN HYDROCHLORIDE 0.4 MG: 0.4 CAPSULE ORAL at 08:37

## 2021-11-10 RX ADMIN — MAGNESIUM SULFATE HEPTAHYDRATE 2 G: 2 INJECTION, SOLUTION INTRAVENOUS at 08:40

## 2021-11-10 RX ADMIN — MOMETASONE FUROATE AND FORMOTEROL FUMARATE DIHYDRATE 2 PUFF: 200; 5 AEROSOL RESPIRATORY (INHALATION) at 19:58

## 2021-11-10 RX ADMIN — CEFTAZIDIME, AVIBACTAM 2.5 G: 2; .5 POWDER, FOR SOLUTION INTRAVENOUS at 15:13

## 2021-11-10 RX ADMIN — HEPARIN SODIUM 5000 UNITS: 5000 INJECTION INTRAVENOUS; SUBCUTANEOUS at 06:26

## 2021-11-10 RX ADMIN — CEFTAZIDIME, AVIBACTAM 2.5 G: 2; .5 POWDER, FOR SOLUTION INTRAVENOUS at 22:00

## 2021-11-10 RX ADMIN — CYCLOSPORINE 1 DROP: 0.5 EMULSION OPHTHALMIC at 08:37

## 2021-11-10 RX ADMIN — HEPARIN SODIUM 5000 UNITS: 5000 INJECTION INTRAVENOUS; SUBCUTANEOUS at 21:57

## 2021-11-10 RX ADMIN — ROSUVASTATIN CALCIUM 10 MG: 10 TABLET, FILM COATED ORAL at 08:37

## 2021-11-10 RX ADMIN — Medication 1000 UNITS: at 08:37

## 2021-11-10 RX ADMIN — MOMETASONE FUROATE AND FORMOTEROL FUMARATE DIHYDRATE 2 PUFF: 200; 5 AEROSOL RESPIRATORY (INHALATION) at 08:40

## 2021-11-10 RX ADMIN — HEPARIN SODIUM 5000 UNITS: 5000 INJECTION INTRAVENOUS; SUBCUTANEOUS at 13:46

## 2021-11-10 RX ADMIN — CYCLOSPORINE 1 DROP: 0.5 EMULSION OPHTHALMIC at 19:58

## 2021-11-10 RX ADMIN — SODIUM CHLORIDE, POTASSIUM CHLORIDE, SODIUM LACTATE AND CALCIUM CHLORIDE 500 ML: 600; 310; 30; 20 INJECTION, SOLUTION INTRAVENOUS at 08:37

## 2021-11-10 RX ADMIN — CEFTAZIDIME, AVIBACTAM 2.5 G: 2; .5 POWDER, FOR SOLUTION INTRAVENOUS at 04:50

## 2021-11-10 RX ADMIN — ASPIRIN 81 MG: 81 TABLET, COATED ORAL at 08:37

## 2021-11-10 ASSESSMENT — COGNITIVE AND FUNCTIONAL STATUS - GENERAL
DRESSING REGULAR UPPER BODY CLOTHING: 3 - A LITTLE
HELP NEEDED FOR PERSONAL GROOMING: 3 - A LITTLE
AFFECT: FLAT/BLUNTED AFFECT
EATING MEALS: 4 - NONE
DRESSING REGULAR LOWER BODY CLOTHING: 3 - A LITTLE
HELP NEEDED FOR BATHING: 3 - A LITTLE
TOILETING: 3 - A LITTLE

## 2021-11-10 NOTE — PROGRESS NOTES
Patient:  Eulalio Gregg  Location:  [facility] [address]  MRN:  [id number]  Today's date:  11/10/2021    Pt found lying supine in bed; RN cleared for session.  Ended session with pt seated OOB in chair, posey alarm on, call bell/personal items within reach, VSS/NAD, RN aware of session/ pt status.  Eulalio is a 76 y.o. male admitted on 10/31/2021 with Weakness [R53.1]  Elevated serum creatinine [R79.89]  Urinary tract infection associated with catheterization of urinary tract, unspecified indwelling urinary catheter type, initial encounter (CMS/[facility]) [T83.511A, N39.0]. Principal problem is Weakness.    Past Medical History  Eulalio has a past medical history of Anemia, Blepharospasm, BPH (benign prostatic hyperplasia), COVID-19 (08/21/2021), History of vertigo (2016), Lipid disorder, and Tendency toward bleeding easily (CMS/[facility]).    History of Present Illness   Admitted with weakness, +COVID      OT Vitals    Date/Time Pulse BP Pt Position Observations [facility]   11/10/21 1206 56 79/44 Lying -- EMS   11/10/21 1210 -- 99/57 Sitting -- EMS   11/10/21 1226 -- 90/49 Sitting After OT session; OOB in chair, RN made aware of BP EMS      OT Pain    Date/Time Pain Type Rating: Rest Rating: Activity [facility]   11/10/21 1206 Pain Assessment 0 - no pain 0 - no pain EMS          Prior Living Environment      Most Recent Value   Current Living Arrangements home   Living Environment Comment ML with spouse, 3STE with FF to bed/bath        Prior Level of Function      Most Recent Value   Ambulation independent   Transferring independent   Toileting independent   Bathing independent   Dressing independent   Eating independent   Prior Level of Function Comment pt reports ind PTA without use of AD,  started to use a SPC before admissions   Assistive Device Currently Used at Home cane, straight        Occupational Profile      Most Recent Value   Reason for Services/Referral COVID19+,  dispo recs   Occupational  "History/Life Experiences Retired,  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     Existing Precautions/Restrictions contact;droplet;fall   COVID19+    Limitations/Impairments safety/cognitive        Cognition/Psychosocial    Affect/Mental Status (Cognition) flat/blunted affect   odd affect    Orientation Status (Cognition) oriented to;person;place;time   AAOx3    Follows Commands (Cognition) follows two-step commands;repetition of directions required;verbal cues/prompting required;increased processing time needed     Attention Deficit (Cognition) minimal deficit;concentration;focused/sustained attention     Executive Function Deficit (Cognition) moderate deficit;insight/awareness of deficits;judgment;planning/decision-making;problem-solving/reasoning     Safety Deficit (Cognition) minimal deficit;moderate deficit;insight into deficits/self-awareness;judgment;problem-solving;safety precautions awareness;safety precautions follow-through/compliance     Comment, Cognition Min-mod cog deficits continue. Flat-odd affect. Presents confused at times, although AAOx3. Ex: when pt sitting on toilet, OT stated \"good\" after pt placed hands on GB-- pt repsonded \"delicious\". Ex: OT checked skin for bleeding as gown had blood stains, pt responded \"I guess you must have done that\" .        Range of Motion (ROM)    Range of Motion bilateral upper extremities;ROM is WFL        Strength (Manual Muscle Testing)    Strength (Manual Muscle Testing) bilateral upper extremities;strength is WFL        Bed Mobility    [place], Supine to Sit minimum assist " (75% or more patient effort);1 person assist     Assistive Device bed rails;head of bed elevated     Comment (Bed Mobility) Increased time to perform        Sit to Stand Transfer    [place], Sit to Stand Transfer Minimum assist; 1 person assist    Verbal Cues hand placement;safety;technique     Assistive Device walker, front-wheeled        Stand to Sit Transfer    [place], Stand to Sit Transfer close supervision     Verbal Cues hand placement;safety;technique     Assistive Device walker, front-wheeled        Toilet Transfer    Transfer Technique sit-stand;stand-sit     [place], Toilet Transfer close supervision     Verbal Cues hand placement;safety;technique     Assistive Device grab bars/safety frame        Safety Issues, Functional Mobility    Comment, Safety Issues/Impairments (Mobility) Pt able to ambulate ~25ft t/o session inside of room; CGA with use of RW.        Balance    Static Sitting Balance WFL;sitting, edge of bed     Static Standing Balance mild impairment     Dynamic Standing Balance mild impairment     Balance Interventions occupation based/functional task        Motor Skills    Functional Endurance Overall, mild deficits remain; CLS-CGA with use of RW, able to ambulate short distances with increased time to perform        Grooming    Self-Performance washes, rinses and dries hands     [place] close supervision     Position sink side;unsupported standing     Comment max VCing for correct/safe RW placement at sink side        Toileting    [place] perform bowel hygiene     Position unsupported sitting;supported standing     Comment Completed on regular toilet, completed standing bowel hygiene with Lucia        BADL Safety/Performance    Impairments, BADL Safety/Performance endurance/activity tolerance;balance;strength     Cognitive Impairments, BADL Safety/Performance attention;insight into deficits/self-awareness;judgment;problem-solving/reasoning;safety precaution  awareness;safety precaution follow-through     Skilled BADL Treatment/Intervention BADL process/adaptation training     Progress in BADL Status effort and initiation        ADL Interventions    Self-Care (BADL) Promotion out of bed for BADLs encouraged   Left OOB in chair at end of session       AM-PAC (TM) - ADL (Current Function)    Putting on and taking off regular lower body clothing? 3 - A Little     Bathing? 3 - A Little     Toileting? 3 - A Little     Putting on/taking off regular upper body clothing? 3 - A Little     How much help for taking care of personal grooming? 3 - A Little     Eating meals? 4 - None     AM-PAC (TM) ADL Score 19        Assessment/Plan (OT)    Daily Outcome Statement OT re-eval completed. Pt continues with min-mod cog deficits: blunted/odd affect, confused at times, AAOx3, VCing for safety. Lucia-CGA for functional txfers and CLS for functional mobility with use of RW. CLS grooming task and [place] for toileting task in bathroom, VCing for safety req'd. Cont OT POC, pt may perform better in familiar setting upon DC if family able to assist/SUP with txfers and ADLs, VS. SNF upon DC.              OT Assessment/Plan      Most Recent Value   OT Recommended Discharge Disposition SNF vs. Home pending family availability   at 11/10/2021 1206   Anticipated Equipment Needs At Discharge (OT) other (see comments)  [TBD] at 11/02/2021 1201   Patient/Family Therapy Goal Statement To get better at 11/02/2021 1201            OT Goals      Most Recent Value   Bed Mobility Goal 1    Activity/Assistive Device bed mobility activities, all at 11/02/2021 1201   [place] independent at 11/02/2021 1201   Time Frame short-term goal (STG) at 11/02/2021 1201   Progress/Outcome goal ongoing at 11/02/2021 1201   Transfer Goal 1    Activity/Assistive Device all transfers at 11/02/2021 1201   [place] independent at 11/02/2021 1201   Time Frame short-term goal (STG) at 11/02/2021 1201   Progress/Outcome goal  ongoing at 11/02/2021 1201   Dressing Goal 1    Activity/Adaptive Equipment dressing skills, all at 11/02/2021 1201   [place] independent at 11/02/2021 1201   Time Frame short-term goal (STG) at 11/02/2021 1201   Progress/Outcome goal ongoing at 11/02/2021 1201   Toileting Goal 1    Activity/Assistive Device toileting skills, all at 11/02/2021 1201   [place] independent at 11/02/2021 1201   Time Frame short-term goal (STG) at 11/02/2021 1201   Progress/Outcome goal ongoing at 11/02/2021 1201   Grooming Goal 1    Activity/Assistive Device grooming skills, all at 11/02/2021 1201   [place] independent at 11/02/2021 1201   Time Frame short-term goal (STG) at 11/02/2021 1201   Progress/Outcome goal ongoing at 11/02/2021 1201

## 2021-11-10 NOTE — NURSING NOTE
RN made aware of bowel movement patient had with OT. BM left in bathroom, streak of blood noted. BP 90/51, sitting in chair. Patient with no complaints. Dr. Keen made aware and will leave BM for MD assessment. No further instructions at this time.     Dr. Keen came to assess at bedside. No further instructions at this time. Will continue to monitor bowel movements

## 2021-11-10 NOTE — PROGRESS NOTES
Internal Medicine  Daily Progress Note       SUBJECTIVE   This is a 76 y.o. year-old male admitted on 10/31/2021 with Weakness [R53.1]  Elevated serum creatinine [R79.89]  Urinary tract infection associated with catheterization of urinary tract, unspecified indwelling urinary catheter type, initial encounter (CMS/[facility]) [T83.511A, N39.0].    Interval History: NAEON. No change from yesterday or new complaints.      OBJECTIVE   Vital signs in last 24 hours:  Temp:  [36.5 °C (97.7 °F)-36.9 °C (98.5 °F)] 36.8 °C (98.3 °F)  Heart Rate:  [60-87] 68  Resp:  [16-18] 18  BP: ()/(40-64) 81/40  SpO2:  [95 %-98 %] 95 %  Oxygen Therapy: None (Room air)    Weight & I/Os:  Weights (last 5 days)     Date/Time Weight    11/10/21 0452 65.1 kg (143 lb 9.2 oz)    11/09/21 0422 65.1 kg (143 lb 9.6 oz)    11/08/21 0428 65.2 kg (143 lb 11.8 oz)    11/07/21 0600 64.8 kg (142 lb 13.7 oz)    11/06/21 0400 64 kg (141 lb 3.2 oz)    11/05/21 0025 67.4 kg (148 lb 9.6 oz)          Intake/Output Summary (Last 24 hours) at 11/10/2021 0659  Last data filed at 11/10/2021 0500  24 Hour Net Input/Output from 7AM Yesterday   Intake 250 ml   Output 1125 ml   Net -875 ml        PHYSICAL EXAMINATION   Physical Exam  General: non toxic, alert, frail   HEENT: NC/AT/ anicteric sclera, pharynx clear   Cardiovascular: regular S1/S2, no mrg     Respiratory: crackles at bases R>L  GI/Abdomen: soft, NT/ND,  no peritoneal signs  Extremities: no  edema  MSK/JOINTS:  R pinky burn   Neurology and psych: no focal deficits, cooperative with exam  Skin: no rashes, lines clean dry intact, R midline   G.U.: serrano, clear urine   LINES, CATHETERS, DRAINS, AIRWAYS, & WOUNDS   Lines, drains, airways, & wounds:  Midline Catheter 11/08/21 0946 (Active)   Number of days: 2       Peripheral IV (Adult) 11/04/21 Left;Posterior Forearm (Active)   Number of days: 6       Urethral Catheter 18 Fr (Active)   Number of days: 6        LABS, IMAGING, & TELE   Labs:      Results from  last 7 days   Lab Units 11/10/21  0451 11/09/21 0419 11/08/21  0320   WBC K/uL 9.42 8.75 8.02   HEMOGLOBIN g/dL 8.4* 8.5* 8.5*   HEMATOCRIT % 25.5* 25.6* 25.9*   PLATELETS K/uL 393* 367* 357*       Results from last 7 days   Lab Units 11/10/21  0451 11/09/21 0419 11/08/21  0320   SODIUM mEQ/L 133* 132* 132*   POTASSIUM mEQ/L 4.7 4.6 4.2   CHLORIDE mEQ/L 98 99 100   CO2 mEQ/L 24 21* 22   BUN mg/dL 15 13 10   CREATININE mg/dL 0.8 0.8 0.8   CALCIUM mg/dL 8.2* 7.7* 7.3*   GLUCOSE mg/dL 97 87 90     Imaging personally reviewed (does not include unread studies):  CT HEAD WITHOUT IV CONTRAST    Result Date: 11/2/2021  IMPRESSION: 1. No acute intracranial hemorrhage, acute infarct in a major vascular territory or mass-effect. 2. Progression of ventriculomegaly which may be from central atrophy.  Normal pressure hydrocephalus can have a similar appearance. COMMENT: Axial noncontrast CT images of the head were obtained. Sagittal and coronal reconstructions were created. CT DOSE:  One or more dose reduction techniques (e.g. automated exposure control, adjustment of the mA and/or kV according to patient size, use of iterative reconstruction technique) utilized for this examination. Comparison:  No prior studies are available for comparison. Findings: Ventricles are enlarged, disproportionate to sulcal involutional changes and progressed from prior.  There are bilateral periventricular white matter lucencies which are nonspecific but compatible with microangiopathic change. There is no mass effect, midline shift, territorial infarction, acute hemorrhage or extra-axial fluid collection. Visualized paranasal sinuses and mastoid air cells are clear.    ULTRASOUND KIDNEYS / BLADDER    Result Date: 11/4/2021  IMPRESSION: 1.  Mild left hydronephrosis 2.  Complex cyst versus solid mass lower pole right kidney measuring up to 3.2 cm. Contrast-enhanced renal mass protocol CT or MRI is recommended for further evaluation. 3.  Debris  within the bladder     X-RAY CHEST 1 VIEW    Result Date: 10/31/2021  IMPRESSION: No significant interval change. Probable minimal atelectasis or scarring bilateral lung bases.    Ultrasound venous arm right    Result Date: 11/4/2021  IMPRESSION: No upper extremity deep vein thrombosis.     CT UROGRAM WITH AND WITHOUT IV CONTRAST    Result Date: 11/4/2021  IMPRESSION: There is a bilateral large extrarenal pelvis bilaterally, right greater than left with segmental dilatation of ectatic ureters but no obstructing mass or calcification. There are numerous small parenchymal lesions in both kidneys, many small and difficult to characterize. 2 more prominent areas of subtle decreased attenuation on the right are noted and subtle masses or lesion certainly not excluded. There is a distended urinary bladder with numerous diverticula. Intraluminal mass at the left base is noted uncertain whether an extension of the prostate or separate intraluminal lesion, further evaluation advised. Multiple hepatic lesions are noted, many too small to characterize, the larger to reflect cysts. See comment above for delineation of findings. Results were made available to the clinical service at time of study dictation. In accordance with [place] Act 112,  the patient will receive a letter notifying them to follow up with their physician.    Telemetry/EKG:  NSR     ASSESSMENT & PLAN   Hyperlipidemia  Assessment & Plan  Continue home statin    Multiple myeloma (CMS/HCC)  Assessment & Plan  Currently on chemotherapy outpatient  - Curb side heme onc: hold revlimid iso infection, he will f/u with his oncologist after DC    COVID-19  Assessment & Plan  No current signs or sx's of acute infection  Continue home Advair  Positive test on admission    - Status post monoclonal antibody therapy    Chronic systolic heart failure (CMS/HCC)  Assessment & Plan  EF 40-45% on home lasix PRN  - appears euvolemic now  Recentrly saw Dr. Ellis who stopped his  metoprolo and changes his lasix from daily to PRN due to hypotension   - can give lasix PRN  - daily standing weight and accurate I/Os   - Cardiology following    Anemia  Assessment & Plan  Presents with Hb 9/1 b/l 10  Has known MM on chemotherapy which likely explains this  - CBC daily  - c/w chemo outpatient  - transfuse Hb <7  - hold home iron supplementation given acute infection UTI    CAD (coronary artery disease)  Assessment & Plan  Has known 50% LAD occlusion  CW home statin and  ASA    UTI (urinary tract infection)  Assessment & Plan  Uclx prior to admission c/w ESBL klebsiella MDRO including meropenem  Received 5 days bactrim PTP  U/A TNTC WBC, 3+ Bacteria, pos leuk esterase and nitrite  Lactate 2.1, Hypotensive with BP 90/54 improved to 113/64 today  No leukocytosis    - Continue with Amycaz for now per ID (day 10/14)  - ID following  - fu repeat UClx and Bclx; repeat blood cultures x2 growing GNRs,repeat cxs with NGTD  - IV team consulted for Midline placement  - pts BP runs chronically low; gentle fluids yesterday, Na remains low at 131  - s/p CT urogram; may need to repeat today, Uro following  - Maintain serrano for now; likely with uro outpatient follow up    Weakness  Assessment & Plan  Likely 2/2 UTI  - PT/OT  - plan as per UTI    BPH (benign prostatic hyperplasia)  Assessment & Plan  C/w home Flomax  Follws with urology Dr. Mathew       VTE Assessment: [place]    VTE Prophylaxis: Current anticoagulants:  heparin (porcine) 5,000 unit/mL injection 5,000 Units, subcutaneous, q8h [id number]      Code Status: Full Code  Estimated discharge date: 11/10/2021     ATTENDING DOCUMENTATION  ALSO SEE ATTENDING ATTESTATION SECTION OF NOTE

## 2021-11-10 NOTE — NURSING NOTE
Patient with low Bps noted during OT session by OT. Made Dr. Keen aware and no further instructions at this time

## 2021-11-10 NOTE — NURSING NOTE
Patient automatic BP 75/38. Manual 81/40. Patient with no complaints. Dr. Laws and Dr. Dennison made aware. 500mL fluid bolus given per orders. Bolus complete, BP 92/54. Patient with no complaints. Dr. Laws made aware

## 2021-11-10 NOTE — NURSING NOTE
"Offered to help patient to chair and OOB. Patient refused and listed many reasons why he \"doesn't want to be in chair\". Educated patient on importance of getting out of bed and to spend time in chair. Patient continue to decline assistance to chair and getting out of bed. Dr. Keen made aware  "

## 2021-11-11 LAB
ANION GAP SERPL CALC-SCNC: 11 MEQ/L (ref 3–15)
BASOPHILS # BLD: 0.12 K/UL (ref 0.01–0.1)
BASOPHILS NFR BLD: 1.3 %
BUN SERPL-MCNC: 11 MG/DL (ref 8–20)
CALCIUM SERPL-MCNC: 8.1 MG/DL (ref 8.9–10.3)
CHLORIDE SERPL-SCNC: 98 MEQ/L (ref 98–109)
CO2 SERPL-SCNC: 25 MEQ/L (ref 22–32)
CREAT SERPL-MCNC: 0.8 MG/DL (ref 0.8–1.3)
DIFFERENTIAL METHOD BLD: ABNORMAL
EOSINOPHIL # BLD: 0.12 K/UL (ref 0.04–0.54)
EOSINOPHIL NFR BLD: 1.3 %
ERYTHROCYTE [DISTWIDTH] IN BLOOD BY AUTOMATED COUNT: 18.6 % (ref 11.6–14.4)
GFR SERPL CREATININE-BSD FRML MDRD: >60 ML/MIN/1.73M*2
GLUCOSE SERPL-MCNC: 93 MG/DL (ref 70–99)
HCT VFR BLDCO AUTO: 25.7 % (ref 40.1–51)
HGB BLD-MCNC: 8.4 G/DL (ref 13.7–17.5)
IMM GRANULOCYTES # BLD AUTO: 0.14 K/UL (ref 0–0.08)
IMM GRANULOCYTES NFR BLD AUTO: 1.5 %
LYMPHOCYTES # BLD: 1.57 K/UL (ref 1.2–3.5)
LYMPHOCYTES NFR BLD: 17.1 %
MAGNESIUM SERPL-MCNC: 2 MG/DL (ref 1.8–2.5)
MCH RBC QN AUTO: 30 PG (ref 28–33.2)
MCHC RBC AUTO-ENTMCNC: 32.7 G/DL (ref 32.2–36.5)
MCV RBC AUTO: 91.8 FL (ref 83–98)
MONOCYTES # BLD: 0.99 K/UL (ref 0.3–1)
MONOCYTES NFR BLD: 10.8 %
NEUTROPHILS # BLD: 6.23 K/UL (ref 1.7–7)
NEUTS SEG NFR BLD: 68 %
NRBC BLD-RTO: 0 %
PDW BLD AUTO: 10.1 FL (ref 9.4–12.4)
PLATELET # BLD AUTO: 399 K/UL (ref 150–350)
POTASSIUM SERPL-SCNC: 4.7 MEQ/L (ref 3.6–5.1)
RBC # BLD AUTO: 2.8 M/UL (ref 4.5–5.8)
SODIUM SERPL-SCNC: 134 MEQ/L (ref 136–144)
WBC # BLD AUTO: 9.17 K/UL (ref 3.8–10.5)

## 2021-11-11 PROCEDURE — 63700000 HC SELF-ADMINISTRABLE DRUG: Performed by: INTERNAL MEDICINE

## 2021-11-11 PROCEDURE — 21400000 HC ROOM AND CARE CCU/INTERMEDIATE

## 2021-11-11 PROCEDURE — 80048 BASIC METABOLIC PNL TOTAL CA: CPT | Performed by: STUDENT IN AN ORGANIZED HEALTH CARE EDUCATION/TRAINING PROGRAM

## 2021-11-11 PROCEDURE — 63700000 HC SELF-ADMINISTRABLE DRUG: Performed by: STUDENT IN AN ORGANIZED HEALTH CARE EDUCATION/TRAINING PROGRAM

## 2021-11-11 PROCEDURE — 85025 COMPLETE CBC W/AUTO DIFF WBC: CPT | Performed by: STUDENT IN AN ORGANIZED HEALTH CARE EDUCATION/TRAINING PROGRAM

## 2021-11-11 PROCEDURE — 25800000 HC PHARMACY IV SOLUTIONS: Performed by: INTERNAL MEDICINE

## 2021-11-11 PROCEDURE — 63600000 HC DRUGS/DETAIL CODE: Mod: JG | Performed by: INTERNAL MEDICINE

## 2021-11-11 PROCEDURE — 63600000 HC DRUGS/DETAIL CODE: Performed by: STUDENT IN AN ORGANIZED HEALTH CARE EDUCATION/TRAINING PROGRAM

## 2021-11-11 PROCEDURE — 36415 COLL VENOUS BLD VENIPUNCTURE: CPT | Performed by: STUDENT IN AN ORGANIZED HEALTH CARE EDUCATION/TRAINING PROGRAM

## 2021-11-11 PROCEDURE — 83735 ASSAY OF MAGNESIUM: CPT | Performed by: STUDENT IN AN ORGANIZED HEALTH CARE EDUCATION/TRAINING PROGRAM

## 2021-11-11 RX ORDER — POLYETHYLENE GLYCOL 3350 17 G/17G
17 POWDER, FOR SOLUTION ORAL 2 TIMES DAILY
Status: DISCONTINUED | OUTPATIENT
Start: 2021-11-11 | End: 2021-11-19 | Stop reason: HOSPADM

## 2021-11-11 RX ADMIN — CEFTAZIDIME, AVIBACTAM 2.5 G: 2; .5 POWDER, FOR SOLUTION INTRAVENOUS at 15:12

## 2021-11-11 RX ADMIN — FERROUS SULFATE TAB 325 MG (65 MG ELEMENTAL FE) 325 MG: 325 (65 FE) TAB at 08:57

## 2021-11-11 RX ADMIN — CYCLOSPORINE 1 DROP: 0.5 EMULSION OPHTHALMIC at 22:12

## 2021-11-11 RX ADMIN — MOMETASONE FUROATE AND FORMOTEROL FUMARATE DIHYDRATE 2 PUFF: 200; 5 AEROSOL RESPIRATORY (INHALATION) at 22:13

## 2021-11-11 RX ADMIN — ROSUVASTATIN CALCIUM 10 MG: 10 TABLET, FILM COATED ORAL at 08:57

## 2021-11-11 RX ADMIN — CEFTAZIDIME, AVIBACTAM 2.5 G: 2; .5 POWDER, FOR SOLUTION INTRAVENOUS at 22:14

## 2021-11-11 RX ADMIN — HEPARIN SODIUM 5000 UNITS: 5000 INJECTION INTRAVENOUS; SUBCUTANEOUS at 06:36

## 2021-11-11 RX ADMIN — HEPARIN SODIUM 5000 UNITS: 5000 INJECTION INTRAVENOUS; SUBCUTANEOUS at 13:24

## 2021-11-11 RX ADMIN — CYCLOSPORINE 1 DROP: 0.5 EMULSION OPHTHALMIC at 08:57

## 2021-11-11 RX ADMIN — Medication 1000 UNITS: at 22:11

## 2021-11-11 RX ADMIN — ASPIRIN 81 MG: 81 TABLET, COATED ORAL at 08:57

## 2021-11-11 RX ADMIN — Medication 1000 UNITS: at 08:57

## 2021-11-11 RX ADMIN — MOMETASONE FUROATE AND FORMOTEROL FUMARATE DIHYDRATE 2 PUFF: 200; 5 AEROSOL RESPIRATORY (INHALATION) at 08:58

## 2021-11-11 RX ADMIN — TAMSULOSIN HYDROCHLORIDE 0.4 MG: 0.4 CAPSULE ORAL at 08:57

## 2021-11-11 RX ADMIN — POLYETHYLENE GLYCOL 3350 17 GRAM ORAL POWDER PACKET 17 G: at 22:13

## 2021-11-11 RX ADMIN — POLYETHYLENE GLYCOL 3350 17 GRAM ORAL POWDER PACKET 17 G: at 09:50

## 2021-11-11 RX ADMIN — HEPARIN SODIUM 5000 UNITS: 5000 INJECTION INTRAVENOUS; SUBCUTANEOUS at 22:13

## 2021-11-11 RX ADMIN — CEFTAZIDIME, AVIBACTAM 2.5 G: 2; .5 POWDER, FOR SOLUTION INTRAVENOUS at 06:36

## 2021-11-11 NOTE — NURSING NOTE
PCT reported an automatic BP of 71/51 to RN. RN re-checked manually and got a reading of 82/50. Other vss at this time and pt asymptomatic. MD made aware of findings and NNO at this time. Will continue to monitor.

## 2021-11-11 NOTE — PLAN OF CARE
Problem: Adult Inpatient Plan of Care  Goal: Plan of Care Review  11/11/2021 1441 by Jeana Hope LSW  Outcome: Progressing  Flowsheets (Taken 11/11/2021 1441)  Plan of Care Reviewed With:  • patient  • daughter     Per information in medical rounds, Pt remains medically stable for d/c pending placement. Pt was Covid+ on 10/31 and fully vaccinated. Pt on isolation for CRE and ESBL as well. Pt on day 11/14 of IV Avycaz. PT/OT rec'd SNF. Pt and daughter (Talli: [phone]) agreeable with SNF placement. SW sent referrals with no accepting facilities at this time. Barriers are Covid+ status, CRE isolation, and IV Avycaz. Updated referrals sent today as IV abx to be completed soon. Awaiting responses from facilities. Update provided to Pt's daughter today. SW attempted to provide update to Pt's significant other (Julia: [phone]) today with no answer. SW to remain available for emotional support and d/c planning.    LAURENT Howard [id number]

## 2021-11-11 NOTE — PATIENT CARE CONFERENCE
Care Progression Rounds Note  Date: 11/11/2021  Time: 11:02 AM     Patient Name: Eulalio Gregg     Medical Record Number: [id number]   YOB: 1945  Sex: Male      Room/Bed: 0202    Admitting Diagnosis: Weakness [R53.1]  Elevated serum creatinine [R79.89]  Urinary tract infection associated with catheterization of urinary tract, unspecified indwelling urinary catheter type, initial encounter (CMS/[facility]) [T83.511A, N39.0]   Admit Date/Time: 10/31/2021 12:15 PM    Primary Diagnosis: Bacteremia due to Klebsiella pneumoniae  Principal Problem: Bacteremia due to Klebsiella pneumoniae    GMLOS: pending  Anticipated Discharge Date: 11/11/2021    AM-PAC:  Mobility Score: 19    Discharge Planning:  Current Living Arrangements: home  Anticipated Discharge Disposition: skilled nursing facility  Type of Skilled Nursing Care Services: OT,PT    Barriers to Discharge:  Barriers to Discharge: Facility availability  Comment: needs facility accepting covid patients and iv antibx    Participants:  ,physician,social work/services

## 2021-11-11 NOTE — PROGRESS NOTES
Internal Medicine  Daily Progress Note       SUBJECTIVE   This is a 76 y.o. year-old male admitted on 10/31/2021 with Weakness [R53.1]  Elevated serum creatinine [R79.89]  Urinary tract infection associated with catheterization of urinary tract, unspecified indwelling urinary catheter type, initial encounter (CMS/[facility]) [T83.511A, N39.0].    Interval History: NAEON. No change from yesterday. He reports feeling well, denies CP, fever, chills.      OBJECTIVE   Vital signs in last 24 hours:  Temp:  [36.9 °C (98.5 °F)-37.1 °C (98.8 °F)] 37 °C (98.6 °F)  Heart Rate:  [55-65] 57  Resp:  [16-18] 16  BP: (71-97)/(41-51) 90/46  SpO2:  [94 %-100 %] 94 %  Oxygen Therapy: None (Room air)    Weight & I/Os:  Weights (last 5 days)     Date/Time Weight    11/11/21 0411 65.1 kg (143 lb 8.3 oz)    11/10/21 0452 65.1 kg (143 lb 9.2 oz)    11/09/21 0422 65.1 kg (143 lb 9.6 oz)    11/08/21 0428 65.2 kg (143 lb 11.8 oz)    11/07/21 0600 64.8 kg (142 lb 13.7 oz)    11/06/21 0400 64 kg (141 lb 3.2 oz)          Intake/Output Summary (Last 24 hours) at 11/11/2021 0659  Last data filed at 11/11/2021 0401  24 Hour Net Input/Output from 7AM Yesterday   Intake 240 ml   Output 1625 ml   Net -1385 ml        PHYSICAL EXAMINATION   Physical Exam  General: non toxic, alert, frail, good mood today    HEENT: NC/AT/ anicteric sclera, pharynx clear   Cardiovascular: regular S1/S2, no mrg     Respiratory: crackles at bases R>L  GI/Abdomen: soft, NT/ND,  no peritoneal signs  Extremities: no  edema  MSK/JOINTS:  R pinky burn   Neurology and psych: no focal deficits, cooperative with exam  Skin: no rashes, lines clean dry intact, R midline   G.U.: serrano, clear urine   LINES, CATHETERS, DRAINS, AIRWAYS, & WOUNDS   Lines, drains, airways, & wounds:  Midline Catheter 11/08/21 0946 (Active)   Number of days: 3       Peripheral IV (Adult) 11/04/21 Left;Posterior Forearm (Active)   Number of days: 7       Urethral Catheter 18 Fr (Active)   Number of days: 7         LABS, IMAGING, & TELE   Labs:      Results from last 7 days   Lab Units 11/11/21  0406 11/10/21  0451 11/09/21 0419   WBC K/uL 9.17 9.42 8.75   HEMOGLOBIN g/dL 8.4* 8.4* 8.5*   HEMATOCRIT % 25.7* 25.5* 25.6*   PLATELETS K/uL 399* 393* 367*       Results from last 7 days   Lab Units 11/11/21  0406 11/10/21  0451 11/09/21 0419   SODIUM mEQ/L 134* 133* 132*   POTASSIUM mEQ/L 4.7 4.7 4.6   CHLORIDE mEQ/L 98 98 99   CO2 mEQ/L 25 24 21*   BUN mg/dL 11 15 13   CREATININE mg/dL 0.8 0.8 0.8   CALCIUM mg/dL 8.1* 8.2* 7.7*   GLUCOSE mg/dL 93 97 87     Imaging personally reviewed (does not include unread studies):  CT HEAD WITHOUT IV CONTRAST    Result Date: 11/2/2021  IMPRESSION: 1. No acute intracranial hemorrhage, acute infarct in a major vascular territory or mass-effect. 2. Progression of ventriculomegaly which may be from central atrophy.  Normal pressure hydrocephalus can have a similar appearance. COMMENT: Axial noncontrast CT images of the head were obtained. Sagittal and coronal reconstructions were created. CT DOSE:  One or more dose reduction techniques (e.g. automated exposure control, adjustment of the mA and/or kV according to patient size, use of iterative reconstruction technique) utilized for this examination. Comparison:  No prior studies are available for comparison. Findings: Ventricles are enlarged, disproportionate to sulcal involutional changes and progressed from prior.  There are bilateral periventricular white matter lucencies which are nonspecific but compatible with microangiopathic change. There is no mass effect, midline shift, territorial infarction, acute hemorrhage or extra-axial fluid collection. Visualized paranasal sinuses and mastoid air cells are clear.    ULTRASOUND KIDNEYS / BLADDER    Result Date: 11/4/2021  IMPRESSION: 1.  Mild left hydronephrosis 2.  Complex cyst versus solid mass lower pole right kidney measuring up to 3.2 cm. Contrast-enhanced renal mass protocol CT or MRI is  recommended for further evaluation. 3.  Debris within the bladder     X-RAY CHEST 1 VIEW    Result Date: 10/31/2021  IMPRESSION: No significant interval change. Probable minimal atelectasis or scarring bilateral lung bases.    Ultrasound venous arm right    Result Date: 11/4/2021  IMPRESSION: No upper extremity deep vein thrombosis.     CT UROGRAM WITH AND WITHOUT IV CONTRAST    Result Date: 11/4/2021  IMPRESSION: There is a bilateral large extrarenal pelvis bilaterally, right greater than left with segmental dilatation of ectatic ureters but no obstructing mass or calcification. There are numerous small parenchymal lesions in both kidneys, many small and difficult to characterize. 2 more prominent areas of subtle decreased attenuation on the right are noted and subtle masses or lesion certainly not excluded. There is a distended urinary bladder with numerous diverticula. Intraluminal mass at the left base is noted uncertain whether an extension of the prostate or separate intraluminal lesion, further evaluation advised. Multiple hepatic lesions are noted, many too small to characterize, the larger to reflect cysts. See comment above for delineation of findings. Results were made available to the clinical service at time of study dictation. In accordance with [place] Act 112,  the patient will receive a letter notifying them to follow up with their physician.    Telemetry/EKG:       ASSESSMENT & PLAN   Hyperlipidemia  Assessment & Plan  Continue home statin    Multiple myeloma (CMS/HCC)  Assessment & Plan  Currently on chemotherapy outpatient  - [facility]urb side heme onc: hold revlimid iso infection, he will f/u with his oncologist after DC    COVID-19  Assessment & Plan  No current signs or sx's of acute infection  Continue home Advair  Positive test on admission    - Status post monoclonal antibody therapy    Chronic systolic heart failure (CMS/HCC)  Assessment & Plan  EF 40-45% on home lasix PRN  - appears euvolemic  now  Recentrly saw Dr. Ellis who stopped his metoprolo and changes his lasix from daily to PRN due to hypotension   - can give lasix PRN  - daily standing weight and accurate I/Os   - Cardiology following    Anemia  Assessment & Plan  Presents with Hb 9/1 b/l 10  Has known MM on chemotherapy which likely explains this  - CBC daily  - c/w chemo outpatient  - transfuse Hb <7  - hold home iron supplementation given acute infection UTI    CAD (coronary artery disease)  Assessment & Plan  Has known 50% LAD occlusion  CW home statin and  ASA    UTI (urinary tract infection)  Assessment & Plan  Uclx prior to admission c/w ESBL klebsiella MDRO including meropenem  Received 5 days bactrim PTP  U/A TNTC WBC, 3+ Bacteria, pos leuk esterase and nitrite  Lactate 2.1, Hypotensive with BP 90/54 improved to 113/64 today  No leukocytosis    - Treated with Avycaz per ID (requires 14 day course from time of negative culture 11/3) today day 9/14  - ID following  - fu repeat UClx and Bclx; repeat blood cultures x2 growing GNRs,repeat cxs NGTD  - IV team consulted for Midline placement  - pts BP runs chronically low; gentle fluids yesterday, Na remains low at 131  - s/p CT urogram; may need to repeat today, Uro following  - Maintain serrano for now; likely with uro outpatient follow up    Weakness  Assessment & Plan  Likely 2/2 UTI  - PT/OT  - plan as per UTI    BPH (benign prostatic hyperplasia)  Assessment & Plan  C/w home Flomax  Follws with urology Dr. Mathew       VTE Assessment: [place]    VTE Prophylaxis: Current anticoagulants:  heparin (porcine) 5,000 unit/mL injection 5,000 Units, subcutaneous, q8h [id number]      Code Status: Full Code  Estimated discharge date: 11/11/2021     ATTENDING DOCUMENTATION  ALSO SEE ATTENDING ATTESTATION SECTION OF NOTE

## 2021-11-12 LAB
ANION GAP SERPL CALC-SCNC: 10 MEQ/L (ref 3–15)
BASOPHILS # BLD: 0.14 K/UL (ref 0.01–0.1)
BASOPHILS NFR BLD: 1.5 %
BUN SERPL-MCNC: 13 MG/DL (ref 8–20)
CALCIUM SERPL-MCNC: 8.2 MG/DL (ref 8.9–10.3)
CHLORIDE SERPL-SCNC: 98 MEQ/L (ref 98–109)
CO2 SERPL-SCNC: 24 MEQ/L (ref 22–32)
CREAT SERPL-MCNC: 0.8 MG/DL (ref 0.8–1.3)
DIFFERENTIAL METHOD BLD: ABNORMAL
EOSINOPHIL # BLD: 0.09 K/UL (ref 0.04–0.54)
EOSINOPHIL NFR BLD: 1 %
ERYTHROCYTE [DISTWIDTH] IN BLOOD BY AUTOMATED COUNT: 18.5 % (ref 11.6–14.4)
GFR SERPL CREATININE-BSD FRML MDRD: >60 ML/MIN/1.73M*2
GLUCOSE SERPL-MCNC: 88 MG/DL (ref 70–99)
HCT VFR BLDCO AUTO: 26.5 % (ref 40.1–51)
HGB BLD-MCNC: 8.5 G/DL (ref 13.7–17.5)
IMM GRANULOCYTES # BLD AUTO: 0.19 K/UL (ref 0–0.08)
IMM GRANULOCYTES NFR BLD AUTO: 2.1 %
LYMPHOCYTES # BLD: 1.7 K/UL (ref 1.2–3.5)
LYMPHOCYTES NFR BLD: 18.5 %
MAGNESIUM SERPL-MCNC: 1.8 MG/DL (ref 1.8–2.5)
MCH RBC QN AUTO: 29.8 PG (ref 28–33.2)
MCHC RBC AUTO-ENTMCNC: 32.1 G/DL (ref 32.2–36.5)
MCV RBC AUTO: 93 FL (ref 83–98)
MONOCYTES # BLD: 0.89 K/UL (ref 0.3–1)
MONOCYTES NFR BLD: 9.7 %
NEUTROPHILS # BLD: 6.17 K/UL (ref 1.7–7)
NEUTS SEG NFR BLD: 67.2 %
NRBC BLD-RTO: 0 %
PDW BLD AUTO: 10.3 FL (ref 9.4–12.4)
PLATELET # BLD AUTO: 421 K/UL (ref 150–350)
POTASSIUM SERPL-SCNC: 4.8 MEQ/L (ref 3.6–5.1)
RBC # BLD AUTO: 2.85 M/UL (ref 4.5–5.8)
SODIUM SERPL-SCNC: 132 MEQ/L (ref 136–144)
WBC # BLD AUTO: 9.18 K/UL (ref 3.8–10.5)

## 2021-11-12 PROCEDURE — 63600000 HC DRUGS/DETAIL CODE: Performed by: STUDENT IN AN ORGANIZED HEALTH CARE EDUCATION/TRAINING PROGRAM

## 2021-11-12 PROCEDURE — 36415 COLL VENOUS BLD VENIPUNCTURE: CPT | Performed by: STUDENT IN AN ORGANIZED HEALTH CARE EDUCATION/TRAINING PROGRAM

## 2021-11-12 PROCEDURE — 25800000 HC PHARMACY IV SOLUTIONS: Performed by: INTERNAL MEDICINE

## 2021-11-12 PROCEDURE — 80048 BASIC METABOLIC PNL TOTAL CA: CPT | Performed by: STUDENT IN AN ORGANIZED HEALTH CARE EDUCATION/TRAINING PROGRAM

## 2021-11-12 PROCEDURE — 63700000 HC SELF-ADMINISTRABLE DRUG: Performed by: INTERNAL MEDICINE

## 2021-11-12 PROCEDURE — 97535 SELF CARE MNGMENT TRAINING: CPT | Mod: GO

## 2021-11-12 PROCEDURE — 85025 COMPLETE CBC W/AUTO DIFF WBC: CPT | Performed by: STUDENT IN AN ORGANIZED HEALTH CARE EDUCATION/TRAINING PROGRAM

## 2021-11-12 PROCEDURE — 21400000 HC ROOM AND CARE CCU/INTERMEDIATE

## 2021-11-12 PROCEDURE — 97116 GAIT TRAINING THERAPY: CPT | Mod: GP

## 2021-11-12 PROCEDURE — 63600000 HC DRUGS/DETAIL CODE: Performed by: INTERNAL MEDICINE

## 2021-11-12 PROCEDURE — 63700000 HC SELF-ADMINISTRABLE DRUG: Performed by: STUDENT IN AN ORGANIZED HEALTH CARE EDUCATION/TRAINING PROGRAM

## 2021-11-12 PROCEDURE — 83735 ASSAY OF MAGNESIUM: CPT | Performed by: STUDENT IN AN ORGANIZED HEALTH CARE EDUCATION/TRAINING PROGRAM

## 2021-11-12 RX ADMIN — TAMSULOSIN HYDROCHLORIDE 0.4 MG: 0.4 CAPSULE ORAL at 08:40

## 2021-11-12 RX ADMIN — POLYETHYLENE GLYCOL 3350 17 GRAM ORAL POWDER PACKET 17 G: at 08:41

## 2021-11-12 RX ADMIN — ASPIRIN 81 MG: 81 TABLET, COATED ORAL at 08:41

## 2021-11-12 RX ADMIN — MAGNESIUM SULFATE HEPTAHYDRATE 2 G: 40 INJECTION, SOLUTION INTRAVENOUS at 09:36

## 2021-11-12 RX ADMIN — CEFTAZIDIME, AVIBACTAM 2.5 G: 2; .5 POWDER, FOR SOLUTION INTRAVENOUS at 23:10

## 2021-11-12 RX ADMIN — Medication 1000 UNITS: at 08:41

## 2021-11-12 RX ADMIN — CEFTAZIDIME, AVIBACTAM 2.5 G: 2; .5 POWDER, FOR SOLUTION INTRAVENOUS at 14:40

## 2021-11-12 RX ADMIN — MOMETASONE FUROATE AND FORMOTEROL FUMARATE DIHYDRATE 2 PUFF: 200; 5 AEROSOL RESPIRATORY (INHALATION) at 20:46

## 2021-11-12 RX ADMIN — HEPARIN SODIUM 5000 UNITS: 5000 INJECTION INTRAVENOUS; SUBCUTANEOUS at 05:24

## 2021-11-12 RX ADMIN — HEPARIN SODIUM 5000 UNITS: 5000 INJECTION INTRAVENOUS; SUBCUTANEOUS at 21:00

## 2021-11-12 RX ADMIN — CYCLOSPORINE 1 DROP: 0.5 EMULSION OPHTHALMIC at 08:41

## 2021-11-12 RX ADMIN — HEPARIN SODIUM 5000 UNITS: 5000 INJECTION INTRAVENOUS; SUBCUTANEOUS at 14:40

## 2021-11-12 RX ADMIN — MOMETASONE FUROATE AND FORMOTEROL FUMARATE DIHYDRATE 2 PUFF: 200; 5 AEROSOL RESPIRATORY (INHALATION) at 08:41

## 2021-11-12 RX ADMIN — CYCLOSPORINE 1 DROP: 0.5 EMULSION OPHTHALMIC at 20:44

## 2021-11-12 RX ADMIN — POLYETHYLENE GLYCOL 3350 17 GRAM ORAL POWDER PACKET 17 G: at 20:45

## 2021-11-12 RX ADMIN — CEFTAZIDIME, AVIBACTAM 2.5 G: 2; .5 POWDER, FOR SOLUTION INTRAVENOUS at 06:22

## 2021-11-12 RX ADMIN — ROSUVASTATIN CALCIUM 10 MG: 10 TABLET, FILM COATED ORAL at 08:41

## 2021-11-12 RX ADMIN — Medication 1000 UNITS: at 20:44

## 2021-11-12 ASSESSMENT — COGNITIVE AND FUNCTIONAL STATUS - GENERAL
DRESSING REGULAR LOWER BODY CLOTHING: 3 - A LITTLE
CLIMB 3 TO 5 STEPS WITH RAILING: 3 - A LITTLE
WALKING IN HOSPITAL ROOM: 3 - A LITTLE
AFFECT: FLAT/BLUNTED AFFECT;CONFUSED
MOVING TO AND FROM BED TO CHAIR: 3 - A LITTLE
TOILETING: 3 - A LITTLE
DRESSING REGULAR UPPER BODY CLOTHING: 3 - A LITTLE
HELP NEEDED FOR PERSONAL GROOMING: 3 - A LITTLE
AFFECT: FLAT/BLUNTED AFFECT;LOW AROUSAL/LETHARGIC;CONFUSED
STANDING UP FROM CHAIR USING ARMS: 3 - A LITTLE
HELP NEEDED FOR BATHING: 3 - A LITTLE
EATING MEALS: 4 - NONE

## 2021-11-12 NOTE — PROGRESS NOTES
Patient: Eulalio Gregg  Location:  [place] [address] [address]  MRN:  [id number]  Today's date:  11/12/2021    Pt left in bedside chair, alarmed, with call bell and personal items within reach. RN informed of session completed, low Bps, increased confusion.     Eulalio is a 76 y.o. male admitted on 10/31/2021 with Weakness [R53.1]  Elevated serum creatinine [R79.89]  Urinary tract infection associated with catheterization of urinary tract, unspecified indwelling urinary catheter type, initial encounter (CMS/[facility]) [T83.511A, N39.0]. Principal problem is Weakness.    Past Medical History  Eulalio has a past medical history of Anemia, Blepharospasm, BPH (benign prostatic hyperplasia), COVID-19 (08/21/2021), History of vertigo (2016), Lipid disorder, and Tendency toward bleeding easily (CMS/[facility]).    History of Present Illness   Admitted with weakness, +COVID      PT Vitals    Date/Time Pulse SpO2 Pt Activity O2 Therapy BP BP Method Pt Position [facility]   11/12/21 0840 65 97 % At rest None (Room air) 97/59 Automatic Sitting CBK      PT Pain    Date/Time Pain Type Rating: Rest Rating: Activity [facility]   11/12/21 0840 Pain Assessment 0 - no pain 0 - no pain CBK          Prior Living Environment      Most Recent Value   Current Living Arrangements home   Living Environment Comment ML with spouse, 3STE with FF to bed/bath        Prior Level of Function      Most Recent Value   Ambulation independent   Transferring independent   Toileting independent   Bathing independent   Dressing independent   Eating independent   Prior Level of Function Comment pt reports ind PTA without use of AD,  started to use a SPC before admissions   Assistive Device Currently Used at Home cane, straight           PT Evaluation and Treatment - 11/12/21 0840        PT Time Calculation    Start Time 0840     Stop Time 0856     Time Calculation (min) 16 min        Session Details    Document Type re-evaluation     Mode of Treatment physical  therapy        General Information    Patient Profile Reviewed yes     Existing Precautions/Restrictions contact;droplet;fall   +COVID    Limitations/Impairments safety/cognitive        Cognition/Psychosocial    Affect/Mental Status (Cognition) flat/blunted affect;confused     Behavioral Issues (Cognition) overwhelmed easily     Orientation Status (Cognition) oriented to;person     Follows Commands (Cognition) follows one-step commands;25-49% accuracy;increased processing time needed;delayed response/completion;repetition of directions required     Comment, Cognition worsening cognition this session, appears with word finding difficulties and difficulty finishing thoughts/conversations        Bed Mobility    [place], Supine to Sit supervision     Comment (Bed Mobility) inc effort/time, able to take meds EOB with RN, posterior lean, max VCs for anterior weight shift and shifting towards EOB so feet are placed safely on the floor        Sit to Stand Transfer    [place], Sit to Stand Transfer touching/steadying assist     Verbal Cues safety;technique     Assistive Device walker, front-wheeled     Comment trialed RW, VCs for hand placement, poor carryover        Stand to Sit Transfer    [place], Stand to Sit Transfer touching/steadying assist     Verbal Cues safety;technique     Assistive Device walker, front-wheeled     Comment dec safety awareness with eccentric control        Gait Training    [place], Gait minimum assist (75% or more patient effort);1 person assist     Assistive Device walker, front-wheeled     Distance in Feet 20 feet     Pattern (Gait) step-through     Deviations/Abnormal Patterns (Gait) raias decreased     Comment (Gait/Stairs) max VCs for sequencing and problem solving use of RW        Safety Issues, Functional Mobility    Safety Issues Affecting Function (Mobility) ability to follow commands;problem-solving;safety precaution awareness;sequencing abilities     Impairments  Affecting Function (Mobility) balance;cognition;strength;motor planning        Balance    Static Sitting Balance WFL     Dynamic Sitting Balance mild impairment     Static Standing Balance mild impairment     Dynamic Standing Balance mild impairment        AM-PAC (TM) - Mobility (Current Function)    Turning from your back to your side while in a flat bed without using bedrails? 4 - None     Moving from lying on your back to sitting on the side of a flat bed without using bedrails? 3 - A Little     Moving to and from a bed to a chair? 3 - A Little     Standing up from a chair using your arms? 3 - A Little     To walk in a hospital room? 3 - A Little     Climbing 3-5 steps with a railing? 3 - A Little     AM-PAC (TM) Mobility Score 19        Assessment/Plan (PT)    Daily Outcome Statement pt continues to req min assist for mobility, cognitive appears worse this session, appears to have difficulty with word finding and finishing thoughts/conversations. anticipate SNF at [place]     Rehab Potential good, to achieve stated therapy goals     Therapy Frequency 3-5 times/wk     Planned Therapy Interventions balance training;bed mobility training;gait training;transfer training               PT Assessment/Plan      Most Recent Value   PT Recommended Discharge Disposition skilled nursing facility at 11/12/2021 0840   Anticipated Equipment Needs at Discharge (PT) other (see comments)  [TBD] at 11/12/2021 0840   Patient/Family Therapy Goals Statement none stated at 11/12/2021 0840                    Education Documentation  Fall Prevention, taught by Ralph Joiner, PT at 11/12/2021 11:14 AM.  Learner: Patient  Readiness: Acceptance  Method: Explanation, Demonstration  Response: Verbalizes Understanding, Demonstrated Understanding, Needs Reinforcement  Comment: PT POC, energy conservation, safety with mobility          PT Goals      Most Recent Value   Bed Mobility Goal 1    Activity/Assistive Device rolling to left, rolling to  right, supine to sit, sit to supine at 11/01/2021 1237   [place] independent at 11/01/2021 1237   Time Frame by discharge at 11/01/2021 1237   Progress/Outcome goal ongoing at 11/01/2021 1237   Transfer Goal 1    Activity/Assistive Device bed-to-chair/chair-to-bed, sit-to-stand/stand-to-sit at 11/01/2021 1237   [place] independent at 11/01/2021 1237   Time Frame by discharge at 11/01/2021 1237   Progress/Outcome goal ongoing at 11/01/2021 1237   Gait Training Goal 1    Activity/Assistive Device gait (walking locomotion), walker, front-wheeled at 11/01/2021 1237   [place] supervision required at 11/01/2021 1237   Distance 100 at 11/01/2021 1237   Time Frame by discharge at 11/01/2021 1237   Progress/Outcome goal ongoing at 11/01/2021 1237

## 2021-11-12 NOTE — PROGRESS NOTES
"Brief Nutrition Note    Recommendations   1. Continue current diet and supplement.     Clinical Course: Patient is a 76 y.o. male who was admitted on 10/31/2021 with a diagnosis of Weakness [R53.1]  Elevated serum creatinine [R79.89]  Urinary tract infection associated with catheterization of urinary tract, unspecified indwelling urinary catheter type, initial encounter (CMS/[facility]) [T83.511A, N39.0].     Past Medical History:   Diagnosis Date   • Anemia    • Blepharospasm     receives botox injections every 2-3 months   • BPH (benign prostatic hyperplasia)    • COVID-19 08/21/2021   • History of vertigo 2016   • Lipid disorder    • Tendency toward bleeding easily (CMS/[facility])      Past Surgical History:   Procedure Laterality Date   • CATARACT EXTRACTION W/  INTRAOCULAR LENS IMPLANT     • CHOLECYSTECTOMY  1992   • HERNIA REPAIR         Reason for Assessment  Reason For Assessment: per organizational policy (LOS)     [facility] Nutrition Screen Tool  Has patient lost weight without trying?: 0-->No  Has patient been eating poorly due to decreased appetite?: 0-->No  [facility] Nutrition Screen Score: 0     Nutrition/Diet History  Intake (%): 75%    Physical Findings  Last Bowel Movement: 11/12/21  Skin: edema (+1 edema RUE, B/L LE)     RETS18 Physical Appearance  Last Bowel Movement: 11/12/21  Skin: edema (+1 edema RUE, B/L LE)     Nutrition Order  Nutrition Order: meets nutritional requirements  Nutrition Order Comments: Kosher, 2000ml FR. Boost Plus BID     Anthropometrics  Height: 160 cm (5' 2.99\")           Current Weight  Weight Method: Bed scale  Weight: 64.9 kg (143 lb 1.3 oz)     Ideal Body Weight (IBW)  Ideal Body Weight (IBW) (kg): 56.9  % Ideal Body Weight: 115.47       Body Mass Index (BMI)  BMI (Calculated): 25.4       Labs/Procedures/Meds  Lab Results Reviewed: reviewed, pertinent   BMP Results       11/12/21 11/11/21 11/10/21     0524 0406 0451     134 133    K 4.8 4.7 4.7    Cl 98 98 98    CO2 24 25 24    Glucose 88 " "93 97    BUN 13 11 15    Creatinine 0.8 0.8 0.8    Calcium 8.2 8.1 8.2    Anion Gap 10 11 11    EGFR >60.0 >60.0 >60.0      H/H: 8.5/26.5      Medications  Pertinent Medications Reviewed: reviewed, pertinent   Avycaz, Vitamin D3, Ferrous Sulfate, Miralax, Crestor, Flomax      Clinical comments: Pt seen for follow up. Per [facility] COVID pt visitation restrictions, RD not able to enter room. Multiple attempts made to interview Pt via phone, no answer. Spoke with both Pt's RN tech. Both report Pt primarily \"picks\" at meals, not a great appetite. Flowsheet notes Pt ate 75% of breakfast yesterday. Pt is ordered Boost Plus TID. Unable to confirm at this time if Pt is drinking consistently. Weights remain stable. Pt is cleared for d/c, awaiting placement.     Goals: PO intake >75% of meals.   Monitor: PO intake, weights, labs, skin, plan of care.     Recommendations: See above       Date: 11/12/21  Signature: YOHAN Johnson  "

## 2021-11-12 NOTE — PROGRESS NOTES
Internal Medicine  Daily Progress Note       SUBJECTIVE   This is a 76 y.o. year-old male admitted on 10/31/2021 with Weakness [R53.1]  Elevated serum creatinine [R79.89]  Urinary tract infection associated with catheterization of urinary tract, unspecified indwelling urinary catheter type, initial encounter (CMS/[facility]) [T83.511A, N39.0].    Interval History: NAEON. He feels well today.      OBJECTIVE   Vital signs in last 24 hours:  Temp:  [36.8 °C (98.2 °F)-37.1 °C (98.7 °F)] 36.8 °C (98.2 °F)  Heart Rate:  [57-67] 63  Resp:  [16-18] 18  BP: ()/(46-60) 97/59  SpO2:  [94 %-98 %] 98 %  Oxygen Therapy: None (Room air)    Weight & I/Os:  Weights (last 5 days)     Date/Time Weight    11/12/21 0524 64.9 kg (143 lb 1.3 oz)    11/11/21 0411 65.1 kg (143 lb 8.3 oz)    11/10/21 0452 65.1 kg (143 lb 9.2 oz)    11/09/21 0422 65.1 kg (143 lb 9.6 oz)    11/08/21 0428 65.2 kg (143 lb 11.8 oz)    11/07/21 0600 64.8 kg (142 lb 13.7 oz)          Intake/Output Summary (Last 24 hours) at 11/12/2021 0659  Last data filed at 11/12/2021 0530  24 Hour Net Input/Output from 7AM Yesterday   Intake 900 ml   Output 1250 ml   Net -350 ml        PHYSICAL EXAMINATION   Physical Exam  General: non toxic, alert, frail   HEENT: NC/AT/ anicteric sclera, pharynx clear   Cardiovascular: regular S1/S2, no mrg     Respiratory: crackles at bases R>L  GI/Abdomen: soft, NT/ND,  no peritoneal signs  Extremities: no  edema  MSK/JOINTS:  R pinky burn   Neurology and psych: no focal deficits, cooperative with exam  Skin: no rashes, lines clean dry intact, R midline   G.U.: serrano, clear urine   LINES, CATHETERS, DRAINS, AIRWAYS, & WOUNDS   Lines, drains, airways, & wounds:  Midline Catheter 11/08/21 0946 (Active)   Number of days: 4       Peripheral IV (Adult) 11/04/21 Left;Posterior Forearm (Active)   Number of days: 8       Urethral Catheter 18 Fr (Active)   Number of days: 8        LABS, IMAGING, & TELE   Labs:      Results from last 7 days   Lab  Units 11/12/21  0524 11/11/21  0406 11/10/21  0451   WBC K/uL 9.18 9.17 9.42   HEMOGLOBIN g/dL 8.5* 8.4* 8.4*   HEMATOCRIT % 26.5* 25.7* 25.5*   PLATELETS K/uL 421* 399* 393*       Results from last 7 days   Lab Units 11/12/21  0524 11/11/21  0406 11/10/21  0451   SODIUM mEQ/L 132* 134* 133*   POTASSIUM mEQ/L 4.8 4.7 4.7   CHLORIDE mEQ/L 98 98 98   CO2 mEQ/L 24 25 24   BUN mg/dL 13 11 15   CREATININE mg/dL 0.8 0.8 0.8   CALCIUM mg/dL 8.2* 8.1* 8.2*   GLUCOSE mg/dL 88 93 97     Imaging personally reviewed (does not include unread studies):  CT HEAD WITHOUT IV CONTRAST    Result Date: 11/2/2021  IMPRESSION: 1. No acute intracranial hemorrhage, acute infarct in a major vascular territory or mass-effect. 2. Progression of ventriculomegaly which may be from central atrophy.  Normal pressure hydrocephalus can have a similar appearance. COMMENT: Axial noncontrast CT images of the head were obtained. Sagittal and coronal reconstructions were created. CT DOSE:  One or more dose reduction techniques (e.g. automated exposure control, adjustment of the mA and/or kV according to patient size, use of iterative reconstruction technique) utilized for this examination. Comparison:  No prior studies are available for comparison. Findings: Ventricles are enlarged, disproportionate to sulcal involutional changes and progressed from prior.  There are bilateral periventricular white matter lucencies which are nonspecific but compatible with microangiopathic change. There is no mass effect, midline shift, territorial infarction, acute hemorrhage or extra-axial fluid collection. Visualized paranasal sinuses and mastoid air cells are clear.    ULTRASOUND KIDNEYS / BLADDER    Result Date: 11/4/2021  IMPRESSION: 1.  Mild left hydronephrosis 2.  Complex cyst versus solid mass lower pole right kidney measuring up to 3.2 cm. Contrast-enhanced renal mass protocol CT or MRI is recommended for further evaluation. 3.  Debris within the bladder      X-RAY CHEST 1 VIEW    Result Date: 10/31/2021  IMPRESSION: No significant interval change. Probable minimal atelectasis or scarring bilateral lung bases.    Ultrasound venous arm right    Result Date: 11/4/2021  IMPRESSION: No upper extremity deep vein thrombosis.     CT UROGRAM WITH AND WITHOUT IV CONTRAST    Result Date: 11/4/2021  IMPRESSION: There is a bilateral large extrarenal pelvis bilaterally, right greater than left with segmental dilatation of ectatic ureters but no obstructing mass or calcification. There are numerous small parenchymal lesions in both kidneys, many small and difficult to characterize. 2 more prominent areas of subtle decreased attenuation on the right are noted and subtle masses or lesion certainly not excluded. There is a distended urinary bladder with numerous diverticula. Intraluminal mass at the left base is noted uncertain whether an extension of the prostate or separate intraluminal lesion, further evaluation advised. Multiple hepatic lesions are noted, many too small to characterize, the larger to reflect cysts. See comment above for delineation of findings. Results were made available to the clinical service at time of study dictation. In accordance with [place] Act 112,  the patient will receive a letter notifying them to follow up with their physician.    Telemetry/EKG:  NSR     ASSESSMENT & PLAN   Hyperlipidemia  Assessment & Plan  Continue home statin    Multiple myeloma (CMS/HCC)  Assessment & Plan  Currently on chemotherapy outpatient  - Curb side heme onc: hold revlimid iso infection, he will f/u with his oncologist after DC    COVID-19  Assessment & Plan  No current signs or sx's of acute infection  Continue home Advair  Positive test on admission    - Status post monoclonal antibody therapy    Chronic systolic heart failure (CMS/HCC)  Assessment & Plan  EF 40-45% on home lasix PRN  - appears euvolemic now  Recentrly saw Dr. Ellis who stopped his metoprolo and changes  his lasix from daily to PRN due to hypotension   - can give lasix PRN  - daily standing weight and accurate I/Os   - Cardiology following    Anemia  Assessment & Plan  Presents with Hb 9/1 b/l 10  Has known MM on chemotherapy which likely explains this  - CBC daily  - c/w chemo outpatient  - transfuse Hb <7  - hold home iron supplementation given acute infection UTI    CAD (coronary artery disease)  Assessment & Plan  Has known 50% LAD occlusion  CW home statin and  ASA    UTI (urinary tract infection)  Assessment & Plan  Uclx prior to admission c/w ESBL klebsiella MDRO including meropenem  Received 5 days bactrim PTP  U/A TNTC WBC, 3+ Bacteria, pos leuk esterase and nitrite  Lactate 2.1, Hypotensive with BP 90/54 improved to 113/64 today  No leukocytosis    - Treated with Avycaz per ID (requires 14 day course from time of negative culture 11/3) today day 10/14  - ID following  - fu repeat UClx and Bclx; repeat blood cultures x2 growing GNRs,repeat cxs NGTD  - IV team consulted for Midline placement  - pts BP runs chronically low; gentle fluids yesterday, Na remains low at 131  - s/p CT urogram; may need to repeat today, Uro following  - Maintain serrano for now; likely with uro outpatient follow up    Weakness  Assessment & Plan  Likely 2/2 UTI  - PT/OT  - plan as per UTI    BPH (benign prostatic hyperplasia)  Assessment & Plan  C/w home Flomax  Follws with urology Dr. Mathew       VTE Assessment: [place]    VTE Prophylaxis: Current anticoagulants:  heparin (porcine) 5,000 unit/mL injection 5,000 Units, subcutaneous, q8h [id number]      Code Status: Full Code  Estimated discharge date: 11/11/2021     ATTENDING DOCUMENTATION  ALSO SEE ATTENDING ATTESTATION SECTION OF NOTE

## 2021-11-12 NOTE — PROGRESS NOTES
Patient:  Eulalio Gregg  Location:  [place] [address] [address]  MRN:  [id number]  Today's date:  11/12/2021    Pt found lying supine in bed; RN cleared for session.  Ended session with pt seated OOB in chair, posey alarm on, call bell/personal items within reach, VSS/NAD, RN aware of session/ pt status.  Eulalio is a 76 y.o. male admitted on 10/31/2021 with Weakness [R53.1]  Elevated serum creatinine [R79.89]  Urinary tract infection associated with catheterization of urinary tract, unspecified indwelling urinary catheter type, initial encounter (CMS/[facility]) [T83.511A, N39.0]. Principal problem is Weakness.    Past Medical History  Eulalio has a past medical history of Anemia, Blepharospasm, BPH (benign prostatic hyperplasia), COVID-19 (08/21/2021), History of vertigo (2016), Lipid disorder, and Tendency toward bleeding easily (CMS/[facility]).    History of Present Illness   Admitted with weakness, +COVID          Therapy Pain/Vitals     Row Name 11/12/21 0840          Pain/Comfort/Sleep    Pain Charting Type Pain Assessment     Preferred Pain Scale word (verbal rating pain scale)     Word Pain Rating: Rest 0 - no pain     Word Pain Rating: Activity 0 - no pain            Vital Signs    Pulse 65     SpO2 97 %     Patient Activity At rest     Oxygen Therapy None (Room air)     BP 97/59     BP Method Automatic     Patient Position Sitting                 Prior Living Environment      Most Recent Value   Current Living Arrangements home   Living Environment Comment ML with spouse, 3STE with FF to bed/bath        Prior Level of Function      Most Recent Value   Ambulation independent   Transferring independent   Toileting independent   Bathing independent   Dressing independent   Eating independent   Prior Level of Function Comment pt reports ind PTA without use of AD,  started to use a SPC before admissions   Assistive Device Currently Used at Home cane, straight        Occupational Profile      Most  "Recent Value   Reason for Services/Referral COVID19+,  dispo recs   Occupational History/Life Experiences Retired,  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 PT present for cotx.     Existing Precautions/Restrictions contact;droplet;fall   COVID19, CRE, ESBL    Limitations/Impairments safety/cognitive        Cognition/Psychosocial    Affect/Mental Status (Cognition) flat/blunted affect;low arousal/lethargic;confused     Orientation Status (Cognition) oriented to;person;place;time   AAOx3 however confused    Follows Commands (Cognition) follows two-step commands;delayed response/completion;increased processing time needed;initiation impaired;repetition of directions required;verbal cues/prompting required     Attention Deficit (Cognition) moderate deficit;concentration;focused/sustained attention;requires cues/redirection to task     Executive Function Deficit (Cognition) moderate deficit;insight/awareness of deficits;judgment;planning/decision-making;problem-solving/reasoning     Safety Deficit (Cognition) minimal deficit;moderate deficit;insight into deficits/self-awareness;judgment;problem-solving;safety precautions awareness;safety precautions follow-through/compliance;at risk behavior observed     Comment, Cognition Cog appears worsened from last session, pt with difficulty finishing sentences. Mx times t/o evaluation pt stated \"I'm having difficulty with...\" then being unable to finish. Emotionally labile, frustrated one minute then happy/laughing the next. Increased processing needs req'd.        Bed Mobility    " [place], Supine to Sit supervision        Sit to Stand Transfer    [place], Sit to Stand Transfer touching/steadying assist     Verbal Cues hand placement;safety;technique     Assistive Device walker, front-wheeled        Stand to Sit Transfer    [place], Stand to Sit Transfer touching/steadying assist     Verbal Cues hand placement;safety;technique     Assistive Device walker, front-wheeled        Balance    Static Sitting Balance WFL     Dynamic Sitting Balance mild impairment     Static Standing Balance mild impairment     Dynamic Standing Balance mild impairment     Balance Interventions occupation based/functional task        Grooming    Self-Performance washes, rinses and dries face     [place] close supervision     Position sink side;unsupported standing     Comment Increased time to perform task, mild confusion with purpose of task        BADL Safety/Performance    Impairments, BADL Safety/Performance endurance/activity tolerance;balance;cognition;strength     Cognitive Impairments, BADL Safety/Performance attention;judgment;insight into deficits/self-awareness;problem-solving/reasoning;safety precaution awareness;safety precaution follow-through;sequencing abilities     Skilled BADL Treatment/Intervention BADL process/adaptation training;cognitive/safety deficit modifications     Progress in BADL Status effort and initiation        ADL Interventions    Self-Care (BADL) Promotion out of bed for BADLs encouraged   Pt left OOB in chair at end of session       AM-PAC (TM) - ADL (Current Function)    Putting on and taking off regular lower body clothing? 3 - A Little     Bathing? 3 - A Little     Toileting? 3 - A Little     Putting on/taking off regular upper body clothing? 3 - A Little     How much help for taking care of personal grooming? 3 - A Little     Eating meals? 4 - None     AM-PAC (TM) ADL Score 19        Assessment/Plan (OT)    Daily Outcome Statement OT tx session completed, pt  "overall presenting with dec'd cog today. Repeatedly stating \"I'm having difficulty with...\" then unable to finish thought/sentence. Confusion continues, AAOx3. Increased processing time req'd for all functional tasks. CGA for txfers, CLS for grooming task. Cont OT POC.               OT Assessment/Plan      Most Recent Value   OT Recommended Discharge Disposition skilled nursing facility at 11/12/2021 0841   Anticipated Equipment Needs At Discharge (OT) other (see comments)  [TBD] at 11/02/2021 1201   Patient/Family Therapy Goal Statement To get better at 11/02/2021 1201          OT Goals      Most Recent Value   Bed Mobility Goal 1    Activity/Assistive Device bed mobility activities, all at 11/02/2021 1201   [place] independent at 11/02/2021 1201   Time Frame short-term goal (STG) at 11/02/2021 1201   Progress/Outcome goal ongoing at 11/02/2021 1201   Transfer Goal 1    Activity/Assistive Device all transfers at 11/02/2021 1201   [place] independent at 11/02/2021 1201   Time Frame short-term goal (STG) at 11/02/2021 1201   Progress/Outcome goal ongoing at 11/02/2021 1201   Dressing Goal 1    Activity/Adaptive Equipment dressing skills, all at 11/02/2021 1201   [place] independent at 11/02/2021 1201   Time Frame short-term goal (STG) at 11/02/2021 1201   Progress/Outcome goal ongoing at 11/02/2021 1201   Toileting Goal 1    Activity/Assistive Device toileting skills, all at 11/02/2021 1201   [place] independent at 11/02/2021 1201   Time Frame short-term goal (STG) at 11/02/2021 1201   Progress/Outcome goal ongoing at 11/02/2021 1201   Grooming Goal 1    Activity/Assistive Device grooming skills, all at 11/02/2021 1201   [place] independent at 11/02/2021 1201   Time Frame short-term goal (STG) at 11/02/2021 1201   Progress/Outcome goal ongoing at 11/02/2021 1201        "

## 2021-11-12 NOTE — PATIENT CARE CONFERENCE
Care Progression Rounds Note  Date: 11/12/2021  Time: 11:31 AM     Patient Name: Eulalio Gregg     Medical Record Number: [id number]   YOB: 1945  Sex: Male      Room/Bed: 0202    Admitting Diagnosis: Weakness [R53.1]  Elevated serum creatinine [R79.89]  Urinary tract infection associated with catheterization of urinary tract, unspecified indwelling urinary catheter type, initial encounter (CMS/[facility]) [T83.511A, N39.0]   Admit Date/Time: 10/31/2021 12:15 PM    Primary Diagnosis: Bacteremia due to Klebsiella pneumoniae  Principal Problem: Bacteremia due to Klebsiella pneumoniae    GMLOS: pending  Anticipated Discharge Date: 11/12/2021    AM-PAC:  Mobility Score: 19    Discharge Planning:  Current Living Arrangements: home  Anticipated Discharge Disposition: skilled nursing facility  Type of Skilled Nursing Care Services: OT,PT    Barriers to Discharge:  Barriers to Discharge: Facility availability  Comment: No accepting SNFs; barriers include Covid+ status, CRE isolation, IV Avycaz    Participants:  ,physical therapy,physician,nursing,social work/services

## 2021-11-13 LAB
ANION GAP SERPL CALC-SCNC: 14 MEQ/L (ref 3–15)
BASOPHILS # BLD: 0.14 K/UL (ref 0.01–0.1)
BASOPHILS NFR BLD: 1.3 %
BUN SERPL-MCNC: 15 MG/DL (ref 8–20)
CALCIUM SERPL-MCNC: 8.5 MG/DL (ref 8.9–10.3)
CHLORIDE SERPL-SCNC: 96 MEQ/L (ref 98–109)
CO2 SERPL-SCNC: 23 MEQ/L (ref 22–32)
CREAT SERPL-MCNC: 0.7 MG/DL (ref 0.8–1.3)
DIFFERENTIAL METHOD BLD: ABNORMAL
EOSINOPHIL # BLD: 0.12 K/UL (ref 0.04–0.54)
EOSINOPHIL NFR BLD: 1.1 %
ERYTHROCYTE [DISTWIDTH] IN BLOOD BY AUTOMATED COUNT: 18.5 % (ref 11.6–14.4)
GFR SERPL CREATININE-BSD FRML MDRD: >60 ML/MIN/1.73M*2
GLUCOSE SERPL-MCNC: 84 MG/DL (ref 70–99)
HCT VFR BLDCO AUTO: 26.6 % (ref 40.1–51)
HGB BLD-MCNC: 8.6 G/DL (ref 13.7–17.5)
IMM GRANULOCYTES # BLD AUTO: 0.24 K/UL (ref 0–0.08)
IMM GRANULOCYTES NFR BLD AUTO: 2.2 %
LYMPHOCYTES # BLD: 1.98 K/UL (ref 1.2–3.5)
LYMPHOCYTES NFR BLD: 18 %
MAGNESIUM SERPL-MCNC: 2 MG/DL (ref 1.8–2.5)
MCH RBC QN AUTO: 29.9 PG (ref 28–33.2)
MCHC RBC AUTO-ENTMCNC: 32.3 G/DL (ref 32.2–36.5)
MCV RBC AUTO: 92.4 FL (ref 83–98)
MONOCYTES # BLD: 1.02 K/UL (ref 0.3–1)
MONOCYTES NFR BLD: 9.2 %
NEUTROPHILS # BLD: 7.53 K/UL (ref 1.7–7)
NEUTS SEG NFR BLD: 68.2 %
NRBC BLD-RTO: 0 %
PDW BLD AUTO: 10.1 FL (ref 9.4–12.4)
PLATELET # BLD AUTO: 423 K/UL (ref 150–350)
POTASSIUM SERPL-SCNC: 5 MEQ/L (ref 3.6–5.1)
RBC # BLD AUTO: 2.88 M/UL (ref 4.5–5.8)
SODIUM SERPL-SCNC: 133 MEQ/L (ref 136–144)
WBC # BLD AUTO: 11.03 K/UL (ref 3.8–10.5)

## 2021-11-13 PROCEDURE — 83735 ASSAY OF MAGNESIUM: CPT | Performed by: STUDENT IN AN ORGANIZED HEALTH CARE EDUCATION/TRAINING PROGRAM

## 2021-11-13 PROCEDURE — 63700000 HC SELF-ADMINISTRABLE DRUG: Performed by: STUDENT IN AN ORGANIZED HEALTH CARE EDUCATION/TRAINING PROGRAM

## 2021-11-13 PROCEDURE — 85025 COMPLETE CBC W/AUTO DIFF WBC: CPT | Performed by: STUDENT IN AN ORGANIZED HEALTH CARE EDUCATION/TRAINING PROGRAM

## 2021-11-13 PROCEDURE — 63700000 HC SELF-ADMINISTRABLE DRUG: Performed by: INTERNAL MEDICINE

## 2021-11-13 PROCEDURE — 80048 BASIC METABOLIC PNL TOTAL CA: CPT | Performed by: STUDENT IN AN ORGANIZED HEALTH CARE EDUCATION/TRAINING PROGRAM

## 2021-11-13 PROCEDURE — 63600000 HC DRUGS/DETAIL CODE: Mod: JG | Performed by: INTERNAL MEDICINE

## 2021-11-13 PROCEDURE — 36415 COLL VENOUS BLD VENIPUNCTURE: CPT | Performed by: STUDENT IN AN ORGANIZED HEALTH CARE EDUCATION/TRAINING PROGRAM

## 2021-11-13 PROCEDURE — 25800000 HC PHARMACY IV SOLUTIONS: Performed by: INTERNAL MEDICINE

## 2021-11-13 PROCEDURE — 63600000 HC DRUGS/DETAIL CODE: Performed by: STUDENT IN AN ORGANIZED HEALTH CARE EDUCATION/TRAINING PROGRAM

## 2021-11-13 PROCEDURE — C1751 CATH, INF, PER/CENT/MIDLINE: HCPCS

## 2021-11-13 PROCEDURE — 21400000 HC ROOM AND CARE CCU/INTERMEDIATE

## 2021-11-13 RX ADMIN — CYCLOSPORINE 1 DROP: 0.5 EMULSION OPHTHALMIC at 21:31

## 2021-11-13 RX ADMIN — HEPARIN SODIUM 5000 UNITS: 5000 INJECTION INTRAVENOUS; SUBCUTANEOUS at 21:28

## 2021-11-13 RX ADMIN — CEFTAZIDIME, AVIBACTAM 2.5 G: 2; .5 POWDER, FOR SOLUTION INTRAVENOUS at 07:45

## 2021-11-13 RX ADMIN — FERROUS SULFATE TAB 325 MG (65 MG ELEMENTAL FE) 325 MG: 325 (65 FE) TAB at 10:03

## 2021-11-13 RX ADMIN — CEFTAZIDIME, AVIBACTAM 2.5 G: 2; .5 POWDER, FOR SOLUTION INTRAVENOUS at 23:22

## 2021-11-13 RX ADMIN — MOMETASONE FUROATE AND FORMOTEROL FUMARATE DIHYDRATE 2 PUFF: 200; 5 AEROSOL RESPIRATORY (INHALATION) at 10:04

## 2021-11-13 RX ADMIN — POLYETHYLENE GLYCOL 3350 17 GRAM ORAL POWDER PACKET 17 G: at 10:01

## 2021-11-13 RX ADMIN — HEPARIN SODIUM 5000 UNITS: 5000 INJECTION INTRAVENOUS; SUBCUTANEOUS at 06:09

## 2021-11-13 RX ADMIN — CEFTAZIDIME, AVIBACTAM 2.5 G: 2; .5 POWDER, FOR SOLUTION INTRAVENOUS at 15:00

## 2021-11-13 RX ADMIN — ASPIRIN 81 MG: 81 TABLET, COATED ORAL at 10:02

## 2021-11-13 RX ADMIN — MOMETASONE FUROATE AND FORMOTEROL FUMARATE DIHYDRATE 2 PUFF: 200; 5 AEROSOL RESPIRATORY (INHALATION) at 21:28

## 2021-11-13 RX ADMIN — TAMSULOSIN HYDROCHLORIDE 0.4 MG: 0.4 CAPSULE ORAL at 10:02

## 2021-11-13 RX ADMIN — Medication 1000 UNITS: at 21:28

## 2021-11-13 RX ADMIN — Medication 1000 UNITS: at 10:02

## 2021-11-13 RX ADMIN — ROSUVASTATIN CALCIUM 10 MG: 10 TABLET, FILM COATED ORAL at 10:02

## 2021-11-13 RX ADMIN — CYCLOSPORINE 1 DROP: 0.5 EMULSION OPHTHALMIC at 12:00

## 2021-11-13 NOTE — PLAN OF CARE
Problem: Adult Inpatient Plan of Care  Goal: Plan of Care Review  Flowsheets (Taken 11/13/2021 6958)  Outcome Summary: Per info during medical rounds today, patient is medically cleared for d/c to covid + accepting facility. Patient is still awaiting accepting facilities. SW will remain available for emotional support and disposition planning. Nae Dominique, MSW [id number]

## 2021-11-13 NOTE — PROGRESS NOTES
Internal Medicine  Daily Progress Note       SUBJECTIVE   This is a 76 y.o. year-old male admitted on 10/31/2021 with Weakness [R53.1]  Elevated serum creatinine [R79.89]  Urinary tract infection associated with catheterization of urinary tract, unspecified indwelling urinary catheter type, initial encounter (CMS/[facility]) [T83.511A, N39.0].    Interval History: NAEON. He feels well. Constipation improved, denies sob, fever, chills, cp. Pending snf.      OBJECTIVE   Vital signs in last 24 hours:  Temp:  [36.4 °C (97.6 °F)-36.7 °C (98.1 °F)] 36.7 °C (98.1 °F)  Heart Rate:  [54-61] 54  Resp:  [18] 18  BP: (88-97)/(43-58) 94/56  SpO2:  [96 %-97 %] 97 %  Oxygen Therapy: None (Room air)    Weight & I/Os:  Weights (last 5 days)     Date/Time Weight    11/13/21 0500 63.2 kg (139 lb 6.4 oz)    11/12/21 0524 64.9 kg (143 lb 1.3 oz)    11/11/21 0411 65.1 kg (143 lb 8.3 oz)    11/10/21 0452 65.1 kg (143 lb 9.2 oz)    11/09/21 0422 65.1 kg (143 lb 9.6 oz)    11/08/21 0428 65.2 kg (143 lb 11.8 oz)          Intake/Output Summary (Last 24 hours) at 11/13/2021 0659  Last data filed at 11/13/2021 0400  24 Hour Net Input/Output from 7AM Yesterday   Intake --   Output 400 ml   Net -400 ml        PHYSICAL EXAMINATION   Physical Exam  General: non toxic, alert, frail   HEENT: NC/AT/ anicteric sclera, pharynx clear   Cardiovascular: regular S1/S2, no mrg     Respiratory: crackles at bases R>L  GI/Abdomen: soft, NT/ND,  no peritoneal signs  Extremities: no  edema  MSK/JOINTS:  R pinky burn   Neurology and psych: no focal deficits, cooperative with exam  Skin: no rashes, lines clean dry intact, R midline   G.U.: serrano, clear urine   LINES, CATHETERS, DRAINS, AIRWAYS, & WOUNDS   Lines, drains, airways, & wounds:  Midline Catheter 11/08/21 0946 (Active)   Number of days: 5       Peripheral IV (Adult) 11/04/21 Left;Posterior Forearm (Active)   Number of days: 9       Urethral Catheter 18 Fr (Active)   Number of days: 9        LABS, IMAGING, &  TELE   Labs:      Results from last 7 days   Lab Units 11/13/21  0420 11/12/21  0524 11/11/21  0406   WBC K/uL 11.03* 9.18 9.17   HEMOGLOBIN g/dL 8.6* 8.5* 8.4*   HEMATOCRIT % 26.6* 26.5* 25.7*   PLATELETS K/uL 423* 421* 399*       Results from last 7 days   Lab Units 11/13/21  0420 11/12/21  0524 11/11/21  0406   SODIUM mEQ/L 133* 132* 134*   POTASSIUM mEQ/L 5.0 4.8 4.7   CHLORIDE mEQ/L 96* 98 98   CO2 mEQ/L 23 24 25   BUN mg/dL 15 13 11   CREATININE mg/dL 0.7* 0.8 0.8   CALCIUM mg/dL 8.5* 8.2* 8.1*   GLUCOSE mg/dL 84 88 93     Imaging personally reviewed (does not include unread studies):  CT HEAD WITHOUT IV CONTRAST    Result Date: 11/2/2021  IMPRESSION: 1. No acute intracranial hemorrhage, acute infarct in a major vascular territory or mass-effect. 2. Progression of ventriculomegaly which may be from central atrophy.  Normal pressure hydrocephalus can have a similar appearance. COMMENT: Axial noncontrast CT images of the head were obtained. Sagittal and coronal reconstructions were created. CT DOSE:  One or more dose reduction techniques (e.g. automated exposure control, adjustment of the mA and/or kV according to patient size, use of iterative reconstruction technique) utilized for this examination. Comparison:  No prior studies are available for comparison. Findings: Ventricles are enlarged, disproportionate to sulcal involutional changes and progressed from prior.  There are bilateral periventricular white matter lucencies which are nonspecific but compatible with microangiopathic change. There is no mass effect, midline shift, territorial infarction, acute hemorrhage or extra-axial fluid collection. Visualized paranasal sinuses and mastoid air cells are clear.    ULTRASOUND KIDNEYS / BLADDER    Result Date: 11/4/2021  IMPRESSION: 1.  Mild left hydronephrosis 2.  Complex cyst versus solid mass lower pole right kidney measuring up to 3.2 cm. Contrast-enhanced renal mass protocol CT or MRI is recommended for  further evaluation. 3.  Debris within the bladder     X-RAY CHEST 1 VIEW    Result Date: 10/31/2021  IMPRESSION: No significant interval change. Probable minimal atelectasis or scarring bilateral lung bases.    Ultrasound venous arm right    Result Date: 11/4/2021  IMPRESSION: No upper extremity deep vein thrombosis.     CT UROGRAM WITH AND WITHOUT IV CONTRAST    Result Date: 11/4/2021  IMPRESSION: There is a bilateral large extrarenal pelvis bilaterally, right greater than left with segmental dilatation of ectatic ureters but no obstructing mass or calcification. There are numerous small parenchymal lesions in both kidneys, many small and difficult to characterize. 2 more prominent areas of subtle decreased attenuation on the right are noted and subtle masses or lesion certainly not excluded. There is a distended urinary bladder with numerous diverticula. Intraluminal mass at the left base is noted uncertain whether an extension of the prostate or separate intraluminal lesion, further evaluation advised. Multiple hepatic lesions are noted, many too small to characterize, the larger to reflect cysts. See comment above for delineation of findings. Results were made available to the clinical service at time of study dictation. In accordance with [place] Act 112,  the patient will receive a letter notifying them to follow up with their physician.    Telemetry/EKG:  NSR     ASSESSMENT & PLAN   Hyperlipidemia  Assessment & Plan  Continue home statin    Multiple myeloma (CMS/HCC)  Assessment & Plan  Currently on chemotherapy outpatient  - Curb side heme onc: hold revlimid iso infection, he will f/u with his oncologist after DC    COVID-19  Assessment & Plan  No current signs or sx's of acute infection  Continue home Advair  Positive test on admission    - Status post monoclonal antibody therapy    Chronic systolic heart failure (CMS/HCC)  Assessment & Plan  EF 40-45% on home lasix PRN  - appears euvolemic now  Recentrly saw  Dr. Ellis who stopped his metoprolo and changes his lasix from daily to PRN due to hypotension   - can give lasix PRN  - daily standing weight and accurate I/Os   - Cardiology following    Anemia  Assessment & Plan  Presents with Hb 9/1 b/l 10  Has known MM on chemotherapy which likely explains this  - CBC daily  - c/w chemo outpatient  - transfuse Hb <7  - hold home iron supplementation given acute infection UTI    CAD (coronary artery disease)  Assessment & Plan  Has known 50% LAD occlusion  CW home statin and  ASA    UTI (urinary tract infection)  Assessment & Plan  Uclx prior to admission c/w ESBL klebsiella MDRO including meropenem  Received 5 days bactrim PTP  U/A TNTC WBC, 3+ Bacteria, pos leuk esterase and nitrite  Lactate 2.1, Hypotensive with BP 90/54 improved to 113/64 today  No leukocytosis    - Treated with Avycaz per ID (requires 14 day course from time of negative culture 11/3) today day 10/14  - ID following  - fu repeat UClx and Bclx; repeat blood cultures x2 growing GNRs,repeat cxs NGTD  - IV team consulted for Midline placement  - pts BP runs chronically low; gentle fluids yesterday, Na remains low at 131  - s/p CT urogram; may need to repeat today, Uro following  - Maintain serrano for now; likely with uro outpatient follow up    Weakness  Assessment & Plan  Likely 2/2 UTI  - PT/OT  - plan as per UTI    BPH (benign prostatic hyperplasia)  Assessment & Plan  C/w home Flomax  Follws with urology Dr. Mathew       VTE Assessment: Padua    VTE Prophylaxis: Current anticoagulants:  heparin (porcine) 5,000 unit/mL injection 5,000 Units, subcutaneous, q8h JENNYFER      Code Status: Full Code  Estimated discharge date: 11/12/2021     ATTENDING DOCUMENTATION  ALSO SEE ATTENDING ATTESTATION SECTION OF NOTE

## 2021-11-14 LAB
ANION GAP SERPL CALC-SCNC: 10 MEQ/L (ref 3–15)
BASOPHILS # BLD: 0.13 K/UL (ref 0.01–0.1)
BASOPHILS NFR BLD: 1.3 %
BUN SERPL-MCNC: 15 MG/DL (ref 8–20)
CALCIUM SERPL-MCNC: 8.7 MG/DL (ref 8.9–10.3)
CHLORIDE SERPL-SCNC: 99 MEQ/L (ref 98–109)
CO2 SERPL-SCNC: 26 MEQ/L (ref 22–32)
CREAT SERPL-MCNC: 0.8 MG/DL (ref 0.8–1.3)
DIFFERENTIAL METHOD BLD: ABNORMAL
EOSINOPHIL # BLD: 0.12 K/UL (ref 0.04–0.54)
EOSINOPHIL NFR BLD: 1.2 %
ERYTHROCYTE [DISTWIDTH] IN BLOOD BY AUTOMATED COUNT: 18.3 % (ref 11.6–14.4)
GFR SERPL CREATININE-BSD FRML MDRD: >60 ML/MIN/1.73M*2
GLUCOSE SERPL-MCNC: 103 MG/DL (ref 70–99)
HCT VFR BLDCO AUTO: 28.5 % (ref 40.1–51)
HGB BLD-MCNC: 9.1 G/DL (ref 13.7–17.5)
IMM GRANULOCYTES # BLD AUTO: 0.21 K/UL (ref 0–0.08)
IMM GRANULOCYTES NFR BLD AUTO: 2.1 %
LYMPHOCYTES # BLD: 1.59 K/UL (ref 1.2–3.5)
LYMPHOCYTES NFR BLD: 15.6 %
MAGNESIUM SERPL-MCNC: 2 MG/DL (ref 1.8–2.5)
MCH RBC QN AUTO: 30 PG (ref 28–33.2)
MCHC RBC AUTO-ENTMCNC: 31.9 G/DL (ref 32.2–36.5)
MCV RBC AUTO: 94.1 FL (ref 83–98)
MONOCYTES # BLD: 0.96 K/UL (ref 0.3–1)
MONOCYTES NFR BLD: 9.4 %
NEUTROPHILS # BLD: 7.21 K/UL (ref 1.7–7)
NEUTS SEG NFR BLD: 70.4 %
NRBC BLD-RTO: 0 %
PDW BLD AUTO: 9.8 FL (ref 9.4–12.4)
PLATELET # BLD AUTO: 423 K/UL (ref 150–350)
POTASSIUM SERPL-SCNC: 5 MEQ/L (ref 3.6–5.1)
RBC # BLD AUTO: 3.03 M/UL (ref 4.5–5.8)
SODIUM SERPL-SCNC: 135 MEQ/L (ref 136–144)
WBC # BLD AUTO: 10.22 K/UL (ref 3.8–10.5)

## 2021-11-14 PROCEDURE — 85025 COMPLETE CBC W/AUTO DIFF WBC: CPT | Performed by: STUDENT IN AN ORGANIZED HEALTH CARE EDUCATION/TRAINING PROGRAM

## 2021-11-14 PROCEDURE — 25800000 HC PHARMACY IV SOLUTIONS: Performed by: INTERNAL MEDICINE

## 2021-11-14 PROCEDURE — 21400000 HC ROOM AND CARE CCU/INTERMEDIATE

## 2021-11-14 PROCEDURE — 36415 COLL VENOUS BLD VENIPUNCTURE: CPT | Performed by: STUDENT IN AN ORGANIZED HEALTH CARE EDUCATION/TRAINING PROGRAM

## 2021-11-14 PROCEDURE — 63600000 HC DRUGS/DETAIL CODE: Performed by: STUDENT IN AN ORGANIZED HEALTH CARE EDUCATION/TRAINING PROGRAM

## 2021-11-14 PROCEDURE — 25000000 HC PHARMACY GENERAL: Performed by: STUDENT IN AN ORGANIZED HEALTH CARE EDUCATION/TRAINING PROGRAM

## 2021-11-14 PROCEDURE — 83735 ASSAY OF MAGNESIUM: CPT | Performed by: STUDENT IN AN ORGANIZED HEALTH CARE EDUCATION/TRAINING PROGRAM

## 2021-11-14 PROCEDURE — 63700000 HC SELF-ADMINISTRABLE DRUG: Performed by: INTERNAL MEDICINE

## 2021-11-14 PROCEDURE — 63600000 HC DRUGS/DETAIL CODE: Mod: JG | Performed by: INTERNAL MEDICINE

## 2021-11-14 PROCEDURE — 63700000 HC SELF-ADMINISTRABLE DRUG: Performed by: STUDENT IN AN ORGANIZED HEALTH CARE EDUCATION/TRAINING PROGRAM

## 2021-11-14 PROCEDURE — 80048 BASIC METABOLIC PNL TOTAL CA: CPT | Performed by: STUDENT IN AN ORGANIZED HEALTH CARE EDUCATION/TRAINING PROGRAM

## 2021-11-14 RX ADMIN — POLYETHYLENE GLYCOL 3350 17 GRAM ORAL POWDER PACKET 17 G: at 09:53

## 2021-11-14 RX ADMIN — Medication 1000 UNITS: at 09:53

## 2021-11-14 RX ADMIN — HEPARIN SODIUM 5000 UNITS: 5000 INJECTION INTRAVENOUS; SUBCUTANEOUS at 14:30

## 2021-11-14 RX ADMIN — HEPARIN SODIUM 5000 UNITS: 5000 INJECTION INTRAVENOUS; SUBCUTANEOUS at 22:57

## 2021-11-14 RX ADMIN — TAMSULOSIN HYDROCHLORIDE 0.4 MG: 0.4 CAPSULE ORAL at 09:53

## 2021-11-14 RX ADMIN — ASPIRIN 81 MG: 81 TABLET, COATED ORAL at 09:53

## 2021-11-14 RX ADMIN — Medication 1000 UNITS: at 19:45

## 2021-11-14 RX ADMIN — HEPARIN SODIUM 5000 UNITS: 5000 INJECTION INTRAVENOUS; SUBCUTANEOUS at 06:28

## 2021-11-14 RX ADMIN — CEFTAZIDIME, AVIBACTAM 2.5 G: 2; .5 POWDER, FOR SOLUTION INTRAVENOUS at 15:50

## 2021-11-14 RX ADMIN — CYCLOSPORINE 1 DROP: 0.5 EMULSION OPHTHALMIC at 19:45

## 2021-11-14 RX ADMIN — CEFTAZIDIME, AVIBACTAM 2.5 G: 2; .5 POWDER, FOR SOLUTION INTRAVENOUS at 23:12

## 2021-11-14 RX ADMIN — ROSUVASTATIN CALCIUM 10 MG: 10 TABLET, FILM COATED ORAL at 09:53

## 2021-11-14 RX ADMIN — CYCLOSPORINE 1 DROP: 0.5 EMULSION OPHTHALMIC at 09:53

## 2021-11-14 RX ADMIN — CEFTAZIDIME, AVIBACTAM 2.5 G: 2; .5 POWDER, FOR SOLUTION INTRAVENOUS at 06:28

## 2021-11-14 RX ADMIN — MOMETASONE FUROATE AND FORMOTEROL FUMARATE DIHYDRATE 2 PUFF: 200; 5 AEROSOL RESPIRATORY (INHALATION) at 09:54

## 2021-11-14 RX ADMIN — MOMETASONE FUROATE AND FORMOTEROL FUMARATE DIHYDRATE 2 PUFF: 200; 5 AEROSOL RESPIRATORY (INHALATION) at 19:45

## 2021-11-14 NOTE — PLAN OF CARE
Problem: Adult Inpatient Plan of Care  Goal: Plan of Care Review  Flowsheets (Taken 11/14/2021 2078)  Plan of Care Reviewed With: (Medical Team) other (see comments)  Outcome Summary: Per info during medical rounds today, patient is likely to be medically cleared for d/c to covid + SNF tomorrow. Patient is awaiting for facility acceptance. SW will remain available for emotional support and disposition planning. Nae Dominique, MSW [id number]

## 2021-11-14 NOTE — PROGRESS NOTES
Internal Medicine  Daily Progress Note       SUBJECTIVE   This is a 76 y.o. year-old male admitted on 10/31/2021 with Weakness [R53.1]  Elevated serum creatinine [R79.89]  Urinary tract infection associated with catheterization of urinary tract, unspecified indwelling urinary catheter type, initial encounter (CMS/[facility]) [T83.511A, N39.0].    Interval History: NAEON. Feels well overall, denies cp, fever, chills.      OBJECTIVE   Vital signs in last 24 hours:  Temp:  [36.5 °C (97.7 °F)-36.8 °C (98.3 °F)] 36.6 °C (97.9 °F)  Heart Rate:  [50-66] 66  Resp:  [18] 18  BP: ()/(47-61) 110/58  SpO2:  [95 %-100 %] 96 %  Oxygen Therapy: None (Room air)    Weight & I/Os:  Weights (last 5 days)     Date/Time Weight    11/14/21 0700 61 kg (134 lb 7.7 oz)    11/13/21 0500 63.2 kg (139 lb 6.4 oz)    11/12/21 0524 64.9 kg (143 lb 1.3 oz)    11/11/21 0411 65.1 kg (143 lb 8.3 oz)    11/10/21 0452 65.1 kg (143 lb 9.2 oz)    11/09/21 0422 65.1 kg (143 lb 9.6 oz)          Intake/Output Summary (Last 24 hours) at 11/14/2021 0659  Last data filed at 11/14/2021 0628  24 Hour Net Input/Output from 7AM Yesterday   Intake 130 ml   Output 1350 ml   Net -1220 ml        PHYSICAL EXAMINATION   Physical Exam  General: non toxic, alert, frail   HEENT: NC/AT/ anicteric sclera, pharynx clear   Cardiovascular: regular S1/S2, no mrg     Respiratory: crackles at bases R>L  GI/Abdomen: soft, NT/ND,  no peritoneal signs  Extremities: no  edema  MSK/JOINTS:  R pinky burn   Neurology and psych: no focal deficits, cooperative with exam  Skin: no rashes, lines clean dry intact, R midline   G.U.: serrano, clear urine   LINES, CATHETERS, DRAINS, AIRWAYS, & WOUNDS   Lines, drains, airways, & wounds:  Midline Catheter 11/08/21 0946 (Active)   Number of days: 6       Peripheral IV (Adult) 11/04/21 Left;Posterior Forearm (Active)   Number of days: 10       Urethral Catheter 18 Fr (Active)   Number of days: 10        LABS, IMAGING, & TELE   Labs:      Results  from last 7 days   Lab Units 11/14/21 0418 11/13/21 0420 11/12/21  0524   WBC K/uL 10.22 11.03* 9.18   HEMOGLOBIN g/dL 9.1* 8.6* 8.5*   HEMATOCRIT % 28.5* 26.6* 26.5*   PLATELETS K/uL 423* 423* 421*       Results from last 7 days   Lab Units 11/14/21 0418 11/13/21 0420 11/12/21  0524   SODIUM mEQ/L 135* 133* 132*   POTASSIUM mEQ/L 5.0 5.0 4.8   CHLORIDE mEQ/L 99 96* 98   CO2 mEQ/L 26 23 24   BUN mg/dL 15 15 13   CREATININE mg/dL 0.8 0.7* 0.8   CALCIUM mg/dL 8.7* 8.5* 8.2*   GLUCOSE mg/dL 103* 84 88     Imaging personally reviewed (does not include unread studies):  CT HEAD WITHOUT IV CONTRAST    Result Date: 11/2/2021  IMPRESSION: 1. No acute intracranial hemorrhage, acute infarct in a major vascular territory or mass-effect. 2. Progression of ventriculomegaly which may be from central atrophy.  Normal pressure hydrocephalus can have a similar appearance. COMMENT: Axial noncontrast CT images of the head were obtained. Sagittal and coronal reconstructions were created. CT DOSE:  One or more dose reduction techniques (e.g. automated exposure control, adjustment of the mA and/or kV according to patient size, use of iterative reconstruction technique) utilized for this examination. Comparison:  No prior studies are available for comparison. Findings: Ventricles are enlarged, disproportionate to sulcal involutional changes and progressed from prior.  There are bilateral periventricular white matter lucencies which are nonspecific but compatible with microangiopathic change. There is no mass effect, midline shift, territorial infarction, acute hemorrhage or extra-axial fluid collection. Visualized paranasal sinuses and mastoid air cells are clear.    ULTRASOUND KIDNEYS / BLADDER    Result Date: 11/4/2021  IMPRESSION: 1.  Mild left hydronephrosis 2.  Complex cyst versus solid mass lower pole right kidney measuring up to 3.2 cm. Contrast-enhanced renal mass protocol CT or MRI is recommended for further evaluation. 3.   Debris within the bladder     X-RAY CHEST 1 VIEW    Result Date: 10/31/2021  IMPRESSION: No significant interval change. Probable minimal atelectasis or scarring bilateral lung bases.    Ultrasound venous arm right    Result Date: 11/4/2021  IMPRESSION: No upper extremity deep vein thrombosis.     CT UROGRAM WITH AND WITHOUT IV CONTRAST    Result Date: 11/4/2021  IMPRESSION: There is a bilateral large extrarenal pelvis bilaterally, right greater than left with segmental dilatation of ectatic ureters but no obstructing mass or calcification. There are numerous small parenchymal lesions in both kidneys, many small and difficult to characterize. 2 more prominent areas of subtle decreased attenuation on the right are noted and subtle masses or lesion certainly not excluded. There is a distended urinary bladder with numerous diverticula. Intraluminal mass at the left base is noted uncertain whether an extension of the prostate or separate intraluminal lesion, further evaluation advised. Multiple hepatic lesions are noted, many too small to characterize, the larger to reflect cysts. See comment above for delineation of findings. Results were made available to the clinical service at time of study dictation. In accordance with [place] Act 112,  the patient will receive a letter notifying them to follow up with their physician.    Telemetry/EKG:  NSR     ASSESSMENT & PLAN   Hyperlipidemia  Assessment & Plan  Continue home statin    Multiple myeloma (CMS/HCC)  Assessment & Plan  Currently on chemotherapy outpatient  - Curb side heme onc: hold revlimid iso infection, he will f/u with his oncologist after DC    COVID-19  Assessment & Plan  No current signs or sx's of acute infection  Continue home Advair  Positive test on admission    - Status post monoclonal antibody therapy    Chronic systolic heart failure (CMS/HCC)  Assessment & Plan  EF 40-45% on home lasix PRN  - appears euvolemic now  Recentrly saw Dr. Ellis who stopped  his metoprolo and changes his lasix from daily to PRN due to hypotension   - can give lasix PRN  - daily standing weight and accurate I/Os   - Cardiology following    Anemia  Assessment & Plan  Presents with Hb 9/1 b/l 10  Has known MM on chemotherapy which likely explains this  - CBC daily  - c/w chemo outpatient  - transfuse Hb <7  - hold home iron supplementation given acute infection UTI    CAD (coronary artery disease)  Assessment & Plan  Has known 50% LAD occlusion  CW home statin and  ASA    UTI (urinary tract infection)  Assessment & Plan  Uclx prior to admission c/w ESBL klebsiella MDRO including meropenem  Received 5 days bactrim PTP  U/A TNTC WBC, 3+ Bacteria, pos leuk esterase and nitrite  Lactate 2.1, Hypotensive with BP 90/54 improved to 113/64 today  No leukocytosis    - Treated with Avycaz per ID (requires 14 day course from time of negative culture 11/3) today day 12/14  - ID following  - fu repeat UClx and Bclx; repeat blood cultures x2 growing GNRs,repeat cxs NGTD  - IV team consulted for Midline placement  - pts BP runs chronically low; gentle fluids yesterday, Na remains low at 131  - s/p CT urogram; may need to repeat today, Uro following  - Maintain serrano for now; likely with uro outpatient follow up    Weakness  Assessment & Plan  Likely 2/2 UTI  - PT/OT  - plan as per UTI    BPH (benign prostatic hyperplasia)  Assessment & Plan  C/w home Flomax  Follws with urology Dr. Mathew       VTE Assessment: Padua    VTE Prophylaxis: Current anticoagulants:  heparin (porcine) 5,000 unit/mL injection 5,000 Units, subcutaneous, q8h JENNYFER      Code Status: Full Code  Estimated discharge date: 11/14/2021     ATTENDING DOCUMENTATION  ALSO SEE ATTENDING ATTESTATION SECTION OF NOTE

## 2021-11-15 LAB
ANION GAP SERPL CALC-SCNC: 9 MEQ/L (ref 3–15)
BASOPHILS # BLD: 0.14 K/UL (ref 0.01–0.1)
BASOPHILS NFR BLD: 1.2 %
BUN SERPL-MCNC: 12 MG/DL (ref 8–20)
CALCIUM SERPL-MCNC: 8.5 MG/DL (ref 8.9–10.3)
CHLORIDE SERPL-SCNC: 99 MEQ/L (ref 98–109)
CO2 SERPL-SCNC: 26 MEQ/L (ref 22–32)
CREAT SERPL-MCNC: 0.8 MG/DL (ref 0.8–1.3)
DIFFERENTIAL METHOD BLD: ABNORMAL
EOSINOPHIL # BLD: 0.12 K/UL (ref 0.04–0.54)
EOSINOPHIL NFR BLD: 1.1 %
ERYTHROCYTE [DISTWIDTH] IN BLOOD BY AUTOMATED COUNT: 18.6 % (ref 11.6–14.4)
GFR SERPL CREATININE-BSD FRML MDRD: >60 ML/MIN/1.73M*2
GLUCOSE SERPL-MCNC: 96 MG/DL (ref 70–99)
HCT VFR BLDCO AUTO: 27.1 % (ref 40.1–51)
HGB BLD-MCNC: 8.6 G/DL (ref 13.7–17.5)
IMM GRANULOCYTES # BLD AUTO: 0.26 K/UL (ref 0–0.08)
IMM GRANULOCYTES NFR BLD AUTO: 2.3 %
LYMPHOCYTES # BLD: 1.68 K/UL (ref 1.2–3.5)
LYMPHOCYTES NFR BLD: 15 %
MAGNESIUM SERPL-MCNC: 1.9 MG/DL (ref 1.8–2.5)
MCH RBC QN AUTO: 30 PG (ref 28–33.2)
MCHC RBC AUTO-ENTMCNC: 31.7 G/DL (ref 32.2–36.5)
MCV RBC AUTO: 94.4 FL (ref 83–98)
MONOCYTES # BLD: 1.06 K/UL (ref 0.3–1)
MONOCYTES NFR BLD: 9.4 %
NEUTROPHILS # BLD: 7.96 K/UL (ref 1.7–7)
NEUTS SEG NFR BLD: 71 %
NRBC BLD-RTO: 0 %
PDW BLD AUTO: 9.9 FL (ref 9.4–12.4)
PLATELET # BLD AUTO: 417 K/UL (ref 150–350)
POTASSIUM SERPL-SCNC: 5.3 MEQ/L (ref 3.6–5.1)
RBC # BLD AUTO: 2.87 M/UL (ref 4.5–5.8)
SODIUM SERPL-SCNC: 134 MEQ/L (ref 136–144)
WBC # BLD AUTO: 11.22 K/UL (ref 3.8–10.5)

## 2021-11-15 PROCEDURE — 63700000 HC SELF-ADMINISTRABLE DRUG: Performed by: STUDENT IN AN ORGANIZED HEALTH CARE EDUCATION/TRAINING PROGRAM

## 2021-11-15 PROCEDURE — 85025 COMPLETE CBC W/AUTO DIFF WBC: CPT | Performed by: STUDENT IN AN ORGANIZED HEALTH CARE EDUCATION/TRAINING PROGRAM

## 2021-11-15 PROCEDURE — 63600000 HC DRUGS/DETAIL CODE: Performed by: STUDENT IN AN ORGANIZED HEALTH CARE EDUCATION/TRAINING PROGRAM

## 2021-11-15 PROCEDURE — 80048 BASIC METABOLIC PNL TOTAL CA: CPT | Performed by: STUDENT IN AN ORGANIZED HEALTH CARE EDUCATION/TRAINING PROGRAM

## 2021-11-15 PROCEDURE — 25800000 HC PHARMACY IV SOLUTIONS: Performed by: INTERNAL MEDICINE

## 2021-11-15 PROCEDURE — 83735 ASSAY OF MAGNESIUM: CPT | Performed by: STUDENT IN AN ORGANIZED HEALTH CARE EDUCATION/TRAINING PROGRAM

## 2021-11-15 PROCEDURE — 21400000 HC ROOM AND CARE CCU/INTERMEDIATE

## 2021-11-15 PROCEDURE — 36415 COLL VENOUS BLD VENIPUNCTURE: CPT | Performed by: STUDENT IN AN ORGANIZED HEALTH CARE EDUCATION/TRAINING PROGRAM

## 2021-11-15 PROCEDURE — 63600000 HC DRUGS/DETAIL CODE: Mod: JG | Performed by: INTERNAL MEDICINE

## 2021-11-15 RX ADMIN — ROSUVASTATIN CALCIUM 10 MG: 10 TABLET, FILM COATED ORAL at 09:10

## 2021-11-15 RX ADMIN — CEFTAZIDIME, AVIBACTAM 2.5 G: 2; .5 POWDER, FOR SOLUTION INTRAVENOUS at 14:18

## 2021-11-15 RX ADMIN — Medication 1000 UNITS: at 09:09

## 2021-11-15 RX ADMIN — CEFTAZIDIME, AVIBACTAM 2.5 G: 2; .5 POWDER, FOR SOLUTION INTRAVENOUS at 06:01

## 2021-11-15 RX ADMIN — CYCLOSPORINE 1 DROP: 0.5 EMULSION OPHTHALMIC at 09:07

## 2021-11-15 RX ADMIN — Medication 1000 UNITS: at 20:10

## 2021-11-15 RX ADMIN — FERROUS SULFATE TAB 325 MG (65 MG ELEMENTAL FE) 325 MG: 325 (65 FE) TAB at 09:09

## 2021-11-15 RX ADMIN — ASPIRIN 81 MG: 81 TABLET, COATED ORAL at 09:09

## 2021-11-15 RX ADMIN — MAGNESIUM SULFATE HEPTAHYDRATE 2 G: 40 INJECTION, SOLUTION INTRAVENOUS at 10:24

## 2021-11-15 RX ADMIN — TAMSULOSIN HYDROCHLORIDE 0.4 MG: 0.4 CAPSULE ORAL at 09:10

## 2021-11-15 RX ADMIN — MOMETASONE FUROATE AND FORMOTEROL FUMARATE DIHYDRATE 2 PUFF: 200; 5 AEROSOL RESPIRATORY (INHALATION) at 09:08

## 2021-11-15 RX ADMIN — CEFTAZIDIME, AVIBACTAM 2.5 G: 2; .5 POWDER, FOR SOLUTION INTRAVENOUS at 22:12

## 2021-11-15 RX ADMIN — MOMETASONE FUROATE AND FORMOTEROL FUMARATE DIHYDRATE 2 PUFF: 200; 5 AEROSOL RESPIRATORY (INHALATION) at 20:09

## 2021-11-15 RX ADMIN — HEPARIN SODIUM 5000 UNITS: 5000 INJECTION INTRAVENOUS; SUBCUTANEOUS at 14:18

## 2021-11-15 RX ADMIN — CYCLOSPORINE 1 DROP: 0.5 EMULSION OPHTHALMIC at 20:10

## 2021-11-15 RX ADMIN — HEPARIN SODIUM 5000 UNITS: 5000 INJECTION INTRAVENOUS; SUBCUTANEOUS at 22:12

## 2021-11-15 RX ADMIN — HEPARIN SODIUM 5000 UNITS: 5000 INJECTION INTRAVENOUS; SUBCUTANEOUS at 05:43

## 2021-11-15 NOTE — PATIENT CARE CONFERENCE
Care Progression Rounds Note  Date: 11/15/2021  Time: 11:39 AM     Patient Name: Eulalio Gregg     Medical Record Number: [id number]   YOB: 1945  Sex: Male      Room/Bed: 0202    Admitting Diagnosis: Weakness [R53.1]  Elevated serum creatinine [R79.89]  Urinary tract infection associated with catheterization of urinary tract, unspecified indwelling urinary catheter type, initial encounter (CMS/[facility]) [T83.511A, N39.0]   Admit Date/Time: 10/31/2021 12:15 PM    Primary Diagnosis: Bacteremia due to Klebsiella pneumoniae  Principal Problem: Bacteremia due to Klebsiella pneumoniae    GMLOS: pending  Anticipated Discharge Date: 11/15/2021    AM-PAC:  Mobility Score: 19    Discharge Planning:  Current Living Arrangements: home  Anticipated Discharge Disposition: skilled nursing facility (Covid isolation removed, IV Avycaz completed tomorrow, barrier is now CRE isolation)  Type of Skilled Nursing Care Services: OT,PT    Barriers to Discharge:  Barriers to Discharge: Facility availability  Comment: No accepting SNFs; barriers include Covid+ status, CRE isolation, IV Avycaz    Participants:  ,physician,nursing,social work/services

## 2021-11-15 NOTE — PROGRESS NOTES
Internal Medicine  Daily Progress Note       SUBJECTIVE   This is a 76 y.o. year-old male admitted on 10/31/2021 with Weakness [R53.1]  Elevated serum creatinine [R79.89]  Urinary tract infection associated with catheterization of urinary tract, unspecified indwelling urinary catheter type, initial encounter (CMS/[facility]) [T83.511A, N39.0].    Interval History: NAEON. Feels well overall, denies cp, fever, chills. Mood is stable.      OBJECTIVE   Vital signs in last 24 hours:  Temp:  [36.3 °C (97.3 °F)-37.1 °C (98.7 °F)] 36.3 °C (97.3 °F)  Heart Rate:  [55-68] 62  Resp:  [16-20] 16  BP: ()/(46-57) 112/55  SpO2:  [96 %-99 %] 99 %  Oxygen Therapy: None (Room air)    Weight & I/Os:  Weights (last 5 days)     Date/Time Weight    11/15/21 0600 61 kg (134 lb 7.7 oz)    11/14/21 0700 61 kg (134 lb 7.7 oz)    11/13/21 0500 63.2 kg (139 lb 6.4 oz)    11/12/21 0524 64.9 kg (143 lb 1.3 oz)    11/11/21 0411 65.1 kg (143 lb 8.3 oz)    11/10/21 0452 65.1 kg (143 lb 9.2 oz)          Intake/Output Summary (Last 24 hours) at 11/15/2021 0659  Last data filed at 11/15/2021 0601  24 Hour Net Input/Output from 7AM Yesterday   Intake 260 ml   Output 850 ml   Net -590 ml        PHYSICAL EXAMINATION   Physical Exam  General: non toxic, alert, frail   HEENT: NC/AT/ anicteric sclera, pharynx clear   Cardiovascular: regular S1/S2, no mrg     Respiratory: crackles at bases R>L  GI/Abdomen: soft, NT/ND,  no peritoneal signs  Extremities: no  edema  MSK/JOINTS:  R pinky burn   Neurology and psych: no focal deficits, cooperative with exam  Skin: no rashes, lines clean dry intact, R midline   G.U.: serrano, clear urine   LINES, CATHETERS, DRAINS, AIRWAYS, & WOUNDS   Lines, drains, airways, & wounds:  Midline Catheter 11/08/21 0946 (Active)   Number of days: 7       Urethral Catheter 18 Fr (Active)   Number of days: 11        LABS, IMAGING, & TELE   Labs:      Results from last 7 days   Lab Units 11/15/21  0455 11/14/21  0418 11/13/21  0420   WBC  K/uL 11.22* 10.22 11.03*   HEMOGLOBIN g/dL 8.6* 9.1* 8.6*   HEMATOCRIT % 27.1* 28.5* 26.6*   PLATELETS K/uL 417* 423* 423*       Results from last 7 days   Lab Units 11/15/21  0456 11/14/21  0418 11/13/21  0420   SODIUM mEQ/L 134* 135* 133*   POTASSIUM mEQ/L 5.3* 5.0 5.0   CHLORIDE mEQ/L 99 99 96*   CO2 mEQ/L 26 26 23   BUN mg/dL 12 15 15   CREATININE mg/dL 0.8 0.8 0.7*   CALCIUM mg/dL 8.5* 8.7* 8.5*   GLUCOSE mg/dL 96 103* 84     Imaging personally reviewed (does not include unread studies):  CT HEAD WITHOUT IV CONTRAST    Result Date: 11/2/2021  IMPRESSION: 1. No acute intracranial hemorrhage, acute infarct in a major vascular territory or mass-effect. 2. Progression of ventriculomegaly which may be from central atrophy.  Normal pressure hydrocephalus can have a similar appearance. COMMENT: Axial noncontrast CT images of the head were obtained. Sagittal and coronal reconstructions were created. CT DOSE:  One or more dose reduction techniques (e.g. automated exposure control, adjustment of the mA and/or kV according to patient size, use of iterative reconstruction technique) utilized for this examination. Comparison:  No prior studies are available for comparison. Findings: Ventricles are enlarged, disproportionate to sulcal involutional changes and progressed from prior.  There are bilateral periventricular white matter lucencies which are nonspecific but compatible with microangiopathic change. There is no mass effect, midline shift, territorial infarction, acute hemorrhage or extra-axial fluid collection. Visualized paranasal sinuses and mastoid air cells are clear.    ULTRASOUND KIDNEYS / BLADDER    Result Date: 11/4/2021  IMPRESSION: 1.  Mild left hydronephrosis 2.  Complex cyst versus solid mass lower pole right kidney measuring up to 3.2 cm. Contrast-enhanced renal mass protocol CT or MRI is recommended for further evaluation. 3.  Debris within the bladder     X-RAY CHEST 1 VIEW    Result Date:  10/31/2021  IMPRESSION: No significant interval change. Probable minimal atelectasis or scarring bilateral lung bases.    Ultrasound venous arm right    Result Date: 11/4/2021  IMPRESSION: No upper extremity deep vein thrombosis.     CT UROGRAM WITH AND WITHOUT IV CONTRAST    Result Date: 11/4/2021  IMPRESSION: There is a bilateral large extrarenal pelvis bilaterally, right greater than left with segmental dilatation of ectatic ureters but no obstructing mass or calcification. There are numerous small parenchymal lesions in both kidneys, many small and difficult to characterize. 2 more prominent areas of subtle decreased attenuation on the right are noted and subtle masses or lesion certainly not excluded. There is a distended urinary bladder with numerous diverticula. Intraluminal mass at the left base is noted uncertain whether an extension of the prostate or separate intraluminal lesion, further evaluation advised. Multiple hepatic lesions are noted, many too small to characterize, the larger to reflect cysts. See comment above for delineation of findings. Results were made available to the clinical service at time of study dictation. In accordance with [place] Act 112,  the patient will receive a letter notifying them to follow up with their physician.    Telemetry/EKG:  NSR     ASSESSMENT & PLAN   Hyperlipidemia  Assessment & Plan  Continue home statin    Multiple myeloma (CMS/HCC)  Assessment & Plan  Currently on chemotherapy outpatient  - [facility] heme onc: hold revlimid iso infection, he will f/u with his oncologist after DC    COVID-19  Assessment & Plan  No current signs or sx's of acute infection  Continue home Advair  Positive test on admission    - Status post monoclonal antibody therapy    Chronic systolic heart failure (CMS/HCC)  Assessment & Plan  EF 40-45% on home lasix PRN  - appears euvolemic now  Recentrly saw Dr. Ellis who stopped his metoprolo and changes his lasix from daily to PRN due to  hypotension   - can give lasix PRN  - daily standing weight and accurate I/Os   - Cardiology following    Anemia  Assessment & Plan  Presents with Hb 9/1 b/l 10  Has known MM on chemotherapy which likely explains this  - CBC daily  - c/w chemo outpatient  - transfuse Hb <7  - hold home iron supplementation given acute infection UTI    CAD (coronary artery disease)  Assessment & Plan  Has known 50% LAD occlusion  CW home statin and  ASA    UTI (urinary tract infection)  Assessment & Plan  Uclx prior to admission c/w ESBL klebsiella MDRO including meropenem  Received 5 days bactrim PTP  U/A TNTC WBC, 3+ Bacteria, pos leuk esterase and nitrite  Lactate 2.1, Hypotensive with BP 90/54 improved to 113/64 today  No leukocytosis    - Treated with Avycaz per ID (requires 14 day course from time of negative culture 11/3) today day 13/14  - ID following  - fu repeat UClx and Bclx; repeat blood cultures x2 growing GNRs,repeat cxs NGTD  - IV team consulted for Midline placement  - pts BP runs chronically low; gentle fluids yesterday, Na remains low at 131  - s/p CT urogram; may need to repeat today, Uro following  - Maintain serrano for now; likely with uro outpatient follow up    Weakness  Assessment & Plan  Likely 2/2 UTI  - PT/OT  - plan as per UTI    BPH (benign prostatic hyperplasia)  Assessment & Plan  C/w home Flomax  Follws with urology Dr. Mathew       VTE Assessment: [place]    VTE Prophylaxis: Current anticoagulants:  heparin (porcine) 5,000 unit/mL injection 5,000 Units, subcutaneous, q8h [id number]      Code Status: Full Code  Estimated discharge date: 11/14/2021     ATTENDING DOCUMENTATION  ALSO SEE ATTENDING ATTESTATION SECTION OF NOTE

## 2021-11-15 NOTE — PLAN OF CARE
Problem: Adult Inpatient Plan of Care  Goal: Plan of Care Review  Outcome: Progressing  Flowsheets (Taken 11/15/2021 7890)  Progress: improving     Per information in medical rounds, Pt remains medically stable for d/c pending placement. PT/OT rec'd SNF. Covid isolation precautions removed as quarantine completed while admitted. MD states IV Avycaz to be completed tomorrow. Barrier to SNF placement is CRE isolation and recent Covid positive swab. Updated and new referrals made today; awaiting responses. SW to remain available for emotional support and d/c planning.    LAURENT Howard [phone]

## 2021-11-16 LAB
ANION GAP SERPL CALC-SCNC: 9 MEQ/L (ref 3–15)
BASOPHILS # BLD: 0.14 K/UL (ref 0.01–0.1)
BASOPHILS NFR BLD: 1.3 %
BUN SERPL-MCNC: 13 MG/DL (ref 8–20)
CALCIUM SERPL-MCNC: 8.7 MG/DL (ref 8.9–10.3)
CHLORIDE SERPL-SCNC: 99 MEQ/L (ref 98–109)
CO2 SERPL-SCNC: 27 MEQ/L (ref 22–32)
CREAT SERPL-MCNC: 0.7 MG/DL (ref 0.8–1.3)
DIFFERENTIAL METHOD BLD: ABNORMAL
EOSINOPHIL # BLD: 0.1 K/UL (ref 0.04–0.54)
EOSINOPHIL NFR BLD: 0.9 %
ERYTHROCYTE [DISTWIDTH] IN BLOOD BY AUTOMATED COUNT: 18.4 % (ref 11.6–14.4)
GFR SERPL CREATININE-BSD FRML MDRD: >60 ML/MIN/1.73M*2
GLUCOSE SERPL-MCNC: 91 MG/DL (ref 70–99)
HCT VFR BLDCO AUTO: 29.4 % (ref 40.1–51)
HGB BLD-MCNC: 9.4 G/DL (ref 13.7–17.5)
IMM GRANULOCYTES # BLD AUTO: 0.2 K/UL (ref 0–0.08)
IMM GRANULOCYTES NFR BLD AUTO: 1.9 %
LYMPHOCYTES # BLD: 1.45 K/UL (ref 1.2–3.5)
LYMPHOCYTES NFR BLD: 13.5 %
MAGNESIUM SERPL-MCNC: 2.2 MG/DL (ref 1.8–2.5)
MCH RBC QN AUTO: 29.8 PG (ref 28–33.2)
MCHC RBC AUTO-ENTMCNC: 32 G/DL (ref 32.2–36.5)
MCV RBC AUTO: 93.3 FL (ref 83–98)
MONOCYTES # BLD: 1 K/UL (ref 0.3–1)
MONOCYTES NFR BLD: 9.3 %
NEUTROPHILS # BLD: 7.86 K/UL (ref 1.7–7)
NEUTS SEG NFR BLD: 73.1 %
NRBC BLD-RTO: 0 %
PDW BLD AUTO: 9.7 FL (ref 9.4–12.4)
PLATELET # BLD AUTO: 396 K/UL (ref 150–350)
POTASSIUM SERPL-SCNC: 5.3 MEQ/L (ref 3.6–5.1)
RBC # BLD AUTO: 3.15 M/UL (ref 4.5–5.8)
SODIUM SERPL-SCNC: 135 MEQ/L (ref 136–144)
WBC # BLD AUTO: 10.75 K/UL (ref 3.8–10.5)

## 2021-11-16 PROCEDURE — 83735 ASSAY OF MAGNESIUM: CPT | Performed by: STUDENT IN AN ORGANIZED HEALTH CARE EDUCATION/TRAINING PROGRAM

## 2021-11-16 PROCEDURE — 97535 SELF CARE MNGMENT TRAINING: CPT | Mod: GO

## 2021-11-16 PROCEDURE — 25800000 HC PHARMACY IV SOLUTIONS: Performed by: INTERNAL MEDICINE

## 2021-11-16 PROCEDURE — 80048 BASIC METABOLIC PNL TOTAL CA: CPT | Performed by: STUDENT IN AN ORGANIZED HEALTH CARE EDUCATION/TRAINING PROGRAM

## 2021-11-16 PROCEDURE — 63600000 HC DRUGS/DETAIL CODE: Performed by: STUDENT IN AN ORGANIZED HEALTH CARE EDUCATION/TRAINING PROGRAM

## 2021-11-16 PROCEDURE — 25800000 HC PHARMACY IV SOLUTIONS: Performed by: NURSE PRACTITIONER

## 2021-11-16 PROCEDURE — 21400000 HC ROOM AND CARE CCU/INTERMEDIATE

## 2021-11-16 PROCEDURE — 63700000 HC SELF-ADMINISTRABLE DRUG: Performed by: INTERNAL MEDICINE

## 2021-11-16 PROCEDURE — 63600000 HC DRUGS/DETAIL CODE: Mod: JG | Performed by: INTERNAL MEDICINE

## 2021-11-16 PROCEDURE — 85025 COMPLETE CBC W/AUTO DIFF WBC: CPT | Performed by: STUDENT IN AN ORGANIZED HEALTH CARE EDUCATION/TRAINING PROGRAM

## 2021-11-16 PROCEDURE — 63600000 HC DRUGS/DETAIL CODE: Mod: JG | Performed by: NURSE PRACTITIONER

## 2021-11-16 PROCEDURE — 97116 GAIT TRAINING THERAPY: CPT | Mod: GP

## 2021-11-16 PROCEDURE — 63700000 HC SELF-ADMINISTRABLE DRUG: Performed by: STUDENT IN AN ORGANIZED HEALTH CARE EDUCATION/TRAINING PROGRAM

## 2021-11-16 PROCEDURE — 36415 COLL VENOUS BLD VENIPUNCTURE: CPT | Performed by: STUDENT IN AN ORGANIZED HEALTH CARE EDUCATION/TRAINING PROGRAM

## 2021-11-16 RX ADMIN — CEFTAZIDIME, AVIBACTAM 2.5 G: 2; .5 POWDER, FOR SOLUTION INTRAVENOUS at 14:29

## 2021-11-16 RX ADMIN — TAMSULOSIN HYDROCHLORIDE 0.4 MG: 0.4 CAPSULE ORAL at 09:25

## 2021-11-16 RX ADMIN — MOMETASONE FUROATE AND FORMOTEROL FUMARATE DIHYDRATE 2 PUFF: 200; 5 AEROSOL RESPIRATORY (INHALATION) at 09:23

## 2021-11-16 RX ADMIN — POLYETHYLENE GLYCOL 3350 17 GRAM ORAL POWDER PACKET 17 G: at 19:24

## 2021-11-16 RX ADMIN — Medication 1000 UNITS: at 09:26

## 2021-11-16 RX ADMIN — CYCLOSPORINE 1 DROP: 0.5 EMULSION OPHTHALMIC at 09:24

## 2021-11-16 RX ADMIN — ROSUVASTATIN CALCIUM 10 MG: 10 TABLET, FILM COATED ORAL at 09:25

## 2021-11-16 RX ADMIN — CEFTAZIDIME, AVIBACTAM 2.5 G: 2; .5 POWDER, FOR SOLUTION INTRAVENOUS at 22:03

## 2021-11-16 RX ADMIN — HEPARIN SODIUM 5000 UNITS: 5000 INJECTION INTRAVENOUS; SUBCUTANEOUS at 05:07

## 2021-11-16 RX ADMIN — HEPARIN SODIUM 5000 UNITS: 5000 INJECTION INTRAVENOUS; SUBCUTANEOUS at 14:22

## 2021-11-16 RX ADMIN — ASPIRIN 81 MG: 81 TABLET, COATED ORAL at 09:26

## 2021-11-16 RX ADMIN — Medication 1000 UNITS: at 19:23

## 2021-11-16 RX ADMIN — CYCLOSPORINE 1 DROP: 0.5 EMULSION OPHTHALMIC at 19:24

## 2021-11-16 RX ADMIN — MOMETASONE FUROATE AND FORMOTEROL FUMARATE DIHYDRATE 2 PUFF: 200; 5 AEROSOL RESPIRATORY (INHALATION) at 19:24

## 2021-11-16 RX ADMIN — CEFTAZIDIME, AVIBACTAM 2.5 G: 2; .5 POWDER, FOR SOLUTION INTRAVENOUS at 09:27

## 2021-11-16 ASSESSMENT — COGNITIVE AND FUNCTIONAL STATUS - GENERAL
AFFECT: FLAT/BLUNTED AFFECT
WALKING IN HOSPITAL ROOM: 3 - A LITTLE
HELP NEEDED FOR BATHING: 3 - A LITTLE
AFFECT: FLAT/BLUNTED AFFECT
EATING MEALS: 4 - NONE
MOVING TO AND FROM BED TO CHAIR: 3 - A LITTLE
CLIMB 3 TO 5 STEPS WITH RAILING: 3 - A LITTLE
HELP NEEDED FOR PERSONAL GROOMING: 3 - A LITTLE
DRESSING REGULAR UPPER BODY CLOTHING: 3 - A LITTLE
STANDING UP FROM CHAIR USING ARMS: 3 - A LITTLE
TOILETING: 3 - A LITTLE
DRESSING REGULAR LOWER BODY CLOTHING: 3 - A LITTLE

## 2021-11-16 NOTE — PLAN OF CARE
Problem: Adult Inpatient Plan of Care  Goal: Plan of Care Review  Outcome: Progressing  Flowsheets (Taken 11/16/2021 4174)  Progress: improving  Plan of Care Reviewed With:   patient   daughter     Per information in medical rounds, Pt remains medically stable for d/c pending placement. PT/OT continue to rec SNF. Pt was Covid+ on 10/31 and fully vaccinated. Covid isolation removed as quarantine completed during admission. IV Avycaz course completed today. Updated SNF referrals sent. Pt accepted at [facility], [facility], and [facility]. [facility] and [facility] actively reviewing as well. SW provided list of facilities to Pt's daughter (Talli: [phone]) for review. SW to remain available for emotional support and d/c planning.    LAURENT Howard [phone]

## 2021-11-16 NOTE — PROGRESS NOTES
Patient:  Eulalio Gregg  Location:  [address] [place]  MRN:  [id number]  Today's date:  11/16/2021    Pt received supine.  Pt left in chair on sheet and incontinence pad with alarm on.  Needs in place and call bell in reach.  RN aware.    Eulalio is a 76 y.o. male admitted on 10/31/2021 with Weakness [R53.1]  Elevated serum creatinine [R79.89]  Urinary tract infection associated with catheterization of urinary tract, unspecified indwelling urinary catheter type, initial encounter (CMS/[facility]) [T83.511A, N39.0]. Principal problem is Weakness.    Past Medical History  Eulalio has a past medical history of Anemia, Blepharospasm, BPH (benign prostatic hyperplasia), COVID-19 (08/21/2021), History of vertigo (2016), Lipid disorder, and Tendency toward bleeding easily (CMS/[facility]).    History of Present Illness   Admitted with weakness, +COVID  11/16- COVID isolation removed 11/15 as pt finished quarantine during hospital stay      OT Vitals    Date/Time Pulse SpO2 Pt Activity O2 Therapy BP [facility]   11/16/21 0859 65 95 % At rest None (Room air) 92/65 CH      OT Pain    Date/Time Pain Type Rating: Rest Rating: Activity Rating: Rest Rating: Activity Interventions [facility]   11/16/21 0859 -- 0 0 -- -- --    11/16/21 0914 Pain Assessment -- -- 0 - no pain 0 - no pain care clustered;pain management plan reviewed with patient/caregiver;quiet environment facilitated KRG          Prior Living Environment      Most Recent Value   Current Living Arrangements home   Living Environment Comment ML with spouse, 3STE with FF to bed/bath        Prior Level of Function      Most Recent Value   Ambulation independent   Transferring independent   Toileting independent   Bathing independent   Dressing independent   Eating independent   Prior Level of Function Comment pt reports ind PTA without use of AD,  started to use a SPC before admissions   Assistive Device Currently Used at Home cane, straight        Occupational  Profile      Most Recent Value   Reason for Services/Referral COVID19+,  dispo recs   Occupational History/Life Experiences Retired,  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 Dennison     Patient/Family/Caregiver Comments/Observations RN cleared pt for OT tx     General Observations of Patient Pt rec'd resting in bed     Existing Precautions/Restrictions fall;contact     Limitations/Impairments safety/cognitive        Cognition/Psychosocial    Affect/Mental Status (Cognition) flat/blunted affect     Orientation Status (Cognition) oriented x 3   with use of white board in room    Follows Commands (Cognition) follows two-step commands;delayed response/completion;increased processing time needed;physical/tactile prompts required;repetition of directions required;verbal cues/prompting required     Executive Function Deficit (Cognition) moderate deficit     Safety Deficit (Cognition) minimal deficit     Comment, Cognition word finding deficits and mild aphasia noted        Bed Mobility    [place], Supine to Sit minimum assist (75% or more patient effort)     Assistive Device head of bed elevated        Transfers    Transfers stand pivot transfer        Sit to Stand Transfer    [place], Sit to Stand Transfer minimum assist (75% or more patient effort)     Verbal Cues hand placement;safety;technique     Assistive Device walker, front-wheeled        Stand to Sit Transfer    [place], Stand to Sit Transfer close supervision     Verbal Cues hand placement;safety;technique     Assistive Device walker, front-wheeled        Stand  Pivot Transfer    [place], Stand Pivot/Stand Step Transfer minimum assist (75% or more patient effort)     Verbal Cues safety;technique;proper use of assistive device     Assistive Device walker, front-wheeled     Comment VCs and physical assistance provided for walker mgmt        Safety Issues, Functional Mobility    Safety Issues Affecting Function (Mobility) ability to follow commands;awareness of need for assistance;insight into deficits/self-awareness;judgment;positioning of assistive device;safety precaution awareness;sequencing abilities     Impairments Affecting Function (Mobility) balance;cognition;endurance/activity tolerance;strength;motor planning        Balance    Static Sitting Balance WFL     Dynamic Sitting Balance mild impairment     Static Standing Balance mild impairment     Dynamic Standing Balance mild impairment        Grooming    Self-Performance oral care (brushing teeth, cleaning dentures)     [place] close supervision     Position sink side;supported standing     Adaptive Equipment none     Comment LUE on sink for balance and Close SUP provided by therapist for safety.  Increased amount of time to complete and VCs provided for locating items (paper towel, toothbrush)        AM-PAC (TM) - ADL (Current Function)    Putting on and taking off regular lower body clothing? 3 - A Little     Bathing? 3 - A Little     Toileting? 3 - A Little     Putting on/taking off regular upper body clothing? 3 - A Little     How much help for taking care of personal grooming? 3 - A Little     Eating meals? 4 - None     AM-PAC (TM) ADL Score 19        Assessment/Plan (OT)    Daily Outcome Statement OT tx completed.  Pt continues to require Close SUP/Min A for transfers and Min A for funx mob as pt requires assistance for walker mgmt.  Close SUP for oral hygiene while standing at sink side.  Pt continues to demonstrate mild confusion with word finding difficulties and mild aphasia.  Cont to follow  acutely and rec SNF.     Rehab Potential good, to achieve stated therapy goals     Therapy Frequency 3-5 times/wk               OT Assessment/Plan      Most Recent Value   OT Recommended Discharge Disposition skilled nursing facility at 11/16/2021 0859   Anticipated Equipment Needs At Discharge (OT) other (see comments)  [TBD at next level of care] at 11/16/2021 0859   Patient/Family Therapy Goal Statement To get better at 11/02/2021 1201                      OT Goals      Most Recent Value   Bed Mobility Goal 1    Activity/Assistive Device bed mobility activities, all at 11/02/2021 1201   [place] independent at 11/02/2021 1201   Time Frame short-term goal (STG) at 11/02/2021 1201   Progress/Outcome goal ongoing at 11/02/2021 1201   Transfer Goal 1    Activity/Assistive Device all transfers at 11/02/2021 1201   [place] independent at 11/02/2021 1201   Time Frame short-term goal (STG) at 11/02/2021 1201   Progress/Outcome goal ongoing at 11/02/2021 1201   Dressing Goal 1    Activity/Adaptive Equipment dressing skills, all at 11/02/2021 1201   [place] independent at 11/02/2021 1201   Time Frame short-term goal (STG) at 11/02/2021 1201   Progress/Outcome goal ongoing at 11/02/2021 1201   Toileting Goal 1    Activity/Assistive Device toileting skills, all at 11/02/2021 1201   [place] independent at 11/02/2021 1201   Time Frame short-term goal (STG) at 11/02/2021 1201   Progress/Outcome goal ongoing at 11/02/2021 1201   Grooming Goal 1    Activity/Assistive Device grooming skills, all at 11/02/2021 1201   [place] independent at 11/02/2021 1201   Time Frame short-term goal (STG) at 11/02/2021 1201   Progress/Outcome goal ongoing at 11/02/2021 1201

## 2021-11-16 NOTE — PROGRESS NOTES
Patient: Eulalio Gregg  [facility]  [place] [address] [facility]  MRN:  [id number]  Today's date:  11/16/2021    Pt left in bedside chair, alarmed, with call bell and personal items within reach. RN informed of session completed.     Eulalio is a 76 y.o. male admitted on 10/31/2021 with Weakness [R53.1]  Elevated serum creatinine [R79.89]  Urinary tract infection associated with catheterization of urinary tract, unspecified indwelling urinary catheter type, initial encounter (CMS/[facility]) [T83.511A, N39.0]. Principal problem is Weakness.    Past Medical History  Eulalio has a past medical history of Anemia, Blepharospasm, BPH (benign prostatic hyperplasia), COVID-19 (08/21/2021), History of vertigo (2016), Lipid disorder, and Tendency toward bleeding easily (CMS/[facility]).    History of Present Illness   Admitted with weakness, +COVID  11/16- COVID isolation removed 11/15 as pt finished quarantine during hospital stay      PT Vitals    Date/Time Pulse SpO2 Pt Activity O2 Therapy BP BP Method Pt Position [facility]   11/16/21 0901 66 97 % At rest None (Room air) 95/62 Automatic Lying K   11/16/21 0914 60 -- -- -- -- -- -- KRG      PT Pain    Date/Time Pain Type Rating: Rest Rating: Activity Interventions [facility]   11/16/21 0901 Pain Assessment 0 - no pain 0 - no pain -- CBK   11/16/21 0914 Pain Assessment 0 - no pain 0 - no pain care clustered;pain management plan reviewed with patient/caregiver;quiet environment facilitated KRG          Prior Living Environment      Most Recent Value   Current Living Arrangements home   Living Environment Comment ML with spouse, 3STE with FF to bed/bath        Prior Level of Function      Most Recent Value   Ambulation independent   Transferring independent   Toileting independent   Bathing independent   Dressing independent   Eating independent   Prior Level of Function Comment pt reports ind PTA without use of AD,  started to use a SPC before admissions   Assistive Device  Currently Used at Home cane, straight           PT Evaluation and Treatment - 11/16/21 0901        PT Time Calculation    Start Time 0901     Stop Time 0915     Time Calculation (min) 14 min        Session Details    Document Type daily treatment/progress note     Mode of Treatment physical therapy        General Information    Patient Profile Reviewed yes     Existing Precautions/Restrictions contact;fall   CRE, ESBL    Limitations/Impairments safety/cognitive        Cognition/Psychosocial    Affect/Mental Status (Cognition) flat/blunted affect     Orientation Status (Cognition) oriented to;person;place;situation     Follows Commands (Cognition) delayed response/completion;increased processing time needed;follows one-step commands;50-74% accuracy;initiation impaired;physical/tactile prompts required;repetition of directions required;verbal cues/prompting required     Cognitive Function executive function deficit     Executive Function Deficit (Cognition) minimal deficit;insight/awareness of deficits;information processing     Safety Deficit (Cognition) minimal deficit;insight into deficits/self-awareness;problem-solving;safety precautions awareness     Comment, Cognition improved cognition from previous session but continues to have word finding issues/difficulty with finishing conversations        Bed Mobility    [place], Supine to Sit minimum assist (75% or more patient effort)     Comment (Bed Mobility) inc effort/time to complete task        Sit to Stand Transfer    [place], Sit to Stand Transfer minimum assist (75% or more patient effort);1 person assist     Verbal Cues safety;technique     Assistive Device walker, front-wheeled        Stand to Sit Transfer    [place], Stand to Sit Transfer minimum assist (75% or more patient effort);1 person assist     Verbal Cues safety;technique     Assistive Device walker, front-wheeled        Gait Training    [place], Gait minimum assist (75% or more  patient effort);1 person assist     Assistive Device walker, front-wheeled     Distance in Feet 30 feet     Pattern (Gait) step-through     Deviations/Abnormal Patterns (Gait) gait speed decreased     Comment (Gait/Stairs) amb within room followed by standing balance in bathroom for IADL        Stairs Training    [place], Stairs not tested        Safety Issues, Functional Mobility    Safety Issues Affecting Function (Mobility) ability to follow commands;insight into deficits/self-awareness;problem-solving;safety precaution awareness     Impairments Affecting Function (Mobility) balance;cognition;strength        Balance    Static Sitting Balance WFL     Dynamic Sitting Balance mild impairment     Static Standing Balance mild impairment     Dynamic Standing Balance mild impairment        AM-PAC (TM) - Mobility (Current Function)    Turning from your back to your side while in a flat bed without using bedrails? 4 - None     Moving from lying on your back to sitting on the side of a flat bed without using bedrails? 3 - A Little     Moving to and from a bed to a chair? 3 - A Little     Standing up from a chair using your arms? 3 - A Little     To walk in a hospital room? 3 - A Little     Climbing 3-5 steps with a railing? 3 - A Little     AM-PAC (TM) Mobility Score 19        Assessment/Plan (PT)    Daily Outcome Statement pt continues to req min assist for mobility, dec insight into deficits, +fall risk, anticipate SNF at [place]     Rehab Potential good, to achieve stated therapy goals     Therapy Frequency 3-5 times/wk     Planned Therapy Interventions balance training;bed mobility training;gait training;transfer training               PT Assessment/Plan      Most Recent Value   PT Recommended Discharge Disposition skilled nursing facility at 11/16/2021 0901   Anticipated Equipment Needs at Discharge (PT) other (see comments)  [TBD] at 11/16/2021 0901   Patient/Family Therapy Goals Statement none stated at 11/16/2021  0901                    Education Documentation  Fall Prevention, taught by Ralph Joiner, PT at 11/16/2021  1:24 PM.  Learner: Patient  Readiness: Acceptance  Method: Explanation, Demonstration  Response: Verbalizes Understanding, Demonstrated Understanding, Needs Reinforcement  Comment: PT POC, energy conservaioin, safety with mobility          PT Goals      Most Recent Value   Bed Mobility Goal 1    Activity/Assistive Device rolling to left, rolling to right, supine to sit, sit to supine at 11/01/2021 1237   [place] independent at 11/01/2021 1237   Time Frame by discharge at 11/01/2021 1237   Progress/Outcome goal ongoing at 11/01/2021 1237   Transfer Goal 1    Activity/Assistive Device bed-to-chair/chair-to-bed, sit-to-stand/stand-to-sit at 11/01/2021 1237   [place] independent at 11/01/2021 1237   Time Frame by discharge at 11/01/2021 1237   Progress/Outcome goal ongoing at 11/01/2021 1237   Gait Training Goal 1    Activity/Assistive Device gait (walking locomotion), walker, front-wheeled at 11/01/2021 1237   [place] supervision required at 11/01/2021 1237   Distance 100 at 11/01/2021 1237   Time Frame by discharge at 11/01/2021 1237   Progress/Outcome goal ongoing at 11/01/2021 1237

## 2021-11-16 NOTE — PATIENT CARE CONFERENCE
Care Progression Rounds Note  Date: 11/16/2021  Time: 11:16 AM     Patient Name: Eulalio Gregg     Medical Record Number: [id number]   YOB: 1945  Sex: Male      Room/Bed: 0202    Admitting Diagnosis: Weakness [R53.1]  Elevated serum creatinine [R79.89]  Urinary tract infection associated with catheterization of urinary tract, unspecified indwelling urinary catheter type, initial encounter (CMS/[facility]) [T83.511A, N39.0]   Admit Date/Time: 10/31/2021 12:15 PM    Primary Diagnosis: Bacteremia due to Klebsiella pneumoniae  Principal Problem: Bacteremia due to Klebsiella pneumoniae    GMLOS: pending  Anticipated Discharge Date: 11/16/2021    AM-PAC:  Mobility Score: 19    Discharge Planning:  Current Living Arrangements: home  Anticipated Discharge Disposition: skilled nursing facility  Type of Skilled Nursing Care Services: OT,PT    Barriers to Discharge:  Barriers to Discharge: Facility availability  Comment: No accepting SNFs, now off Covid+ precautions, done IV Avycaz today, updated referrals sent    Participants:  ,physician,social work/services

## 2021-11-16 NOTE — PROGRESS NOTES
"Daily Progress Note              Subjective    Patient received in transfer from Teaching to Non-Teaching Service today. Discussed with Dr. Dennison at the time of transfer.     Patient seen and examined today, resting in bed. Tells me he was up OOB all morning in chair and just recently got back in bed. He offers no acute physical complaints and is anxious to determine next steps with regard to SNF placement. He states his appetite is \"good\" and he denies chills. Denies cough, chest pain, or dyspnea. Denies palpitations or dizziness. Denies abdominal pain, flank pain, or back pain. Denies nausea/vomiting or diarrhea. Mancia has remained in place and is draining without complication: patient denies pain at Mancia insertion site.     D/w CC on rounds today: await updates on status of new SNF referrals sent this AM.    Interval History: This is a 76 y.o. male with a past medical history of HFrEF (LVEF 40-45%, 8/23/21), CAD, HTN, HLP, Multiple Myeloma, BPH, and who [place] [facility] [place] with generalized weakness and fatigue and outpatient diagnosed UTI. Outpatient urine culture revealed MDR ESBL Klebsiella sensitive to Bactrim, which was started by his PCP prior to admission. Repeat UCx on admission revealed the same organism, and further infectious work-up demonstrated a Klebsiella bacteremia. He was evaluated by ID and is now completing a 14 day course of Avycaz (end date 11/16/2021 after 3rd dose given).   He was followed by Urology with persistent bacteremia from  source, with concern for structural cause: CT Urogram demonstrated hydronephrosis, and so Mancia catheter was placed. As his bacteremia ultimately improved with IV antibiotics, Urology has recommended leaving Mancia catheter in place until outpatient follow-up with his Urologist Dr. Mathew for void trial.   On admission, patient was positive for COVID-19 (with history of known infection in August 2021 s/p hospitalization in [place]; he has completed 2 " doses of the Moderna vaccine as well as a Booster vaccine). He received monoclonal antibody infusion while inpatient on 11/1/21. He is now off COVID isolation precautions having completed the required isolation period while inpatient.   Given multiple recent hospitalizations and acute illness leading to deconditioning, patient was evaluated by PT/OT and recommended for SNF placement. Social Work is following for placement needs; patient has unfortunately remained inpatient with inability to place due to multiple clinical factors resulting in denial from several facilities to date.     Objective     Vital signs in last 24 hours:  Temp:  [36.1 °C (96.9 °F)-36.4 °C (97.6 °F)] 36.3 °C (97.3 °F)  Heart Rate:  [57-70] 70  Resp:  [16] 16  BP: ()/(39-66) 108/66      Intake/Output Summary (Last 24 hours) at 11/16/2021 1004  Last data filed at 11/16/2021 0600  Gross per 24 hour   Intake --   Output 1500 ml   Net -1500 ml         Current Facility-Administered Medications:   •  aspirin enteric coated tablet 81 mg, 81 mg, oral, Daily, Daniel White MD, 81 mg at 11/16/21 0926  •  atropine injection 0.5 mg, 0.5 mg, intravenous, q5 min PRN, Daniel White MD  •  cefTAZidime-avibactam (AVYCAZ) 2.5 g in sodium chloride 0.9 % 50 mL IVPB, 2.5 g, intravenous, q8h INT, Avani Keen MD, Last Rate: 25 mL/hr at 11/16/21 0927, 2.5 g at 11/16/21 0927  •  cholecalciferol (vitamin D3) tablet 1,000 Units, 1,000 Units, oral, BID, Daniel White MD, 1,000 Units at 11/16/21 0926  •  cycloSPORINE (RESTASIS) 0.05 % ophthalmic emulsion 1 drop, 1 drop, Both Eyes, BID, Daniel White MD, 1 drop at 11/16/21 0924  •  glucose chewable tablet 16-32 g of dextrose, 16-32 g of dextrose, oral, PRN **OR** dextrose 40 % oral gel 15-30 g of dextrose, 15-30 g of dextrose, oral, PRN **OR** glucagon (GLUCAGEN) injection 1 mg, 1 mg, intramuscular, PRN **OR** dextrose in water injection 12.5 g, 25 mL, intravenous, PRN, Daniel White MD  •  ferrous sulfate  tablet 325 mg, 325 mg, oral, Every other day, Daniel White MD, 325 mg at 11/15/21 0909  •  heparin (porcine) 5,000 unit/mL injection 5,000 Units, 5,000 Units, subcutaneous, q8h JENNYFER, Daniel White MD, 5,000 Units at 11/16/21 0507  •  [Provider Managed Hold] lenalidomide (REVLIMID) capsule 25 mg, 25 mg, oral, Daily, Daniel White MD  •  mometasone-formoterol (DULERA 200) 200-5 mcg/actuation inhaler 2 puff, 2 puff, inhalation, BID, Daniel White MD, 2 puff at 11/16/21 0923  •  nitroglycerin (NITROSTAT) SL tablet 0.4 mg, 0.4 mg, sublingual, q5 min PRN, Daniel White MD  •  polyethylene glycol (MIRALAX) 17 gram packet 17 g, 17 g, oral, BID, Avani Keen MD, 17 g at 11/14/21 0953  •  rosuvastatin (CRESTOR) tablet 10 mg, 10 mg, oral, Daily, Daniel White MD, 10 mg at 11/16/21 0925  •  tamsulosin (FLOMAX) 24 hr ER capsule 0.4 mg, 0.4 mg, oral, Daily, Daniel White MD, 0.4 mg at 11/16/21 0925    Physical Exam  Vitals and nursing note reviewed.   Constitutional:       General: He is not in acute distress.     Appearance: He is ill-appearing (chronically). He is not toxic-appearing or diaphoretic.   HENT:      Head: Normocephalic and atraumatic.      Nose: No congestion or rhinorrhea.      Mouth/Throat:      Mouth: Mucous membranes are moist.   Eyes:      General: No scleral icterus.  Cardiovascular:      Rate and Rhythm: Normal rate and regular rhythm.   Pulmonary:      Effort: Pulmonary effort is normal.      Comments: No respiratory distress on RA; +fine bibasilar crackles  Abdominal:      General: Bowel sounds are normal. There is no distension.      Palpations: Abdomen is soft.      Tenderness: There is no abdominal tenderness. There is no right CVA tenderness, left CVA tenderness, guarding or rebound.   Genitourinary:     Comments: Mancia catheter in place draining clear yellow urine  Musculoskeletal:      Right lower leg: No edema.      Left lower leg: No edema.   Skin:     General: Skin is warm and dry.       Coloration: Skin is pale.   Neurological:      General: No focal deficit present.      Mental Status: He is alert and oriented to person, place, and time.   Psychiatric:         Mood and Affect: Mood normal.         Behavior: Behavior normal.       Data Review  Labs  Lab Results   Component Value Date    WBC 10.75 (H) 11/16/2021    HGB 9.4 (L) 11/16/2021    HCT 29.4 (L) 11/16/2021     (H) 11/16/2021    CHOL 126 05/28/2021    TRIG 62 05/28/2021    HDL 43 (L) 05/28/2021    ALT 34 10/31/2021    AST 37 10/31/2021     (L) 11/16/2021    K 5.3 (H) 11/16/2021    CL 99 11/16/2021    CREATININE 0.7 (L) 11/16/2021    BUN 13 11/16/2021    CO2 27 11/16/2021    TSH 2.23 12/12/2017    PSA 1.55 03/17/2021    INR 1.0 03/19/2019     Pertinent labs  Na 135  K 5.3  Bicarb 27  BUN 13  Cr 0.7  Glu 91  Mg 2.2    WBC 10.75  Hgb 9.4      UA (10/31): +3 Leuk esterase, +Nitrites, +1 Protein, +2 blood  5-9 RBC, TNTC WBC, +3 Bacteria    Micro:  UCx (10/31): ESBL Kleb pneumoniae (S. Amikacin, Gent, Tetracycline, Tobramycin, Bactrim)    BCx (10/31): 2/4+ ESBL Kleb pneumoniae (S. Amikacin, Gent, Tetracycline, Tobramycin, Bactrim)  BCx (11/1): 2/4+ ESBL Kleb pneumoniae  BCx (11/3): No growth at 120 hours.    SARS-CoV-2 positive 10/31/21    Imaging  CXR 1v, 10/31/2021  CLINICAL HISTORY: Weakness.     COMPARISON: 11/2/2015     COMMENT: AP view of the chest.     No significant interval change. Linear lung markings bilateral lung bases, not  significantly changed, probable atelectasis or scarring. Heart size within  normal limits. Mediastinal contours appear unremarkable. Osseous structures are  intact.     --  IMPRESSION: No significant interval change. Probable minimal atelectasis or  scarring bilateral lung bases.    -----  CT Head without IV contrast, 11/1/2021  STUDY:  CT of the Brain without contrast     CLINICAL HISTORY: Change in mental status     --  IMPRESSION:  1. No acute intracranial hemorrhage, acute infarct in a major  vascular territory  or mass-effect.  2. Progression of ventriculomegaly which may be from central atrophy.  Normal  pressure hydrocephalus can have a similar appearance.     COMMENT: Axial noncontrast CT images of the head were obtained. Sagittal and  coronal reconstructions were created.     CT DOSE:  One or more dose reduction techniques (e.g. automated exposure  control, adjustment of the mA and/or kV according to patient size, use of  iterative reconstruction technique) utilized for this examination.     Comparison:  No prior studies are available for comparison.     Findings: Ventricles are enlarged, disproportionate to sulcal involutional  changes and progressed from prior.  There are bilateral periventricular white  matter lucencies which are nonspecific but compatible with microangiopathic  change. There is no mass effect, midline shift, territorial infarction, acute  hemorrhage or extra-axial fluid collection. Visualized paranasal sinuses and  mastoid air cells are clear.    -----  Kidney/Bladder US, 11/4/2021  STUDY:   Real-time retroperitoneal ultrasound with attention to the kidneys and  bladder.     CLINICAL HISTORY:   rule out obstruction, hydro     COMPARISON: None.     COMMENT:     RIGHT KIDNEY:  Size: 10.2 cm in length.  Cysts/Masses:  There is a complex cyst versus solid mass at the lower pole of  the right kidney measuring 2.5 x 3.9 x 3.5 cm. Peripelvic right renal cysts are  present.  Echogenicity:  Within normal limits.  Calculi:  None.  Hydronephrosis: No hydronephrosis.     LEFT KIDNEY:  Size: 11.2 cm in length.  Cysts/Masses:  None.  Echogenicity:  Within normal limits.  Calculi:  None.  Hydronephrosis: Mild.     BLADDER: Debris layers within the bladder. Right posterolateral bladder  diverticulum with layering debris.           --  IMPRESSION:     1.  Mild left hydronephrosis  2.  Complex cyst versus solid mass lower pole right kidney measuring up to 3.2  cm. Contrast-enhanced renal mass  protocol CT or MRI is recommended for further  evaluation.  3.  Debris within the bladder     -----  CT Urogram, 11/4/2021  CLINICAL HISTORY: Recent ultrasound imaging revealed mild left hydronephrosis  and a complex cyst versus solid mass was suggested at the inferior pole of the  right kidney.     COMMENT: Pre and post enhanced axial imaging was obtained of the abdomen and  pelvis according to CT angiogram protocol. For the enhanced component the  patient received 120 cc of intravenous Omnipaque 350. No oral contrast was given  as per protocol. Coronal and sagittal images were generated. There is no  previous CT study at this facility for comparison.     CT DOSE:  One or more dose reduction techniques (e.g. automated exposure  control, adjustment of the mA and/or kV according to patient size, use of  iterative reconstruction technique) utilized for this examination.     Lung bases demonstrate marked motion artifact. There is patchy and scattered  parenchymal abnormality particularly peripherally suggesting scarring and  atelectasis. A small punctate granuloma projects at the lateral right lung base.  Included cardiac chambers are within normal limits. There is no significant  pericardial fluid.     On noncontrast imaging there is no renal calcification and no ureteral  calcification. Numerous  radiodensities in the pelvis project posterior and  anterior to the urinary bladder base not thought to be intraluminal.     Kidneys enhance and excrete bilaterally and symmetrically. There are numerous  small renal hypoattenuating parenchymal abnormalities small and difficult to  characterize perhaps cysts. That at the posterior aspect of the right upper pole  is more prominent and portions measure greater than water density. It does  measure up to 15 mm in maximal dimension in coronal plane. There is some  heterogeneously enhancing parenchyma otherwise in both kidneys limiting  assessment. This is  noted at the inferior  pole of the right kidney where there  is some nonspecific decreased attenuation measuring 16 mm in diameter in axial  plane. Subtle mass certainly must be considered. There is similar decreased  attenuation at the anterior right upper pole measuring up to 2.7 cm.     There is a prominent extrarenal pelvis bilaterally, right greater than left.  There is marked tortuosity of the ureters with  segmental dilatation  particularly throughout much of the right ureter. Ureters are followed to the  urinary bladder which is well distended with nonuniform wall thickening. There  is a prominent diverticulum at the posterior urinary bladder attending on the  right measuring  up to 4.5 cm in maximal diameter in axial plane. More  superiorly at the posterior lateral left ureter there is additional diverticula  with the most prominent measuring up to 2.4 cm. Additional are noted at the dome  of the urinary bladder. There is a prominent heterogeneous prostate measuring up  to 4.7 cm transversely in axial plane. There is a soft tissue density  eccentrically within the inferior urinary bladder to left of midline uncertain  whether reflecting underdistention of the prostate or an intraluminal mass.  Further evaluation advised.     There is limited assessment of bowel and mesentery without oral contrast  material. There is capacious stool and air-filled colon. There is no bowel  obstruction. Stomach is partly physiologically distended. There is no gross free  intraperitoneal air or free fluid. No drainable abscess or collection. No bulky  adenopathy. Abdominal aorta and iliac arteries are grossly normal in caliber  with some ectasia of the course and some patchy atherosclerotic calcifications.  They are perfused throughout as are the central visceral vessels.     There is no inguinal mass or adenopathy. Fatty containing bilateral inguinal  hernias are demonstrated.     Liver and spleen are normal in size and contour. There are  multiple  hypoattenuating defects throughout the liver, some irregular in contour  including that at the caudate lobe. These may reflect cysts but evaluation is  limited particularly of the smaller lesions. Clips in the gallbladder fossa  reflect prior cholecystectomy. There is no biliary ductal dilatation. Pancreas  demonstrates no acute abnormality. Spleen is within normal limits. There is no  bulky adrenal mass.     There is a mild degenerative changes at the dorsal spine. There is marked  discogenic narrowing at L4-5 with sclerotic endplate changes and osteophytes.     --  IMPRESSION: There is a bilateral large extrarenal pelvis bilaterally, right  greater than left with segmental dilatation of ectatic ureters but no  obstructing mass or calcification.     There are numerous small parenchymal lesions in both kidneys, many small and  difficult to characterize. 2 more prominent areas of subtle decreased  attenuation on the right are noted and subtle masses or lesion certainly not  excluded.     There is a distended urinary bladder with numerous diverticula.     Intraluminal mass at the left base is noted uncertain whether an extension of  the prostate or separate intraluminal lesion, further evaluation advised.     Multiple hepatic lesions are noted, many too small to characterize, the larger  to reflect cysts.     See comment above for delineation of findings.     Results were made available to the clinical service at time of study dictation.     In accordance with [place] Act 112,  the patient will receive a letter notifying them  to follow up with their physician.    -----  RUE venous doppler, 11/4/2021  CLINICAL INDICATION: Swelling     COMPARISON: None     COMMENT:    Duplex/color Doppler evaluation of the deep venous system of the  right upper extremity was  performed.     RIGHT:     INTERNAL JUGULAR VEIN: Normal compression  INNOMINATE VEIN: Patent by color Doppler.  SUBCLAVIAN VEIN: Patent by color  Doppler.  AXILLARY VEIN: Normal compression.  BRACHIAL VEIN: Normal compression.     The cephalic and basilic veins compress normally.     --  IMPRESSION:     No upper extremity deep vein thrombosis.      ECG/Telemetry  I have independently reviewed the telemetry. No events for the last 24 hours.    VTE Assessment: I have reassessed and the patient's VTE risk and treatment plan is appropriate.     No new subjective & objective note has been filed under this hospital service since the last note was generated.    Assessment & Plan  Patient Active Problem List   Diagnosis   • BPH (benign prostatic hyperplasia)   • RBBB   • Hyperlipidemia   • Weakness   • UTI (urinary tract infection)   • CAD (coronary artery disease)   • Anemia   • Chronic systolic heart failure (CMS/HCC)   • COVID-19   • Multiple myeloma (CMS/HCC)   • Bacteremia due to Klebsiella pneumoniae   • Sepsis due to Klebsiella pneumoniae (CMS/HCC)     Hyperlipidemia  Assessment & Plan  -Continue daily statin    Multiple myeloma (CMS/HCC)  Assessment & Plan  Currently on chemotherapy outpatient following with Dr. Romero  -Per curbside with Heme-Onc: holding Revlimid in s/o infection, he will f/u with his oncologist after D/C to discuss when to resume    COVID-19  Assessment & Plan  No current s/s of acute infection; +screen on admission (10/31/21)  UTD on vaccination  Continue home Advair    -Status post monoclonal antibody therapy on 11/1  -No longer on enhanced precautions due to completion of isolation period while inpatient.    Chronic systolic heart failure (CMS/HCC)  Assessment & Plan  EF 40-45% on home lasix PRN: Recently saw his Cardiologist Dr. Ellis who stopped his Metoprolol and changed his Lasix from daily to PRN schedule due to hypotension   - appears Euvolemic on this time  - Can give lasix PRN  - Daily standing weight and strict I/Os   - Cardiology following with thanks: anti-hypertensives remain on hold. He will need a TTE in 3 months of  discharge to reassess his LV function (currently scheduled on 1/31/21)    Anemia  Assessment & Plan  Presents with Hgb ~9 (baseline ~10).  Has known MM on chemotherapy which likely explains this. No report of active bleeding  - CBC daily  - Will follow-up with Heme-Onc on discharge to establish resumption of outpatient chemo given recent acute infection  - transfuse Hb <7  - Continue home iron supplementation     CAD (coronary artery disease)  Assessment & Plan  -Has known 50% LAD occlusion  -No anginal symptoms reported  -Continue with home statin and ASA  -F/up with Dr. Ellis on discharge    UTI (urinary tract infection)  Assessment & Plan  Uclx prior to admission c/w ESBL klebsiella MDRO including Meropenem  Received 5 days bactrim PTA  U/A TNTC WBC, 3+ Bacteria, pos leuk esterase and nitrite  Lactate 2.1, Hypotensive with BP 90/54   No leukocytosis    - Sepsis now resolved  - Repeat UCx on 10/31 demonstrates MDR ESBL Klebsiella; BCx 10/31 positive for same organism  - BCx cleared as of 11/3  - Treated with Avycaz per ID (requires 14 day course from time of negative culture 11/3) today day 14/14  - ID following with thanks  - IV team consulted for Midline placement: can d/c at the time of discharge as abx course will be completed prior to SNF placement  - Pt's BP runs chronically low: can give gentle fluids PRN yesterday  - s/p CT urogram 11/4 showing bilateral hydro, Uro following and s/p Serrano placement  - Maintain serrano for now: will need Uro outpatient follow up on discharge for void trial and to discuss treatment options with Dr. Mathew.    Weakness  Assessment & Plan  Likely 2/2 multiple recent hospitalizations/SNF stay and acute infection (UTI/bacteremia, +COVID)  - PT/OT appreciated: recommended for SNF on discharge.      BPH (benign prostatic hyperplasia)  Assessment & Plan  -C/w home Flomax  -Serrano catheter now in place  -Follows with Urology Dr. Mathew: needs follow-up for void trial on  discharge.    #Global  -Full code  -Regular diet (Kosher) with 2L FR  -DVT pplx: Heparin subcutaneous      Expected Discharge Date:  11/15/2021    CARSON Araya  [id number]  11/16/2021

## 2021-11-16 NOTE — PROGRESS NOTES
Daily Progress Note    Subjective    Interval History:  The patient denies any nausea vomiting or diarrhea today.  He denies any shortness of breath or cough.  He remains worried about need to get stronger and going to a nursing facility.    Objective    Vital signs in last 24 hours:  Temp:  [36.1 °C (96.9 °F)-36.4 °C (97.6 °F)] 36.4 °C (97.5 °F)  Heart Rate:  [57-66] 65  Resp:  [16] 16  BP: ()/(39-48) 93/44      Intake/Output Summary (Last 24 hours) at 11/16/2021 0902  Last data filed at 11/16/2021 0600  Gross per 24 hour   Intake --   Output 1500 ml   Net -1500 ml       Physical Exam:  Lungs-few bibasilar crackles  Heart-regular  Abdomen-soft positive bowel sounds nontender  Extremities-no edema    Labs  Hemoglobin 9.4, WBC 10.75, sodium 135, potassium 5.3, BUN 13, creatinine 0.7    Imaging  Not applicable    ECG/Telemetry  I have independently reviewed the telemetry. Significant findings include Sinus rhythm.    VTE Assessment: I have reassessed and the patient's VTE risk and treatment plan is appropriate.       Assessment & Plan    1.  Urosepsis with multidrug resistant Klebsiella on day 14 of 14 of IV Avycaz.  Overall doing better.  Urology recommends continuing Mancia catheter until outpatient follow-up.  2.  History of CHF.  Has been reasonably stable while hospitalized.  3.  Covid positive.  He was admitted to the hospital in [place] a few months ago with Covid.  He tested positive again on this admission and was treated with monoclonal antibody.  Fortunately been afebrile and has had no sign of clinical deterioration from this.  4.  History multiple myeloma.  Revlimid has been on hold due to the concurrent bacteremia.  Will need heme-onc follow-up after recovers.  5.  Deconditioning.  Continue physical therapy here.  Would certainly benefit from going to a skilled nursing facility more intense physical therapy.  Social work has been working on this and has been trying to overcome multiple issues  regarding it.    Expected Discharge Date:  11/15/2021

## 2021-11-17 LAB
ANION GAP SERPL CALC-SCNC: 11 MEQ/L (ref 3–15)
BASOPHILS # BLD: 0.12 K/UL (ref 0.01–0.1)
BASOPHILS NFR BLD: 1 %
BUN SERPL-MCNC: 13 MG/DL (ref 8–20)
CALCIUM SERPL-MCNC: 8.6 MG/DL (ref 8.9–10.3)
CHLORIDE SERPL-SCNC: 100 MEQ/L (ref 98–109)
CO2 SERPL-SCNC: 23 MEQ/L (ref 22–32)
CREAT SERPL-MCNC: 0.7 MG/DL (ref 0.8–1.3)
DIFFERENTIAL METHOD BLD: ABNORMAL
EOSINOPHIL # BLD: 0.13 K/UL (ref 0.04–0.54)
EOSINOPHIL NFR BLD: 1.1 %
ERYTHROCYTE [DISTWIDTH] IN BLOOD BY AUTOMATED COUNT: 18.3 % (ref 11.6–14.4)
GFR SERPL CREATININE-BSD FRML MDRD: >60 ML/MIN/1.73M*2
GLUCOSE SERPL-MCNC: 73 MG/DL (ref 70–99)
HCT VFR BLDCO AUTO: 28.8 % (ref 40.1–51)
HGB BLD-MCNC: 9.4 G/DL (ref 13.7–17.5)
IMM GRANULOCYTES # BLD AUTO: 0.16 K/UL (ref 0–0.08)
IMM GRANULOCYTES NFR BLD AUTO: 1.4 %
LYMPHOCYTES # BLD: 1.48 K/UL (ref 1.2–3.5)
LYMPHOCYTES NFR BLD: 12.9 %
MAGNESIUM SERPL-MCNC: 1.9 MG/DL (ref 1.8–2.5)
MCH RBC QN AUTO: 30.6 PG (ref 28–33.2)
MCHC RBC AUTO-ENTMCNC: 32.6 G/DL (ref 32.2–36.5)
MCV RBC AUTO: 93.8 FL (ref 83–98)
MONOCYTES # BLD: 0.93 K/UL (ref 0.3–1)
MONOCYTES NFR BLD: 8.1 %
NEUTROPHILS # BLD: 8.62 K/UL (ref 1.7–7)
NEUTS SEG NFR BLD: 75.5 %
NRBC BLD-RTO: 0 %
PDW BLD AUTO: 9.8 FL (ref 9.4–12.4)
PLATELET # BLD AUTO: 357 K/UL (ref 150–350)
POTASSIUM SERPL-SCNC: 5 MEQ/L (ref 3.6–5.1)
RBC # BLD AUTO: 3.07 M/UL (ref 4.5–5.8)
SODIUM SERPL-SCNC: 134 MEQ/L (ref 136–144)
WBC # BLD AUTO: 11.44 K/UL (ref 3.8–10.5)

## 2021-11-17 PROCEDURE — 21400000 HC ROOM AND CARE CCU/INTERMEDIATE

## 2021-11-17 PROCEDURE — 85025 COMPLETE CBC W/AUTO DIFF WBC: CPT | Performed by: STUDENT IN AN ORGANIZED HEALTH CARE EDUCATION/TRAINING PROGRAM

## 2021-11-17 PROCEDURE — 36415 COLL VENOUS BLD VENIPUNCTURE: CPT | Performed by: STUDENT IN AN ORGANIZED HEALTH CARE EDUCATION/TRAINING PROGRAM

## 2021-11-17 PROCEDURE — 80048 BASIC METABOLIC PNL TOTAL CA: CPT | Performed by: STUDENT IN AN ORGANIZED HEALTH CARE EDUCATION/TRAINING PROGRAM

## 2021-11-17 PROCEDURE — 83735 ASSAY OF MAGNESIUM: CPT | Performed by: STUDENT IN AN ORGANIZED HEALTH CARE EDUCATION/TRAINING PROGRAM

## 2021-11-17 PROCEDURE — 63700000 HC SELF-ADMINISTRABLE DRUG: Performed by: STUDENT IN AN ORGANIZED HEALTH CARE EDUCATION/TRAINING PROGRAM

## 2021-11-17 PROCEDURE — 63600000 HC DRUGS/DETAIL CODE: Performed by: STUDENT IN AN ORGANIZED HEALTH CARE EDUCATION/TRAINING PROGRAM

## 2021-11-17 RX ADMIN — ASPIRIN 81 MG: 81 TABLET, COATED ORAL at 08:58

## 2021-11-17 RX ADMIN — MOMETASONE FUROATE AND FORMOTEROL FUMARATE DIHYDRATE 2 PUFF: 200; 5 AEROSOL RESPIRATORY (INHALATION) at 21:01

## 2021-11-17 RX ADMIN — ROSUVASTATIN CALCIUM 10 MG: 10 TABLET, FILM COATED ORAL at 08:58

## 2021-11-17 RX ADMIN — Medication 1000 UNITS: at 20:59

## 2021-11-17 RX ADMIN — CYCLOSPORINE 1 DROP: 0.5 EMULSION OPHTHALMIC at 08:56

## 2021-11-17 RX ADMIN — TAMSULOSIN HYDROCHLORIDE 0.4 MG: 0.4 CAPSULE ORAL at 08:57

## 2021-11-17 RX ADMIN — MOMETASONE FUROATE AND FORMOTEROL FUMARATE DIHYDRATE 2 PUFF: 200; 5 AEROSOL RESPIRATORY (INHALATION) at 08:56

## 2021-11-17 RX ADMIN — HEPARIN SODIUM 5000 UNITS: 5000 INJECTION INTRAVENOUS; SUBCUTANEOUS at 05:09

## 2021-11-17 RX ADMIN — Medication 1000 UNITS: at 08:58

## 2021-11-17 RX ADMIN — CYCLOSPORINE 1 DROP: 0.5 EMULSION OPHTHALMIC at 20:59

## 2021-11-17 RX ADMIN — FERROUS SULFATE TAB 325 MG (65 MG ELEMENTAL FE) 325 MG: 325 (65 FE) TAB at 08:58

## 2021-11-17 RX ADMIN — HEPARIN SODIUM 5000 UNITS: 5000 INJECTION INTRAVENOUS; SUBCUTANEOUS at 21:01

## 2021-11-17 RX ADMIN — HEPARIN SODIUM 5000 UNITS: 5000 INJECTION INTRAVENOUS; SUBCUTANEOUS at 14:47

## 2021-11-17 NOTE — PROGRESS NOTES
"Daily Progress Note              Subjective    Interval History: Pt offers no c/o this AM. Denies fever or chills, n/v/d. Mancia \" bothers\" him. He looks forward to Discharge to rehab and requests being close to [facility].     Objective  Vital signs in last 24 hours:  Temp:  [36.4 °C (97.5 °F)-36.8 °C (98.3 °F)] 36.8 °C (98.2 °F)  Heart Rate:  [60-97] 61  Resp:  [16] 16  BP: ()/(43-66) 100/53      Intake/Output Summary (Last 24 hours) at 11/17/2021 1310  Last data filed at 11/17/2021 0843  Gross per 24 hour   Intake --   Output 750 ml   Net -750 ml         Current Facility-Administered Medications:   •  aspirin enteric coated tablet 81 mg, 81 mg, oral, Daily, Daniel White MD, 81 mg at 11/17/21 0858  •  atropine injection 0.5 mg, 0.5 mg, intravenous, q5 min PRN, Daniel White MD  •  cholecalciferol (vitamin D3) tablet 1,000 Units, 1,000 Units, oral, BID, Daniel White MD, 1,000 Units at 11/17/21 0858  •  cycloSPORINE (RESTASIS) 0.05 % ophthalmic emulsion 1 drop, 1 drop, Both Eyes, BID, Daniel White MD, 1 drop at 11/17/21 0856  •  glucose chewable tablet 16-32 g of dextrose, 16-32 g of dextrose, oral, PRN **OR** dextrose 40 % oral gel 15-30 g of dextrose, 15-30 g of dextrose, oral, PRN **OR** glucagon (GLUCAGEN) injection 1 mg, 1 mg, intramuscular, PRN **OR** dextrose in water injection 12.5 g, 25 mL, intravenous, PRN, Daniel White MD  •  ferrous sulfate tablet 325 mg, 325 mg, oral, Every other day, Daniel White MD, 325 mg at 11/17/21 0858  •  heparin (porcine) 5,000 unit/mL injection 5,000 Units, 5,000 Units, subcutaneous, q8h JENNYFER, Daniel White MD, 5,000 Units at 11/17/21 0509  •  [Provider Managed Hold] lenalidomide (REVLIMID) capsule 25 mg, 25 mg, oral, Daily, Daniel White MD  •  mometasone-formoterol (DULERA 200) 200-5 mcg/actuation inhaler 2 puff, 2 puff, inhalation, BID, Daniel White MD, 2 puff at 11/17/21 0844  •  nitroglycerin (NITROSTAT) SL tablet 0.4 mg, 0.4 mg, sublingual, q5 min PRN, Christopher, " MD Daniel  •  polyethylene glycol (MIRALAX) 17 gram packet 17 g, 17 g, oral, BID, Avani Keen MD, 17 g at 11/16/21 1924  •  rosuvastatin (CRESTOR) tablet 10 mg, 10 mg, oral, Daily, Daniel White MD, 10 mg at 11/17/21 0858  •  tamsulosin (FLOMAX) 24 hr ER capsule 0.4 mg, 0.4 mg, oral, Daily, Daniel White MD, 0.4 mg at 11/17/21 0857    Physical Exam:  Physical Exam  Constitutional:       General: He is not in acute distress.  HENT:      Mouth/Throat:      Mouth: Mucous membranes are moist.   Cardiovascular:      Rate and Rhythm: Normal rate.   Pulmonary:      Effort: Pulmonary effort is normal.   Abdominal:      General: Bowel sounds are normal.      Palpations: Abdomen is soft.   Genitourinary:     Comments: + Mancia catheter for yellow urine  Musculoskeletal:      Right lower leg: No edema.      Left lower leg: No edema.   Skin:     General: Skin is warm and dry.   Neurological:      General: No focal deficit present.      Mental Status: He is alert.   Psychiatric:         Mood and Affect: Mood normal.         Labs  Lab Results   Component Value Date    WBC 11.44 (H) 11/17/2021    HGB 9.4 (L) 11/17/2021    HCT 28.8 (L) 11/17/2021     (H) 11/17/2021    CHOL 126 05/28/2021    TRIG 62 05/28/2021    HDL 43 (L) 05/28/2021    ALT 34 10/31/2021    AST 37 10/31/2021     (L) 11/17/2021    K 5.0 11/17/2021     11/17/2021    CREATININE 0.7 (L) 11/17/2021    BUN 13 11/17/2021    CO2 23 11/17/2021    TSH 2.23 12/12/2017    PSA 1.55 03/17/2021    INR 1.0 03/19/2019       Imaging  I have independently reviewed the pertinent imaging from the last 24 hrs.    ECG/Telemetry  I have independently reviewed the telemetry. No events for the last 24 hours.    VTE Assessment: I have reassessed and the patient's VTE risk and treatment plan is appropriate.     No new subjective & objective note has been filed under this hospital service since the last note was generated.    Assessment & Plan  Patient Active  Problem List   Diagnosis   • BPH (benign prostatic hyperplasia)   • RBBB   • Hyperlipidemia   • Weakness   • UTI (urinary tract infection)   • CAD (coronary artery disease)   • Anemia   • Chronic systolic heart failure (CMS/HCC)   • COVID-19   • Multiple myeloma (CMS/[facility])   • Bacteremia due to Klebsiella pneumoniae   • Sepsis due to Klebsiella pneumoniae (CMS/HCC)       Assessment & Plan      UTI (urinary tract infection)  Assessment & Plan  Uclx prior to admission c/w ESBL klebsiella MDRO including Meropenem  Received 5 days bactrim PTA  U/A TNTC WBC, 3+ Bacteria, pos leuk esterase and nitrite  Lactate 2.1, Hypotensive with BP 90/54   No leukocytosis    - Sepsis now resolved  - Repeat UCx on 10/31 demonstrates MDR ESBL Klebsiella; BCx 10/31 positive for same organism  - BCx cleared as of 11/3  - Treated with Avycaz x14 days - completed 11/16 w/ ID guidance. Should DC Midline at the time of discharge as abx course completed   - BP chronically low but stable at this time.  - s/p CT urogram 11/4 showing bilateral hydro, Uro following and s/p Serrano placement  - Maintain serrano for now: will need Uro outpatient follow up on discharge for void trial and to discuss treatment options with Dr. Mathew.      Hyperlipidemia  Assessment & Plan  -Continue daily statin    Multiple myeloma (CMS/HCC)  Assessment & Plan  Currently on chemotherapy outpatient following with Dr. Roemro  -Per curbside with Heme-Onc: holding Revlimid in s/o infection, he will f/u with his oncologist after D/C to discuss when to resume    COVID-19  Assessment & Plan  No current s/s of acute infection; +screen on admission (10/31/21)  UTD on vaccination  Continue home Advair    -Status post monoclonal antibody therapy on 11/1  -No longer on enhanced precautions due to completion of isolation period while inpatient.    Chronic systolic heart failure (CMS/HCC)  Assessment & Plan  EF 40-45% on home lasix PRN: Recently saw his Cardiologist Dr. Ellis who  stopped his Metoprolol and changed his Lasix from daily to PRN schedule due to hypotension   - appears Euvolemic on exam, lungs clear, no LE edema  - Can order lasix PRN  - Daily standing weight as able. Only bed scale documented.   - strict I/Os   - Cardiology following with thanks: anti-hypertensives remain on hold. He will need a TTE in 3 months of discharge to reassess his LV function (currently scheduled on 1/31/21)    Anemia  Assessment & Plan  Presents with Hgb ~9 (baseline ~10).  Has known MM on chemotherapy which likely explains this. No report of active bleeding  - CBC daily  - Will follow-up with Heme-Onc on discharge to establish resumption of outpatient chemo given recent acute infection  - transfuse Hb <7  - Continue home iron supplementation     CAD (coronary artery disease)  Assessment & Plan  -Has known 50% LAD occlusion  -No anginal symptoms reported  -Continue with home statin and ASA  -F/up with Dr. Ellis on discharge    Weakness  Assessment & Plan  Likely 2/2 multiple recent hospitalizations/SNF stay and acute infection (UTI/bacteremia, +COVID)  - PT/OT appreciated: recommended for SNF on discharge.      BPH (benign prostatic hyperplasia)  Assessment & Plan  -C/w home Flomax  -Mancia catheter now in place  -Follows with Urology Dr. Mathew: needs follow-up for void trial on discharge.        Expected Discharge Date:  11/17/2021      CARSON Hendrickson  11/17/2021

## 2021-11-17 NOTE — PROGRESS NOTES
Daily Progress Note    Subjective    Interval History: Patient denies any nausea vomiting or diarrhea today.  He denies cough or shortness of breath.  Remains worried about getting to a skilled nursing facility and working on getting himself stronger.  He denies any fever.    Objective    Vital signs in last 24 hours:  Temp:  [36.3 °C (97.3 °F)-36.8 °C (98.3 °F)] 36.6 °C (97.8 °F)  Heart Rate:  [60-97] 60  Resp:  [16] 16  BP: ()/(43-66) 99/66      Intake/Output Summary (Last 24 hours) at 11/17/2021 0826  Last data filed at 11/16/2021 1830  Gross per 24 hour   Intake --   Output 550 ml   Net -550 ml       Physical Exam:  Lungs-few bibasilar crackles  Heart-regular  Abdomen-soft nontender positive bowel sounds no guarding  Extremities-no edema    Labs  Sodium 134, potassium 5.0, BUN 13, creatinine 0.7, hemoglobin 9.4, WBC 11.4    Imaging  Not applicable    ECG/Telemetry  I have independently reviewed the telemetry. Significant findings include Sinus rhythm.    VTE Assessment: I have reassessed and the patient's VTE risk and treatment plan is appropriate.       Assessment & Plan    1.  Urosepsis with multidrug resistant Klebsiella.  He completed 14 days of IV Avycaz.  Urology recommended continue Mancia catheter until outpatient follow-up with them.  Overall is doing better although he does remain weak and debilitated.  2.  History of CHF.  This is remained reasonably stable during this hospitalization.  3.  Covid positive.  He was Covid positive during admission in [place] a few months ago.  He tested positive again on this admission and was treated multiple antibiotic therapy.  Fortunately he is remained afebrile without any cough or shortness of breath at present.  There is been no sign of any clinical deterioration.  4.  History of multiple myeloma.  Revlimid has been on hold due to his concurrent bacteremia.  Will need heme-onc follow-up to discuss resuming this after discharge.  5.  Deconditioning.  Has  failed physical therapy here.  Transfer to skilled nursing facility has been recommended.  He has been a great deal of difficulty finding the case studies completed the IV antibiotics exactly for the past with quarantine...  The [place] become easier.  Await word from social work today.    Expected Discharge Date:  11/16/2021

## 2021-11-17 NOTE — PATIENT CARE CONFERENCE
Care Progression Rounds Note  Date: 11/17/2021  Time: 10:15 AM     Patient Name: Eulalio Gregg     Medical Record Number: [id number]   YOB: 1945  Sex: Male      Room/Bed: 0202    Admitting Diagnosis: Weakness [R53.1]  Elevated serum creatinine [R79.89]  Urinary tract infection associated with catheterization of urinary tract, unspecified indwelling urinary catheter type, initial encounter (CMS/[facility]) [T83.511A, N39.0]   Admit Date/Time: 10/31/2021 12:15 PM    Primary Diagnosis: Bacteremia due to Klebsiella pneumoniae  Principal Problem: Bacteremia due to Klebsiella pneumoniae    GMLOS: pending  Anticipated Discharge Date: 11/17/2021    AM-PAC:  Mobility Score: 19    Discharge Planning:  Current Living Arrangements: home  Concerns to be Addressed: discharge planning  Anticipated Discharge Disposition: skilled nursing facility  Type of Skilled Nursing Care Services: OT,PT    Barriers to Discharge:  Facility availability,Delay in patient/family decision making    Comments:  Awaiting SNF selection from daughter    Participants:  ,physician,social work/services

## 2021-11-18 PROBLEM — A41.4 SEPSIS DUE TO KLEBSIELLA PNEUMONIAE (CMS/HCC): Status: RESOLVED | Noted: 2021-11-05 | Resolved: 2021-11-18

## 2021-11-18 LAB
ANION GAP SERPL CALC-SCNC: 9 MEQ/L (ref 3–15)
BASOPHILS # BLD: 0.09 K/UL (ref 0.01–0.1)
BASOPHILS NFR BLD: 0.7 %
BUN SERPL-MCNC: 13 MG/DL (ref 8–20)
CALCIUM SERPL-MCNC: 8.5 MG/DL (ref 8.9–10.3)
CHLORIDE SERPL-SCNC: 97 MEQ/L (ref 98–109)
CO2 SERPL-SCNC: 27 MEQ/L (ref 22–32)
CREAT SERPL-MCNC: 0.7 MG/DL (ref 0.8–1.3)
DIFFERENTIAL METHOD BLD: ABNORMAL
EOSINOPHIL # BLD: 0.14 K/UL (ref 0.04–0.54)
EOSINOPHIL NFR BLD: 1.1 %
ERYTHROCYTE [DISTWIDTH] IN BLOOD BY AUTOMATED COUNT: 18.4 % (ref 11.6–14.4)
GFR SERPL CREATININE-BSD FRML MDRD: >60 ML/MIN/1.73M*2
GLUCOSE SERPL-MCNC: 89 MG/DL (ref 70–99)
HCT VFR BLDCO AUTO: 29.2 % (ref 40.1–51)
HGB BLD-MCNC: 9.3 G/DL (ref 13.7–17.5)
IMM GRANULOCYTES # BLD AUTO: 0.21 K/UL (ref 0–0.08)
IMM GRANULOCYTES NFR BLD AUTO: 1.7 %
LYMPHOCYTES # BLD: 1.82 K/UL (ref 1.2–3.5)
LYMPHOCYTES NFR BLD: 14.9 %
MAGNESIUM SERPL-MCNC: 1.8 MG/DL (ref 1.8–2.5)
MCH RBC QN AUTO: 29.9 PG (ref 28–33.2)
MCHC RBC AUTO-ENTMCNC: 31.8 G/DL (ref 32.2–36.5)
MCV RBC AUTO: 93.9 FL (ref 83–98)
MONOCYTES # BLD: 1 K/UL (ref 0.3–1)
MONOCYTES NFR BLD: 8.2 %
NEUTROPHILS # BLD: 8.97 K/UL (ref 1.7–7)
NEUTS SEG NFR BLD: 73.4 %
NRBC BLD-RTO: 0 %
PDW BLD AUTO: 9.6 FL (ref 9.4–12.4)
PLATELET # BLD AUTO: 351 K/UL (ref 150–350)
POTASSIUM SERPL-SCNC: 5 MEQ/L (ref 3.6–5.1)
RBC # BLD AUTO: 3.11 M/UL (ref 4.5–5.8)
SODIUM SERPL-SCNC: 133 MEQ/L (ref 136–144)
WBC # BLD AUTO: 12.23 K/UL (ref 3.8–10.5)

## 2021-11-18 PROCEDURE — 63600000 HC DRUGS/DETAIL CODE: Performed by: STUDENT IN AN ORGANIZED HEALTH CARE EDUCATION/TRAINING PROGRAM

## 2021-11-18 PROCEDURE — 21400000 HC ROOM AND CARE CCU/INTERMEDIATE

## 2021-11-18 PROCEDURE — 63700000 HC SELF-ADMINISTRABLE DRUG: Performed by: INTERNAL MEDICINE

## 2021-11-18 PROCEDURE — 97116 GAIT TRAINING THERAPY: CPT | Mod: GP

## 2021-11-18 PROCEDURE — 83735 ASSAY OF MAGNESIUM: CPT | Performed by: STUDENT IN AN ORGANIZED HEALTH CARE EDUCATION/TRAINING PROGRAM

## 2021-11-18 PROCEDURE — 85025 COMPLETE CBC W/AUTO DIFF WBC: CPT | Performed by: STUDENT IN AN ORGANIZED HEALTH CARE EDUCATION/TRAINING PROGRAM

## 2021-11-18 PROCEDURE — 36415 COLL VENOUS BLD VENIPUNCTURE: CPT | Performed by: STUDENT IN AN ORGANIZED HEALTH CARE EDUCATION/TRAINING PROGRAM

## 2021-11-18 PROCEDURE — 63700000 HC SELF-ADMINISTRABLE DRUG: Performed by: STUDENT IN AN ORGANIZED HEALTH CARE EDUCATION/TRAINING PROGRAM

## 2021-11-18 PROCEDURE — 80048 BASIC METABOLIC PNL TOTAL CA: CPT | Performed by: STUDENT IN AN ORGANIZED HEALTH CARE EDUCATION/TRAINING PROGRAM

## 2021-11-18 RX ADMIN — CYCLOSPORINE 1 DROP: 0.5 EMULSION OPHTHALMIC at 19:55

## 2021-11-18 RX ADMIN — ASPIRIN 81 MG: 81 TABLET, COATED ORAL at 08:58

## 2021-11-18 RX ADMIN — HEPARIN SODIUM 5000 UNITS: 5000 INJECTION INTRAVENOUS; SUBCUTANEOUS at 22:09

## 2021-11-18 RX ADMIN — HEPARIN SODIUM 5000 UNITS: 5000 INJECTION INTRAVENOUS; SUBCUTANEOUS at 05:34

## 2021-11-18 RX ADMIN — MOMETASONE FUROATE AND FORMOTEROL FUMARATE DIHYDRATE 2 PUFF: 200; 5 AEROSOL RESPIRATORY (INHALATION) at 19:55

## 2021-11-18 RX ADMIN — POLYETHYLENE GLYCOL 3350 17 GRAM ORAL POWDER PACKET 17 G: at 08:59

## 2021-11-18 RX ADMIN — HEPARIN SODIUM 5000 UNITS: 5000 INJECTION INTRAVENOUS; SUBCUTANEOUS at 14:48

## 2021-11-18 RX ADMIN — Medication 1000 UNITS: at 19:55

## 2021-11-18 RX ADMIN — ROSUVASTATIN CALCIUM 10 MG: 10 TABLET, FILM COATED ORAL at 08:58

## 2021-11-18 RX ADMIN — MOMETASONE FUROATE AND FORMOTEROL FUMARATE DIHYDRATE 2 PUFF: 200; 5 AEROSOL RESPIRATORY (INHALATION) at 08:58

## 2021-11-18 RX ADMIN — CYCLOSPORINE 1 DROP: 0.5 EMULSION OPHTHALMIC at 08:59

## 2021-11-18 RX ADMIN — Medication 1000 UNITS: at 08:58

## 2021-11-18 RX ADMIN — TAMSULOSIN HYDROCHLORIDE 0.4 MG: 0.4 CAPSULE ORAL at 08:59

## 2021-11-18 ASSESSMENT — COGNITIVE AND FUNCTIONAL STATUS - GENERAL
CLIMB 3 TO 5 STEPS WITH RAILING: 2 - A LOT
MOVING TO AND FROM BED TO CHAIR: 3 - A LITTLE
WALKING IN HOSPITAL ROOM: 3 - A LITTLE
AFFECT: FLAT/BLUNTED AFFECT
STANDING UP FROM CHAIR USING ARMS: 3 - A LITTLE

## 2021-11-18 NOTE — PLAN OF CARE
Problem: Adult Inpatient Plan of Care  Goal: Plan of Care Review  Outcome: Progressing  Flowsheets (Taken 11/18/2021 1500)  Progress: improving  Plan of Care Reviewed With: patient     Per information in medical rounds, Pt remains medically stable for d/c pending placement. Pt was Covid+ on 10/31 and vaccinated. Covid quarantine completed. Pt still on isolation for CRE and ESBL. PT/OT continue to rec SNF. SW sent updated referrals. Pt accepted at [facility], [facility], and [place]. SW provided list to Pt's daughter (Talli: [phone]), who selected [facility]. Update provided to Nasreen at SNF ([phone]). Tentative BLS via [facility] scheduled for 3pm Friday to SNF. Pt's daughter requesting medical update; CRNP made aware. SW to remain available for emotional support and d/c planning.    LAURENT Howard [id number]

## 2021-11-18 NOTE — PATIENT CARE CONFERENCE
Care Progression Rounds Note  Date: 11/18/2021  Time: 11:54 AM     Patient Name: Eulalio Gregg     Medical Record Number: [id number]   YOB: 1945  Sex: Male      Room/Bed: 0202    Admitting Diagnosis: Weakness [R53.1]  Elevated serum creatinine [R79.89]  Urinary tract infection associated with catheterization of urinary tract, unspecified indwelling urinary catheter type, initial encounter (CMS/[facility]) [T83.511A, N39.0]   Admit Date/Time: 10/31/2021 12:15 PM    Primary Diagnosis: Bacteremia due to Klebsiella pneumoniae  Principal Problem: Bacteremia due to Klebsiella pneumoniae    GMLOS: pending  Anticipated Discharge Date: 11/19/2021    AM-PAC:  Mobility Score: 19    Discharge Planning:  Current Living Arrangements: home  Concerns to be Addressed: discharge planning  Anticipated Discharge Disposition: skilled nursing facility  Type of Skilled Nursing Care Services: OT,PT    Barriers to Discharge:  Delay in patient/family decision making    Comments:       Participants:  ,physician,social work/services

## 2021-11-18 NOTE — PROGRESS NOTES
Daily Progress Note              Subjective    Interval History: Patient seen and examined this morning, resting in bed. No acute events overnight. Offers no new physical complaints: denies chills, cough/rhinorrhea, chest pain, dyspnea, dizziness, palpitations, abdominal pain, or nausea/vomiting. Denies dysuria with Mancia in place or suprapubic/flank pain. Denies pain at Midline (RUE) site, LE pain, or feeling of LE swelling. Confirmed with bedside RN: no diarrhea noted.     D/w Dr. Dennison: patient remains medically stable to d/c to SNF when placement obtained.     Per d/w SW, await family selection of SNF for discharge at this time.     Objective    Vital signs in last 24 hours:  Temp:  [36.6 °C (97.8 °F)-36.9 °C (98.5 °F)] 36.6 °C (97.9 °F)  Heart Rate:  [58-86] 67  Resp:  [14-16] 16  BP: ()/(47-70) 92/54      Intake/Output Summary (Last 24 hours) at 11/18/2021 0933  Last data filed at 11/18/2021 0500  Gross per 24 hour   Intake --   Output 1000 ml   Net -1000 ml         Current Facility-Administered Medications:   •  aspirin enteric coated tablet 81 mg, 81 mg, oral, Daily, Daniel White MD, 81 mg at 11/18/21 0858  •  atropine injection 0.5 mg, 0.5 mg, intravenous, q5 min PRN, Daniel White MD  •  cholecalciferol (vitamin D3) tablet 1,000 Units, 1,000 Units, oral, BID, Daniel White MD, 1,000 Units at 11/18/21 0858  •  cycloSPORINE (RESTASIS) 0.05 % ophthalmic emulsion 1 drop, 1 drop, Both Eyes, BID, Daniel White MD, 1 drop at 11/18/21 0859  •  glucose chewable tablet 16-32 g of dextrose, 16-32 g of dextrose, oral, PRN **OR** dextrose 40 % oral gel 15-30 g of dextrose, 15-30 g of dextrose, oral, PRN **OR** glucagon (GLUCAGEN) injection 1 mg, 1 mg, intramuscular, PRN **OR** dextrose in water injection 12.5 g, 25 mL, intravenous, PRN, Daniel White MD  •  ferrous sulfate tablet 325 mg, 325 mg, oral, Every other day, Daniel White MD, 325 mg at 11/17/21 0858  •  heparin (porcine) 5,000 unit/mL injection  5,000 Units, 5,000 Units, subcutaneous, q8h JENNYFER, Daniel White MD, 5,000 Units at 11/18/21 0534  •  [Provider Managed Hold] lenalidomide (REVLIMID) capsule 25 mg, 25 mg, oral, Daily, Daniel Wihte MD  •  mometasone-formoterol (DULERA 200) 200-5 mcg/actuation inhaler 2 puff, 2 puff, inhalation, BID, Daniel White MD, 2 puff at 11/18/21 0858  •  nitroglycerin (NITROSTAT) SL tablet 0.4 mg, 0.4 mg, sublingual, q5 min PRN, Daniel White MD  •  polyethylene glycol (MIRALAX) 17 gram packet 17 g, 17 g, oral, BID, Avani Keen MD, 17 g at 11/18/21 0859  •  rosuvastatin (CRESTOR) tablet 10 mg, 10 mg, oral, Daily, Daniel White MD, 10 mg at 11/18/21 0858  •  tamsulosin (FLOMAX) 24 hr ER capsule 0.4 mg, 0.4 mg, oral, Daily, Daniel White MD, 0.4 mg at 11/18/21 0859    Physical Exam  Vitals and nursing note reviewed.   Constitutional:       General: He is not in acute distress.     Appearance: He is ill-appearing (chronically). He is not toxic-appearing.   HENT:      Head: Normocephalic and atraumatic.      Nose: No congestion or rhinorrhea.      Mouth/Throat:      Mouth: Mucous membranes are moist.   Eyes:      General: No scleral icterus.  Cardiovascular:      Rate and Rhythm: Normal rate and regular rhythm.   Pulmonary:      Effort: Pulmonary effort is normal.      Comments: No signs of respiratory distress on RA. +Diminished breath sounds to the bilateral lung bases.  Abdominal:      General: Bowel sounds are normal. There is no distension.      Palpations: Abdomen is soft.      Tenderness: There is no abdominal tenderness. There is no guarding or rebound.   Genitourinary:     Comments: Manica in place draining clear yellow urine  Musculoskeletal:      Cervical back: Neck supple.      Right lower leg: No edema.      Left lower leg: No edema.   Skin:     General: Skin is warm and dry.      Comments: R upper arm midline site with c/d/i dressing, no tenderness/swelling/drainage/or erythema noted at insertion site or  area surrounding line.   Neurological:      Mental Status: He is alert and oriented to person, place, and time.   Psychiatric:         Mood and Affect: Mood normal.         Behavior: Behavior normal.       Data Review  Labs  Lab Results   Component Value Date    WBC 12.23 (H) 11/18/2021    HGB 9.3 (L) 11/18/2021    HCT 29.2 (L) 11/18/2021     (H) 11/18/2021    CHOL 126 05/28/2021    TRIG 62 05/28/2021    HDL 43 (L) 05/28/2021    ALT 34 10/31/2021    AST 37 10/31/2021     (L) 11/18/2021    K 5.0 11/18/2021    CL 97 (L) 11/18/2021    CREATININE 0.7 (L) 11/18/2021    BUN 13 11/18/2021    CO2 27 11/18/2021    TSH 2.23 12/12/2017    PSA 1.55 03/17/2021    INR 1.0 03/19/2019     Pertinent labs  Na 135  K 5.3  Bicarb 27  BUN 13  Cr 0.7  Glu 91  Mg 2.2     WBC 10.75  Hgb 9.4      Imaging  No new imaging available in the last 24 hours.     ECG/Telemetry  I have independently reviewed the telemetry. No events for the last 24 hours.    VTE Assessment: I have reassessed and the patient's VTE risk and treatment plan is appropriate.     No new subjective & objective note has been filed under this hospital service since the last note was generated.    Assessment & Plan  Patient Active Problem List   Diagnosis   • BPH (benign prostatic hyperplasia)   • RBBB   • Hyperlipidemia   • Weakness   • UTI (urinary tract infection)   • CAD (coronary artery disease)   • Anemia   • Chronic systolic heart failure (CMS/HCC)   • COVID-19   • Multiple myeloma (CMS/HCC)   • Bacteremia due to Klebsiella pneumoniae   • Sepsis due to Klebsiella pneumoniae (CMS/HCC)     UTI (urinary tract infection)  Assessment & Plan  Uclx prior to admission c/w ESBL klebsiella MDRO including Meropenem, s. Bactrim  Received 5 days bactrim PTA  U/A TNTC WBC, 3+ Bacteria, pos leuk esterase and nitrite  Lactate 2.1, Hypotensive with BP 90/54   No leukocytosis    - Sepsis now resolved; with concomitant Bacteremia on infectious work-up  - Repeat UCx on  10/31 demonstrates MDR ESBL Klebsiella; BCx 10/31 positive for same organism  - BCx cleared as of 11/3  - Treated with Avycaz x14 days - completed 11/16 w/ ID guidance. Should discontinue Midline at the time of discharge as abx course completed   - BP chronically low but stable at this time.  - s/p CT urogram 11/4 showing bilateral hydro, Uro following and s/p Serrano placement  - Maintain serrano for now: will need Uro outpatient follow up on discharge for void trial and to discuss treatment options with Dr. Mathew (tentatively scheduled for 11/23).    Hyperlipidemia  Assessment & Plan  -Continue daily statin    Multiple myeloma (CMS/HCC)  Assessment & Plan  Currently on chemotherapy outpatient following with Dr. Romero  -Per curbside with Heme-Onc: holding Revlimid in s/o infection, he will f/u with his oncologist after D/C to discuss when to resume    COVID-19  Assessment & Plan  No current s/s of acute infection; +screen on admission (10/31/21)  UTD on vaccination  Continue home Advair    -Status post monoclonal antibody therapy on 11/1  -No longer on enhanced precautions due to completion of isolation period while inpatient.    Chronic systolic heart failure (CMS/HCC)  Assessment & Plan  EF 40-45% on home lasix PRN: Recently saw his Cardiologist Dr. Ellis who stopped his Metoprolol and changed his Lasix from daily to PRN schedule due to hypotension   - appears Euvolemic on exam, lungs clear, no LE edema  - Can order lasix PRN  - Daily standing weight as able. Only bed scale documented.   - strict I/Os   - Cardiology following with thanks: anti-hypertensives remain on hold. He will need a TTE in 3 months of discharge to reassess his LV function (currently scheduled on 1/31/21)    Anemia  Assessment & Plan  Presents with Hgb ~9 (baseline ~10).  Has known MM on chemotherapy which likely explains this. No report of active bleeding  - CBC daily  - Will follow-up with Heme-Onc on discharge to establish resumption of  outpatient chemo given recent acute infection  - transfuse Hb <7  - Continue home iron supplementation     CAD (coronary artery disease)  Assessment & Plan  -Has known 50% LAD occlusion  -No anginal symptoms reported  -Continue with home statin and ASA  -F/up with Dr. Ellis on discharge    Weakness  Assessment & Plan  Likely 2/2 multiple recent hospitalizations/SNF stay and acute infection (UTI/bacteremia, +COVID)  - PT/OT appreciated: recommended for SNF on discharge.  - Awaiting placement at this time.      BPH (benign prostatic hyperplasia)  Assessment & Plan  -C/w home Flomax  -Mancia catheter now in place  -Follows with Urology Dr. Mathew: needs follow-up for void trial on discharge (tentatively scheduled for 11/23; he is able to be transported from SNF for appts per d/w SW 11/18).    #Global  -Full code  -Regular diet (Kosher) with 2L FR  -DVT pplx: Heparin subcutaneous    Expected Discharge Date:  11/17/2021    CARSON Araya  [id number]  11/18/2021

## 2021-11-18 NOTE — PROGRESS NOTES
Patient: Eulalio Gregg  [facility]  [place] [address] [facility]  MRN:  [id number]  Today's date:  11/18/2021    Pt left in bedside chair, alarmed, with call bell and personal items within reach. RN informed of session completed.     Eulalio is a 76 y.o. male admitted on 10/31/2021 with Weakness [R53.1]  Elevated serum creatinine [R79.89]  Urinary tract infection associated with catheterization of urinary tract, unspecified indwelling urinary catheter type, initial encounter (CMS/[facility]) [T83.511A, N39.0]. Principal problem is Weakness.    Past Medical History  Eulalio has a past medical history of Anemia, Blepharospasm, BPH (benign prostatic hyperplasia), COVID-19 (08/21/2021), History of vertigo (2016), Lipid disorder, and Tendency toward bleeding easily (CMS/[facility]).    History of Present Illness   Admitted with weakness, +COVID  11/16- COVID isolation removed 11/15 as pt finished quarantine during hospital stay      PT Vitals    Date/Time Pulse SpO2 Pt Activity O2 Therapy BP BP Method Pt Position [facility]   11/18/21 0831 62 97 % At rest None (Room air) 105/60 Automatic Lying CBK      PT Pain    Date/Time Pain Type Rating: Rest Rating: Activity [facility]   11/18/21 0831 Pain Assessment 0 - no pain 0 - no pain CBK          Prior Living Environment      Most Recent Value   Current Living Arrangements home   Living Environment Comment ML with spouse, 3STE with FF to bed/bath        Prior Level of Function      Most Recent Value   Ambulation independent   Transferring independent   Toileting independent   Bathing independent   Dressing independent   Eating independent   Prior Level of Function Comment pt reports ind PTA without use of AD,  started to use a SPC before admissions   Assistive Device Currently Used at Home cane, straight           PT Evaluation and Treatment - 11/18/21 0831        PT Time Calculation    Start Time 0831     Stop Time 0844     Time Calculation (min) 13 min        Session Details     Document Type re-evaluation     Mode of Treatment physical therapy        General Information    Patient Profile Reviewed yes     Existing Precautions/Restrictions contact   CRE, ESBL    Limitations/Impairments safety/cognitive        Cognition/Psychosocial    Affect/Mental Status (Cognition) flat/blunted affect     Behavioral Issues (Cognition) overwhelmed easily     Orientation Status (Cognition) oriented to;person;place     Follows Commands (Cognition) follows one-step commands;50-74% accuracy;delayed response/completion;increased processing time needed;physical/tactile prompts required;repetition of directions required     Cognitive Function executive function deficit;safety deficit     Attention Deficit (Cognition) minimal deficit;perseverates on recent conversation     Executive Function Deficit (Cognition) moderate deficit;information processing;planning/decision-making;problem-solving/reasoning     Safety Deficit (Cognition) moderate deficit;awareness of need for assistance;insight into deficits/self-awareness;safety precautions awareness;problem-solving     Comment, Cognition Continues to have word finding issues/difficulty with finishing conversations        Bed Mobility    [place], Supine to Sit supervision     Comment (Bed Mobility) inc effort/time to complete task, HOB elevated, bedrail used        Sit to Stand Transfer    [place], Sit to Stand Transfer minimum assist (75% or more patient effort);1 person assist     Verbal Cues safety;technique     Assistive Device walker, front-wheeled     Comment continues to req VCs for hand placement prior to stand        Stand to Sit Transfer    [place], Stand to Sit Transfer minimum assist (75% or more patient effort);1 person assist     Verbal Cues technique;safety     Assistive Device walker, front-wheeled        Gait Training    [place], Gait minimum assist (75% or more patient effort);1 person assist     Assistive Device walker,  front-wheeled     Distance in Feet 15 feet     Pattern (Gait) step-through     Deviations/Abnormal Patterns (Gait) arias decreased     Comment (Gait/Stairs) amb within room, req max VCs for motivation and participation        Stairs Training    [place], Stairs not tested        Safety Issues, Functional Mobility    Safety Issues Affecting Function (Mobility) ability to follow commands;insight into deficits/self-awareness;problem-solving;safety precaution awareness;sequencing abilities     Impairments Affecting Function (Mobility) balance;cognition;strength        Balance    Static Sitting Balance WFL     Dynamic Sitting Balance WFL     Static Standing Balance mild impairment     Dynamic Standing Balance mild impairment        AM-PAC (TM) - Mobility (Current Function)    Turning from your back to your side while in a flat bed without using bedrails? 3 - A Little     Moving from lying on your back to sitting on the side of a flat bed without using bedrails? 3 - A Little     Moving to and from a bed to a chair? 3 - A Little     Standing up from a chair using your arms? 3 - A Little     To walk in a hospital room? 3 - A Little     Climbing 3-5 steps with a railing? 2 - A Lot     AM-PAC (TM) Mobility Score 17        Assessment/Plan (PT)    Daily Outcome Statement pt seen for follow up treatment, pt continues to req min assist for mobility, dec insight into deficits, +fall risk, rec SNF at [facility]     Rehab Potential good, to achieve stated therapy goals     Therapy Frequency 3-5 times/wk     Planned Therapy Interventions balance training;bed mobility training;gait training;transfer training               PT Assessment/Plan      Most Recent Value   PT Recommended Discharge Disposition skilled nursing facility at 11/18/2021 0831   Anticipated Equipment Needs at Discharge (PT) --  [TBD] at 11/18/2021 0831   Patient/Family Therapy Goals Statement none stated at 11/18/2021 0831                    Education  Documentation  Fall Prevention, taught by Ralph Joiner, PT at 11/18/2021 12:38 PM.  Learner: Patient  Readiness: Acceptance  Method: Demonstration, Explanation  Response: Verbalizes Understanding, Demonstrated Understanding, Needs Reinforcement  Comment: safety with mobility          PT Goals      Most Recent Value   Bed Mobility Goal 1    Activity/Assistive Device rolling to left, rolling to right, supine to sit, sit to supine at 11/01/2021 1237   [place] independent at 11/01/2021 1237   Time Frame by discharge at 11/01/2021 1237   Progress/Outcome goal ongoing at 11/01/2021 1237   Transfer Goal 1    Activity/Assistive Device bed-to-chair/chair-to-bed, sit-to-stand/stand-to-sit at 11/01/2021 1237   [place] independent at 11/01/2021 1237   Time Frame by discharge at 11/01/2021 1237   Progress/Outcome goal ongoing at 11/01/2021 1237   Gait Training Goal 1    Activity/Assistive Device gait (walking locomotion), walker, front-wheeled at 11/01/2021 1237   [place] supervision required at 11/01/2021 1237   Distance 100 at 11/01/2021 1237   Time Frame by discharge at 11/01/2021 1237   Progress/Outcome goal ongoing at 11/01/2021 1237

## 2021-11-18 NOTE — PROGRESS NOTES
Daily Progress Note    Subjective    Interval History: Patient denies any shortness of breath cough, nausea, vomiting or diarrhea today.  He states he did sit up in the chair for a while yesterday.  He denies any fever.    Objective    Vital signs in last 24 hours:  Temp:  [36.6 °C (97.8 °F)-36.9 °C (98.5 °F)] 36.6 °C (97.9 °F)  Heart Rate:  [58-86] 67  Resp:  [14-16] 16  BP: ()/(47-70) 92/54      Intake/Output Summary (Last 24 hours) at 11/18/2021 0837  Last data filed at 11/18/2021 0500  Gross per 24 hour   Intake --   Output 1200 ml   Net -1200 ml       Physical Exam:  Lungs-rare basilar crackles  Heart-regular  Abdomen-soft, nontender, positive bowel sounds, no guarding or rebound  Extremities-no edema    Labs  Hemoglobin 9.3, WBC 12.2, sodium 133, potassium 5.0, BUN 13, creatinine 0.7    Imaging  Not applicable    ECG/Telemetry  I have independently reviewed the telemetry. Significant findings include Sinus..    VTE Assessment: I have reassessed and the patient's VTE risk and treatment plan is appropriate.       Assessment & Plan    1.  Urosepsis with multidrug resistant Klebsiella.  He completed 14 days of IV Avycaz.  Urology has recommended continuing Mancia catheter until outpatient follow-up with them.  He does remains weak and deconditioned and would benefit from skilled nursing facility stay.  2.  History of CHF.  Reasonably stable.  Blood pressure is on the soft side.  3.  Covid positive.  He was Covid positive during admission in [place] a few months ago.  He again tested positive on this admission and was treated with monoclonal antibody therapy.  Fortunately has remained afebrile without any sign of respiratory distress or cough.  There has been no clinical deterioration.  He has fulfilled the quarantine time requirement.  4.  History of multiple myeloma.  His Revlimid has been on hold due to the concurrent bacteremia.  Will need heme-onc follow-up to discuss resuming this after discharge.  5.   Deconditioning.  He has had physical therapy here who have recommended he go to a skilled nursing facility.    Expected Discharge Date:  11/17/2021

## 2021-11-18 NOTE — ASSESSMENT & PLAN NOTE
Uclx prior to admission c/w ESBL klebsiella MDRO including Meropenem, s. Bactrim  Received 5 days bactrim PTA  U/A TNTC WBC, 3+ Bacteria, pos leuk esterase and nitrite  Lactate 2.1, Hypotensive with BP 90/54   No leukocytosis    - Sepsis now resolved; with concomitant Bacteremia on infectious work-up  - Repeat UCx on 10/31 demonstrates MDR ESBL Klebsiella; BCx 10/31 positive for same organism  - BCx cleared as of 11/3  - Treated with Avycaz x14 days - completed 11/16 w/ ID guidance.   -Order placed to discontinue R basilic Midline today   - BP chronically low but stable at this time.  - s/p CT urogram 11/4 showing bilateral hydro, Uro following and s/p Serrano placement  - Maintain serrano for now: will need Uro outpatient follow up on discharge for void trial and to discuss treatment options with Dr. Mathew (tentatively scheduled for 11/23).

## 2021-11-18 NOTE — ASSESSMENT & PLAN NOTE
+screen on admission (10/31/21)  No current signs or symptoms related to acute Covid infection and medically stable for Discharge to skilled nursing facility today  UTD on vaccination:  COVID-19 Vaccine, Moderna 2/25/2021 , 1/28/2021     Continue home Advair  -Status post monoclonal antibody therapy on 11/1  -No longer on enhanced precautions due to completion of isolation period while inpatient.

## 2021-11-18 NOTE — ASSESSMENT & PLAN NOTE
-C/w home Flomax  -Mancia catheter now in place  -Follows with Urology Dr. Mathew: needs follow-up for void trial on discharge (tentatively scheduled for 11/23; he is able to be transported from SNF for appts per d/w SW 11/18).

## 2021-11-18 NOTE — ASSESSMENT & PLAN NOTE
Presents with Hgb ~9 (baseline ~10).  Has known MM on chemotherapy which likely explains this. No report of active bleeding  - CBC daily while inpatient  - Will follow-up with Heme-Onc on discharge to establish resumption of outpatient chemo given recent acute infection  - transfuse Hb <7  - Continue home iron supplementation

## 2021-11-18 NOTE — ASSESSMENT & PLAN NOTE
-Has known 50% LAD occlusion  -No anginal symptoms reported  -Continue with home statin and ASA  -F/up with Dr. Ellis on discharge

## 2021-11-18 NOTE — ASSESSMENT & PLAN NOTE
Currently on chemotherapy outpatient following with Dr. Romero  -Holding Revlimid since admission in s/o infection, f/u with his oncologist after D/C to discuss when to resume

## 2021-11-18 NOTE — ASSESSMENT & PLAN NOTE
Likely 2/2 multiple recent hospitalizations/SNF stay and acute infection (UTI/bacteremia, +COVID)  - PT/OT appreciated: recommended for SNF on discharge.  -[facility]

## 2021-11-19 VITALS
WEIGHT: 134 LBS | TEMPERATURE: 97.7 F | SYSTOLIC BLOOD PRESSURE: 94 MMHG | DIASTOLIC BLOOD PRESSURE: 44 MMHG | RESPIRATION RATE: 18 BRPM | HEART RATE: 64 BPM | BODY MASS INDEX: 23.74 KG/M2 | HEIGHT: 63 IN | OXYGEN SATURATION: 100 %

## 2021-11-19 LAB
ANION GAP SERPL CALC-SCNC: 10 MEQ/L (ref 3–15)
BASOPHILS # BLD: 0.09 K/UL (ref 0.01–0.1)
BASOPHILS NFR BLD: 0.8 %
BUN SERPL-MCNC: 13 MG/DL (ref 8–20)
CALCIUM SERPL-MCNC: 8.8 MG/DL (ref 8.9–10.3)
CHLORIDE SERPL-SCNC: 99 MEQ/L (ref 98–109)
CO2 SERPL-SCNC: 26 MEQ/L (ref 22–32)
CREAT SERPL-MCNC: 0.7 MG/DL (ref 0.8–1.3)
DIFFERENTIAL METHOD BLD: ABNORMAL
EOSINOPHIL # BLD: 0.11 K/UL (ref 0.04–0.54)
EOSINOPHIL NFR BLD: 1 %
ERYTHROCYTE [DISTWIDTH] IN BLOOD BY AUTOMATED COUNT: 18.3 % (ref 11.6–14.4)
GFR SERPL CREATININE-BSD FRML MDRD: >60 ML/MIN/1.73M*2
GLUCOSE SERPL-MCNC: 85 MG/DL (ref 70–99)
HCT VFR BLDCO AUTO: 29.4 % (ref 40.1–51)
HGB BLD-MCNC: 9.7 G/DL (ref 13.7–17.5)
IMM GRANULOCYTES # BLD AUTO: 0.15 K/UL (ref 0–0.08)
IMM GRANULOCYTES NFR BLD AUTO: 1.3 %
LYMPHOCYTES # BLD: 1.75 K/UL (ref 1.2–3.5)
LYMPHOCYTES NFR BLD: 15.6 %
MAGNESIUM SERPL-MCNC: 1.8 MG/DL (ref 1.8–2.5)
MCH RBC QN AUTO: 30.5 PG (ref 28–33.2)
MCHC RBC AUTO-ENTMCNC: 33 G/DL (ref 32.2–36.5)
MCV RBC AUTO: 92.5 FL (ref 83–98)
MONOCYTES # BLD: 1.04 K/UL (ref 0.3–1)
MONOCYTES NFR BLD: 9.3 %
NEUTROPHILS # BLD: 8.07 K/UL (ref 1.7–7)
NEUTS SEG NFR BLD: 72 %
NRBC BLD-RTO: 0 %
PDW BLD AUTO: 10.1 FL (ref 9.4–12.4)
PLATELET # BLD AUTO: 325 K/UL (ref 150–350)
POTASSIUM SERPL-SCNC: 5.4 MEQ/L (ref 3.6–5.1)
RBC # BLD AUTO: 3.18 M/UL (ref 4.5–5.8)
SARS-COV-2 RNA RESP QL NAA+PROBE: NEGATIVE
SODIUM SERPL-SCNC: 135 MEQ/L (ref 136–144)
WBC # BLD AUTO: 11.21 K/UL (ref 3.8–10.5)

## 2021-11-19 PROCEDURE — 83735 ASSAY OF MAGNESIUM: CPT | Performed by: STUDENT IN AN ORGANIZED HEALTH CARE EDUCATION/TRAINING PROGRAM

## 2021-11-19 PROCEDURE — 63600000 HC DRUGS/DETAIL CODE: Performed by: STUDENT IN AN ORGANIZED HEALTH CARE EDUCATION/TRAINING PROGRAM

## 2021-11-19 PROCEDURE — 36415 COLL VENOUS BLD VENIPUNCTURE: CPT | Performed by: STUDENT IN AN ORGANIZED HEALTH CARE EDUCATION/TRAINING PROGRAM

## 2021-11-19 PROCEDURE — 63700000 HC SELF-ADMINISTRABLE DRUG: Performed by: STUDENT IN AN ORGANIZED HEALTH CARE EDUCATION/TRAINING PROGRAM

## 2021-11-19 PROCEDURE — U0002 COVID-19 LAB TEST NON-CDC: HCPCS | Performed by: NURSE PRACTITIONER

## 2021-11-19 PROCEDURE — 99231 SBSQ HOSP IP/OBS SF/LOW 25: CPT | Performed by: INTERNAL MEDICINE

## 2021-11-19 PROCEDURE — 80048 BASIC METABOLIC PNL TOTAL CA: CPT | Performed by: STUDENT IN AN ORGANIZED HEALTH CARE EDUCATION/TRAINING PROGRAM

## 2021-11-19 PROCEDURE — 85025 COMPLETE CBC W/AUTO DIFF WBC: CPT | Performed by: STUDENT IN AN ORGANIZED HEALTH CARE EDUCATION/TRAINING PROGRAM

## 2021-11-19 RX ORDER — TAMSULOSIN HYDROCHLORIDE 0.4 MG/1
0.4 CAPSULE ORAL DAILY
Qty: 30 CAPSULE | Refills: 0
Start: 2021-11-19 | End: 2021-12-19

## 2021-11-19 RX ADMIN — HEPARIN SODIUM 5000 UNITS: 5000 INJECTION INTRAVENOUS; SUBCUTANEOUS at 14:42

## 2021-11-19 RX ADMIN — FERROUS SULFATE TAB 325 MG (65 MG ELEMENTAL FE) 325 MG: 325 (65 FE) TAB at 09:21

## 2021-11-19 RX ADMIN — ASPIRIN 81 MG: 81 TABLET, COATED ORAL at 09:21

## 2021-11-19 RX ADMIN — Medication 1000 UNITS: at 09:21

## 2021-11-19 RX ADMIN — MOMETASONE FUROATE AND FORMOTEROL FUMARATE DIHYDRATE 2 PUFF: 200; 5 AEROSOL RESPIRATORY (INHALATION) at 09:21

## 2021-11-19 RX ADMIN — CYCLOSPORINE 1 DROP: 0.5 EMULSION OPHTHALMIC at 09:21

## 2021-11-19 RX ADMIN — HEPARIN SODIUM 5000 UNITS: 5000 INJECTION INTRAVENOUS; SUBCUTANEOUS at 06:23

## 2021-11-19 RX ADMIN — ROSUVASTATIN CALCIUM 10 MG: 10 TABLET, FILM COATED ORAL at 09:21

## 2021-11-19 RX ADMIN — TAMSULOSIN HYDROCHLORIDE 0.4 MG: 0.4 CAPSULE ORAL at 09:21

## 2021-11-19 NOTE — NURSING NOTE
Patient has been cleared for discharge. Midline was taken out, belongings were returned. Discharge instructions were provided and explained. Mancia remained in place per MD orders.

## 2021-11-19 NOTE — PROGRESS NOTES
Daily Progress Note              Subjective    Interval History: Patient seen and examined this morning, resting comfortably in bed. No acute events noted overnight. He looks forward to skilled rehab placement today.   He denies: chills, cough/rhinorrhea, chest pain, dyspnea, dizziness, palpitations, abdominal pain, or nausea/vomiting. Reports a good appetite. Denies flank pain or dysuria with Mancia in place.   Reviewed need for follow-up on discharge with: Urology, Hematology, and ID. Appointments made on behalf of patient while in Rehab.     D/w Dr. Dennison: patient remains medically stable to d/c to SNF when placement obtained    Objective    Vital signs in last 24 hours:  Temp:  [36.4 °C (97.6 °F)-36.8 °C (98.3 °F)] 36.4 °C (97.6 °F)  Heart Rate:  [62-68] 62  Resp:  [16-18] 18  BP: ()/(47-59) 96/54      Intake/Output Summary (Last 24 hours) at 11/19/2021 0959  Last data filed at 11/19/2021 0600  Gross per 24 hour   Intake 480 ml   Output 975 ml   Net -495 ml         Current Facility-Administered Medications:   •  aspirin enteric coated tablet 81 mg, 81 mg, oral, Daily, Daniel White MD, 81 mg at 11/19/21 0921  •  atropine injection 0.5 mg, 0.5 mg, intravenous, q5 min PRN, Daniel White MD  •  cholecalciferol (vitamin D3) tablet 1,000 Units, 1,000 Units, oral, BID, Daniel White MD, 1,000 Units at 11/19/21 0921  •  cycloSPORINE (RESTASIS) 0.05 % ophthalmic emulsion 1 drop, 1 drop, Both Eyes, BID, Daniel White MD, 1 drop at 11/19/21 0921  •  glucose chewable tablet 16-32 g of dextrose, 16-32 g of dextrose, oral, PRN **OR** dextrose 40 % oral gel 15-30 g of dextrose, 15-30 g of dextrose, oral, PRN **OR** glucagon (GLUCAGEN) injection 1 mg, 1 mg, intramuscular, PRN **OR** dextrose in water injection 12.5 g, 25 mL, intravenous, PRN, Daniel White MD  •  ferrous sulfate tablet 325 mg, 325 mg, oral, Every other day, Daniel White MD, 325 mg at 11/19/21 4505  •  heparin (porcine) 5,000 unit/mL injection 5,000  Units, 5,000 Units, subcutaneous, q8h JENNYFER, Daniel White MD, 5,000 Units at 11/19/21 0623  •  [Provider Managed Hold] lenalidomide (REVLIMID) capsule 25 mg, 25 mg, oral, Daily, Daniel White MD  •  mometasone-formoterol (DULERA 200) 200-5 mcg/actuation inhaler 2 puff, 2 puff, inhalation, BID, Daniel White MD, 2 puff at 11/19/21 0921  •  nitroglycerin (NITROSTAT) SL tablet 0.4 mg, 0.4 mg, sublingual, q5 min PRN, Daniel Whiet MD  •  polyethylene glycol (MIRALAX) 17 gram packet 17 g, 17 g, oral, BID, Avani Keen MD, 17 g at 11/18/21 0859  •  rosuvastatin (CRESTOR) tablet 10 mg, 10 mg, oral, Daily, Daniel White MD, 10 mg at 11/19/21 0921  •  tamsulosin (FLOMAX) 24 hr ER capsule 0.4 mg, 0.4 mg, oral, Daily, Daniel White MD, 0.4 mg at 11/19/21 0921    Physical Exam  Vitals and nursing note reviewed.   Constitutional:       General: He is not in acute distress.     Appearance: He is ill-appearing (chronically). He is not toxic-appearing.   HENT:      Head: Normocephalic and atraumatic.      Nose: No congestion or rhinorrhea.      Mouth/Throat:      Mouth: Mucous membranes are moist.   Eyes:      General: No scleral icterus.  Cardiovascular:      Rate and Rhythm: Normal rate and regular rhythm.   Pulmonary:      Effort: Pulmonary effort is normal.      Comments: No signs of respiratory distress on RA. +Diminished breath sounds to the bilateral lung bases.  Abdominal:      General: Bowel sounds are normal. There is no distension.      Palpations: Abdomen is soft.      Tenderness: There is no abdominal tenderness. There is no guarding or rebound.   Genitourinary:     Comments: Mancia in place draining clear yellow urine  Musculoskeletal:      Right lower leg: No edema.      Left lower leg: No edema.   Skin:     General: Skin is warm and dry.      Comments: R upper arm midline site with c/d/i dressing, no tenderness/swelling/drainage/or erythema noted at insertion site or area surrounding line.    Neurological:       General: No focal deficit present.      Mental Status: He is alert and oriented to person, place, and time.   Psychiatric:         Mood and Affect: Mood normal.         Behavior: Behavior normal.       Data Review  Lab Results   Component Value Date    WBC 11.21 (H) 11/19/2021    HGB 9.7 (L) 11/19/2021    HCT 29.4 (L) 11/19/2021     11/19/2021    CHOL 126 05/28/2021    TRIG 62 05/28/2021    HDL 43 (L) 05/28/2021    ALT 34 10/31/2021    AST 37 10/31/2021     (L) 11/19/2021    K 5.4 (H) 11/19/2021    CL 99 11/19/2021    CREATININE 0.7 (L) 11/19/2021    BUN 13 11/19/2021    CO2 26 11/19/2021    TSH 2.23 12/12/2017    PSA 1.55 03/17/2021    INR 1.0 03/19/2019     Pertinent Labs  Na 135  K 5.4  Bicarb 26  BUN 13  Cr 0.7  Glu 85  Mg 1.8     WBC 10.75 --> 11.44 --> 12.23 --> 11.21  Hgb 9.7      Imaging  No new imaging available in the last 24 hours    ECG/Telemetry  I have independently reviewed the telemetry. No events for the last 24 hours.    VTE Assessment: I have reassessed and the patient's VTE risk and treatment plan is appropriate.     No new subjective & objective note has been filed under this hospital service since the last note was generated.    Assessment & Plan  Patient Active Problem List   Diagnosis   • BPH (benign prostatic hyperplasia)   • RBBB   • Hyperlipidemia   • Weakness   • UTI (urinary tract infection)   • CAD (coronary artery disease)   • Anemia   • Chronic systolic heart failure (CMS/HCC)   • COVID-19   • Multiple myeloma (CMS/HCC)   • Bacteremia due to Klebsiella pneumoniae     Assessment & Plan  Hyperlipidemia  Assessment & Plan  -Continue daily statin    Multiple myeloma (CMS/HCC)  Assessment & Plan  Currently on chemotherapy outpatient following with Dr. Romero  -Holding Revlimid since admission in s/o infection, f/u with his oncologist after D/C to discuss when to resume: scheduled to see Dr. Romero on 11/23/21    COVID-19  Assessment & Plan   +screen on admission  (10/31/21)  No current signs or symptoms related to acute Covid infection and medically stable for Discharge to skilled nursing facility today.  UTD on vaccination:  COVID-19 Vaccine, Moderna 2/25/2021 , 1/28/2021     Continue home Advair  -Status post monoclonal antibody therapy on 11/1  -No longer on enhanced precautions due to completion of isolation period while inpatient.      Chronic systolic heart failure (CMS/HCC)  Assessment & Plan  EF 40-45% on home lasix PRN: Recently saw his Cardiologist Dr. Ellis who stopped his Metoprolol and changed his Lasix from daily to PRN schedule due to hypotension   - appears Euvolemic on exam, lungs clear, no LE edema  - Can order lasix PRN  - Daily standing weight as able. Only bed scale documented.   - strict I/Os   - Cardiology following with thanks: anti-hypertensives remain on hold. He will need a TTE in 3 months of discharge to reassess his LV function (currently scheduled on 1/31/21)    Anemia  Assessment & Plan  Presents with Hgb ~9 (baseline ~10).  Has known MM on chemotherapy which likely explains this. No report of active bleeding  - CBC daily while inpatient  - Will follow-up with Heme-Onc on discharge to establish resumption of outpatient chemo given recent acute infection  - transfuse Hb <7  - Continue home iron supplementation     CAD (coronary artery disease)  Assessment & Plan  -Has known 50% LAD occlusion  -No anginal symptoms reported  -Continue with home statin and ASA  -F/up with Dr. Ellis on discharge    UTI (urinary tract infection)  Assessment & Plan  Uclx prior to admission c/w ESBL klebsiella MDRO including Meropenem, s. Bactrim  Received 5 days bactrim PTA  U/A TNTC WBC, 3+ Bacteria, pos leuk esterase and nitrite  Lactate 2.1, Hypotensive with BP 90/54   No leukocytosis    - Sepsis now resolved; with concomitant Bacteremia on infectious work-up  - Repeat UCx on 10/31 demonstrates MDR ESBL Klebsiella; BCx 10/31 positive for same organism  - BCx  cleared as of 11/3  - Treated with Avycaz x14 days - completed 11/16 w/ ID guidance.   -Order placed to discontinue R basilic Midline today   - BP chronically low but stable at this time.  - s/p CT urogram 11/4 showing bilateral hydro, Uro following and s/p Serrano placement  - Maintain serrano for now: will need Uro outpatient follow up on discharge for void trial and to discuss treatment options with Dr. Mathew (scheduled for 11/24).  -Scheduled for post-hospital follow-up with Dr. Adams on 11/30    Weakness  Assessment & Plan  Likely 2/2 multiple recent hospitalizations/SNF stay and acute infection (UTI/bacteremia, +COVID)  - PT/OT appreciated: recommended for SNF on discharge.  -DC planned for today 1530 to [facility]      BPH (benign prostatic hyperplasia)  Assessment & Plan  -C/w home Flomax  -Serrano catheter now in place  -Follows with Urology Dr. Mathew: needs follow-up for void trial on discharge (scheduled for 11/24; he is able to be transported from SNF for appts per d/w SW 11/18).    #Global  -Full code  -Regular (Kosher) diet; 2L FR  -DVT pplx: Heparin subcutaneous      Expected Discharge Date:  11/19/2021

## 2021-11-19 NOTE — PROGRESS NOTES
Daily Progress Note    Subjective    Interval History: Patient denies any shortness of breath, cough, nausea, vomiting, or diarrhea today.  He looks forward to going to a skilled nursing facility but he regains strength.    Objective    Vital signs in last 24 hours:  Temp:  [36.6 °C (97.8 °F)-36.8 °C (98.3 °F)] 36.8 °C (98.3 °F)  Heart Rate:  [63-71] 63  Resp:  [16-18] 18  BP: ()/(47-60) 94/54      Intake/Output Summary (Last 24 hours) at 11/19/2021 0802  Last data filed at 11/19/2021 0600  Gross per 24 hour   Intake 720 ml   Output 975 ml   Net -255 ml       Physical Exam:  Lungs-slightly decreased with rare crackles  Heart-regular  Soft positive bowel sounds no guarding or rebound, no tenderness  Extremities-no edema    Labs  Hemoglobin today 9.7 with WBC 11.2, sodium 135, potassium 5.4, BUN 13, creatinine 0.7    Imaging  Not applicable    ECG/Telemetry  I have independently reviewed the telemetry. Significant findings include Sinus rhythm.    VTE Assessment: I have reassessed and the patient's VTE risk and treatment plan is appropriate.       Assessment & Plan    1.  Urosepsis with multidrug resistant Klebsiella.  Completed 14 days of IV Avycaz.  Remains afebrile.  Neurology recommended continue Mancia catheter until outpatient follow-up with them for voiding trial.  He does remain quite deconditioned and will benefit from skilled nursing facility stay  2.  History of CHF.  Remains reasonably stable and compensated with blood pressure remains on the low side.  3.  Covid positive.  He was Covid positive during admission to [facility] a few months ago.  He again tested positive on this admission and was treated with monoclonal antibody therapy.  Fortunately he is remained afebrile without evidence of respiratory distress during this admission.  There is been no clinical deterioration.  Positive Covid quarantine time requirement.  4. Multiple myeloma.  His Revlimid has been on hold due to the current  chronic bacteremia.  We will follow-up with heme-onc as an outpatient to discuss resuming this after discharge.  5.  Deconditioning.  Has had physical therapy here while awaiting transfer to a skilled nursing facility.  Hopefully this will be accomplished later today.    Expected Discharge Date:  11/19/2021

## 2021-11-19 NOTE — PLAN OF CARE
Problem: Adult Inpatient Plan of Care  Goal: Plan of Care Review  Outcome: Progressing  Flowsheets (Taken 11/19/2021 1033)  Progress: improving  Plan of Care Reviewed With:  • daughter  • patient     Per information in medical rounds, Pt remains medically stable for d/c today. Pt was Covid+ on 10/31 and fully vaccinated. Covid isolation removed as quarantine completed during admission. PT/OT rec'd SNF. SW in communication with Pt and daughter (Talli: [phone]), who are agreeable with SNF placement. Pt prefers daughter select facility. Referrals sent and Pt's daughter selected [facility].     Nasreen at [facility] ([phone]) confirmed Pt is accepted for today. Nasreen requesting repeat Covid swab even though Pt was recently Covid+. She states if still positive, Pt will still be able to d/c to [facility] for SNF. CARSON and RN made aware. BLS via [facility] scheduled for 3:30pm this afternoon to SNF. D/c instructions completed. SW to remain available for emotional support.    1:00pm: Repeat Covid swab resulted as negative. Update provided to Pt's daughter and made aware that if another swab is negative in 24 hours, Pt may have additional SNF choices. Pt's daughter spoke with Pt, who prefers to d/c to [facility] today. Nasreen at [facility] updated on negative Covid swab. CARSON and RN made aware.    LAURENT Howard [phone]

## 2021-11-19 NOTE — PROGRESS NOTES
Cardiology Inpatient  Progress Note       SUBJECTIVE   This is a 76 y.o. year-old male admitted on 10/31/2021 with Weakness [R53.1]  Elevated serum creatinine [R79.89]  Urinary tract infection associated with catheterization of urinary tract, unspecified indwelling urinary catheter type, initial encounter (CMS/[facility]) [T83.511A, N39.0].    Interval History: He reports feeling better.  Still weak.  Denies CP, SOB, orthopnea. He is anxious to go to rehab.    A 14-point review of system was performed and was negative, or as documented above. 10 systems examined.   OBJECTIVE      Vital signs in last 24 hours:  Temp:  [36.4 °C (97.6 °F)-36.8 °C (98.3 °F)] 36.5 °C (97.7 °F)  Heart Rate:  [62-68] 64  Resp:  [16-18] 18  BP: ()/(44-59) 94/44      Intake/Output Summary (Last 24 hours) at 11/19/2021 1100  Last data filed at 11/19/2021 0600  Gross per 24 hour   Intake 480 ml   Output 975 ml   Net -495 ml     Weights (last 5 days)     Date/Time Weight    11/19/21 0528 60.8 kg (134 lb)    11/18/21 0534 60.6 kg (133 lb 8 oz)    11/15/21 0600 61 kg (134 lb 7.7 oz)    11/14/21 0700 61 kg (134 lb 7.7 oz)          PHYSICAL EXAMINATION      Physical Exam   General: Pleasant, weak.  HEENT: No corneal arcus or xanthelasmas.  Sclerae are anicteric.  Nares patent.  Mucous membranes are slightly dry.  Neck: Supple.  JVP is 5 cm/H2O.  Carotids are equal with no audible bruits.  No lymphadenopathy or thyromegaly.  Heart: Regular.  Normal S1 and S2.  No S4. No S3. No murmur.  Chest: Symmetrical.  Lungs: Clear bilaterally without rales, wheezes nor rhonchi.  Abdomen: Soft, nontender.  No masses or bruits.  No organomegaly.  Normal bowel sounds.  : +serrano with clear yellow urine  Extremities: No cyanosis or clubbing. Trace pedal edema, L>R.  Distal pulses are easily palpable.  Skin: Deeply tanned.  Warm and dry and well perfused.   Neurologic: Alert and oriented ×3 with occasional confusion and forgetfulness.  Cranial nerves II through  XII are intact.  Psychiatric: normal mood, affect & judgment.   LABS / IMAGING / TELE/ MEDS      Labs  Results from last 7 days   Lab Units 11/19/21 0457 11/18/21 0440 11/17/21 0442   SODIUM mEQ/L 135* 133* 134*   POTASSIUM mEQ/L 5.4* 5.0 5.0   MAGNESIUM mg/dL 1.8 1.8 1.9   CHLORIDE mEQ/L 99 97* 100   CO2 mEQ/L 26 27 23   BUN mg/dL 13 13 13   CREATININE mg/dL 0.7* 0.7* 0.7*   EGFR mL/min/1.73m*2 >60.0 >60.0 >60.0   CALCIUM mg/dL 8.8* 8.5* 8.6*   GLUCOSE mg/dL 85 89 73     Results from last 7 days   Lab Units 11/19/21 0457 11/18/21 0440 11/17/21 0442   WBC K/uL 11.21* 12.23* 11.44*   HEMOGLOBIN g/dL 9.7* 9.3* 9.4*   HEMATOCRIT % 29.4* 29.2* 28.8*   PLATELETS K/uL 325 351* 357*      0   Lab Value Date/Time    INR 1.0 03/19/2019 1409     Lab Results   Component Value Date    CHOL 126 05/28/2021    TRIG 62 05/28/2021    HDL 43 (L) 05/28/2021    LDLCALC 71 05/28/2021         Lab Results   Component Value Date    LACTATE 1.1 11/05/2021    LACTATE 0.8 10/31/2021    LACTATE 2.1 (H) 10/31/2021       ECG/Telemetry  I have independently reviewed the telemetry. No events for the last 24 hours.    Imaging    Echocardiogram 08/23/2021: Performed at [facility]  Normal left ventricular cavity size. There is mild concentric left ventricular hypertrophy. There is mild global hypokinesis involving all segments of the left ventricle.  Mild left ventricular systolic dysfunction.  The ejection fraction estimate is 40-45%. Abnormal left ventricular diastolic function.  The left atrial volume is normal.   The right ventricle size is borderline enlarged. The right ventricular systolic function is normal.    Normal opening and closure of the aortic valve. There is trace aortic regurgitation.  Grossly normal mitral valve.  Grossly normal tricuspid valve.  Trace tricuspid regurgitation.  Mean PA pressure is normal.  Normal IVC. Normal respiratory collapse.  No significant pericardial effusion.     Left heart catheterization  9/18/2019:  Mid LAD 50% stenosis at the level of a diagonal branch which also has 50% ostial stenosis. Both LAD and diagonal branches were iFR negative. Normal LVEDP.     Stress echocardiogram 6/25/2019:  1. Conclusion: Stress echo does not meet criteria for ischemia.  2. Baseline Echo: Normal left ventricular size and function. No wall motion abnormalities. Ejection fraction 65%. Normal left atrial size.  Mild mitral annular calcification. Trace mitral regurgitation. Tricuspid aortic valve. Mild aortic regurgitation. Normal right atrium. Normal  right ventricular size and function. Trace tricuspid regurgitation. The jet is insufficient to estimate right ventricular systolic pressure.  3. Post-Stress Echo: All walls become hyperdynamic. Ejection fraction improves.  4. Stress ECG does not meet criteria for ischemia.  5. Good exercise tolerance. Holman treadmill score is 8, associated with low risk for near-future cardiac events.      Home Medications:  •  ferrous sulfate, Take 65 mg by mouth daily with breakfast.  •  aspirin, Take 1 tablet (81 mg total) by mouth daily.  •  cholecalciferol (vitamin D3), Take 2,000 Units by mouth daily.  •  coenzyme Q10, Take by mouth daily.    •  compress.stocking,knee,reg,med, 1 each daily. Apply in AM and remove in PM.  •  fluticasone propion-salmeteroL, Inhale 1 puff 2 (two) times a day.  Rinse mouth with water after use to reduce aftertaste and incidence of candidiasis.  Do not swallow. For patients not on a ventilator, a spacer is recommended to be used with this medication/inhaler.  •  furosemide, Take 1 tablet (20 mg total) by mouth daily as needed (weight gain of 3 lbs in 1 day or 5 lbs in 1 week).  •  multivit-min/folic/vit K/lycop (MEN'S MULTIVITAMIN ORAL), Take by mouth daily.    •  REVLIMID, Take  25 mg daily     •  rosuvastatin, Take 1 tablet (10 mg total) by mouth daily.  •  sulfamethoxazole-trimethoprim, Take 1 tablet by mouth 2 (two) times a day.    •  tamsulosin, Take  0.4 mg by mouth 2 (two) times a day.      Scheduled Meds:  • aspirin  81 mg oral Daily   • cholecalciferol (vitamin D3)  1,000 Units oral BID   • cycloSPORINE  1 drop Both Eyes BID   • ferrous sulfate  325 mg oral Every other day   • heparin (porcine)  5,000 Units subcutaneous q8h [facility]   • [Provider Managed Hold] lenalidomide  25 mg oral Daily   • mometasone-formoterol  2 puff inhalation BID   • polyethylene glycol  17 g oral BID   • rosuvastatin  10 mg oral Daily   • tamsulosin  0.4 mg oral Daily          IMPRESSION/RECOMMENDATIONS:  1. Klebsiella UTI - He was hospitalized in August 2021 and again in September 2021. He was treated with a course of fosfomycin then Bactrim.  He was restarted on Bactrim as outpatient.  Urine this admission w ESBL MDRO.    He completed course of Avycaz per ID recommendations.  2. BPH with urinary retention - He continues on Flomax. He was also on doxazosin but I instructed him to remain off of it until his blood pressure and lightheadedness improved.  He is status post serrano placement with 900 ml in bladder. Urology following.   3. Persistent Klebsiella bacteremia - ID is following.  Hemodynamically stable. He has native valves therefore do not suspect valvular vegetation with gram negative bacteria.  Repeat blood cx negative.   4. Covid-19 pneumonia - He was hospitalized in August 2021. He had associated hypoxemic respiratory failure but fortunately did not require intubation.  He completed a course of Remdesivir.  He again tested positive this admission.  Unclear if new Covid-19 infection.  He is status post monoclonal antibodies.    5. Altered mental status - CT head negative for infarct, mass or ICH; there was progression of ventriculomegaly.  This seems to be improving.   6. Left ventricular systolic dysfunction - His echocardiogram in August 2021 during his acute illness showed mild left ventricular systolic dysfunction with an EF of 40-45%.  He was started on metoprolol succinate  25 mg during his prior hospital stay however he has been hypotensive with intermittent bradycardia on admission.  I recommended that he remain off beta-blocker. Hold off on introducing an ACE-inhibitor or ARB in due to hypotension and recent acute renal insuffiency.  He appears euvolemic on exam today.  I had stopped his daily loop diuretic.  Monitor Is and Os. Weigh daily.  He can use furosemide 20 mg daily as needed for weight gain or congestion.  He will need a repeat echocardiogram in 3 months to reassess his LV systolic function.    7. Hypotension - His blood pressure remains low normal but stable.  I recommend that he remain off of metoprolol, doxazosin and daily loop diuretic.  I ask him to drink at least 48-64 ounces of fluid per day.    8. Lower extremity edema - This has significantly improved.  His albumin is low and I suspect that his edema is more due to decreased osmotic pressure. I recommended that he increase his protein intake with diet and nutritional supplements.  I also recommended compression stockings.  He should only use his furosemide as needed for weight gain of 3 lbs in 1 day or 5 lbs in 1 week.  9. Acute renal insuffiencey - He was volume depleted in the office due to poor oral intake and ongoing diuretic use.  I recommended that he increase his fluid intake and only use his diuretic as needed.    10. Moderate nonobstructive single vessel 50% mid LAD and 50% ostial D1 stenosis by catheterization 9/18/2019- he has no symptoms of angina pectoralis.  He should continue on aspirin and rosuvastatin therapy along with lifestyle modification.  Mild troponin elevation during acute illness due to demand ischemia.  No ischemic work up indicated at this time.  11. Exertional chest pain -in 2019 he was noting left-sided, graded 3-4/10 chest pain which resolved with rest.  Interestingly, however, he was doing a spinning class for 1 hour breaking a solid sweat and has absolutely no symptoms.  This has  not recurred.  12. Hyperlipidemia -I reviewed his latest lipid profile outlined above.  I have asked him to stop atorvastatin and resume his rosuvastatin therapy.    13. Asthma -He is on Advair.  He follows with Suresh Cardoza.  14. MAC - This was noted on his stress echo which is outlined above.  15. Aortic insufficiency -this was graded mild on his stress echocardiogram and trace on his most recent echocardiogram in August 2021.  This is probably age-related changes to the aortic valve.  16. Bifascicular block with LAFB/RBBB-I previously reviewed this issue with him.  It is probably of no clinical consequence and will be followed with an annual EKG and he will report any symptoms of syncope to me.  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  He is on Revlimid.    He has a scheduled office visit in February 2022.    CARSON Nation  11/19/2021

## 2021-11-19 NOTE — PATIENT CARE CONFERENCE
Care Progression Rounds Note  Date: 11/19/2021  Time: 10:27 AM     Patient Name: Eulalio Gregg     Medical Record Number: [id number]   YOB: 1945  Sex: Male      Room/Bed: 0202    Admitting Diagnosis: Weakness [R53.1]  Elevated serum creatinine [R79.89]  Urinary tract infection associated with catheterization of urinary tract, unspecified indwelling urinary catheter type, initial encounter (CMS/[facility]) [T83.511A, N39.0]   Admit Date/Time: 10/31/2021 12:15 PM    Primary Diagnosis: Bacteremia due to Klebsiella pneumoniae  Principal Problem: Bacteremia due to Klebsiella pneumoniae    GMLOS: pending  Anticipated Discharge Date: 11/19/2021    AM-PAC:  Mobility Score: 17    Discharge Planning:  Current Living Arrangements: home  Concerns to be Addressed: discharge planning  Anticipated Discharge Disposition: skilled nursing facility  Type of Skilled Nursing Care Services: OT,PT    Barriers to Discharge:  None    Comments:       Participants:  ,advanced practice provider,social work/services (Nurse Practitioner)

## 2021-11-19 NOTE — DISCHARGE INSTRUCTIONS
"  Eulalio Gregg is a 76 y.o. male with a past medical history of HFrEF (LVEF 40-45%, 8/23/21), CAD, HTN, HLP, Multiple Myeloma, BPH, and who presented to [facility] on 10/31/21 with generalized weakness and fatigue and outpatient diagnosed UTI. Outpatient urine culture revealed MDR ESBL Klebsiella sensitive to Bactrim, which was started by his PCP prior to admission. Repeat UCx on admission revealed the same organism, and further infectious work-up demonstrated a MDR Klebsiella bacteremia. He was evaluated by ID and has now completed a 14-day course of IV Avycaz (end date 11/16/2021).     He was followed by Urology with persistent bacteremia from  source, with concern for structural cause: his renal/bladder US showed mild hydro, thus a Mancia was placed on 11/4/2021 and CT Urogram was obtained showing: \"There is a bilateral large extrarenal pelvis bilaterally, right greater than left with segmental dilatation of ectatic ureters but no obstructing mass or calcification. There are numerous small parenchymal lesions in both kidneys, many small and difficult to characterize. 2 more prominent areas of subtle decreased attenuation on the right are noted and subtle masses or lesion certainly not excluded. There is a distended urinary bladder with numerous diverticula. Intraluminal mass at the left base is noted uncertain whether an extension of the prostate or separate intraluminal lesion, further evaluation advised.\" As his bacteremia ultimately improved with IV antibiotics, Urology has recommended leaving Mancia catheter in place until outpatient follow-up with his Urologist Dr. Mathew for void trial and discussion of further treatments.     On admission, patient was positive for COVID-19 (with history of known infection in August 2021 s/p hospitalization in [place]; he has completed 2 doses of the Moderna vaccine as well as a Booster vaccine). He received monoclonal antibody infusion while inpatient on 11/1/21. He is now off " COVID isolation precautions having completed the required isolation period while inpatient. He demonstrates no current signs or symptoms related to acute COVID infection.    Given multiple recent hospitalizations and acute illness leading to deconditioning, patient was evaluated by PT/OT and recommended for SNF placement.     In regards to his history of CHF: patient should be monitored with daily standing weights and maintained on a fluid restriction of 2L in 24 hours. His weight on day of discharge was 134 lbs (11/19/2021).     He is currently on daily oral Revlimid for multiple myeloma: this medication has been held at the recommendation of Hematology/Oncology due to recent acute infection. He will need close follow-up with his Oncologist to determine when to resume this medication.      Upon discharge, patient should follow-up with the following providers:  -Dr. Latoya Romero (Hematology-Oncology): follow-up as scheduled on Tuesday, 11/23, at 8:45 AM.   [facility], [address]  [address] [place] [address]  [phone]    -Dr. Villa Mathew (Urology): follow-up as scheduled for a void trial. Maintain Mancia catheter until follow-up appointment on Wednesday, 11/24, at 10:00 AM.   Office is located at: [facility], [address]  [address] [place] [address]  [phone]    -Dr. Tico Adams (Infectious Diseases- [facility]): follow-up as scheduled on Tuesday, 11/30, at 8:40 AM.   Office is located at: [facility], [address]  [address] [place] [address]  [phone]    -His PCP Dr. Otto Dennison: follow-up in 1 week of discharge from skilled rehab.    -His Cardiologist Dr. Donal Ellis: schedule post-hospital follow-up within 3-4 weeks of discharge from skilled rehab.

## 2021-11-23 ENCOUNTER — LAB REQUISITION (OUTPATIENT)
Dept: LAB | Facility: HOSPITAL | Age: 76
End: 2021-11-23
Attending: INTERNAL MEDICINE
Payer: COMMERCIAL

## 2021-11-23 ENCOUNTER — TELEPHONE (OUTPATIENT)
Dept: SCHEDULING | Facility: CLINIC | Age: 76
End: 2021-11-23
Payer: MEDICARE

## 2021-11-23 DIAGNOSIS — D64.9 ANEMIA, UNSPECIFIED: ICD-10-CM

## 2021-11-23 DIAGNOSIS — E86.0 DEHYDRATION: ICD-10-CM

## 2021-11-23 DIAGNOSIS — C90.00 MULTIPLE MYELOMA NOT HAVING ACHIEVED REMISSION (CMS/HCC): ICD-10-CM

## 2021-11-23 DIAGNOSIS — E07.9 DISORDER OF THYROID, UNSPECIFIED: ICD-10-CM

## 2021-11-23 DIAGNOSIS — D47.2 MONOCLONAL GAMMOPATHY: ICD-10-CM

## 2021-11-23 DIAGNOSIS — M81.8 OTHER OSTEOPOROSIS WITHOUT CURRENT PATHOLOGICAL FRACTURE: ICD-10-CM

## 2021-11-23 LAB
BASOPHILS # BLD: 0.05 K/UL (ref 0.01–0.1)
BASOPHILS NFR BLD: 0.4 %
DIFFERENTIAL METHOD BLD: ABNORMAL
EOSINOPHIL # BLD: 0.18 K/UL (ref 0.04–0.54)
EOSINOPHIL NFR BLD: 1.4 %
ERYTHROCYTE [DISTWIDTH] IN BLOOD BY AUTOMATED COUNT: 20 % (ref 11.6–14.4)
HCT VFR BLDCO AUTO: 33 % (ref 40.1–51)
HGB BLD-MCNC: 11.3 G/DL (ref 13.7–17.5)
IMM GRANULOCYTES # BLD AUTO: 0.06 K/UL (ref 0–0.08)
IMM GRANULOCYTES NFR BLD AUTO: 0.5 %
LYMPHOCYTES # BLD: 1.31 K/UL (ref 1.2–3.5)
LYMPHOCYTES NFR BLD: 10.3 %
MCH RBC QN AUTO: 31.6 PG (ref 28–33.2)
MCHC RBC AUTO-ENTMCNC: 34.2 G/DL (ref 32.2–36.5)
MCV RBC AUTO: 92.2 FL (ref 83–98)
MONOCYTES # BLD: 0.88 K/UL (ref 0.3–1)
MONOCYTES NFR BLD: 6.9 %
NEUTROPHILS # BLD: 10.26 K/UL (ref 1.7–7)
NEUTROPHILS # BLD: 10.26 K/UL (ref 1.7–7)
NEUTS SEG NFR BLD: 80.5 %
NRBC BLD-RTO: 0 %
PDW BLD AUTO: 12.6 FL (ref 9.4–12.4)
PLATELET # BLD AUTO: 275 K/UL (ref 150–350)
RBC # BLD AUTO: 3.58 M/UL (ref 4.5–5.8)
WBC # BLD AUTO: 12.74 K/UL (ref 3.8–10.5)

## 2021-11-23 PROCEDURE — 36415 COLL VENOUS BLD VENIPUNCTURE: CPT | Performed by: INTERNAL MEDICINE

## 2021-11-23 PROCEDURE — 85025 COMPLETE CBC W/AUTO DIFF WBC: CPT | Performed by: INTERNAL MEDICINE

## 2021-11-23 NOTE — TELEPHONE ENCOUNTER
Hospital Follow Up     Name of patient: Eulalio MATUTE Cristino    New or Established: Est    PCP: Dr. Otto Dennison    Doctor requested: Dr. Ellis    Location of Admission: [facility]    Timeframe instructed to follow-up within: 1-2 Weeks     Best contact number: [facility] [phone]

## 2021-11-23 NOTE — TELEPHONE ENCOUNTER
Duarte - He can keep his scheduled appt with me on 2/7/2022    JOHNNY - Eulalio has a scheduled appt with us on 2/7/2022.  No earlier HFU appt is needed.

## 2021-11-23 NOTE — TELEPHONE ENCOUNTER
JBERNARD TY     Spoke w Imelda @ [facility] & informed her that the pt can keep his 2/7 appt. No earlier hfu appt is needed

## 2021-12-07 NOTE — TELEPHONE ENCOUNTER
I called the pt and inquired as to why he wanted a sooner appt. He reports he thought that Dr. Ellis had wanted him to have a sooner one. I notified that per the notes below Dr. Ellis and CARSON James were both aware that he has an appt on 2/7 and unless his physical status has worsened they would like him to keep that appt, as they saw him the day of hospital d/c. He verbalized understanding.

## 2021-12-07 NOTE — TELEPHONE ENCOUNTER
GW - Please inquire as to reason for earlier appointment.  We saw him on day of discharge from [facility] and planned to see him on 2/7 as scheduled.

## 2021-12-29 ENCOUNTER — LAB REQUISITION (OUTPATIENT)
Dept: LAB | Facility: HOSPITAL | Age: 76
End: 2021-12-29
Attending: INTERNAL MEDICINE
Payer: COMMERCIAL

## 2021-12-29 DIAGNOSIS — E86.0 DEHYDRATION: ICD-10-CM

## 2021-12-29 DIAGNOSIS — E07.9 DISORDER OF THYROID, UNSPECIFIED: ICD-10-CM

## 2021-12-29 DIAGNOSIS — D47.2 MONOCLONAL GAMMOPATHY: ICD-10-CM

## 2021-12-29 DIAGNOSIS — D64.9 ANEMIA, UNSPECIFIED: ICD-10-CM

## 2021-12-29 DIAGNOSIS — M81.8 OTHER OSTEOPOROSIS WITHOUT CURRENT PATHOLOGICAL FRACTURE: ICD-10-CM

## 2021-12-29 DIAGNOSIS — C90.00 MULTIPLE MYELOMA NOT HAVING ACHIEVED REMISSION (CMS/HCC): ICD-10-CM

## 2021-12-29 LAB
ALBUMIN SERPL-MCNC: 3.1 G/DL (ref 3.4–5)
ALP SERPL-CCNC: 327 IU/L (ref 35–126)
ALT SERPL-CCNC: 27 IU/L (ref 16–63)
ANION GAP SERPL CALC-SCNC: 9 MEQ/L (ref 3–15)
AST SERPL-CCNC: 34 IU/L (ref 15–41)
BASOPHILS # BLD: 0.02 K/UL (ref 0.01–0.1)
BASOPHILS NFR BLD: 0.2 %
BILIRUB SERPL-MCNC: 0.6 MG/DL (ref 0.3–1.2)
BUN SERPL-MCNC: 15 MG/DL (ref 8–20)
CALCIUM SERPL-MCNC: 8.6 MG/DL (ref 8.9–10.3)
CHLORIDE SERPL-SCNC: 105 MEQ/L (ref 98–109)
CO2 SERPL-SCNC: 24 MEQ/L (ref 22–32)
CREAT SERPL-MCNC: 0.9 MG/DL (ref 0.8–1.3)
DIFFERENTIAL METHOD BLD: ABNORMAL
EOSINOPHIL # BLD: 0.14 K/UL (ref 0.04–0.54)
EOSINOPHIL NFR BLD: 1.5 %
ERYTHROCYTE [DISTWIDTH] IN BLOOD BY AUTOMATED COUNT: 17.7 % (ref 11.6–14.4)
GFR SERPL CREATININE-BSD FRML MDRD: >60 ML/MIN/1.73M*2
GLUCOSE SERPL-MCNC: 102 MG/DL (ref 70–99)
HCT VFR BLDCO AUTO: 33.8 % (ref 40.1–51)
HGB BLD-MCNC: 10.9 G/DL (ref 13.7–17.5)
IMM GRANULOCYTES # BLD AUTO: 0.05 K/UL (ref 0–0.08)
IMM GRANULOCYTES NFR BLD AUTO: 0.5 %
KAPPA LC SERPL-MCNC: 18.29 MG/L (ref 8.96–34.28)
KAPPA LC/LAMBDA SER: 0.99 {RATIO} (ref 0.64–1.83)
LAMBDA LC SERPL-MCNC: 18.4 MG/L (ref 5.7–26.3)
LYMPHOCYTES # BLD: 1.25 K/UL (ref 1.2–3.5)
LYMPHOCYTES NFR BLD: 13.1 %
MCH RBC QN AUTO: 30.3 PG (ref 28–33.2)
MCHC RBC AUTO-ENTMCNC: 32.2 G/DL (ref 32.2–36.5)
MCV RBC AUTO: 93.9 FL (ref 83–98)
MONOCYTES # BLD: 0.92 K/UL (ref 0.3–1)
MONOCYTES NFR BLD: 9.6 %
NEUTROPHILS # BLD: 7.16 K/UL (ref 1.7–7)
NEUTROPHILS # BLD: 7.16 K/UL (ref 1.7–7)
NEUTS SEG NFR BLD: 75.1 %
NRBC BLD-RTO: 0 %
PDW BLD AUTO: 8.9 FL (ref 9.4–12.4)
PLATELET # BLD AUTO: 249 K/UL (ref 150–350)
POTASSIUM SERPL-SCNC: 4.1 MEQ/L (ref 3.6–5.1)
PROT SERPL-MCNC: 6.3 G/DL (ref 6–8.2)
RBC # BLD AUTO: 3.6 M/UL (ref 4.5–5.8)
SODIUM SERPL-SCNC: 138 MEQ/L (ref 136–144)
WBC # BLD AUTO: 9.54 K/UL (ref 3.8–10.5)

## 2021-12-29 PROCEDURE — 84165 PROTEIN E-PHORESIS SERUM: CPT | Performed by: INTERNAL MEDICINE

## 2021-12-29 PROCEDURE — 80053 COMPREHEN METABOLIC PANEL: CPT | Performed by: INTERNAL MEDICINE

## 2021-12-29 PROCEDURE — 83883 ASSAY NEPHELOMETRY NOT SPEC: CPT | Performed by: INTERNAL MEDICINE

## 2021-12-29 PROCEDURE — 85025 COMPLETE CBC W/AUTO DIFF WBC: CPT | Performed by: INTERNAL MEDICINE

## 2021-12-30 LAB
ALBUMIN MFR UR ELPH: 59.4 %
ALBUMIN SERPL ELPH-MCNC: 3.74 G/DL (ref 3.2–4.9)
ALBUMIN/GLOB SERPL: 1.5 {RATIO} (ref 1.1–2.1)
ALPHA1 GLOB MFR SERPL ELPH: 3.5 %
ALPHA1 GLOB SERPL ELPH-MCNC: 0.22 G/DL (ref 0.08–0.23)
ALPHA2 GLOB MFR UR ELPH: 12 %
ALPHA2 GLOB SERPL ELPH-MCNC: 0.76 G/DL (ref 0.45–0.92)
B-GLOBULIN SERPL ELPH-MCNC: 0.66 G/DL (ref 0.5–1.03)
BETA1 GLOB MFR UR ELPH: 10.5 %
GAMMA GLOB MFR UR ELPH: 14.6 %
GAMMA GLOB SERPL ELPH-MCNC: 0.92 G/DL (ref 0.6–1.6)
M PROTEIN MFR SERPL ELPH: 3.5 %
M PROTEIN SERPL ELPH-MCNC: 0.22 G/DL
PROT PATTERN SERPL ELPH-IMP: NORMAL
PROT SERPL-MCNC: 6.3 G/DL (ref 6–8.2)
